# Patient Record
Sex: MALE | Race: BLACK OR AFRICAN AMERICAN | NOT HISPANIC OR LATINO | URBAN - METROPOLITAN AREA
[De-identification: names, ages, dates, MRNs, and addresses within clinical notes are randomized per-mention and may not be internally consistent; named-entity substitution may affect disease eponyms.]

---

## 2023-08-20 ENCOUNTER — INPATIENT (INPATIENT)
Facility: HOSPITAL | Age: 88
LOS: 22 days | Discharge: NURSING, REHAB OR LONG-TERM CARE | End: 2023-09-12
Attending: INTERNAL MEDICINE | Admitting: INTERNAL MEDICINE
Payer: MEDICARE

## 2023-08-20 VITALS
SYSTOLIC BLOOD PRESSURE: 98 MMHG | WEIGHT: 100.09 LBS | HEART RATE: 60 BPM | OXYGEN SATURATION: 99 % | RESPIRATION RATE: 26 BRPM | DIASTOLIC BLOOD PRESSURE: 57 MMHG | HEIGHT: 66 IN

## 2023-08-20 LAB
ACANTHOCYTES BLD QL SMEAR: SLIGHT — SIGNIFICANT CHANGE UP
ALBUMIN SERPL ELPH-MCNC: 2.1 G/DL — LOW (ref 3.3–5)
ALBUMIN SERPL ELPH-MCNC: 2.5 G/DL — LOW (ref 3.3–5)
ALP SERPL-CCNC: 69 U/L — SIGNIFICANT CHANGE UP (ref 40–120)
ALP SERPL-CCNC: 78 U/L — SIGNIFICANT CHANGE UP (ref 40–120)
ALT FLD-CCNC: 50 U/L — SIGNIFICANT CHANGE UP (ref 12–78)
ALT FLD-CCNC: 54 U/L — SIGNIFICANT CHANGE UP (ref 12–78)
AMMONIA BLD-MCNC: 46 UMOL/L — HIGH (ref 11–32)
ANION GAP SERPL CALC-SCNC: 11 MMOL/L — SIGNIFICANT CHANGE UP (ref 5–17)
ANION GAP SERPL CALC-SCNC: 26 MMOL/L — HIGH (ref 5–17)
ANISOCYTOSIS BLD QL: SLIGHT — SIGNIFICANT CHANGE UP
APAP SERPL-MCNC: <2 UG/ML — LOW (ref 10–30)
APPEARANCE UR: CLEAR — SIGNIFICANT CHANGE UP
AST SERPL-CCNC: 132 U/L — HIGH (ref 15–37)
AST SERPL-CCNC: 177 U/L — HIGH (ref 15–37)
BACTERIA # UR AUTO: ABNORMAL
BASE EXCESS BLDA CALC-SCNC: -15.6 MMOL/L — LOW (ref -2–3)
BASE EXCESS BLDA CALC-SCNC: -8.1 MMOL/L — LOW (ref -2–3)
BASE EXCESS BLDA CALC-SCNC: -9 MMOL/L — LOW (ref -2–3)
BASOPHILS # BLD AUTO: 0 K/UL — SIGNIFICANT CHANGE UP (ref 0–0.2)
BASOPHILS NFR BLD AUTO: 0 % — SIGNIFICANT CHANGE UP (ref 0–2)
BILIRUB SERPL-MCNC: 0.8 MG/DL — SIGNIFICANT CHANGE UP (ref 0.2–1.2)
BILIRUB SERPL-MCNC: 0.8 MG/DL — SIGNIFICANT CHANGE UP (ref 0.2–1.2)
BILIRUB UR-MCNC: NEGATIVE — SIGNIFICANT CHANGE UP
BLD GP AB SCN SERPL QL: SIGNIFICANT CHANGE UP
BLOOD GAS COMMENTS ARTERIAL: SIGNIFICANT CHANGE UP
BUN SERPL-MCNC: 37 MG/DL — HIGH (ref 7–23)
BUN SERPL-MCNC: 41 MG/DL — HIGH (ref 7–23)
BURR CELLS BLD QL SMEAR: PRESENT — SIGNIFICANT CHANGE UP
CALCIUM SERPL-MCNC: 7.7 MG/DL — LOW (ref 8.5–10.1)
CALCIUM SERPL-MCNC: 8.4 MG/DL — LOW (ref 8.5–10.1)
CHLORIDE SERPL-SCNC: 103 MMOL/L — SIGNIFICANT CHANGE UP (ref 96–108)
CHLORIDE SERPL-SCNC: 108 MMOL/L — SIGNIFICANT CHANGE UP (ref 96–108)
CK SERPL-CCNC: 4428 U/L — HIGH (ref 26–308)
CO2 BLDA-SCNC: 12 MMOL/L — LOW (ref 19–24)
CO2 BLDA-SCNC: 15 MMOL/L — LOW (ref 19–24)
CO2 BLDA-SCNC: 16 MMOL/L — LOW (ref 19–24)
CO2 SERPL-SCNC: 12 MMOL/L — LOW (ref 22–31)
CO2 SERPL-SCNC: 20 MMOL/L — LOW (ref 22–31)
COLOR SPEC: YELLOW — SIGNIFICANT CHANGE UP
COMMENT - URINE: SIGNIFICANT CHANGE UP
CREAT SERPL-MCNC: 2.64 MG/DL — HIGH (ref 0.5–1.3)
CREAT SERPL-MCNC: 3.4 MG/DL — HIGH (ref 0.5–1.3)
DACRYOCYTES BLD QL SMEAR: SLIGHT — SIGNIFICANT CHANGE UP
DIFF PNL FLD: ABNORMAL
EGFR: 17 ML/MIN/1.73M2 — LOW
EGFR: 23 ML/MIN/1.73M2 — LOW
ELLIPTOCYTES BLD QL SMEAR: SLIGHT — SIGNIFICANT CHANGE UP
EOSINOPHIL # BLD AUTO: 0 K/UL — SIGNIFICANT CHANGE UP (ref 0–0.5)
EOSINOPHIL NFR BLD AUTO: 0 % — SIGNIFICANT CHANGE UP (ref 0–6)
EPI CELLS # UR: SIGNIFICANT CHANGE UP
ETHANOL SERPL-MCNC: <10 MG/DL — SIGNIFICANT CHANGE UP (ref 0–10)
GAS PNL BLDA: SIGNIFICANT CHANGE UP
GAS PNL BLDA: SIGNIFICANT CHANGE UP
GLUCOSE SERPL-MCNC: 107 MG/DL — HIGH (ref 70–99)
GLUCOSE SERPL-MCNC: 88 MG/DL — SIGNIFICANT CHANGE UP (ref 70–99)
GLUCOSE UR QL: NEGATIVE MG/DL — SIGNIFICANT CHANGE UP
HCO3 BLDA-SCNC: 11 MMOL/L — LOW (ref 21–28)
HCO3 BLDA-SCNC: 14 MMOL/L — LOW (ref 21–28)
HCO3 BLDA-SCNC: 15 MMOL/L — LOW (ref 21–28)
HCT VFR BLD CALC: 21.8 % — LOW (ref 39–50)
HCT VFR BLD CALC: 24.2 % — LOW (ref 39–50)
HGB BLD-MCNC: 6.1 G/DL — CRITICAL LOW (ref 13–17)
HGB BLD-MCNC: 7.5 G/DL — LOW (ref 13–17)
HOROWITZ INDEX BLDA+IHG-RTO: 100 — SIGNIFICANT CHANGE UP
HOROWITZ INDEX BLDA+IHG-RTO: 100 — SIGNIFICANT CHANGE UP
HOROWITZ INDEX BLDA+IHG-RTO: 28 — SIGNIFICANT CHANGE UP
HYPOCHROMIA BLD QL: SLIGHT — SIGNIFICANT CHANGE UP
KETONES UR-MCNC: ABNORMAL
LACTATE SERPL-SCNC: 14.4 MMOL/L — CRITICAL HIGH (ref 0.7–2)
LACTATE SERPL-SCNC: 5.8 MMOL/L — CRITICAL HIGH (ref 0.7–2)
LACTATE SERPL-SCNC: 9.3 MMOL/L — CRITICAL HIGH (ref 0.7–2)
LEUKOCYTE ESTERASE UR-ACNC: NEGATIVE — SIGNIFICANT CHANGE UP
LYMPHOCYTES # BLD AUTO: 0.96 K/UL — LOW (ref 1–3.3)
LYMPHOCYTES # BLD AUTO: 7 % — LOW (ref 13–44)
MAGNESIUM SERPL-MCNC: 2.4 MG/DL — SIGNIFICANT CHANGE UP (ref 1.6–2.6)
MANUAL SMEAR VERIFICATION: SIGNIFICANT CHANGE UP
MCHC RBC-ENTMCNC: 18.6 PG — LOW (ref 27–34)
MCHC RBC-ENTMCNC: 20.3 PG — LOW (ref 27–34)
MCHC RBC-ENTMCNC: 28 G/DL — LOW (ref 32–36)
MCHC RBC-ENTMCNC: 31 G/DL — LOW (ref 32–36)
MCV RBC AUTO: 65.4 FL — LOW (ref 80–100)
MCV RBC AUTO: 66.5 FL — LOW (ref 80–100)
MICROCYTES BLD QL: SLIGHT — SIGNIFICANT CHANGE UP
MONOCYTES # BLD AUTO: 0.27 K/UL — SIGNIFICANT CHANGE UP (ref 0–0.9)
MONOCYTES NFR BLD AUTO: 2 % — SIGNIFICANT CHANGE UP (ref 2–14)
NEUTROPHILS # BLD AUTO: 12.46 K/UL — HIGH (ref 1.8–7.4)
NEUTROPHILS NFR BLD AUTO: 66 % — SIGNIFICANT CHANGE UP (ref 43–77)
NEUTS BAND # BLD: 25 % — HIGH (ref 0–8)
NITRITE UR-MCNC: NEGATIVE — SIGNIFICANT CHANGE UP
NRBC # BLD: 1 /100 WBCS — HIGH (ref 0–0)
NRBC # BLD: 1 /100 — HIGH (ref 0–0)
NRBC # BLD: SIGNIFICANT CHANGE UP /100 WBCS (ref 0–0)
OVALOCYTES BLD QL SMEAR: SLIGHT — SIGNIFICANT CHANGE UP
PCO2 BLDA: 24 MMHG — LOW (ref 32–46)
PCO2 BLDA: 25 MMHG — LOW (ref 32–46)
PCO2 BLDA: 28 MMHG — LOW (ref 32–46)
PH BLDA: 7.2 — LOW (ref 7.35–7.45)
PH BLDA: 7.38 — SIGNIFICANT CHANGE UP (ref 7.35–7.45)
PH BLDA: 7.39 — SIGNIFICANT CHANGE UP (ref 7.35–7.45)
PH UR: 5 — SIGNIFICANT CHANGE UP (ref 5–8)
PHOSPHATE SERPL-MCNC: 4.7 MG/DL — HIGH (ref 2.5–4.5)
PLAT MORPH BLD: NORMAL — SIGNIFICANT CHANGE UP
PLATELET # BLD AUTO: 348 K/UL — SIGNIFICANT CHANGE UP (ref 150–400)
PLATELET # BLD AUTO: 430 K/UL — HIGH (ref 150–400)
PO2 BLDA: 109 MMHG — HIGH (ref 83–108)
PO2 BLDA: 306 MMHG — HIGH (ref 83–108)
PO2 BLDA: 32 MMHG — CRITICAL LOW (ref 83–108)
POIKILOCYTOSIS BLD QL AUTO: SIGNIFICANT CHANGE UP
POLYCHROMASIA BLD QL SMEAR: SLIGHT — SIGNIFICANT CHANGE UP
POTASSIUM SERPL-MCNC: 4 MMOL/L — SIGNIFICANT CHANGE UP (ref 3.5–5.3)
POTASSIUM SERPL-MCNC: 4.2 MMOL/L — SIGNIFICANT CHANGE UP (ref 3.5–5.3)
POTASSIUM SERPL-SCNC: 4 MMOL/L — SIGNIFICANT CHANGE UP (ref 3.5–5.3)
POTASSIUM SERPL-SCNC: 4.2 MMOL/L — SIGNIFICANT CHANGE UP (ref 3.5–5.3)
PROT SERPL-MCNC: 6.3 GM/DL — SIGNIFICANT CHANGE UP (ref 6–8.3)
PROT SERPL-MCNC: 7.3 GM/DL — SIGNIFICANT CHANGE UP (ref 6–8.3)
PROT UR-MCNC: 30 MG/DL
RAPID RVP RESULT: DETECTED
RBC # BLD: 3.28 M/UL — LOW (ref 4.2–5.8)
RBC # BLD: 3.7 M/UL — LOW (ref 4.2–5.8)
RBC # FLD: 20.6 % — HIGH (ref 10.3–14.5)
RBC # FLD: 22.5 % — HIGH (ref 10.3–14.5)
RBC BLD AUTO: ABNORMAL
RBC CASTS # UR COMP ASSIST: ABNORMAL /HPF (ref 0–4)
RV+EV RNA SPEC QL NAA+PROBE: DETECTED
SALICYLATES SERPL-MCNC: 2 MG/DL — LOW (ref 2.8–20)
SAO2 % BLDA: 100 % — HIGH (ref 94–98)
SAO2 % BLDA: 31.8 % — LOW (ref 94–98)
SAO2 % BLDA: 99.4 % — HIGH (ref 94–98)
SARS-COV-2 RNA SPEC QL NAA+PROBE: SIGNIFICANT CHANGE UP
SCHISTOCYTES BLD QL AUTO: SLIGHT — SIGNIFICANT CHANGE UP
SODIUM SERPL-SCNC: 139 MMOL/L — SIGNIFICANT CHANGE UP (ref 135–145)
SODIUM SERPL-SCNC: 141 MMOL/L — SIGNIFICANT CHANGE UP (ref 135–145)
SP GR SPEC: 1.02 — SIGNIFICANT CHANGE UP (ref 1.01–1.02)
TROPONIN I, HIGH SENSITIVITY RESULT: 242.1 NG/L — HIGH
TROPONIN I, HIGH SENSITIVITY RESULT: 276.9 NG/L — HIGH
TSH SERPL-MCNC: 1.81 UIU/ML — SIGNIFICANT CHANGE UP (ref 0.36–3.74)
TSH SERPL-MCNC: 2.55 UIU/ML — SIGNIFICANT CHANGE UP (ref 0.36–3.74)
UROBILINOGEN FLD QL: NEGATIVE MG/DL — SIGNIFICANT CHANGE UP
WBC # BLD: 13.69 K/UL — HIGH (ref 3.8–10.5)
WBC # BLD: 9.27 K/UL — SIGNIFICANT CHANGE UP (ref 3.8–10.5)
WBC # FLD AUTO: 13.69 K/UL — HIGH (ref 3.8–10.5)
WBC # FLD AUTO: 9.27 K/UL — SIGNIFICANT CHANGE UP (ref 3.8–10.5)
WBC UR QL: SIGNIFICANT CHANGE UP

## 2023-08-20 PROCEDURE — 74176 CT ABD & PELVIS W/O CONTRAST: CPT | Mod: 26,MA

## 2023-08-20 PROCEDURE — 99223 1ST HOSP IP/OBS HIGH 75: CPT

## 2023-08-20 PROCEDURE — 93970 EXTREMITY STUDY: CPT | Mod: 26

## 2023-08-20 PROCEDURE — 71250 CT THORAX DX C-: CPT | Mod: 26,MA

## 2023-08-20 PROCEDURE — 93010 ELECTROCARDIOGRAM REPORT: CPT

## 2023-08-20 PROCEDURE — 99222 1ST HOSP IP/OBS MODERATE 55: CPT

## 2023-08-20 PROCEDURE — 99291 CRITICAL CARE FIRST HOUR: CPT

## 2023-08-20 PROCEDURE — 72125 CT NECK SPINE W/O DYE: CPT | Mod: 26,MA

## 2023-08-20 PROCEDURE — 70450 CT HEAD/BRAIN W/O DYE: CPT | Mod: 26

## 2023-08-20 PROCEDURE — 70450 CT HEAD/BRAIN W/O DYE: CPT | Mod: 26,MA,77

## 2023-08-20 RX ORDER — CEFTRIAXONE 500 MG/1
1000 INJECTION, POWDER, FOR SOLUTION INTRAMUSCULAR; INTRAVENOUS EVERY 24 HOURS
Refills: 0 | Status: DISCONTINUED | OUTPATIENT
Start: 2023-08-20 | End: 2023-08-21

## 2023-08-20 RX ORDER — VANCOMYCIN HCL 1 G
1000 VIAL (EA) INTRAVENOUS ONCE
Refills: 0 | Status: COMPLETED | OUTPATIENT
Start: 2023-08-20 | End: 2023-08-20

## 2023-08-20 RX ORDER — SODIUM CHLORIDE 9 MG/ML
1000 INJECTION, SOLUTION INTRAVENOUS
Refills: 0 | Status: DISCONTINUED | OUTPATIENT
Start: 2023-08-20 | End: 2023-08-20

## 2023-08-20 RX ORDER — SODIUM CHLORIDE 9 MG/ML
1000 INJECTION, SOLUTION INTRAVENOUS ONCE
Refills: 0 | Status: COMPLETED | OUTPATIENT
Start: 2023-08-20 | End: 2023-08-20

## 2023-08-20 RX ORDER — PIPERACILLIN AND TAZOBACTAM 4; .5 G/20ML; G/20ML
3.38 INJECTION, POWDER, LYOPHILIZED, FOR SOLUTION INTRAVENOUS ONCE
Refills: 0 | Status: DISCONTINUED | OUTPATIENT
Start: 2023-08-20 | End: 2023-08-20

## 2023-08-20 RX ORDER — HEPARIN SODIUM 5000 [USP'U]/ML
4000 INJECTION INTRAVENOUS; SUBCUTANEOUS EVERY 6 HOURS
Refills: 0 | Status: DISCONTINUED | OUTPATIENT
Start: 2023-08-20 | End: 2023-08-20

## 2023-08-20 RX ORDER — AZITHROMYCIN 500 MG/1
500 TABLET, FILM COATED ORAL ONCE
Refills: 0 | Status: COMPLETED | OUTPATIENT
Start: 2023-08-20 | End: 2023-08-20

## 2023-08-20 RX ORDER — PANTOPRAZOLE SODIUM 20 MG/1
40 TABLET, DELAYED RELEASE ORAL
Refills: 0 | Status: DISCONTINUED | OUTPATIENT
Start: 2023-08-20 | End: 2023-08-21

## 2023-08-20 RX ORDER — AMIODARONE HYDROCHLORIDE 400 MG/1
150 TABLET ORAL ONCE
Refills: 0 | Status: COMPLETED | OUTPATIENT
Start: 2023-08-20 | End: 2023-08-20

## 2023-08-20 RX ORDER — AZITHROMYCIN 500 MG/1
500 TABLET, FILM COATED ORAL EVERY 24 HOURS
Refills: 0 | Status: DISCONTINUED | OUTPATIENT
Start: 2023-08-21 | End: 2023-08-21

## 2023-08-20 RX ORDER — HEPARIN SODIUM 5000 [USP'U]/ML
INJECTION INTRAVENOUS; SUBCUTANEOUS
Qty: 25000 | Refills: 0 | Status: DISCONTINUED | OUTPATIENT
Start: 2023-08-20 | End: 2023-08-20

## 2023-08-20 RX ORDER — IPRATROPIUM BROMIDE 0.2 MG/ML
500 SOLUTION, NON-ORAL INHALATION EVERY 6 HOURS
Refills: 0 | Status: DISCONTINUED | OUTPATIENT
Start: 2023-08-20 | End: 2023-08-21

## 2023-08-20 RX ORDER — CHLORHEXIDINE GLUCONATE 213 G/1000ML
1 SOLUTION TOPICAL
Refills: 0 | Status: DISCONTINUED | OUTPATIENT
Start: 2023-08-20 | End: 2023-08-23

## 2023-08-20 RX ORDER — AMIODARONE HYDROCHLORIDE 400 MG/1
0.5 TABLET ORAL
Qty: 450 | Refills: 0 | Status: DISCONTINUED | OUTPATIENT
Start: 2023-08-20 | End: 2023-08-20

## 2023-08-20 RX ORDER — HEPARIN SODIUM 5000 [USP'U]/ML
5000 INJECTION INTRAVENOUS; SUBCUTANEOUS EVERY 8 HOURS
Refills: 0 | Status: DISCONTINUED | OUTPATIENT
Start: 2023-08-20 | End: 2023-09-02

## 2023-08-20 RX ORDER — HEPARIN SODIUM 5000 [USP'U]/ML
2000 INJECTION INTRAVENOUS; SUBCUTANEOUS EVERY 6 HOURS
Refills: 0 | Status: DISCONTINUED | OUTPATIENT
Start: 2023-08-20 | End: 2023-08-20

## 2023-08-20 RX ORDER — NOREPINEPHRINE BITARTRATE/D5W 8 MG/250ML
0.05 PLASTIC BAG, INJECTION (ML) INTRAVENOUS
Qty: 8 | Refills: 0 | Status: DISCONTINUED | OUTPATIENT
Start: 2023-08-20 | End: 2023-08-20

## 2023-08-20 RX ORDER — SODIUM CHLORIDE 9 MG/ML
1000 INJECTION INTRAMUSCULAR; INTRAVENOUS; SUBCUTANEOUS
Refills: 0 | Status: DISCONTINUED | OUTPATIENT
Start: 2023-08-20 | End: 2023-08-20

## 2023-08-20 RX ORDER — AZITHROMYCIN 500 MG/1
TABLET, FILM COATED ORAL
Refills: 0 | Status: DISCONTINUED | OUTPATIENT
Start: 2023-08-20 | End: 2023-08-21

## 2023-08-20 RX ORDER — PIPERACILLIN AND TAZOBACTAM 4; .5 G/20ML; G/20ML
3.38 INJECTION, POWDER, LYOPHILIZED, FOR SOLUTION INTRAVENOUS ONCE
Refills: 0 | Status: COMPLETED | OUTPATIENT
Start: 2023-08-20 | End: 2023-08-20

## 2023-08-20 RX ORDER — SODIUM CHLORIDE 9 MG/ML
1500 INJECTION INTRAMUSCULAR; INTRAVENOUS; SUBCUTANEOUS ONCE
Refills: 0 | Status: COMPLETED | OUTPATIENT
Start: 2023-08-20 | End: 2023-08-20

## 2023-08-20 RX ORDER — AMIODARONE HYDROCHLORIDE 400 MG/1
1 TABLET ORAL
Qty: 450 | Refills: 0 | Status: DISCONTINUED | OUTPATIENT
Start: 2023-08-20 | End: 2023-08-20

## 2023-08-20 RX ADMIN — CEFTRIAXONE 100 MILLIGRAM(S): 500 INJECTION, POWDER, FOR SOLUTION INTRAMUSCULAR; INTRAVENOUS at 15:30

## 2023-08-20 RX ADMIN — AMIODARONE HYDROCHLORIDE 618 MILLIGRAM(S): 400 TABLET ORAL at 13:18

## 2023-08-20 RX ADMIN — SODIUM CHLORIDE 1000 MILLILITER(S): 9 INJECTION, SOLUTION INTRAVENOUS at 09:15

## 2023-08-20 RX ADMIN — HEPARIN SODIUM 5000 UNIT(S): 5000 INJECTION INTRAVENOUS; SUBCUTANEOUS at 21:15

## 2023-08-20 RX ADMIN — AZITHROMYCIN 255 MILLIGRAM(S): 500 TABLET, FILM COATED ORAL at 16:18

## 2023-08-20 RX ADMIN — Medication 500 MICROGRAM(S): at 18:18

## 2023-08-20 RX ADMIN — SODIUM CHLORIDE 1500 MILLILITER(S): 9 INJECTION INTRAMUSCULAR; INTRAVENOUS; SUBCUTANEOUS at 05:09

## 2023-08-20 RX ADMIN — CHLORHEXIDINE GLUCONATE 1 APPLICATION(S): 213 SOLUTION TOPICAL at 15:30

## 2023-08-20 RX ADMIN — Medication 250 MILLIGRAM(S): at 06:10

## 2023-08-20 RX ADMIN — Medication 4.26 MICROGRAM(S)/KG/MIN: at 05:57

## 2023-08-20 RX ADMIN — Medication 500 MICROGRAM(S): at 23:10

## 2023-08-20 RX ADMIN — PIPERACILLIN AND TAZOBACTAM 3.38 GRAM(S): 4; .5 INJECTION, POWDER, LYOPHILIZED, FOR SOLUTION INTRAVENOUS at 05:45

## 2023-08-20 RX ADMIN — Medication 0.05 MICROGRAM(S)/KG/MIN: at 06:16

## 2023-08-20 RX ADMIN — PANTOPRAZOLE SODIUM 40 MILLIGRAM(S): 20 TABLET, DELAYED RELEASE ORAL at 17:16

## 2023-08-20 RX ADMIN — SODIUM CHLORIDE 100 MILLILITER(S): 9 INJECTION, SOLUTION INTRAVENOUS at 15:30

## 2023-08-20 RX ADMIN — PIPERACILLIN AND TAZOBACTAM 200 GRAM(S): 4; .5 INJECTION, POWDER, LYOPHILIZED, FOR SOLUTION INTRAVENOUS at 05:12

## 2023-08-20 RX ADMIN — Medication 4.26 MICROGRAM(S)/KG/MIN: at 13:18

## 2023-08-20 NOTE — CONSULT NOTE ADULT - ASSESSMENT
ASSESSMENT :   ==============  88 year old male with unknown medical history , presents to hospital via EMS after being found down on street. Patient awake , denies PMH.   On admission patient hypothermic, Afib RVR - Rate in 150's.   Ct Chest + B/L consolidation suggestive of pneumonia       PLAN:  ========  NEURO:  -neuro checks per routine   -CT head with age indeterminate SDH 4mm    PULM:  -pulse ox w/ poor wave form  -ABG - O2 saturation ok -  -titrate to Nasal cannula 2L        -maintain O2 >92    -Ct scan with honeycombing - likely emphysema  - will continue ipratropium Q 8  (duo nebs once HR controlled)  -chest PT        CV:  -Started on low dose  norepinephrine for hypotension  -likely will be able to d/c once HR controlled   - Afib RVR - rate in the 150's  - Amio bolus X 1   -cardiology consulted   -elevated troponin - should trend although likely demand from tachycardia   -Maintain MAP > 65    GI:  -Advance diet as tolerated   -Dysphagia   - pantoprazole  Injectable 40 milliGRAM(s)    RENAL:  -elevated BUN/Creatine --> unsure baseline   -likely dehydrated   -monitor replete electrolytes   -maintain urine output > 0.5cc/kg/hr     :  KELLIE yes [ X ] NO [  ] insertion date   08-20-23 @ 04:59    ID:  -suspected pneumonia  - MRSA PCR/ Legionella   - trend lactate 14--> 9.3-->   - continue ceftriaxone/ azithromycin for CAP   - received vanco/ zosyn in ED   - send sputum Cx, Blood culture     ENDO:  -check TSH   -cortisol level in AM   -check FS Q6, Hg A1C     HEME:  - anemia   - s/p PRBC X 1     MUSC:   -Physical therapy when stable       Goals of Care:   Advanced Directives :

## 2023-08-20 NOTE — CONSULT NOTE ADULT - CRITICAL CARE ATTENDING COMMENT
88M w/ no known prior hx. Found down, brought in by EMS. In ED, pt found to be hypothermic, hypoxic and tachycardic in Afib w/ RVR to 150s. Pt's BP was stable throughout his ED stay. Labs revealed bandemia, anemia, lactic acidosis and ANGI. He received IVF and abx. Imaging revealed multifocal pneumonia. Initially seen by ICU and did not require critical care. Pt's BP reportedly dropped while getting blood and was started on low dose norepi, so we were re-consulted. Pt on norepi 0.03 w/ BP 110s/70s. Accepted to ICU.     #Neuro - CTH showed old SDH w/ interval repeat CT showing unchanged imaging; neuro checks per protocol   #CV - pressors turned off when arrived to ICU, appeared very dry initially and anemic; avoid further fluid for now as IVC is plethoric; elevated cardiac enzymes likely due to demand; amiodarone for afib w/ RVR; get TTE   #Pulm - satting well on 2L NC, confirmed by ABG  #ID- CAP coverage w/ ceftriaxone and azithromycin; f/u blood cx, MRSA PCR, urine legionella; check RVP  #Renal/metabolic - ANGI (unknown baseline) likely prerenal ATN; monitor I/Os, electrolytes; lactate is clearing  #GI- bedside dysphagia screen, no need for PPI if can eat  #Heme - acute blood loss anemia, unknown baseline however, no clear bleeding from anywhere, plan for 1-2 pRBCs and monitor  #Endo - check hgba1c; check FS tonight  #PPx - HSQ q8  #Dispo- admit to ICU for close monitoring and possible need for pressors; prognosis guarded; full code    Family contacted and aware that pt is in the hospital

## 2023-08-20 NOTE — ED ADULT NURSE REASSESSMENT NOTE - NS ED NURSE REASSESS COMMENT FT1
Patient alert and oriented. Patient is on oxygen via nonrebreather pulse oxygenation reading low. Dr. Marte made aware. ABG to be ordered.

## 2023-08-20 NOTE — ED ADULT NURSE REASSESSMENT NOTE - NS ED NURSE REASSESS COMMENT FT1
Spoke to pt who said he still makes urine and is continent. Dr. Pete approved of waiting on beavers cath at this time, will give patient a chance to give sample on his own. Levophed d/oscar, pt has adequate BP with fluids. Still unable to obtain SPO2 reading d/t cold, ABG results pending. Continuous cardiac monitoring in place. Hypothermia blanket in place as well. Plan of care ongoing.

## 2023-08-20 NOTE — ED ADULT TRIAGE NOTE - CHIEF COMPLAINT QUOTE
poor historian pt found lying down in street. PT tachypneic speaking in , utilizing accessory muscles , speaking incomplete sentences. denies pain. unable to take temp in traige

## 2023-08-20 NOTE — PROVIDER CONTACT NOTE (CRITICAL VALUE NOTIFICATION) - DATE AND TIME:
20-Aug-2023 15:53 Valtrex Pregnancy And Lactation Text: this medication is Pregnancy Category B and is considered safe during pregnancy. This medication is not directly found in breast milk but it's metabolite acyclovir is present.

## 2023-08-20 NOTE — ED PROVIDER NOTE - OBJECTIVE STATEMENT
89 yo M bibems after being found down outside.  Unknown history.  Pt. is minimally responsive, altered, unable to provide history.  Rectal temp found to be 90 degrees.  No prior visits from chart review.    ROS: unobtainable from patient  PMH: unknown; Meds: unknown; SH: unknown

## 2023-08-20 NOTE — PATIENT PROFILE ADULT - FALL HARM RISK - HARM RISK INTERVENTIONS

## 2023-08-20 NOTE — CONSULT NOTE ADULT - SUBJECTIVE AND OBJECTIVE BOX
88 year old gentleman brought in by EMS , found lying on ground. Patient hypothermic, hypoxic on admission, AMS - confused, unknown medical History.   ICU initially consulted for hypothermia, hypoxia.   On ICU  examination patient awake, alert , knows birth date, states he has a son and daughter unable to give us more infromation, states that he takes no medicine and still smoke occasionally.   Patient Afib RVR  to  150's , BP low but stable, patient apppears dehydrated, skin tenting, mucous memebrane appears dry.    ICU CONSULT  ===============    88 year old gentleman brought in by EMS , found lying on ground. Patient hypothermic, hypoxic on admission, AMS - confused, unknown medical History.   ICU initially consulted for hypothermia, hypoxia, in the setting of sepsis elevated lactate- 14.     On ICU examination patient wake, alert , 's Afib rvr, BP - stable  120//76, Unable to obtain pulse oximetry reading , ( shows 95 but without appropriate wave form) - will obtain ABG.  CT scan Chest - Bibasilar patchy consolidations suggesting pneumonia. Superimposed emphysematous lung change and interstitial lung disease/honeycombing at the lung bases.  CT Head: Thin indeterminate age left frontal convexity subdural hematoma measuring up to 4 mm.     LABs show elevated [id number]-->9, Bands 25%, anemia H/H 6.1/21.8    On ICU  examination patient awake, alert, knows birth date, states he has a son and daughter unable to give us more information,    states that he takes no medicine and still smokes occasionally.     12:00 - ICU recalled  now requiring low dose vasopressors   -Will transfer to ICU for further monitoring       HPI:  87 yo M     bibems after being found down outside.    no  pmx. pe r pt  now    per  er  chart ,on  arrival,  pt  was  minimally responsive, altered, unable to provide history.    and  had  a ectal temp   90 degrees.  No prior visits from chart review   was hypotensive/  hypotehrmic/    elevated  lactate/  acidotic, severe  anemia on  arrival/ and  ,per  ER Dr , ICU  eval  was  called/ and  was rejected /  note currently, pending    pt  unable  to  clearly   state  if  eh had  any  trauma  or how  he fell    icu  re  eval  called again by current   er  dr/  awaiting   consult   pt  seen  in  er/  awake  and  alert,  somewhat  able  to  answer  questions , though   not  fully    denies  any  cp/sob/abd  pain/  recall   bleeding  also  denies   any  cardiac  hx/  priro  strokes/  dm,  etc   states  he  lives  with  his  family/ address /  phone  numbers  of  family    currently  unknown   (20 Aug 2023 10:35)      Allergies  No Known Allergies    Home Medications:  -unable to obtain     SOCIAL HX:     Smoking : [ X ] Current daily  [  ] former  [  ] never         ETOH/Illicit drugs: [X ] denies  [  ] current use :           Occupation:     PAST MEDICAL & SURGICAL HISTORY:  No pertinent past medical history    FAMILY HISTORY:  No known cardiovascular or pulmonary family history     ROS:  See HPI     PHYSICAL EXAM  ============    ICU Vital Signs Last 24 Hrs  T(C): 34.8 (20 Aug 2023 13:02), Max: 34.8 (20 Aug 2023 13:02)  T(F): 94.7 (20 Aug 2023 13:02), Max: 94.7 (20 Aug 2023 13:02)  HR: 149 (20 Aug 2023 13:02) (60 - 159)  BP: 123/60 (20 Aug 2023 13:02) (79/52 - 149/74)  RR: 37 (20 Aug 2023 13:02) (26 - 38)  SpO2: 99% (20 Aug 2023 04:21) (99% - 99%)    O2 Parameters below as of 20 Aug 2023 04:21  Patient On (Oxygen Delivery Method): room air    General: Not in distress  HEENT:  ARELIS              Lymphatic system: No LN  Lungs: Bilateral BS, CTA  NO wheezing , rales , crackles   Cardiovascular: TAchycardia, RRR No murmur   Gastrointestinal: Soft, Positive BS  Musculoskeletal: No clubbing.  Moves all extremities.    Skin: Warm.  Intact  Neurological: No motor or sensory deficit         LABS:                          6.1    13.69 )-----------( 430      ( 20 Aug 2023 05:00 )             21.8      08-20    141  |  103  |  37<H>  ----------------------------<  88  4.0   |  12<L>  |  3.40<H>    Ca    8.4<L>      20 Aug 2023 05:00    TPro  7.3  /  Alb  2.5<L>  /  TBili  0.8  /  DBili  x   /  AST  132<H>  /  ALT  50  /  AlkPhos  78  08-20           LIVER FUNCTIONS - ( 20 Aug 2023 05:00 )  Alb: 2.5 g/dL / Pro: 7.3 gm/dL / ALK PHOS: 78 U/L / ALT: 50 U/L / AST: 132 U/L / GGT: x                                                                                                                            Urinalysis Basic - ( 20 Aug 2023 08:17 )    Color: Yellow / Appearance: Clear / S.020 / pH: x  Gluc: x / Ketone: Trace  / Bili: Negative / Urobili: Negative mg/dL   Blood: x / Protein: 30 mg/dL / Nitrite: Negative   Leuk Esterase: Negative / RBC: 6-10 /HPF / WBC 0-2   Sq Epi: x / Non Sq Epi: x / Bacteria: Moderate                                        ABG - ( 20 Aug 2023 11:56 )  pH, Arterial: 7.39  pH, Blood: x     /  pCO2: 25    /  pO2: 109   / HCO3: 15    / Base Excess: -8.1  /  SaO2: 99.4        RADIOLOGY  ==========  < from: CT Head No Cont (23 @ 12:29) >  FINDINGS:  Similar small left frontal convexity mixed density subdural collection   measuring 5.3 mm in thickness. 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  There is periventricular and subcortical white matter hypodensity without   mass effect, nonspecific, likely representing mild chronic microvascular   ischemic changes. There is no compelling evidence for an acute   transcortical infarction. There is no evidence of mass, mass effect,   midline shift or other extra-axial fluid collection. The lateral   ventricles and cortical sulci are age-appropriate in size and   configuration. Mild inflammatory mucosal changes are seen throughout the   various portions of the paranasal sinuses. The orbits and mastoid air   cells are unremarkable. The calvarium is intact. Consider MRI as   clinically warranted.    IMPRESSION:  Similar left frontal subdural collection. Mild chronic   microvascular changes without evidence of an acute transcortical   infarction or hemorrhage.    < end of copied text >  < from: CT Abdomen and Pelvis No Cont (23 @ 06:49) >  FINDINGS:  CHEST:  LUNGS AND LARGE AIRWAYS: Patent central airways. Bibasilar patchy   consolidations suggesting pneumonia. Superimposed emphysematous lung   change and interstitial lung disease/honeycombing at the lung bases..  PLEURA: No pleural effusion or pneumothorax.  VESSELS: Limited evaluation of the thoracic aorta without intravenous   contrast, however there is calcific atherosclerosis without aneurysmal   dilatation.  HEART: Borderline enlarged heart.. No pericardial effusion. Coronary   artery calcifications. Anemia suggested.  MEDIASTINUM AND STACY: No lymphadenopathy.  CHEST WALL AND LOWER NECK: Within normal limits.      ABDOMEN AND PELVIS:  LIVER: Within normal limits.  BILE DUCTS: Normal caliber.  GALLBLADDER: Elongated gallbladder without definite calcified gallstone..  SPLEEN: Within normal limits.  PANCREAS: Within normal limits.  ADRENALS: Normal right adrenal gland. Limited left adrenal gland.  KIDNEYS/URETERS: Small bilateral kidneys. No renal stone or   hydronephrosis.    BLADDER: Within normal limits.  REPRODUCTIVE ORGANS: Prostate gland is not grossly enlarged.    BOWEL: Evaluation of bowel is limited without distention with oral   contrast and lack of mesenteric fat, however there is no bowel   obstruction. There is a 7 cm stool distended rectum. Small bowel loops   are not grossly dilated. Stomach is mildly distended with debris.  PERITONEUM: No free air or significant ascites.  VESSELS:  Limited evaluation of the abdominal aorta without intravenous   contrast, demonstrates tortuosity and calcific atherosclerosis without   aneurysmal dilatation. Negative  RETROPERITONEUM: No lymphadenopathy.  ABDOMINAL WALL: Within normal limits.  BONES: Severe scoliosis of the spine with associated multilevel   degenerative changes. Question pagetoid appearance of the right iliac   bone.    IMPRESSION:    Bilateral pneumonia.    ECHO:    MEDICATIONS  (STANDING):  chlorhexidine 2% Cloths 1 Application(s) Topical <User Schedule>  norepinephrine Infusion 0.05 MICROgram(s)/kG/Min (4.26 mL/Hr) IV Continuous <Continuous>  pantoprazole  Injectable 40 milliGRAM(s) IV Push two times a day  piperacillin/tazobactam IVPB. 3.375 Gram(s) IV Intermittent once  piperacillin/tazobactam IVPB.- 3.375 Gram(s) IV Intermittent once  sodium chloride 0.9%. 1000 milliLiter(s) (100 mL/Hr) IV Continuous <Continuous>    MEDICATIONS  (PRN):             ICU CONSULT  ===============    88 year old gentleman brought in by EMS , found lying on ground. Patient hypothermic, hypoxic on admission, AMS - confused, unknown medical History.   ICU initially consulted for hypothermia, hypoxia, in the setting of sepsis elevated lactate- 14.     On ICU examination patient wake, alert , 's Afib rvr, BP - stable  120//76, Unable to obtain pulse oximetry reading , ( shows 95 but without appropriate wave form) - will obtain ABG.  CT scan Chest - Bibasilar patchy consolidations suggesting pneumonia. Superimposed emphysematous lung change and interstitial lung disease/honeycombing at the lung bases.  CT Head: Thin indeterminate age left frontal convexity subdural hematoma measuring up to 4 mm.     LABs show elevated [id number]-->9, Bands 25%, anemia H/H 6.1/21.8  ABG 7.39/25/109/15 ( 2L NC )     On ICU  examination patient awake, alert, knows birth date, states he has a son and daughter unable to give us more information, states that he takes no medicine and still smokes occasionally.   Patient appears thin, denies medical history , states that he still smokes occasionally.     12:00 - ICU recalled  now requiring low dose vasopressors   -Will transfer to ICU for further monitoring       HPI:  87 yo M     bibems after being found down outside.    no  pmx. pe r pt  now    per  er  chart ,on  arrival,  pt  was  minimally responsive, altered, unable to provide history.    and  had  a ectal temp   90 degrees.  No prior visits from chart review   was hypotensive/  hypotehrmic/    elevated  lactate/  acidotic, severe  anemia on  arrival/ and  ,per  ER Dr , ICU  eval  was  called/ and  was rejected /  note currently, pending    pt  unable  to  clearly   state  if  eh had  any  trauma  or how  he fell    icu  re  eval  called again by current   er  dr/  awaiting   consult   pt  seen  in  er/  awake  and  alert,  somewhat  able  to  answer  questions , though   not  fully    denies  any  cp/sob/abd  pain/  recall   bleeding  also  denies   any  cardiac  hx/  priro  strokes/  dm,  etc   states  he  lives  with  his  family/ address /  phone  numbers  of  family    currently  unknown   (20 Aug 2023 10:35)      Allergies  No Known Allergies    Home Medications:  -unable to obtain     SOCIAL HX:     Smoking : [ X ] Current daily  [  ] former  [  ] never         ETOH/Illicit drugs: [X ] denies  [  ] current use :           Occupation:     PAST MEDICAL & SURGICAL HISTORY:  No pertinent past medical history    FAMILY HISTORY:  No known cardiovascular or pulmonary family history     ROS:  See HPI     PHYSICAL EXAM  ============    ICU Vital Signs Last 24 Hrs  T(C): 34.8 (20 Aug 2023 13:02), Max: 34.8 (20 Aug 2023 13:02)  T(F): 94.7 (20 Aug 2023 13:02), Max: 94.7 (20 Aug 2023 13:02)  HR: 149 (20 Aug 2023 13:02) (60 - 159)  BP: 123/60 (20 Aug 2023 13:02) (79/52 - 149/74)  RR: 37 (20 Aug 2023 13:02) (26 - 38)  SpO2: 99% (20 Aug 2023 04:21) (99% - 99%)    O2 Parameters below as of 20 Aug 2023 04:21  Patient On (Oxygen Delivery Method): room air    General: Not in distress  HEENT:  ARELIS              Lymphatic system: No LN  Lungs: Bilateral BS, CTA  NO wheezing , rales , crackles   Cardiovascular: TAchycardia, RRR No murmur   Gastrointestinal: Soft, Positive BS  Musculoskeletal: No clubbing.  Moves all extremities.    Skin: Warm.  Intact  Neurological: No motor or sensory deficit         LABS:                          6.1    13.69 )-----------( 430      ( 20 Aug 2023 05:00 )             21.8      08-20    141  |  103  |  37<H>  ----------------------------<  88  4.0   |  12<L>  |  3.40<H>    Ca    8.4<L>      20 Aug 2023 05:00    TPro  7.3  /  Alb  2.5<L>  /  TBili  0.8  /  DBili  x   /  AST  132<H>  /  ALT  50  /  AlkPhos  78  08-20           LIVER FUNCTIONS - ( 20 Aug 2023 05:00 )  Alb: 2.5 g/dL / Pro: 7.3 gm/dL / ALK PHOS: 78 U/L / ALT: 50 U/L / AST: 132 U/L / GGT: x                                                                                                                            Urinalysis Basic - ( 20 Aug 2023 08:17 )    Color: Yellow / Appearance: Clear / S.020 / pH: x  Gluc: x / Ketone: Trace  / Bili: Negative / Urobili: Negative mg/dL   Blood: x / Protein: 30 mg/dL / Nitrite: Negative   Leuk Esterase: Negative / RBC: 6-10 /HPF / WBC 0-2   Sq Epi: x / Non Sq Epi: x / Bacteria: Moderate                                        ABG - ( 20 Aug 2023 11:56 )  pH, Arterial: 7.39  pH, Blood: x     /  pCO2: 25    /  pO2: 109   / HCO3: 15    / Base Excess: -8.1  /  SaO2: 99.4        RADIOLOGY  ==========  < from: CT Head No Cont (23 @ 12:29) >  FINDINGS:  Similar small left frontal convexity mixed density subdural collection   measuring 5.3 mm in thickness. 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  There is periventricular and subcortical white matter hypodensity without   mass effect, nonspecific, likely representing mild chronic microvascular   ischemic changes. There is no compelling evidence for an acute   transcortical infarction. There is no evidence of mass, mass effect,   midline shift or other extra-axial fluid collection. The lateral   ventricles and cortical sulci are age-appropriate in size and   configuration. Mild inflammatory mucosal changes are seen throughout the   various portions of the paranasal sinuses. The orbits and mastoid air   cells are unremarkable. The calvarium is intact. Consider MRI as   clinically warranted.    IMPRESSION:  Similar left frontal subdural collection. Mild chronic   microvascular changes without evidence of an acute transcortical   infarction or hemorrhage.    < end of copied text >  < from: CT Abdomen and Pelvis No Cont (23 @ 06:49) >  FINDINGS:  CHEST:  LUNGS AND LARGE AIRWAYS: Patent central airways. Bibasilar patchy   consolidations suggesting pneumonia. Superimposed emphysematous lung   change and interstitial lung disease/honeycombing at the lung bases..  PLEURA: No pleural effusion or pneumothorax.  VESSELS: Limited evaluation of the thoracic aorta without intravenous   contrast, however there is calcific atherosclerosis without aneurysmal   dilatation.  HEART: Borderline enlarged heart.. No pericardial effusion. Coronary   artery calcifications. Anemia suggested.  MEDIASTINUM AND STACY: No lymphadenopathy.  CHEST WALL AND LOWER NECK: Within normal limits.      ABDOMEN AND PELVIS:  LIVER: Within normal limits.  BILE DUCTS: Normal caliber.  GALLBLADDER: Elongated gallbladder without definite calcified gallstone..  SPLEEN: Within normal limits.  PANCREAS: Within normal limits.  ADRENALS: Normal right adrenal gland. Limited left adrenal gland.  KIDNEYS/URETERS: Small bilateral kidneys. No renal stone or   hydronephrosis.    BLADDER: Within normal limits.  REPRODUCTIVE ORGANS: Prostate gland is not grossly enlarged.    BOWEL: Evaluation of bowel is limited without distention with oral   contrast and lack of mesenteric fat, however there is no bowel   obstruction. There is a 7 cm stool distended rectum. Small bowel loops   are not grossly dilated. Stomach is mildly distended with debris.  PERITONEUM: No free air or significant ascites.  VESSELS:  Limited evaluation of the abdominal aorta without intravenous   contrast, demonstrates tortuosity and calcific atherosclerosis without   aneurysmal dilatation. Negative  RETROPERITONEUM: No lymphadenopathy.  ABDOMINAL WALL: Within normal limits.  BONES: Severe scoliosis of the spine with associated multilevel   degenerative changes. Question pagetoid appearance of the right iliac   bone.    IMPRESSION:    Bilateral pneumonia.    ECHO:    MEDICATIONS  (STANDING):  chlorhexidine 2% Cloths 1 Application(s) Topical <User Schedule>  norepinephrine Infusion 0.05 MICROgram(s)/kG/Min (4.26 mL/Hr) IV Continuous <Continuous>  pantoprazole  Injectable 40 milliGRAM(s) IV Push two times a day  piperacillin/tazobactam IVPB. 3.375 Gram(s) IV Intermittent once  piperacillin/tazobactam IVPB.- 3.375 Gram(s) IV Intermittent once  sodium chloride 0.9%. 1000 milliLiter(s) (100 mL/Hr) IV Continuous <Continuous>    MEDICATIONS  (PRN):

## 2023-08-20 NOTE — H&P ADULT - ASSESSMENT
87 yo M     bibems after being found down outside.    no  pmx. pe r pt  now    per  er  chart ,on  arrival,  pt  was  minimally responsive, altered, unable to provide history.    and  had  a ectal temp   90 degrees.  No prior visits from chart review   was hypotensive/  hypotehrmic/    elevated  lactate/  acidotic, severe  anemia on  arrival/ and  ,per  ER Dr , ICU  eval  was  called/ and  was rejected /  note currently, pending      icu  re  eval  called again by current   er  dr/  awaiting   consult   pt  seen  in  er/  awake  and  alert,  somewhat  able  to  answer  questions , though   not  fully    denies  any  cp/sob/abd  pain/  recall   bleeding  also  denies   any  cardiac  hx/  priro  strokes/  dm,  etc   states  he  lives  with  his  family/ address /  phone  numbers  of  family    currently  unknown       found  down  outside.  arrived   via  ems   septic  shock  on arrival,  with  hypothermia,  hypotension,  ANGI,           acidosis  , lactate  of 14,         wbc  13,000,  hb 6/21,, + troponin . on non rebreather   mask/ hasfoley    CT head, sub  dural  hematoma       needs  N/S    eval/  rot  ct head/  neuro  checks      discussed  with er  dr  again.  who  never  informed  me  of  SDH/      arron pimentel  stated,  he is  aware   and  will  now  call N/S   at tertiary  facility    pt  is   critical  and  needs  icu level  of  care   still  awaiting ICU  eval   on iv  fluids/ iv  zposyn    anemia,   no  rectal  bleed            stool  guaiac   iron  studies/ prbc/  iv  protonix/  npo    ekg,  not  done/  ordered    rpt ABG  pending   dvt  ppx/  ekg  ordered   rpt labs ordered   card/ icu/ gi dr everett saenz/ID  d sue boone, / renal  edmund grant  called  pt  still  in er/ awaiting   higher level  of care                             rad< from: CT Chest No Cont (08.20.23 @ 06:49) >  INDINGS:  CHEST:  LUNGS AND LARGE AIRWAYS: Patent central airways. Bibasilar patchy   consolidations suggesting pneumonia. Superimposed emphysematous lung   change and interstitial lung disease/honeycombing at the lung bases..  PLEURA: No pleural effusion or pneumothorax.  VESSELS: Limited evaluation of the thoracic aorta without intravenous   contrast, however there is calcific atherosclerosis without aneurysmal   dilatation.  HEART: Borderline enlarged heart.. No pericardial effusion. Coronary   artery calcifications. Anemia suggested.  MEDIASTINUM AND STACY: No lymphadenopathy.  CHEST WALL AND LOWER NECK: Within normal limits.    ABDOMEN AND PELVIS:  LIVER: Within normal limits.  BILE DUCTS: Normal caliber.  GALLBLADDER: Elongated gallbladder without definite calcified gallstone..  SPLEEN: Within normal limits.  PANCREAS: Within normal limits.  ADRENALS: Normal right adrenal gland. Limited left adrenal gland.  KIDNEYS/URETERS: Small bilateral kidneys. No renal stone or   hydronephrosis.  BLADDER: Within normal limits.  REPRODUCTIVE ORGANS: Prostate gland is not grossly enlarged.  BOWEL: Evaluation of bowel is limited without distention with oral   contrast and lack of mesenteric fat, however there is no bowel   obstruction. There is a 7 cm stool distended rectum. Small bowel loops   are not grossly dilated. Stomach is mildly distended with debris.  PERITONEUM: No free air or significant ascites.  VESSELS:  Limited evaluation of the abdominal aorta without intravenous   contrast, demonstrates tortuosity and calcific atherosclerosis without   aneurysmal dilatation. Negative  RETROPERITONEUM: No lymphadenopathy.  ABDOMINAL WALL: Within normal limits.  BONES: Severe scoliosis of the spine with associated multilevel   degenerative changes. Question pagetoid appearance of the right iliac   bone.  IMPRESSION:  Bilateral pneumonia.  Additional findings as above.  --- End of Report ---    MARÍA RO MD; Attending Radiologist  This document has been  < end of copied text >                           rad< from: CT Head No Cont (08.20.23 @ 06:48) >    IMPRESSION:    Thin indeterminate age left frontal convexity subdural hematoma measuring   up to 4 mm. Asymmetric low-attenuation right frontal, parietal, and   temporal convexity subdural collection which may represent an old   subdural hematoma or hygroma.No significant mass effect. No acute   osseous fracture.    Findings were discussed with Dr. Pete of the emergency Department at 6:47   AM on 8/20/2023.    --- End of Report ---        < end of copied text >       89 yo M     bibems after being found down outside.      no  pmx. per pt  now    per  er  chart ,on  arrival,  pt  was  minimally responsive, altered, unable to provide history.      and  had  a ectal temp   90 degrees.  No prior visits   here   was hypotensive/  hypotehrmic/  elevated  lactate/  acidotic, severe  anemia on  arrival/ and  ,per  Er  Dr , ICU  eval  was  called/ and  was rejected /  note currently, pending      icu  re  eval  called again by current   er  dr/  awaiting   consult   pt  seen  in  er/  awake  and  alert,  somewhat  able  to  answer  questions , though   not  fully    denies  any  cp/sob/abd  pain/  recall   bleeding  also  denies   any  cardiac  hx/  prior  strokes/  dm,  etc   states  he  lives  with  his  family/ address /  phone  numbers  of  family    currently  unknown       found  down  outside.  arrived   via  ems   septic  shock  on arrival,.  with  organ dysfunction,  with  hypothermia,  hypotension,  ANGI,           acidosis  , lactate  of 14. pna on  ct/ ?  aspiration          wbc  13,000,  hb 6/21,, + troponin . on non rebreather   mask/ has beavers    CT head, sub  dural  hematoma         needs  N/S    eval/  rot  ct head/  neuro  checks         discussed  with er  dr  again.  who  never  informed  me  of  SDH         usually  pts  are transferred from er  to  tertiary facility for  N/S  eval/ intervention          [facility]  [facility],  [facility] is  aware  and  will  now  call N/S ,tertiary  facility    pt  is   critical  and  needs  icu level  of  care   still  awaiting ICU  eval   on iv  fluids,  sbp  has  improved    on   iv  Zosyn .  follow  bcx/ ucx     Anemia,   no  rectal  bleed         stool  guaiac   iron  studies/ prbc/  iv  protonix/  npo/  gi  dr floyd      ekg,  not  done/ and   ordered      rpt ABG  pending       rpt  Ct head, ordered      rpt labs ordered      echo ordered/  renal  u/s  ordered      Consults  called //  card,   gi dr everett saenz/ID  dr boone,                                                            renal  dr grant / pulm dr anderson      pt  still  in er/  pt  will benefit  from higher  level  of care   low  threshold   for  rrt, if  pt de compensates, pt in er  at present                               rad< from: CT Chest No Cont (08.20.23 @ 06:49) >  INDINGS:  CHEST:  LUNGS AND LARGE AIRWAYS: Patent central airways. Bibasilar patchy   consolidations suggesting pneumonia. Superimposed emphysematous lung   change and interstitial lung disease/honeycombing at the lung bases..  PLEURA: No pleural effusion or pneumothorax.  VESSELS: Limited evaluation of the thoracic aorta without intravenous   contrast, however there is calcific atherosclerosis without aneurysmal   dilatation.  HEART: Borderline enlarged heart.. No pericardial effusion. Coronary   artery calcifications. Anemia suggested.  MEDIASTINUM AND STACY: No lymphadenopathy.  CHEST WALL AND LOWER NECK: Within normal limits.    ABDOMEN AND PELVIS:  LIVER: Within normal limits.  BILE DUCTS: Normal caliber.  GALLBLADDER: Elongated gallbladder without definite calcified gallstone..  SPLEEN: Within normal limits.  PANCREAS: Within normal limits.  ADRENALS: Normal right adrenal gland. Limited left adrenal gland.  KIDNEYS/URETERS: Small bilateral kidneys. No renal stone or   hydronephrosis.  BLADDER: Within normal limits.  REPRODUCTIVE ORGANS: Prostate gland is not grossly enlarged.  BOWEL: Evaluation of bowel is limited without distention with oral   contrast and lack of mesenteric fat, however there is no bowel   obstruction. There is a 7 cm stool distended rectum. Small bowel loops   are not grossly dilated. Stomach is mildly distended with debris.  PERITONEUM: No free air or significant ascites.  VESSELS:  Limited evaluation of the abdominal aorta without intravenous   contrast, demonstrates tortuosity and calcific atherosclerosis without   aneurysmal dilatation. Negative  RETROPERITONEUM: No lymphadenopathy.  ABDOMINAL WALL: Within normal limits.  BONES: Severe scoliosis of the spine with associated multilevel   degenerative changes. Question pagetoid appearance of the right iliac   bone.  IMPRESSION:  Bilateral pneumonia.  Additional findings as above.  --- End of Report ---    MARÍA RO MD; Attending Radiologist  This document has been  < end of copied text >                           rad< from: CT Head No Cont (08.20.23 @ 06:48) >    IMPRESSION:    Thin indeterminate age left frontal convexity subdural hematoma measuring   up to 4 mm. Asymmetric low-attenuation right frontal, parietal, and   temporal convexity subdural collection which may represent an old   subdural hematoma or hygroma.No significant mass effect. No acute   osseous fracture.    Findings were discussed with Dr. Pete of the emergency Department at 6:47   AM on 8/20/2023.    --- End of Report ---        < end of copied text >       87 yo M     bibems after being found down outside.      no  pmx. per pt  now    per  er  chart ,on  arrival,  pt  was  minimally responsive, altered, unable to provide history.      and  had  a ectal temp   90 degrees.  No prior visits   here   was hypotensive/  hypothermic/   elevated  lactate/  acidotic, severe  anemia on  arrival/ and  ,per  Er  Dr , ICU  eval  was  called/ and  was rejected /  note currently, pending      icu  re  eval  called again by current   er  dr/  awaiting   consult   pt  seen  in  er/  awake  and  alert,  somewhat  able  to  answer  questions , though   not  fully    denies  any  cp/sob/abd  pain/  recall   bleeding  also  denies   any  cardiac  hx/  prior  strokes/  dm,  etc   states  he  lives  with  his  family/ address /  phone  numbers  of  family    currently  unknown       found  down  outside.  arrived   via  ems   septic  shock  on arrival,.  with  organ dysfunction,  with  hypothermia,  hypotension,  ANGI,           acidosis  , lactate  of 14. pna on  ct/ ?  aspiration          wbc  13,000,  hb 6/21,, + troponin . on non rebreather   mask/ has beavers    CT head, sub  dural  hematoma         needs  N/S    eval/  rot  ct head/  neuro  checks         discussed  with er  dr  again.  who  never  informed  me  of  SDH         usually  pts  are transferred from er  to  tertiary facility for  N/S  eval/ intervention          [facility],  [facility]  aware  and  will  now  call N/S ,tertiary  facility    elevated  troponin/ suspect  from  angi/  follow   ekg/  cpk ordered/ follow  troponin/  echo    pt  is   critical  and  needs  icu level  of  care   still  awaiting ICU  eval   on iv  fluids,  sbp  has  improved    on   iv  Zosyn .  follow  bcx/ ucx     Anemia,   no  rectal  bleed        stool  guaiac   iron  studies/ prbc/  iv  protonix/  npo/  gi  dr floyd      ekg,  not  done/ and   ordered      rpt ABG  pending       rpt  Ct head, ordered      rpt labs ordered      echo ordered/  renal  u/s  ordered      Consults  called //  card,   gi dr everett saenz/ID  dr boone,                                                            renal  dr grant / pulm dr anderson      pt  still  in er/  pt  will benefit  from higher  level  of care   low  threshold   for  rrt, if  pt de compensates, pt in er  at present         rad< from: CT Chest No Cont (08.20.23 @ 06:49) >  INDINGS:  CHEST:  LUNGS AND LARGE AIRWAYS: Patent central airways. Bibasilar patchy   consolidations suggesting pneumonia. Superimposed emphysematous lung   change and interstitial lung disease/honeycombing at the lung bases..  PLEURA: No pleural effusion or pneumothorax.  VESSELS: Limited evaluation of the thoracic aorta without intravenous   contrast, however there is calcific atherosclerosis without aneurysmal   dilatation.  HEART: Borderline enlarged heart.. No pericardial effusion. Coronary   artery calcifications. Anemia suggested.  MEDIASTINUM AND STACY: No lymphadenopathy.  CHEST WALL AND LOWER NECK: Within normal limits.    ABDOMEN AND PELVIS:  LIVER: Within normal limits.  BILE DUCTS: Normal caliber.  GALLBLADDER: Elongated gallbladder without definite calcified gallstone..  SPLEEN: Within normal limits.  PANCREAS: Within normal limits.  ADRENALS: Normal right adrenal gland. Limited left adrenal gland.  KIDNEYS/URETERS: Small bilateral kidneys. No renal stone or   hydronephrosis.  BLADDER: Within normal limits.  REPRODUCTIVE ORGANS: Prostate gland is not grossly enlarged.  BOWEL: Evaluation of bowel is limited without distention with oral   contrast and lack of mesenteric fat, however there is no bowel   obstruction. There is a 7 cm stool distended rectum. Small bowel loops   are not grossly dilated. Stomach is mildly distended with debris.  PERITONEUM: No free air or significant ascites.  VESSELS:  Limited evaluation of the abdominal aorta without intravenous   contrast, demonstrates tortuosity and calcific atherosclerosis without   aneurysmal dilatation. Negative  RETROPERITONEUM: No lymphadenopathy.  ABDOMINAL WALL: Within normal limits.  BONES: Severe scoliosis of the spine with associated multilevel   degenerative changes. Question pagetoid appearance of the right iliac   bone.  IMPRESSION:  Bilateral pneumonia.  Additional findings as above.  --- End of Report ---    MARÍA RO MD; Attending Radiologist  This document has been  < end of copied text >                           rad< from: CT Head No Cont (08.20.23 @ 06:48) >    IMPRESSION:    Thin indeterminate age left frontal convexity subdural hematoma measuring   up to 4 mm. Asymmetric low-attenuation right frontal, parietal, and   temporal convexity subdural collection which may represent an old   subdural hematoma or hygroma.No significant mass effect. No acute   osseous fracture.    Findings were discussed with Dr. Pete of the emergency Department at 6:47   AM on 8/20/2023.    --- End of Report ---        < end of copied text >       89 yo M     bibems after being found down outside.      no  pmx. per pt  now    per  er  chart ,on  arrival,  pt  was  minimally responsive, altered, unable to provide history.      and  had  a ectal temp   90 degrees.  No prior visits   here   was hypotensive/  hypothermic/   elevated  lactate/  acidotic, severe  anemia on  arrival/ and  ,per  Er  Dr , ICU  eval  was  called/ and  was rejected /  note currently, pending      icu  re  eval  called again by current   er  dr/  awaiting   consult   pt  seen  in  er/  awake  and  alert,  somewhat  able  to  answer  questions , though   not  fully    denies  any  cp/sob/abd  pain/  recall   bleeding  also  denies   any  cardiac  hx/  prior  strokes/  dm,  etc   states  he  lives  with  his  family/ address /  phone  numbers  of  family    currently  unknown       found  down  outside.  arrived   via  ems   septic  shock  on arrival,.  with  organ dysfunction,  with  hypothermia,  hypotension,  ANGI,           acidosis  , lactate  of 14. pna on  ct/ ?  aspiration          wbc  13,000,  hb 6/21,, + troponin . on non rebreather   mask/ has beavers    CT head, sub  dural  hematoma         needs  N/S    eval/  rot  ct head/  neuro  checks         discussed  with er  dr  again.  who  never  informed  me  of  SDH         usually  pts  are transferred from er  to  tertiary facility for  N/S  eval/ intervention          [facility]  [facility],  he is  aware  and  will  now  call N/S ,tertiary  facility    elevated  troponin/ suspect  from  angi/  follow   ekg/  cpk ordered/ follow  troponin/  echo    pt  is   critical  and  needs  icu level  of  care   still  awaiting ICU  eval   on iv  fluids,  sbp  has  improved    on   iv  Zosyn .  follow  bcx/ ucx     Anemia,   no  rectal  bleed        stool  guaiac   iron  studies/ prbc/  iv  protonix/  npo/  gi  dr floyd      ekg,  not  done/ and   ordered      rpt ABG  pending       rpt  Ct head, ordered      rpt labs ordered      echo ordered/  renal  u/s  ordered      Consults  called //  card,   gi dr everett saenz/ID  dr boone,                                     renal  dr grant / pulm dr anderson /  neuro dr lombardo     pt  still  in er/  pt  will benefit  from higher  level  of care   low  threshold   for  rrt, if  pt de compensates, pt in er  at present         rad< from: CT Chest No Cont (08.20.23 @ 06:49) >  INDINGS:  CHEST:  LUNGS AND LARGE AIRWAYS: Patent central airways. Bibasilar patchy   consolidations suggesting pneumonia. Superimposed emphysematous lung   change and interstitial lung disease/honeycombing at the lung bases..  PLEURA: No pleural effusion or pneumothorax.  VESSELS: Limited evaluation of the thoracic aorta without intravenous   contrast, however there is calcific atherosclerosis without aneurysmal   dilatation.  HEART: Borderline enlarged heart.. No pericardial effusion. Coronary   artery calcifications. Anemia suggested.  MEDIASTINUM AND STACY: No lymphadenopathy.  CHEST WALL AND LOWER NECK: Within normal limits.    ABDOMEN AND PELVIS:  LIVER: Within normal limits.  BILE DUCTS: Normal caliber.  GALLBLADDER: Elongated gallbladder without definite calcified gallstone..  SPLEEN: Within normal limits.  PANCREAS: Within normal limits.  ADRENALS: Normal right adrenal gland. Limited left adrenal gland.  KIDNEYS/URETERS: Small bilateral kidneys. No renal stone or   hydronephrosis.  BLADDER: Within normal limits.  REPRODUCTIVE ORGANS: Prostate gland is not grossly enlarged.  BOWEL: Evaluation of bowel is limited without distention with oral   contrast and lack of mesenteric fat, however there is no bowel   obstruction. There is a 7 cm stool distended rectum. Small bowel loops   are not grossly dilated. Stomach is mildly distended with debris.  PERITONEUM: No free air or significant ascites.  VESSELS:  Limited evaluation of the abdominal aorta without intravenous   contrast, demonstrates tortuosity and calcific atherosclerosis without   aneurysmal dilatation. Negative  RETROPERITONEUM: No lymphadenopathy.  ABDOMINAL WALL: Within normal limits.  BONES: Severe scoliosis of the spine with associated multilevel   degenerative changes. Question pagetoid appearance of the right iliac   bone.  IMPRESSION:  Bilateral pneumonia.  Additional findings as above.  --- End of Report ---    MARÍA RO MD; Attending Radiologist  This document has been  < end of copied text >                           rad< from: CT Head No Cont (08.20.23 @ 06:48) >    IMPRESSION:    Thin indeterminate age left frontal convexity subdural hematoma measuring   up to 4 mm. Asymmetric low-attenuation right frontal, parietal, and   temporal convexity subdural collection which may represent an old   subdural hematoma or hygroma.No significant mass effect. No acute   osseous fracture.    Findings were discussed with Dr. Pete of the emergency Department at 6:47   AM on 8/20/2023.    --- End of Report ---        < end of copied text >       89 yo M     bibems after being found down outside.      no  pmx. per pt  now    per  er  chart ,on  arrival,  pt  was  minimally responsive, altered, unable to provide history.      and  had  a ectal temp   90 degrees.  No prior visits   here   was hypotensive/  hypothermic/   elevated  lactate/  acidotic, severe  anemia on  arrival/ and  ,per  Er  Dr , ICU  eval  was  called/ and  was rejected /  note currently, pending      icu  re  eval  called again by current   er  dr/  awaiting   consult   pt  seen  in  er/  awake  and  alert,  somewhat  able  to  answer  questions , though   not  fully    denies  any  cp/sob/abd  pain/  recall   bleeding  also  denies   any  cardiac  hx/  prior  strokes/  dm,  etc   states  he  lives  with  his  family/ address /  phone  numbers  of  family    currently  unknown       found  down  outside.  arrived   via  ems   septic  shock  on arrival,.  with  organ dysfunction,  with  hypothermia,  hypotension,  ANGI,           acidosis  , lactate  of 14. pna on  ct/ ?  aspiration          wbc  13,000,  hb 6/21,, + troponin . on non rebreather   mask/ has beavers    CT head, sub  dural  hematoma         needs  N/S    eval/  rot  ct head/  neuro  checks         discussed  with er  dr  again.  who  never  informed  me  of  SDH         usually  pts  are transferred from er  to  tertiary facility for  N/S  eval/ intervention          [facility],  [facility] is  aware  and  will  now  call N/S ,tertiary  facility    elevated  troponin/ suspect  from  angi/  follow   ekg/  cpk ordered/ follow  troponin/  echo    pt  is   critical  and  needs  icu level  of  care   still  awaiting ICU  eval   on iv  fluids,  sbp  has  improved    on   iv  Zosyn .  follow  bcx/ ucx     Anemia,   no  rectal  bleed        stool  guaiac   iron  studies/ prbc/  iv  protonix/  npo/  gi  dr floyd      ekg,  not  done/ and   ordered      rpt ABG  pending       rpt  Ct head, ordered      rpt labs ordered      echo ordered/  renal  u/s  ordered      Consults  called //  card,   gi dr everett saenz/ID  dr boone,                                     renal  dr grant / pulm dr anderson /  neuro dr lombardo     pt  still  in er/  pt  will benefit  from higher  level  of care   low  threshold   for  rrt, if  pt de compensates, pt in er  at present     addendum/  12  pm/   spoke  with  N/S   resident/  attending edmund tellez  link       rad< from: CT Chest No Cont (08.20.23 @ 06:49) >  INDINGS:  CHEST:  LUNGS AND LARGE AIRWAYS: Patent central airways. Bibasilar patchy   consolidations suggesting pneumonia. Superimposed emphysematous lung   change and interstitial lung disease/honeycombing at the lung bases..  PLEURA: No pleural effusion or pneumothorax.  VESSELS: Limited evaluation of the thoracic aorta without intravenous   contrast, however there is calcific atherosclerosis without aneurysmal   dilatation.  HEART: Borderline enlarged heart.. No pericardial effusion. Coronary   artery calcifications. Anemia suggested.  MEDIASTINUM AND STACY: No lymphadenopathy.  CHEST WALL AND LOWER NECK: Within normal limits.    ABDOMEN AND PELVIS:  LIVER: Within normal limits.  BILE DUCTS: Normal caliber.  GALLBLADDER: Elongated gallbladder without definite calcified gallstone..  SPLEEN: Within normal limits.  PANCREAS: Within normal limits.  ADRENALS: Normal right adrenal gland. Limited left adrenal gland.  KIDNEYS/URETERS: Small bilateral kidneys. No renal stone or   hydronephrosis.  BLADDER: Within normal limits.  REPRODUCTIVE ORGANS: Prostate gland is not grossly enlarged.  BOWEL: Evaluation of bowel is limited without distention with oral   contrast and lack of mesenteric fat, however there is no bowel   obstruction. There is a 7 cm stool distended rectum. Small bowel loops   are not grossly dilated. Stomach is mildly distended with debris.  PERITONEUM: No free air or significant ascites.  VESSELS:  Limited evaluation of the abdominal aorta without intravenous   contrast, demonstrates tortuosity and calcific atherosclerosis without   aneurysmal dilatation. Negative  RETROPERITONEUM: No lymphadenopathy.  ABDOMINAL WALL: Within normal limits.  BONES: Severe scoliosis of the spine with associated multilevel   degenerative changes. Question pagetoid appearance of the right iliac   bone.  IMPRESSION:  Bilateral pneumonia.  Additional findings as above.  --- End of Report ---    MARÍA RO MD; Attending Radiologist  This document has been  < end of copied text >                           rad< from: CT Head No Cont (08.20.23 @ 06:48) >    IMPRESSION:    Thin indeterminate age left frontal convexity subdural hematoma measuring   up to 4 mm. Asymmetric low-attenuation right frontal, parietal, and   temporal convexity subdural collection which may represent an old   subdural hematoma or hygroma.No significant mass effect. No acute   osseous fracture.    Findings were discussed with Dr. Pete of the emergency Department at 6:47   AM on 8/20/2023.    --- End of Report ---        < end of copied text >       87 yo M     bibems after being found down outside.     per  er  chart ,on  arrival,  pt  was  minimally responsive, altered, unable to provide history.      and  had  a ectal temp   90 degrees.  No prior visits   here   was hypotensive/  hypothermic/   elevated  lactate/  acidotic, severe  anemia on  arrival/ and  per  [facility] Dr , Icu   eval  was  called, and   pt  was rejected /  note currently, pending      icu  re  eval  called again by current   er  dr/  awaiting   consult   pt  seen  in  er/  awake  and  alert,  now, somewhat  able  to  answer  questions    denies  any  cp/sob/abd  pain/  recall   bleeding  also  denies   any  cardiac  hx/  prior  strokes/  dm,  etc   states  he  lives  with  his  family/ address /  phone  numbers  of  family    currently  unknown       found  down  outside.  arrived   via  ems   septic  shock  on arrival,.  with  organ dysfunction,  with  hypothermia,  hypotension,  ANGI,           acidosis  , lactate  of 14. pna on  ct/ ?  aspiration          wbc  13,000,  hb 6/21,, + troponin . on non rebreather   mask/ has beavers    CT head, sub  dural  hematoma         needs  N/S    eval/  rot  ct head/  neuro  checks         discussed  with er  dr  again.  who  never  informed  me  of  SDH         usually  pts  are transferred from er  to  tertiary facility for  N/S  eval/ intervention          dr copeland [facility]  stated,  he is  aware  and  will  now  call N/S  at tertiary  facility    elevated  troponin/ suspect  from  angi/  follow   ekg/  cpk ordered/ follow  troponin/  echo   s/p  syncope/ collapse    pt  is   critical  and  needs  icu level  of  care   still  awaiting ICU  eval   on iv  fluids,  sbp  has  improved    on   iv  Zosyn .  follow  bcx/ ucx     Anemia,   no  rectal  bleed        stool  guaiac   iron  studies/ prbc/  iv  protonix/  npo/  gi  dr floyd      ekg,  not  done/ and   ordered      rpt ABG  pending       rpt  Ct head, ordered      rpt labs ordered      echo ordered/  renal  u/s  ordered      Consults  called //  card,   gi dr everett saenz/ID  dr boone,                                     renal  dr grant / pulm dr anderson /  neuro dr lombardo      pt  still  in er/  pt  will benefit  from higher  level  of care   low  threshold   for  rrt, if  pt de compensates, pt in er  at present     addendum/  12  pm/           spoke  with  N/S   resident, and, attending dr rosalinda gonzalez will evaluate        rad< from: CT Chest No Cont (08.20.23 @ 06:49) >  INDINGS:  CHEST:  LUNGS AND LARGE AIRWAYS: Patent central airways. Bibasilar patchy   consolidations suggesting pneumonia. Superimposed emphysematous lung   change and interstitial lung disease/honeycombing at the lung bases..  PLEURA: No pleural effusion or pneumothorax.  VESSELS: Limited evaluation of the thoracic aorta without intravenous   contrast, however there is calcific atherosclerosis without aneurysmal   dilatation.  HEART: Borderline enlarged heart.. No pericardial effusion. Coronary   artery calcifications. Anemia suggested.  MEDIASTINUM AND STACY: No lymphadenopathy.  CHEST WALL AND LOWER NECK: Within normal limits.    ABDOMEN AND PELVIS:  LIVER: Within normal limits.  BILE DUCTS: Normal caliber.  GALLBLADDER: Elongated gallbladder without definite calcified gallstone..  SPLEEN: Within normal limits.  PANCREAS: Within normal limits.  ADRENALS: Normal right adrenal gland. Limited left adrenal gland.  KIDNEYS/URETERS: Small bilateral kidneys. No renal stone or   hydronephrosis.  BLADDER: Within normal limits.  REPRODUCTIVE ORGANS: Prostate gland is not grossly enlarged.  BOWEL: Evaluation of bowel is limited without distention with oral   contrast and lack of mesenteric fat, however there is no bowel   obstruction. There is a 7 cm stool distended rectum. Small bowel loops   are not grossly dilated. Stomach is mildly distended with debris.  PERITONEUM: No free air or significant ascites.  VESSELS:  Limited evaluation of the abdominal aorta without intravenous   contrast, demonstrates tortuosity and calcific atherosclerosis without   aneurysmal dilatation. Negative  RETROPERITONEUM: No lymphadenopathy.  ABDOMINAL WALL: Within normal limits.  BONES: Severe scoliosis of the spine with associated multilevel   degenerative changes. Question pagetoid appearance of the right iliac   bone.  IMPRESSION:  Bilateral pneumonia.  Additional findings as above.  --- End of Report ---  MARÍA RO MD; Attending Radiologist  This document has been  < end of copied text >       rad< from: CT Head No Cont (08.20.23 @ 06:48) >  IMPRESSION:  Thin indeterminate age left frontal convexity subdural hematoma measuring   up to 4 mm. Asymmetric low-attenuation right frontal, parietal, and   temporal convexity subdural collection which may represent an old   subdural hematoma or hygroma.No significant mass effect. No acute   osseous fracture.  Findings were discussed with Dr. Pete of the emergency Department at 6:47   AM on 8/20/2023.  < end of copied text >                           rad< from: CT Head No Cont (08.20.23 @ 06:48) >    IMPRESSION:    Thin indeterminate age left frontal convexity subdural hematoma measuring   up to 4 mm. Asymmetric low-attenuation right frontal, parietal, and   temporal convexity subdural collection which may represent an old   subdural hematoma or hygroma.No significant mass effect. No acute   osseous fracture.    Findings were discussed with Dr. Pete of the emergency Department at 6:47   AM on 8/20/2023.    --- End of Report ---        < end of copied text >       89 yo M     bibems after being found down outside.     per  er  chart ,on  arrival,  pt  was  minimally responsive, altered, unable to provide history.      and  had  a ectal temp   90 degrees.  No prior visits   here   was hypotensive/  hypothermic/   elevated  lactate/  acidotic, severe  anemia on  arrival/ and  per  [facility] Dr , Icu   eval  was  called, and   pt  was rejected /  note currently, pending      icu  re  eval  called again by current   er  dr/  awaiting   consult   pt  seen  in  er/  awake  and  alert,  now, somewhat  able  to  answer  questions    denies  any  cp/sob/abd  pain/  recall   bleeding  also  denies   any  cardiac  hx/  prior  strokes/  dm,  etc   states  he  lives  with  his  family/ address /  phone  numbers  of  family    currently  unknown       found  down  outside.  arrived   via  ems   septic  shock  on arrival,.  with  organ dysfunction,  with  hypothermia,  hypotension,  ANGI,           acidosis  , lactate  of 14. pna on  ct/ ?  aspiration          wbc  13,000,  hb 6/21,, + troponin . on non rebreather   mask/ has beavers    CT head, sub  dural  hematoma         needs  N/S    eval/  rot  ct head/  neuro  checks         discussed  with er  dr  again.  who  never  informed  me  of  SDH         usually  pts  are transferred from er  to  tertiary facility for  N/S  eval/ intervention          dr copeland [facility]  stated,  he is  aware  and  will  now  call N/S  at tertiary  facility    elevated  troponin/ suspect  from  angi/  follow   ekg/  cpk ordered/ follow  troponin/  echo   s/p  syncope/ collapse    pt  is   critical  and  needs  icu level  of  care   still  awaiting ICU  eval   on iv  fluids,  sbp  has  improved    on   iv  Zosyn .  follow  bcx/ ucx     Anemia,   no  rectal  bleed        stool  guaiac   iron  studies/ prbc/  iv  protonix/  npo/  gi  dr floyd      ekg,  not  done/ and   ordered      rpt ABG  pending       rpt  Ct head, ordered      rpt labs ordered      echo ordered/  renal  u/s  ordered      Consults  called //  card,   gi dr everett saenz/ID  dr boone,                                     renal  dr grant / pulm dr anderson /  neuro dr lombardo      pt  still  in er/  pt  will benefit  from higher  level  of care   low  threshold   for  rrt, if  pt de compensates, pt in er  at present       addendum/  12  pm           spoke  with  N/S   resident, and, attending dr rosalinda gonzalez will evaluate     addendum/ 12/14  pm         noted  form chart,, that  Er Dr  has started  iv norepinephrine   at  12/11 pm       rad< from: CT Chest No Cont (08.20.23 @ 06:49) >  INDINGS:  CHEST:  LUNGS AND LARGE AIRWAYS: Patent central airways. Bibasilar patchy   consolidations suggesting pneumonia. Superimposed emphysematous lung   change and interstitial lung disease/honeycombing at the lung bases..  PLEURA: No pleural effusion or pneumothorax.  VESSELS: Limited evaluation of the thoracic aorta without intravenous   contrast, however there is calcific atherosclerosis without aneurysmal   dilatation.  HEART: Borderline enlarged heart.. No pericardial effusion. Coronary   artery calcifications. Anemia suggested.  MEDIASTINUM AND STACY: No lymphadenopathy.  CHEST WALL AND LOWER NECK: Within normal limits.    ABDOMEN AND PELVIS:  LIVER: Within normal limits.  BILE DUCTS: Normal caliber.  GALLBLADDER: Elongated gallbladder without definite calcified gallstone..  SPLEEN: Within normal limits.  PANCREAS: Within normal limits.  ADRENALS: Normal right adrenal gland. Limited left adrenal gland.  KIDNEYS/URETERS: Small bilateral kidneys. No renal stone or   hydronephrosis.  BLADDER: Within normal limits.  REPRODUCTIVE ORGANS: Prostate gland is not grossly enlarged.  BOWEL: Evaluation of bowel is limited without distention with oral   contrast and lack of mesenteric fat, however there is no bowel   obstruction. There is a 7 cm stool distended rectum. Small bowel loops   are not grossly dilated. Stomach is mildly distended with debris.  PERITONEUM: No free air or significant ascites.  VESSELS:  Limited evaluation of the abdominal aorta without intravenous   contrast, demonstrates tortuosity and calcific atherosclerosis without   aneurysmal dilatation. Negative  RETROPERITONEUM: No lymphadenopathy.  ABDOMINAL WALL: Within normal limits.  BONES: Severe scoliosis of the spine with associated multilevel   degenerative changes. Question pagetoid appearance of the right iliac   bone.  IMPRESSION:  Bilateral pneumonia.  Additional findings as above.  --- End of Report ---  MARÍA RO MD; Attending Radiologist  This document has been  < end of copied text >       rad< from: CT Head No Cont (08.20.23 @ 06:48) >  IMPRESSION:  Thin indeterminate age left frontal convexity subdural hematoma measuring   up to 4 mm. Asymmetric low-attenuation right frontal, parietal, and   temporal convexity subdural collection which may represent an old   subdural hematoma or hygroma.No significant mass effect. No acute   osseous fracture.  Findings were discussed with Dr. Pete of the emergency Department at 6:47   AM on 8/20/2023.  < end of copied text >                           rad< from: CT Head No Cont (08.20.23 @ 06:48) >    IMPRESSION:    Thin indeterminate age left frontal convexity subdural hematoma measuring   up to 4 mm. Asymmetric low-attenuation right frontal, parietal, and   temporal convexity subdural collection which may represent an old   subdural hematoma or hygroma.No significant mass effect. No acute   osseous fracture.    Findings were discussed with Dr. Pete of the emergency Department at 6:47   AM on 8/20/2023.    --- End of Report ---        < end of copied text >       89 yo M     bibems after being found down outside.     per  er  chart ,on  arrival,  pt  was  minimally responsive, altered, unable to provide history.      and  had  a ectal temp   90 degrees.  No prior visits   here   was hypotensive/  hypothermic/   elevated  lactate/  acidotic, severe  anemia on  arrival/ and  per  Er Dr , Icu   eval  was  called, and   pt  was rejected /  note currently, pending      icu  re  eval  called again by current   er  dr/  awaiting   consult   pt  seen  in  er/  awake  and  alert,  now, somewhat  able  to  answer  questions    denies  any  cp/sob/abd  pain/  recall   bleeding  also  denies   any  cardiac  hx/  prior  strokes/  dm,  etc   states  he  lives  with  his  family/ address /  phone  numbers  of  family    currently  unknown       found  down  outside.  arrived   via  ems   septic  shock  on arrival,.  with  organ dysfunction,  with  hypothermia,  hypotension,  ANGI,           acidosis  , lactate  of 14. pna on  ct/ ?  aspiration          wbc  13,000,  hb 6/21,, + troponin . on non rebreather   mask/ has beavers    CT head, sub  dural  hematoma         needs  N/S    eval/  rot  ct head/  neuro  checks         discussed  with er  dr  again.  who  never  informed  me  of  SDH         usually  pts  are transferred from er  to  tertiary facility for  N/S  eval/ intervention          dr copeland [facility]  stated,  he is  aware  and  will  now  call N/S  at tertiary  facility    elevated  troponin/ suspect  from  angi/  follow   ekg/  cpk ordered/ follow  troponin/  echo   s/p  syncope/ collapse    pt  is   critical  and  needs  icu level  of  care     awaiting ICU  eval   on iv  fluids,  sbp  has  improved    on   iv  Zosyn .  follow  bcx/ ucx     Anemia,   no  rectal  bleed        stool  guaiac   iron  studies/ prbc/  iv  protonix/  npo/  gi  dr floyd      ekg,  not  done/ and   ordered      rpt ABG  pending       rpt  Ct head, ordered      rpt labs ordered      echo ordered/  renal  u/s  ordered      Consults  called //  card,   gi dr everett saenz/ID  dr boone,                                     renal  dr grant / pulm dr anderson /  neuro dr lombardo      pt  still  in er/  pt  will benefit  from higher  level  of care   low  threshold   for  rrt, if  pt de compensates, pt in er  at present       addendum/  12  pm           spoke  with  N/S   resident, and, attending dr rosalinda gonzalez will evaluate     addendum/ 12/14  pm         noted  form chart,, that  Er Dr  has started  iv norepinephrine   at  12/11 pm  1 pm/  informed   by er  Dr, that pt   has been accepted by icu       rad< from: CT Chest No Cont (08.20.23 @ 06:49) >  INDINGS:  CHEST:  LUNGS AND LARGE AIRWAYS: Patent central airways. Bibasilar patchy   consolidations suggesting pneumonia. Superimposed emphysematous lung   change and interstitial lung disease/honeycombing at the lung bases..  PLEURA: No pleural effusion or pneumothorax.  VESSELS: Limited evaluation of the thoracic aorta without intravenous   contrast, however there is calcific atherosclerosis without aneurysmal   dilatation.  HEART: Borderline enlarged heart.. No pericardial effusion. Coronary   artery calcifications. Anemia suggested.  MEDIASTINUM AND STACY: No lymphadenopathy.  CHEST WALL AND LOWER NECK: Within normal limits.    ABDOMEN AND PELVIS:  LIVER: Within normal limits.  BILE DUCTS: Normal caliber.  GALLBLADDER: Elongated gallbladder without definite calcified gallstone..  SPLEEN: Within normal limits.  PANCREAS: Within normal limits.  ADRENALS: Normal right adrenal gland. Limited left adrenal gland.  KIDNEYS/URETERS: Small bilateral kidneys. No renal stone or   hydronephrosis.  BLADDER: Within normal limits.  REPRODUCTIVE ORGANS: Prostate gland is not grossly enlarged.  BOWEL: Evaluation of bowel is limited without distention with oral   contrast and lack of mesenteric fat, however there is no bowel   obstruction. There is a 7 cm stool distended rectum. Small bowel loops   are not grossly dilated. Stomach is mildly distended with debris.  PERITONEUM: No free air or significant ascites.  VESSELS:  Limited evaluation of the abdominal aorta without intravenous   contrast, demonstrates tortuosity and calcific atherosclerosis without   aneurysmal dilatation. Negative  RETROPERITONEUM: No lymphadenopathy.  ABDOMINAL WALL: Within normal limits.  BONES: Severe scoliosis of the spine with associated multilevel   degenerative changes. Question pagetoid appearance of the right iliac   bone.  IMPRESSION:  Bilateral pneumonia.  Additional findings as above.  --- End of Report ---  MARÍA RO MD; Attending Radiologist  This document has been  < end of copied text >       rad< from: CT Head No Cont (08.20.23 @ 06:48) >  IMPRESSION:  Thin indeterminate age left frontal convexity subdural hematoma measuring   up to 4 mm. Asymmetric low-attenuation right frontal, parietal, and   temporal convexity subdural collection which may represent an old   subdural hematoma or hygroma.No significant mass effect. No acute   osseous fracture.  Findings were discussed with Dr. Pete of the emergency Department at 6:47   AM on 8/20/2023.  < end of copied text >                           rad< from: CT Head No Cont (08.20.23 @ 06:48) >    IMPRESSION:    Thin indeterminate age left frontal convexity subdural hematoma measuring   up to 4 mm. Asymmetric low-attenuation right frontal, parietal, and   temporal convexity subdural collection which may represent an old   subdural hematoma or hygroma.No significant mass effect. No acute   osseous fracture.    Findings were discussed with Dr. Pete of the emergency Department at 6:47   AM on 8/20/2023.    --- End of Report ---        < end of copied text >       89 yo M     bibems after being found down outside.     per  er  chart ,on  arrival,  pt  was  minimally responsive, altered, unable to provide history.      and  had  a ectal temp   90 degrees.  No prior visits   here   was hypotensive/  hypothermic/   elevated  lactate/  acidotic, severe  anemia on  arrival/ and  per  Er Dr , Icu   eval  was  called, and   pt  was rejected /  note currently, pending      icu  re  eval  called again by current   er  dr/  awaiting   consult   pt  seen  in  er/  awake  and  alert,  now, somewhat  able  to  answer  questions    denies  any  cp/sob/abd  pain/  recall   bleeding  also  denies   any  cardiac  hx/  prior  strokes/  dm,  etc   states  he  lives  with  his  family/ address /  phone  numbers  of  family    currently  unknown       found  down  outside.  arrived   via  ems   septic  shock  on arrival,.  with  organ dysfunction,  with  hypothermia,  hypotension,  ANGI,           acidosis  , lactate  of 14. pna on  ct/ ?  aspiration          wbc  13,000,  hb 6/21,, + troponin . on non rebreather   mask/ has beavers    CT head, sub  dural  hematoma         needs  N/S    eval/  rot  ct head/  neuro  checks         discussed  with er  dr  . as usually  pts  are transferred from er  to  tertiary facility for  N/S  eval/ intervention          arron pimentel  stated,  he is  aware  and  will  now  call N/S  at tertiary  facility    elevated  troponin/ suspect  from  angi/  follow   ekg/  cpk ordered/ follow  troponin/  echo   s/p  syncope/ collapse    pt  is   critical  and  needs  icu level  of  care     awaiting ICU  eval   on iv  fluids,  sbp  has  improved    on   iv  Zosyn .  follow  bcx/ ucx     Anemia,   no  rectal  bleed        stool  guaiac   iron  studies/ prbc/  iv  protonix/  npo/  gi  dr floyd      ekg,  not  done/ and   ordered      rpt ABG  pending       rpt  Ct head, ordered      rpt labs ordered      echo ordered/  renal  u/s  ordered      Consults  called //  card,   gi dr everett saenz/ID  dr boone,                                     renal  dr grant / pulm dr anderson /  neuro dr lombardo      pt  still  in er/  pt  will benefit  from higher  level  of care   low  threshold   for  rrt, if  pt de compensates, pt in er  at present       addendum/  12  pm           spoke  with  N/S   resident, and, attending dr rosalinda gonzalez will evaluate     addendum/ 12/14  pm         noted  form chart,, that  Er Dr  has started  iv norepinephrine   at  12/11 pm  1 pm/  informed   by er  , that pt   has been accepted by icu       rad< from: CT Chest No Cont (08.20.23 @ 06:49) >  INDINGS:  CHEST:  LUNGS AND LARGE AIRWAYS: Patent central airways. Bibasilar patchy   consolidations suggesting pneumonia. Superimposed emphysematous lung   change and interstitial lung disease/honeycombing at the lung bases..  PLEURA: No pleural effusion or pneumothorax.  VESSELS: Limited evaluation of the thoracic aorta without intravenous   contrast, however there is calcific atherosclerosis without aneurysmal   dilatation.  HEART: Borderline enlarged heart.. No pericardial effusion. Coronary   artery calcifications. Anemia suggested.  MEDIASTINUM AND STACY: No lymphadenopathy.  CHEST WALL AND LOWER NECK: Within normal limits.    ABDOMEN AND PELVIS:  LIVER: Within normal limits.  BILE DUCTS: Normal caliber.  GALLBLADDER: Elongated gallbladder without definite calcified gallstone..  SPLEEN: Within normal limits.  PANCREAS: Within normal limits.  ADRENALS: Normal right adrenal gland. Limited left adrenal gland.  KIDNEYS/URETERS: Small bilateral kidneys. No renal stone or   hydronephrosis.  BLADDER: Within normal limits.  REPRODUCTIVE ORGANS: Prostate gland is not grossly enlarged.  BOWEL: Evaluation of bowel is limited without distention with oral   contrast and lack of mesenteric fat, however there is no bowel   obstruction. There is a 7 cm stool distended rectum. Small bowel loops   are not grossly dilated. Stomach is mildly distended with debris.  PERITONEUM: No free air or significant ascites.  VESSELS:  Limited evaluation of the abdominal aorta without intravenous   contrast, demonstrates tortuosity and calcific atherosclerosis without   aneurysmal dilatation. Negative  RETROPERITONEUM: No lymphadenopathy.  ABDOMINAL WALL: Within normal limits.  BONES: Severe scoliosis of the spine with associated multilevel   degenerative changes. Question pagetoid appearance of the right iliac   bone.  IMPRESSION:  Bilateral pneumonia.  Additional findings as above.  --- End of Report ---  MARÍA RO MD; Attending Radiologist  This document has been  < end of copied text >       rad< from: CT Head No Cont (08.20.23 @ 06:48) >  IMPRESSION:  Thin indeterminate age left frontal convexity subdural hematoma measuring   up to 4 mm. Asymmetric low-attenuation right frontal, parietal, and   temporal convexity subdural collection which may represent an old   subdural hematoma or hygroma.No significant mass effect. No acute   osseous fracture.  Findings were discussed with Dr. Pete of the emergency Department at 6:47   AM on 8/20/2023.  < end of copied text >                           rad< from: CT Head No Cont (08.20.23 @ 06:48) >    IMPRESSION:    Thin indeterminate age left frontal convexity subdural hematoma measuring   up to 4 mm. Asymmetric low-attenuation right frontal, parietal, and   temporal convexity subdural collection which may represent an old   subdural hematoma or hygroma.No significant mass effect. No acute   osseous fracture.    Findings were discussed with Dr. Pete of the emergency Department at 6:47   AM on 8/20/2023.    --- End of Report ---        < end of copied text >

## 2023-08-20 NOTE — CONSULT NOTE ADULT - SUBJECTIVE AND OBJECTIVE BOX
[facility] NEPHROLOGY SERVICES, [facility]  NEPHROLOGY AND HYPERTENSION  [address]  [address]  [place] [address]  [phone]    MD LYNDSEY RAINEY MD YELENA ROSENBERG, MD BINNY KOSHY, MD CHRISTOPHER CAPUTO, MD EDWARD BOVER, MD      Information from chart:  "Patient is a 88y old  Male who presents with a chief complaint of found  down (20 Aug 2023 13:14)    HPI:  89 yo M     bibems after being found down outside.    no  pmx. pe r pt  now    per  er  chart ,on  arrival,  pt  was  minimally responsive, altered, unable to provide history.    and  had  a ectal temp   90 degrees.  No prior visits from chart review   was hypotensive/  hypotehrmic/    elevated  lactate/  acidotic, severe  anemia on  arrival/ and  ,per  ER Dr , ICU  eval  was  called/ and  was rejected /  note currently, pending    pt  unable  to  clearly   state  if  eh had  any  trauma  or how  he fell    icu  re  eval  called again by current   er  dr/  awaiting   consult   pt  seen  in  er/  awake  and  alert,  somewhat  able  to  answer  questions , though   not  fully    denies  any  cp/sob/abd  pain/  recall   bleeding  also  denies   any  cardiac  hx/  priro  strokes/  dm,  etc   states  he  lives  with  his  family/ address /  phone  numbers  of  family    currently  unknown   (20 Aug 2023 10:35)   "    Patient awake, answering questions  Denies medical hx  No abd pain   + current smoker    PAST MEDICAL & SURGICAL HISTORY:  No pertinent past medical history        FAMILY HISTORY:    Allergies    No Known Allergies    Intolerances      Home Medications:    MEDICATIONS  (STANDING):  azithromycin  IVPB      azithromycin  IVPB 500 milliGRAM(s) IV Intermittent once  cefTRIAXone   IVPB 1000 milliGRAM(s) IV Intermittent every 24 hours  chlorhexidine 2% Cloths 1 Application(s) Topical <User Schedule>  ipratropium    for Nebulization 500 MICROGram(s) Nebulizer every 6 hours  lactated ringers. 1000 milliLiter(s) (100 mL/Hr) IV Continuous <Continuous>  norepinephrine Infusion 0.05 MICROgram(s)/kG/Min (4.26 mL/Hr) IV Continuous <Continuous>  pantoprazole  Injectable 40 milliGRAM(s) IV Push two times a day    MEDICATIONS  (PRN):    Vital Signs Last 24 Hrs  T(C): 34.8 (20 Aug 2023 13:02), Max: 34.8 (20 Aug 2023 13:02)  T(F): 94.7 (20 Aug 2023 13:02), Max: 94.7 (20 Aug 2023 13:02)  HR: 149 (20 Aug 2023 13:02) (60 - 159)  BP: 123/60 (20 Aug 2023 13:02) (79/52 - 149/74)  BP(mean): --  RR: 37 (20 Aug 2023 13:02) (26 - 38)  SpO2: 99% (20 Aug 2023 04:21) (99% - 99%)    Parameters below as of 20 Aug 2023 04:21  Patient On (Oxygen Delivery Method): room air        Daily Height in cm: 167.64 (20 Aug 2023 04:21)    Daily     CAPILLARY BLOOD GLUCOSE      POCT Blood Glucose.: 76 mg/dL (20 Aug 2023 04:28)    PHYSICAL EXAM:      T(C): 34.8 (23 @ 13:02), Max: 34.8 (23 @ 13:02)  HR: 149 (23 @ 13:02) (60 - 159)  BP: 123/60 (23 @ 13:02) (79/52 - 149/74)  RR: 37 (23 @ 13:02) (26 - 38)  SpO2: 99% (23 @ 04:21) (99% - 99%)  Wt(kg): --  Lungs clear  Heart S1S2  Abd soft mild distention   Extremities:   1  edema    Lactate, Blood (23 @ 05:00)    Lactate, Blood: 14.4: TYPE:(C=Critical, N=Notification, A=Abnormal) C  TESTS: LACTATE_  DATE/TIME CALLED: 2023 05:42:10 EDT_  CALLED TO: DR. TODD _  READ BACK (2 Patient Identifiers)(Y/N): Y_  READ BACK VALUES (Y/N): Y_  CALLED BY: CHARLINE DANIELS_ mmol/L                  141  |  103  |  37<H>  ----------------------------<  88  4.0   |  12<L>  |  3.40<H>    Ca    8.4<L>      20 Aug 2023 05:00    TPro  7.3  /  Alb  2.5<L>  /  TBili  0.8  /  DBili  x   /  AST  132<H>  /  ALT  50  /  AlkPhos  78                            6.1    13.69 )-----------( 430      ( 20 Aug 2023 05:00 )             21.8     Creatinine Trend: 3.40<--  Urinalysis Basic - ( 20 Aug 2023 08:17 )    Color: Yellow / Appearance: Clear / S.020 / pH: x  Gluc: x / Ketone: Trace  / Bili: Negative / Urobili: Negative mg/dL   Blood: x / Protein: 30 mg/dL / Nitrite: Negative   Leuk Esterase: Negative / RBC: 6-10 /HPF / WBC 0-2   Sq Epi: x / Non Sq Epi: x / Bacteria: Moderate      ABG - ( 20 Aug 2023 11:56 )  pH, Arterial: 7.39  pH, Blood: x     /  pCO2: 25    /  pO2: 109   / HCO3: 15    / Base Excess: -8.1  /  SaO2: 99.4                  Assessment   ANGI, degree of CKD unclear;   Metabolic acidosis, lactic, septic shock, risk for ATN  Afib; risk for rhabdomyolysis  Anemia, acute on chronic     Plan  IVF bolus and maintenance   MM screen   Guaiac stool   CPK         Vj Guadalupe MD [facility] NEPHROLOGY SERVICES, [facility]  NEPHROLOGY AND HYPERTENSION  [address]  [address]  [place] [address]  [phone]    MD LYNDSEY RAINEY MD YELENA ROSENBERG, MD BINNY KOSHY, MD CHRISTOPHER CAPUTO, MD EDWARD BOVER, MD      Information from chart:  "Patient is a 88y old  Male who presents with a chief complaint of found  down (20 Aug 2023 13:14)    HPI:  89 yo M     bibems after being found down outside.    no  pmx. pe r pt  now    per  er  chart ,on  arrival,  pt  was  minimally responsive, altered, unable to provide history.    and  had  a ectal temp   90 degrees.  No prior visits from chart review   was hypotensive/  hypotehrmic/    elevated  lactate/  acidotic, severe  anemia on  arrival/ and  ,per  ER Dr , ICU  eval  was  called/ and  was rejected /  note currently, pending    pt  unable  to  clearly   state  if  eh had  any  trauma  or how  he fell    icu  re  eval  called again by current   er  dr/  awaiting   consult   pt  seen  in  er/  awake  and  alert,  somewhat  able  to  answer  questions , though   not  fully    denies  any  cp/sob/abd  pain/  recall   bleeding  also  denies   any  cardiac  hx/  priro  strokes/  dm,  etc   states  he  lives  with  his  family/ address /  phone  numbers  of  family    currently  unknown   (20 Aug 2023 10:35)   "    Patient awake, answering questions  Denies medical hx  No abd pain   + current smoker  UA with reported RBC casts     PAST MEDICAL & SURGICAL HISTORY:  No pertinent past medical history        FAMILY HISTORY:    Allergies    No Known Allergies    Intolerances      Home Medications:    MEDICATIONS  (STANDING):  azithromycin  IVPB      azithromycin  IVPB 500 milliGRAM(s) IV Intermittent once  cefTRIAXone   IVPB 1000 milliGRAM(s) IV Intermittent every 24 hours  chlorhexidine 2% Cloths 1 Application(s) Topical <User Schedule>  ipratropium    for Nebulization 500 MICROGram(s) Nebulizer every 6 hours  lactated ringers. 1000 milliLiter(s) (100 mL/Hr) IV Continuous <Continuous>  norepinephrine Infusion 0.05 MICROgram(s)/kG/Min (4.26 mL/Hr) IV Continuous <Continuous>  pantoprazole  Injectable 40 milliGRAM(s) IV Push two times a day    MEDICATIONS  (PRN):    Vital Signs Last 24 Hrs  T(C): 34.8 (20 Aug 2023 13:02), Max: 34.8 (20 Aug 2023 13:02)  T(F): 94.7 (20 Aug 2023 13:02), Max: 94.7 (20 Aug 2023 13:02)  HR: 149 (20 Aug 2023 13:) (60 - 159)  BP: 123/60 (20 Aug 2023 13:02) (79/52 - 149/74)  BP(mean): --  RR: 37 (20 Aug 2023 13:) (26 - 38)  SpO2: 99% (20 Aug 2023 04:21) (99% - 99%)    Parameters below as of 20 Aug 2023 04:21  Patient On (Oxygen Delivery Method): room air        Daily Height in cm: 167.64 (20 Aug 2023 04:21)    Daily     CAPILLARY BLOOD GLUCOSE      POCT Blood Glucose.: 76 mg/dL (20 Aug 2023 04:28)    PHYSICAL EXAM:      T(C): 34.8 (23 @ 13:02), Max: 34.8 (23 @ 13:02)  HR: 149 (23 @ 13:02) (60 - 159)  BP: 123/60 (23 @ 13:02) (79/52 - 149/74)  RR: 37 (23 @ 13:02) (26 - 38)  SpO2: 99% (23 @ 04:21) (99% - 99%)  Wt(kg): --  Lungs clear  Heart S1S2  Abd soft mild distention   Extremities:   1  edema    Lactate, Blood (23 @ 05:00)    Lactate, Blood: 14.4: TYPE:(C=Critical, N=Notification, A=Abnormal) C  TESTS: LACTATE_  DATE/TIME CALLED: 2023 05:42:10 EDT_  CALLED TO: DR. TODD _  READ BACK (2 Patient Identifiers)(Y/N): Y_  READ BACK VALUES (Y/N): Y_  CALLED BY: CHARLINE DANIELS_ mmol/L                  141  |  103  |  37<H>  ----------------------------<  88  4.0   |  12<L>  |  3.40<H>    Ca    8.4<L>      20 Aug 2023 05:00    TPro  7.3  /  Alb  2.5<L>  /  TBili  0.8  /  DBili  x   /  AST  132<H>  /  ALT  50  /  AlkPhos  78                            6.1    13.69 )-----------( 430      ( 20 Aug 2023 05:00 )             21.8     Creatinine Trend: 3.40<--  Urinalysis Basic - ( 20 Aug 2023 08:17 )    Color: Yellow / Appearance: Clear / S.020 / pH: x  Gluc: x / Ketone: Trace  / Bili: Negative / Urobili: Negative mg/dL   Blood: x / Protein: 30 mg/dL / Nitrite: Negative   Leuk Esterase: Negative / RBC: 6-10 /HPF / WBC 0-2   Sq Epi: x / Non Sq Epi: x / Bacteria: Moderate      ABG - ( 20 Aug 2023 11:56 )  pH, Arterial: 7.39  pH, Blood: x     /  pCO2: 25    /  pO2: 109   / HCO3: 15    / Base Excess: -8.1  /  SaO2: 99.4                  Assessment   ANGI, degree of CKD unclear;   PNA emphysema   Metabolic acidosis, lactic, septic shock, risk for ATN  R/O occult pulm renal syndrome, vasculitic, connective tissue related  Afib; risk for rhabdomyolysis  Anemia, acute on chronic     Plan  IVF bolus and maintenance   MM screen  Serologic screen    Guaiac stool   CPK         Vj Guadalupe MD

## 2023-08-20 NOTE — CONSULT NOTE ADULT - ASSESSMENT
The chart has been reviewed but the patient has not yet been examined.  Full note to follow. 86-year-old female with history of recurrent lightheadedness and dizziness with multiple admissions to the emergency room for above.  88-year-old male with no known medical history found down on street for unknown duration.  On admission found to be hypothermic and hypotensive with rapid atrial fibrillation.  CT of chest appears to suggest bilateral pneumonia and patient is currently being treated for this with IV fluids and antibiotics.  Also, profound acidosis and anemia, receiving blood products while in the emergency room.  Rates appear to be slowly improving on IV amiodarone, suggest continue for now.  Pressor support as needed, will obtain transthoracic echocardiogram to assess for left ventricular function.  Evidence of acute renal insufficiency possible prerenal azotemia, see no evidence of heart failure, advocate aggressive IV hydration. Elevated troponin seen here for left significant demand ischemia in the setting of renal insufficiency.  Continue supportive measures  We will follow with you

## 2023-08-20 NOTE — ED PROVIDER NOTE - CLINICAL SUMMARY MEDICAL DECISION MAKING FREE TEXT BOX
89 yo M with hypothermia, AMS, possible sepsis  -basic labs, asa/tylenol levels, ammonia, etoh, blood cxs, lactate, tsh, trop, CT head/neck/chest/abd/pelv, ekg, iv, vanc/zosyn, hydration, ruthie hugger  -ICU consulted for eval  -f/u results, reeval, admit 87 yo M with hypothermia, AMS, possible sepsis  -basic labs, asa/tylenol levels, ammonia, etoh, blood cxs, lactate, tsh, trop, CT head/neck/chest/abd/pelv, ekg, iv, vanc/zosyn, hydration, ruthie hugger  -ICU consulted for eval  -f/u results, reeval, admit    Pt rejected by ICU  lactate improving, BP normalized and patient more responsive  Hgb 6 --> PRBC  tele admit

## 2023-08-20 NOTE — ED PROVIDER NOTE - CARE PLAN
1 Principal Discharge DX:	AMS (altered mental status)  Secondary Diagnosis:	Sepsis   Principal Discharge DX:	AMS (altered mental status)  Secondary Diagnosis:	Sepsis  Secondary Diagnosis:	Anemia requiring transfusions  Secondary Diagnosis:	ANGI (acute kidney injury)  Secondary Diagnosis:	Elevated troponin  Secondary Diagnosis:	Induced hypothermia

## 2023-08-20 NOTE — ED PROVIDER NOTE - PROGRESS NOTE DETAILS
Patient received on sign-out by Dr. Pete    hypothermia, AMS, anemia   lactate 14-->9, patient not accepted to ICU  will give additional fluids and PRBC and admit to medicine sz focus resection, post op some right sided weakness dec sz activity   being discharged home on keppra and Onfi EKG Afib w/ RVR, patient septic w/ b/l PNA on CT    neurosurgery consulted for CT head of old subdural vs hygroma on recs of management Patient received on sign-out by Dr. Pete    hypothermia, AMS, anemia   lactate 14-->9, patient not accepted to ICU    will give additional fluids and PRBC and admit to medicine Patient received on sign-out by Dr. Pete    hypothermia, AMS, anemia   lactate 14-->9, patient not accepted to ICU (per Dr. Coates)    will give additional fluids and PRBC and admit to medicine EKG Afib w/ RVR, patient septic w/ b/l PNA on CT  neurosurgery consulted for CT head of old subdural vs hygroma on recs of management  will re-consult with ICU team for acceptance, patient with multiple acute medical problems requiring ICU management Patient remains A&O x1  hypotensive s/p 3 L IV fluids, PRBC running  will initiate levophed  pending ICU team re-evaluation for admission Patient A&Ox2  Accepted to ICU under Dr. Coates

## 2023-08-20 NOTE — ED ADULT NURSE REASSESSMENT NOTE - NS ED NURSE REASSESS COMMENT FT1
receives pt from departing shift in bed  m27. pt is confused. rectal temp 92.4. pt noted with a heating blanket in place. iv in left ac. ns and  vanco infusing. afib on the monitor. pt is admitted. waiting for a bed. receives pt from departing shift in bed  m27. pt is confused. rectal temp 92.4. pt noted with a heating blanket in place. iv in left ac. ns and  vanco infusing. afib on the monitor.  made aware. receives pt from departing shift in bed  m27. pt is confused. rectal temp 92.4. pt noted with a heating blanket in place. iv in left ac. ns and  vanco infusing. afib on the monitor.  made aware. pt waiting for icu evaluation.

## 2023-08-20 NOTE — CHART NOTE - NSCHARTNOTEFT_GEN_A_CORE
:  Aminata    INDICATION:  respiratory failure  soft BP    PROCEDURE:  [ x] LIMITED ECHO  [ x] LIMITED CHEST  [ ] LIMITED RETROPERITONEAL  [ ] LIMITED ABDOMINAL  [ ] LIMITED DVT  [ ] NEEDLE GUIDANCE VASCULAR  [ ] NEEDLE GUIDANCE THORACENTESIS  [ ] NEEDLE GUIDANCE PARACENTESIS  [ ] NEEDLE GUIDANCE PERICARDIOCENTESIS  [ ] OTHER    FINDINGS:  Limited chest- scattered B lines w/ irregular pleura; bibasilar consolidation patterns w/ mobile air bronchograms  Limited TTE- difficult axis, likely mild-moderately decrease LV systolic function, RV somewhat enlarged, maybe same size as LV, no pericardiac effusion, IVC indeterminate      INTERPRETATION:  continue abx for pneumonia  would avoid additional IVF for now, if BP drops suggest pressors

## 2023-08-20 NOTE — CONSULT NOTE ADULT - ASSESSMENT
Acute encephalopathy in the setting of sepsis, hypothermia, ?aspiration pneumonia, resolving.      Bilateral chronic subdural collections.  Suspect multiple prior falls.      Cognitive impairment; likely has dementia at baseline.      Microcytic anemia.      Malnutrition.      Asthenia.        RECOMMENDATIONS    Repeat non-con head CT 8/21/23.      B12, folate, methylmalonic acid, homocysteine, RPR, Fe, TIBC, ferritin, RPR, copper, zinc.    PT evaluation.                                                          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 I do not see patients outside of the hospital.        Patients requiring neurological follow-up after discharge may contact one of the following offices.     [facility]  [address] [place] [address].  [place] [address]  [phone]    [facility]  [address].  [place], [place]  [phone]      Teagan Butcher M.D.   - Department of Neurology  [employer] [facility]

## 2023-08-20 NOTE — ED PROVIDER NOTE - SECONDARY DIAGNOSIS.
Sepsis Elevated troponin Anemia requiring transfusions Induced hypothermia ANGI (acute kidney injury)

## 2023-08-20 NOTE — CONSULT NOTE ADULT - SUBJECTIVE AND OBJECTIVE BOX
History from Admission H&P today.  Pt examined in ICU.    <Start of quote(s) from H&P>  "Reason for Admission: found  down  History of Present Illness:   89 yo M     bibems after being found down outside.    no  pmx. pe r pt  now    per  er  chart ,on  arrival,  pt  was  minimally responsive, altered, unable to provide history.    and  had  a ectal temp   90 degrees.  No prior visits from chart review   was hypotensive/  hypotehrmic/    elevated  lactate/  acidotic, severe  anemia on  arrival/ and  ,per  ER Dr , ICU  eval  was  called/ and  was rejected /  note currently, pending    pt  unable  to  clearly   state  if  eh had  any  trauma  or how  he fell    icu  re  eval  called again by current   er  dr/  awaiting   consult   pt  seen  in  er/  awake  and  alert,  somewhat  able  to  answer  questions , though   not  fully    denies  any  cp/sob/abd  pain/  recall   bleeding  also  denies   any  cardiac  hx/  priro  strokes/  dm,  etc   states  he  lives  with  his  family/ address /  phone  numbers  of  family    currently  unknown       Review of Systems:  Review of Systems: REVIEW OF SYSTEMS:  CONSTITUTIONAL: No fever,  no  weight loss  ENT:  No  tinnitus,   no   vertigo  NECK: No pain or stiffness  RESPIRATORY: No cough, wheezing, chills or hemoptysis;    No Shortness of Breath  CARDIOVASCULAR: No chest pain, palpitations, dizziness  GASTROINTESTINAL: No abdominal or epigastric pain. No nausea, vomiting, or hematemesis; No diarrhea  No melena or hematochezia.  GENITOURINARY: No dysuria, frequency, hematuria, or incontinence  NEUROLOGICAL: No headaches  SKIN: No itching,  no   rash  LYMPH Nodes: No enlarged glands  ENDOCRINE: No heat or cold intolerance  MUSCULOSKELETAL: No joint pain or swelling  PSYCHIATRIC: No depression, anxiety  HEME/LYMPH: No easy bruising, or bleeding gums  ALLERGY AND IMMUNOLOGIC: No hives or eczema"  <End of quote(s) from H&P>    Per radiology report of non-con head CT 06:48 today:  "There is a thin indeterminate age left frontal convexity subdural   hematoma measuring 4 mm in maximum thickness. There is asymmetric low   attenuation right frontal, parietal, and temporal convexity subdural   collection which may represent an old subdural hematoma or hygroma. There   is no significant mass effect or shift of the midline. The basal cisterns   are not effaced. There is cerebral and cerebellar volume loss with   prominence of the ventricles, sulci, and cerebellar folia. There are mild   chronic ischemic changes in the frontoparietal white matter. There are   atherosclerotic calcifications of the intracranial carotid arteries.    There is no significant scalp soft tissue swelling or scalp hematoma. The   skull base and bony calvarium are intact. There is mild bilateral ethmoid   and maxillary sinus mucosal thickening and aerosolized secretions in the   sphenoid sinus and maxillary sinuses with small fluid levels. The   tympanic and mastoid cavities are well aerated.      IMPRESSION:    Thin indeterminate age left frontal convexity subdural hematoma measuring   up to 4 mm. Asymmetric low-attenuation right frontal, parietal, and   temporal convexity subdural collection which may represent an old   subdural hematoma or hygroma.No significant mass effect. No acute   osseous fracture."      Per radiology report of follow-up non-con head CT 12:29 today:  "COMPARISON: Head CT dated 8/20/2023  . . .     FINDINGS:  Similar small left frontal convexity mixed density subdural collection   measuring 5.3 mm in thickness. 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  There is periventricular and subcortical white matter hypodensity without   mass effect, nonspecific, likely representing mild chronic microvascular   ischemic changes. There is no compelling evidence for an acute   transcortical infarction. There is no evidence of mass, mass effect,   midline shift or other extra-axial fluid collection. The lateral   ventricles and cortical sulci are age-appropriate in size and   configuration. Mild inflammatory mucosal changes are seen throughout the   various portions of the paranasal sinuses. The orbits and mastoid air   cells are unremarkable. The calvarium is intact. Consider MRI as   clinically warranted.    IMPRESSION:  Similar left frontal subdural collection. Mild chronic   microvascular changes without evidence of an acute transcortical   infarction or hemorrhage."      EXAMINATION    Awake, alert, supine in bed, under heating blanket.   History from Admission H&P today.  Pt examined in ICU.    <Start of quote(s) from H&P>  "Reason for Admission: found  down  History of Present Illness:   89 yo M     bibems after being found down outside.    no  pmx. pe r pt  now    per  er  chart ,on  arrival,  pt  was  minimally responsive, altered, unable to provide history.    and  had  a ectal temp   90 degrees.  No prior visits from chart review   was hypotensive/  hypotehrmic/    elevated  lactate/  acidotic, severe  anemia on  arrival/ and  ,per  ER Dr , ICU  eval  was  called/ and  was rejected /  note currently, pending    pt  unable  to  clearly   state  if  eh had  any  trauma  or how  he fell    icu  re  eval  called again by current   er  dr/  awaiting   consult   pt  seen  in  er/  awake  and  alert,  somewhat  able  to  answer  questions , though   not  fully    denies  any  cp/sob/abd  pain/  recall   bleeding  also  denies   any  cardiac  hx/  priro  strokes/  dm,  etc   states  he  lives  with  his  family/ address /  phone  numbers  of  family    currently  unknown       Review of Systems:  Review of Systems: REVIEW OF SYSTEMS:  CONSTITUTIONAL: No fever,  no  weight loss  ENT:  No  tinnitus,   no   vertigo  NECK: No pain or stiffness  RESPIRATORY: No cough, wheezing, chills or hemoptysis;    No Shortness of Breath  CARDIOVASCULAR: No chest pain, palpitations, dizziness  GASTROINTESTINAL: No abdominal or epigastric pain. No nausea, vomiting, or hematemesis; No diarrhea  No melena or hematochezia.  GENITOURINARY: No dysuria, frequency, hematuria, or incontinence  NEUROLOGICAL: No headaches  SKIN: No itching,  no   rash  LYMPH Nodes: No enlarged glands  ENDOCRINE: No heat or cold intolerance  MUSCULOSKELETAL: No joint pain or swelling  PSYCHIATRIC: No depression, anxiety  HEME/LYMPH: No easy bruising, or bleeding gums  ALLERGY AND IMMUNOLOGIC: No hives or eczema"  <End of quote(s) from H&P>    Per my interviewing the Pt:    He has no recollection of how he ended up on the ground.  He is  from his wife; has a son and a daughter.  He rooms in someone's house.   His surname is pronounced "crame" (rhymes with came).  He used to be a .       Per radiology report of non-con head CT 06:48 today:  "There is a thin indeterminate age left frontal convexity subdural   hematoma measuring 4 mm in maximum thickness. There is asymmetric low   attenuation right frontal, parietal, and temporal convexity subdural   collection which may represent an old subdural hematoma or hygroma. There   is no significant mass effect or shift of the midline. The basal cisterns   are not effaced. There is cerebral and cerebellar volume loss with   prominence of the ventricles, sulci, and cerebellar folia. There are mild   chronic ischemic changes in the frontoparietal white matter. There are   atherosclerotic calcifications of the intracranial carotid arteries.    There is no significant scalp soft tissue swelling or scalp hematoma. The   skull base and bony calvarium are intact. There is mild bilateral ethmoid   and maxillary sinus mucosal thickening and aerosolized secretions in the   sphenoid sinus and maxillary sinuses with small fluid levels. The   tympanic and mastoid cavities are well aerated.      IMPRESSION:    Thin indeterminate age left frontal convexity subdural hematoma measuring   up to 4 mm. Asymmetric low-attenuation right frontal, parietal, and   temporal convexity subdural collection which may represent an old   subdural hematoma or hygroma.No significant mass effect. No acute   osseous fracture."      Per radiology report of follow-up non-con head CT 12:29 today:  "COMPARISON: Head CT dated 8/20/2023  . . .     FINDINGS:  Similar small left frontal convexity mixed density subdural collection   measuring 5.3 mm in thickness. 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  There is periventricular and subcortical white matter hypodensity without   mass effect, nonspecific, likely representing mild chronic microvascular   ischemic changes. There is no compelling evidence for an acute   transcortical infarction. There is no evidence of mass, mass effect,   midline shift or other extra-axial fluid collection. The lateral   ventricles and cortical sulci are age-appropriate in size and   configuration. Mild inflammatory mucosal changes are seen throughout the   various portions of the paranasal sinuses. The orbits and mastoid air   cells are unremarkable. The calvarium is intact. Consider MRI as   clinically warranted.    IMPRESSION:  Similar left frontal subdural collection. Mild chronic   microvascular changes without evidence of an acute transcortical   infarction or hemorrhage."    Per radiology report of non-con c-spine CT:  "FINDINGS:    Evaluation is mildly degraded by motion. There is nonspecific   straightening and reversal of the cervical spine lordosis. There is no   gross acute cervical spine fracture or evidence of traumatic   malalignment. There is no significant prevertebral soft tissue   swelling/hematoma. There are multilevel degenerative changes with   multilevel ventral osteophytes, intervertebral disc space narrowing,   small disc bulges and disc osteophyte complexes, and facet and uncinate   hypertrophy. The regional soft tissues of the neckare otherwise   unremarkable apart from vascular atherosclerotic calcifications. The lung   apices demonstrate mild emphysema and parenchymal scarring.      IMPRESSION:    Motion degraded exam shows no gross acute cervical spine fracture or   evidence of traumatic malalignment. Cervical spondylosis."       EXAMINATION    Awake, alert, supine in bed, under heating blanket.  Grossly normal comprehension, expression, articulation.  Mild psychomotor slowing.  Year = "I don't know."  Month = "I forgot."  President = "I forgot; " cued with Siddhartha he answers "Biden."  9x7 = "63."  Changed from $1.00 for 43 cent purchase = "53 cents."  Names fingers correctly (cued to get ring finger).  PERRL; meiotic.  Confrontation visual fields grossly intact.  Symmetric normal facial movements.      No gross UE drift.  Very weak four limbs without grossly evident asymmetries; exam limited by lines, heating blanket, recumbency.  P/LT sensation grossly intact; no extinction on DSS.      Reflex                           Right    Left   Comment    Biceps                              2-      2-  low amplitude  Triceps                             0       0  Merrill                        absent absent  Patellar                             0       0  Gastroc                             0      0  Plantar                           mute   mute     History from Admission H&P today.  Pt examined in ICU.    <Start of quote(s) from H&P>  "Reason for Admission: found  down  History of Present Illness:   87 yo M     bibems after being found down outside.    no  pmx. pe r pt  now    per  er  chart ,on  arrival,  pt  was  minimally responsive, altered, unable to provide history.    and  had  a ectal temp   90 degrees.  No prior visits from chart review   was hypotensive/  hypotehrmic/    elevated  lactate/  acidotic, severe  anemia on  arrival/ and  ,per  ER Dr , ICU  eval  was  called/ and  was rejected /  note currently, pending    pt  unable  to  clearly   state  if  eh had  any  trauma  or how  he fell    icu  re  eval  called again by current   er  dr/  awaiting   consult   pt  seen  in  er/  awake  and  alert,  somewhat  able  to  answer  questions , though   not  fully    denies  any  cp/sob/abd  pain/  recall   bleeding  also  denies   any  cardiac  hx/  priro  strokes/  dm,  etc   states  he  lives  with  his  family/ address /  phone  numbers  of  family    currently  unknown       Review of Systems:  Review of Systems: REVIEW OF SYSTEMS:  CONSTITUTIONAL: No fever,  no  weight loss  ENT:  No  tinnitus,   no   vertigo  NECK: No pain or stiffness  RESPIRATORY: No cough, wheezing, chills or hemoptysis;    No Shortness of Breath  CARDIOVASCULAR: No chest pain, palpitations, dizziness  GASTROINTESTINAL: No abdominal or epigastric pain. No nausea, vomiting, or hematemesis; No diarrhea  No melena or hematochezia.  GENITOURINARY: No dysuria, frequency, hematuria, or incontinence  NEUROLOGICAL: No headaches  SKIN: No itching,  no   rash  LYMPH Nodes: No enlarged glands  ENDOCRINE: No heat or cold intolerance  MUSCULOSKELETAL: No joint pain or swelling  PSYCHIATRIC: No depression, anxiety  HEME/LYMPH: No easy bruising, or bleeding gums  ALLERGY AND IMMUNOLOGIC: No hives or eczema  . . .      per  er  chart ,on  arrival,  pt  was  minimally responsive, altered, unable to provide history.      and  had  a ectal temp   90 degrees.  No prior visits   here   was hypotensive/  hypothermic/   elevated  lactate/  acidotic, severe  anemia on  arrival/ and  per  Er Dr , Icu   eval  was  called, and   pt  was rejected /  note currently, pending      icu  re  eval  called again by current   er  dr/  awaiting   consult   pt  seen  in  er/  awake  and  alert,  now, somewhat  able  to  answer  questions    . . .     denies   any  cardiac  hx/  prior  strokes/  dm,  etc   states  he  lives  with  his  family/ address /  phone  numbers  of  family    currently  unknown   . . .      septic  shock  on arrival,.  with  organ dysfunction,  with  hypothermia,  hypotension,  ANGI,           acidosis  , lactate  of 14. pna on  ct/ ?  aspiration"  <End of quote(s) from H&P>    Per my interviewing the Pt:    He has no recollection of how he ended up on the ground.  He is  from his wife; has a son and a daughter.  He rooms in someone's house.   His surname is pronounced "crame" (rhymes with came).  He used to be a .       Per radiology report of non-con head CT 06:48 today:  "There is a thin indeterminate age left frontal convexity subdural   hematoma measuring 4 mm in maximum thickness. There is asymmetric low   attenuation right frontal, parietal, and temporal convexity subdural   collection which may represent an old subdural hematoma or hygroma. There   is no significant mass effect or shift of the midline. The basal cisterns   are not effaced. There is cerebral and cerebellar volume loss with   prominence of the ventricles, sulci, and cerebellar folia. There are mild   chronic ischemic changes in the frontoparietal white matter. There are   atherosclerotic calcifications of the intracranial carotid arteries.    There is no significant scalp soft tissue swelling or scalp hematoma. The   skull base and bony calvarium are intact. There is mild bilateral ethmoid   and maxillary sinus mucosal thickening and aerosolized secretions in the   sphenoid sinus and maxillary sinuses with small fluid levels. The   tympanic and mastoid cavities are well aerated.      IMPRESSION:    Thin indeterminate age left frontal convexity subdural hematoma measuring   up to 4 mm. Asymmetric low-attenuation right frontal, parietal, and   temporal convexity subdural collection which may represent an old   subdural hematoma or hygroma.No significant mass effect. No acute   osseous fracture."      Per radiology report of follow-up non-con head CT 12:29 today:  "COMPARISON: Head CT dated 8/20/2023  . . .     FINDINGS:  Similar small left frontal convexity mixed density subdural collection   measuring 5.3 mm in thickness. 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  There is periventricular and subcortical white matter hypodensity without   mass effect, nonspecific, likely representing mild chronic microvascular   ischemic changes. There is no compelling evidence for an acute   transcortical infarction. There is no evidence of mass, mass effect,   midline shift or other extra-axial fluid collection. The lateral   ventricles and cortical sulci are age-appropriate in size and   configuration. Mild inflammatory mucosal changes are seen throughout the   various portions of the paranasal sinuses. The orbits and mastoid air   cells are unremarkable. The calvarium is intact. Consider MRI as   clinically warranted.    IMPRESSION:  Similar left frontal subdural collection. Mild chronic   microvascular changes without evidence of an acute transcortical   infarction or hemorrhage."    Per radiology report of non-con c-spine CT:  "FINDINGS:    Evaluation is mildly degraded by motion. There is nonspecific   straightening and reversal of the cervical spine lordosis. There is no   gross acute cervical spine fracture or evidence of traumatic   malalignment. There is no significant prevertebral soft tissue   swelling/hematoma. There are multilevel degenerative changes with   multilevel ventral osteophytes, intervertebral disc space narrowing,   small disc bulges and disc osteophyte complexes, and facet and uncinate   hypertrophy. The regional soft tissues of the neckare otherwise   unremarkable apart from vascular atherosclerotic calcifications. The lung   apices demonstrate mild emphysema and parenchymal scarring.      IMPRESSION:    Motion degraded exam shows no gross acute cervical spine fracture or   evidence of traumatic malalignment. Cervical spondylosis."     Hgb/Hct/MCV  7.5/24.2/65.4  tot prot/alb  6.3/2.1  ammonia 46      EXAMINATION    Awake, alert, supine in bed, under heating blanket.  Grossly normal comprehension, expression, articulation.  Mild psychomotor slowing.  Year = "I don't know."  Month = "I forgot."  President = "I forgot; " cued with Siddhartha he answers "Biden."  9x7 = "63."  Changed from $1.00 for 43 cent purchase = "53 cents."  Names fingers correctly (cued to get ring finger).  PERRL; meiotic.  Confrontation visual fields grossly intact.  Symmetric normal facial movements.      No gross UE drift.  Very weak four limbs without grossly evident asymmetries; exam limited by lines, heating blanket, recumbency.  P/LT sensation grossly intact; no extinction on DSS.      Reflex                           Right    Left   Comment    Biceps                              2-      2-  low amplitude  Triceps                             0       0  Merrill                        absent absent  Patellar                             0       0  Gastroc                             0      0  Plantar                           mute   mute    Gait and station cannot be evaluated at this time.    No tremors or AIMs noted.

## 2023-08-20 NOTE — CONSULT NOTE ADULT - SUBJECTIVE AND OBJECTIVE BOX
CARDIOLOGY CONSULTATION NOTE                                                                             NAIN WEISS is a 88y Male BIBEMS. As per ED chart- pt was minimally responsive on admission and unable to provide history.   He had a rectal temp of 90 degrees, was hypotensive and severely acidotic and anemic with elevated  lactate. No clear history of any trauma, or how  he fell. No evidence of active bleed, prior labs unknown.   REVIEW OF SYSTEMS: NA ----------------------------  Home Medications:   MEDICATIONS  (STANDING): aMIOdarone IVPB 150 milliGRAM(s) IV Intermittent once chlorhexidine 2% Cloths 1 Application(s) Topical <User Schedule> norepinephrine Infusion 0.05 MICROgram(s)/kG/Min (4.26 mL/Hr) IV Continuous <Continuous> pantoprazole  Injectable 40 milliGRAM(s) IV Push two times a day piperacillin/tazobactam IVPB. 3.375 Gram(s) IV Intermittent once piperacillin/tazobactam IVPB.- 3.375 Gram(s) IV Intermittent once sodium chloride 0.9%. 1000 milliLiter(s) (100 mL/Hr) IV Continuous <Continuous>   ALLERGIES: No Known Allergies   FAMILY HISTORY:   PHYSICAL EXAMINATION: ----------------------------- T(C): 34.6 (08-20-23 @ 11:32), Max: 34.6 (08-20-23 @ 11:32) HR: 159 (08-20-23 @ 12:40) (60 - 159) BP: 112/82 (08-20-23 @ 12:40) (79/52 - 149/74) RR: 36 (08-20-23 @ 12:40) (26 - 38) SpO2: 99% (08-20-23 @ 04:21) (99% - 99%) Wt(kg): --  Height (cm): 167.6 (08-20 @ 04:21) Weight (kg): 45.4 (08-20 @ 04:21) BMI (kg/m2): 16.2 (08-20 @ 04:21) BSA (m2): 1.49 (08-20 @ 04:21)  Constitutional: well developed, normal appearance, well groomed, well nourished, no deformities and no acute distress.  Eyes: the conjunctiva exhibited no abnormalities and the eyelids demonstrated no xanthelasmas.  HEENT: normal oral mucosa, no oral pallor and no oral cyanosis.  Neck: normal jugular venous A waves present, normal jugular venous V waves present and no jugular venous perdomo A waves.  Pulmonary: no respiratory distress, normal respiratory rhythm and effort, no accessory muscle use and lungs were clear to auscultation bilaterally.  Cardiovascular: heart rate and rhythm were normal, normal S1 and S2 and no murmur, gallop, rub, heave or thrill are present.  Abdomen: soft, non-tender, no hepato-splenomegaly and no abdominal mass palpated.  Musculoskeletal: the gait could not be assessed..  Extremities: no clubbing of the fingernails, no localized cyanosis, no petechial hemorrhages and no ischemic changes.  Skin: normal skin color and pigmentation, no rash, no venous stasis, no skin lesions, no skin ulcer and no xanthoma was observed.  Psychiatric: oriented to person, place, and time, the affect was normal, the mood was normal and not feeling anxious.   ECG: -------    LABS:  -------- 08-20  141  |  103  |  37<H> ----------------------------<  88 4.0   |  12<L>  |  3.40<H>  Ca    8.4<L>      20 Aug 2023 05:00  TPro  7.3  /  Alb  2.5<L>  /  TBili  0.8  /  DBili  x   /  AST  132<H>  /  ALT  50  /  AlkPhos  78  08-20                       6.1   13.69 )-----------( 430      ( 20 Aug 2023 05:00 )            21.8          RADIOLOGY REPORTS: -----------------------------  < from: CT Abdomen and Pelvis No Cont (08.20.23 @ 06:49) >  ACC: [id number] EXAM:  CT ABDOMEN AND PELVIS   ORDERED BY: OSCAR TODD   ACC: [id number] EXAM:  CT CHEST   ORDERED BY: OSCAR EWY   PROCEDURE DATE:  08/20/2023      INTERPRETATION:  CLINICAL INFORMATION:  found down hypothermic to 90,  AMS, unknown history  COMPARISON: None.  CONTRAST/COMPLICATIONS: IV Contrast: None Oral Contrast: None Complications: None   PROCEDURE: Axial CT images were acquired through the chest, abdomen and pelvis  without intravenous contrast. Coronal and sagittal reformatted images  were generated.  LIMITATIONS: Limited due to the lack of oral and intravenous contrast.  Also limited due to suboptimal resolution and lack of body fat.  FINDINGS: CHEST: LUNGS AND LARGE AIRWAYS: Patent central airways. Bibasilar patchy  consolidations suggesting pneumonia. Superimposed emphysematous lung  change and interstitial lung disease/honeycombing at the lung bases.. PLEURA: No pleural effusion or pneumothorax. VESSELS: Limited evaluation of the thoracic aorta without intravenous  contrast, however there is calcific atherosclerosis without aneurysmal  dilatation. HEART: Borderline enlarged heart.. No pericardial effusion. Coronary  artery calcifications. Anemia suggested. MEDIASTINUM AND STACY: No lymphadenopathy. CHEST WALL AND LOWER NECK: Within normal limits.   ABDOMEN AND PELVIS: LIVER: Within normal limits. BILE DUCTS: Normal caliber. GALLBLADDER: Elongated gallbladder without definite calcified gallstone.. SPLEEN: Within normal limits. PANCREAS: Within normal limits. ADRENALS: Normal right adrenal gland. Limited left adrenal gland. KIDNEYS/URETERS: Small bilateral kidneys. No renal stone or  hydronephrosis.  BLADDER: Within normal limits. REPRODUCTIVE ORGANS: Prostate gland is not grossly enlarged.  BOWEL: Evaluation of bowel is limited without distention with oral  contrast and lack of mesenteric fat, however there is no bowel  obstruction. There is a 7 cm stool distended rectum. Small bowel loops  are not grossly dilated. Stomach is mildly distended with debris. PERITONEUM: No free air or significant ascites. VESSELS:  Limited evaluation of the abdominal aorta without intravenous  contrast, demonstrates tortuosity and calcific atherosclerosis without  aneurysmal dilatation. Negative RETROPERITONEUM: No lymphadenopathy. ABDOMINAL WALL: Within normal limits. BONES: Severe scoliosis of the spine with associated multilevel  degenerative changes. Question pagetoid appearance of the right iliac  bone.  IMPRESSION:  Bilateral pneumonia.  Additional findings as above.  --- End of Report ---    ECHOCARDIOGRAM: --------------------------- pending     CARDIOLOGY CONSULTATION NOTE                                                                             NAIN WEISS is a 88y Male BIBEMS. As per ED chart- pt was minimally responsive on admission and unable to provide history.   He had a rectal temp of 90 degrees, was hypotensive and severely acidotic and anemic with elevated  lactate. In Afib rates up to 140's, no prior history available. No clear history of any trauma, or how  he fell. No evidence of active bleed, prior labs unknown.   REVIEW OF SYSTEMS: NA ----------------------------  Home Medications: NA   MEDICATIONS  (STANDING): aMIOdarone IVPB 150 milliGRAM(s) IV Intermittent once chlorhexidine 2% Cloths 1 Application(s) Topical <User Schedule> norepinephrine Infusion 0.05 MICROgram(s)/kG/Min (4.26 mL/Hr) IV Continuous <Continuous> pantoprazole  Injectable 40 milliGRAM(s) IV Push two times a day piperacillin/tazobactam IVPB. 3.375 Gram(s) IV Intermittent once piperacillin/tazobactam IVPB.- 3.375 Gram(s) IV Intermittent once sodium chloride 0.9%. 1000 milliLiter(s) (100 mL/Hr) IV Continuous <Continuous>   ALLERGIES: No Known Allergies   FAMILY HISTORY:   PHYSICAL EXAMINATION: ----------------------------- T(C): 34.6 (08-20-23 @ 11:32), Max: 34.6 (08-20-23 @ 11:32) HR: 159 (08-20-23 @ 12:40) (60 - 159) BP: 112/82 (08-20-23 @ 12:40) (79/52 - 149/74) RR: 36 (08-20-23 @ 12:40) (26 - 38) SpO2: 99% (08-20-23 @ 04:21) (99% - 99%) Wt(kg): --  Height (cm): 167.6 (08-20 @ 04:21) Weight (kg): 45.4 (08-20 @ 04:21) BMI (kg/m2): 16.2 (08-20 @ 04:21) BSA (m2): 1.49 (08-20 @ 04:21)  Constitutional: well developed, normal appearance, well groomed, well nourished, no deformities and no acute distress.  Eyes: the conjunctiva exhibited no abnormalities and the eyelids demonstrated no xanthelasmas.  HEENT: normal oral mucosa, no oral pallor and no oral cyanosis.  Neck: normal jugular venous A waves present, normal jugular venous V waves present and no jugular venous perdomo A waves.  Pulmonary: no respiratory distress, normal respiratory rhythm and effort, no accessory muscle use and lungs were clear to auscultation bilaterally.  Cardiovascular: heart rate and rhythm were normal, normal S1 and S2 and no murmur, gallop, rub, heave or thrill are present.  Abdomen: soft, non-tender, no hepato-splenomegaly and no abdominal mass palpated.  Musculoskeletal: the gait could not be assessed..  Extremities: no clubbing of the fingernails, no localized cyanosis, no petechial hemorrhages and no ischemic changes.  Skin: normal skin color and pigmentation, no rash, no venous stasis, no skin lesions, no skin ulcer and no xanthoma was observed.  Psychiatric: oriented to person, place, and time, the affect was normal, the mood was normal and not feeling anxious.   ECG: ------- 's.   LABS:  -------- 08-20  141  |  103  |  37<H> ----------------------------<  88 4.0   |  12<L>  |  3.40<H>  Ca    8.4<L>      20 Aug 2023 05:00  TPro  7.3  /  Alb  2.5<L>  /  TBili  0.8  /  DBili  x   /  AST  132<H>  /  ALT  50  /  AlkPhos  78  08-20                       6.1   13.69 )-----------( 430      ( 20 Aug 2023 05:00 )            21.8          RADIOLOGY REPORTS: -----------------------------  < from: CT Abdomen and Pelvis No Cont (08.20.23 @ 06:49) >  ACC: [id number] EXAM:  CT ABDOMEN AND PELVIS   ORDERED BY: OSCAR TODD   ACC: [id number] EXAM:  CT CHEST   ORDERED BY: OSCAR TSEKAROLINA   PROCEDURE DATE:  08/20/2023      INTERPRETATION:  CLINICAL INFORMATION:  found down hypothermic to 90,  AMS, unknown history  COMPARISON: None.  CONTRAST/COMPLICATIONS: IV Contrast: None Oral Contrast: None Complications: None   PROCEDURE: Axial CT images were acquired through the chest, abdomen and pelvis  without intravenous contrast. Coronal and sagittal reformatted images  were generated.  LIMITATIONS: Limited due to the lack of oral and intravenous contrast.  Also limited due to suboptimal resolution and lack of body fat.  FINDINGS: CHEST: LUNGS AND LARGE AIRWAYS: Patent central airways. Bibasilar patchy  consolidations suggesting pneumonia. Superimposed emphysematous lung  change and interstitial lung disease/honeycombing at the lung bases.. PLEURA: No pleural effusion or pneumothorax. VESSELS: Limited evaluation of the thoracic aorta without intravenous  contrast, however there is calcific atherosclerosis without aneurysmal  dilatation. HEART: Borderline enlarged heart.. No pericardial effusion. Coronary  artery calcifications. Anemia suggested. MEDIASTINUM AND STACY: No lymphadenopathy. CHEST WALL AND LOWER NECK: Within normal limits.   ABDOMEN AND PELVIS: LIVER: Within normal limits. BILE DUCTS: Normal caliber. GALLBLADDER: Elongated gallbladder without definite calcified gallstone.. SPLEEN: Within normal limits. PANCREAS: Within normal limits. ADRENALS: Normal right adrenal gland. Limited left adrenal gland. KIDNEYS/URETERS: Small bilateral kidneys. No renal stone or  hydronephrosis.  BLADDER: Within normal limits. REPRODUCTIVE ORGANS: Prostate gland is not grossly enlarged.  BOWEL: Evaluation of bowel is limited without distention with oral  contrast and lack of mesenteric fat, however there is no bowel  obstruction. There is a 7 cm stool distended rectum. Small bowel loops  are not grossly dilated. Stomach is mildly distended with debris. PERITONEUM: No free air or significant ascites. VESSELS:  Limited evaluation of the abdominal aorta without intravenous  contrast, demonstrates tortuosity and calcific atherosclerosis without  aneurysmal dilatation. Negative RETROPERITONEUM: No lymphadenopathy. ABDOMINAL WALL: Within normal limits. BONES: Severe scoliosis of the spine with associated multilevel  degenerative changes. Question pagetoid appearance of the right iliac  bone.  IMPRESSION:  Bilateral pneumonia.  Additional findings as above.  --- End of Report ---    ECHOCARDIOGRAM: --------------------------- pending

## 2023-08-20 NOTE — H&P ADULT - NSHPPHYSICALEXAM_GEN_ALL_CORE
T(C): 33.6 (08-20-23 @ 06:54), Max: 33.6 (08-20-23 @ 06:54)  HR: 141 (08-20-23 @ 06:54) (60 - 150)  BP: 128/81 (08-20-23 @ 06:54) (86/68 - 149/74)  RR: 32 (08-20-23 @ 05:41) (26 - 32)  SpO2: 99% (08-20-23 @ 04:21) (99% - 99%)  GENERAL: NAD, lying in bed comfortably  HEAD:  Atraumatic, normocephalic  EYES: EOMI, PERRLA, conjunctiva and sclera clear  NECK: Supple, trachea midline, no JVD  HEART: Regular rate and rhythm, no murmurs, rubs, or gallops  LUNGS: Unlabored respirations.  Clear to auscultation bilaterally, no crackles, wheezing, or rhonchi  ABDOMEN: Soft, nontender, nondistended, +BS  EXTREMITIES: 2+ peripheral pulses bilaterally. No clubbing, cyanosis, or edema  NERVOUS SYSTEM:  A&Ox3, moving all extremities, no focal deficits   SKIN: No rashes or lesions   lying  on  to  his  side/  has  NRM  on/  bear  hugger   on

## 2023-08-20 NOTE — ED PROVIDER NOTE - PHYSICAL EXAMINATION
Vitals: soft pressure of 98/57, rectal temp of 90 is hypothermic  Gen: confused, minimally responsive to questions, NAD, laying in stretcher  Head: ncat, perrla, eomi b/l  Neck: supple, no lymphadenopathy, no midline deviation  Heart: rrr, no m/r/g  Lungs: CTA b/l, no rales/ronchi/wheezes  Abd: soft, nontender, non-distended, no rebound or guarding  Ext: no clubbing/cyanosis/edema, no gross bony deformities   Neuro: sensation and muscle strength intact b/l, uncooperative for full exam

## 2023-08-20 NOTE — ED ADULT NURSE NOTE - OBJECTIVE STATEMENT
poor historian pt found lying down in street. PT tachypneic speaking in , utilizing accessory muscles , speaking incomplete sentences. denies pain. Rectal 90.3 in triage, pt in afib with RVR

## 2023-08-20 NOTE — ED ADULT NURSE NOTE - NSFALLHARMRISKINTERV_ED_ALL_ED

## 2023-08-20 NOTE — H&P ADULT - NSHPLABSRESULTS_GEN_ALL_CORE
LABS:                        6.1    13.69 )-----------( 430      ( 20 Aug 2023 05:00 )             21.8         141  |  103  |  37<H>  ----------------------------<  88  4.0   |  12<L>  |  3.40<H>    Ca    8.4<L>      20 Aug 2023 05:00    TPro  7.3  /  Alb  2.5<L>  /  TBili  0.8  /  DBili  x   /  AST  132<H>  /  ALT  50  /  AlkPhos  78            Urinalysis Basic - ( 20 Aug 2023 08:17 )    Color: Yellow / Appearance: Clear / S.020 / pH: x  Gluc: x / Ketone: Trace  / Bili: Negative / Urobili: Negative mg/dL   Blood: x / Protein: 30 mg/dL / Nitrite: Negative   Leuk Esterase: Negative / RBC: 6-10 /HPF / WBC 0-2   Sq Epi: x / Non Sq Epi: x / Bacteria: Moderate      Lactate, Blood: 9.3 mmol/L ( @ 08:00)  Lactate, Blood: 14.4 mmol/L ( @ 05:00)    ABG - ( 20 Aug 2023 05:57 )  pH, Arterial: 7.20  pH, Blood: x     /  pCO2: 28    /  pO2: 32    / HCO3: 11    / Base Excess: -15.6 /  SaO2: 31.8                  Thyroid Stimulating Hormone, Serum: 2.550 uIU/mL ( @ 05:00)

## 2023-08-20 NOTE — H&P ADULT - HISTORY OF PRESENT ILLNESS
89 yo M     bibems after being found down outside.    no  pmx. pe r pt  now    per  er  chart ,on  arrival,  pt  was  minimally responsive, altered, unable to provide history.    and  had  a ectal temp   90 degrees.  No prior visits from chart review   was hypotensive/  hypotehrmic/    elevated  lactate/  acidotic, severe  anemia on  arrival/ and  ,per  ER Dr , ICU  eval  was  called/ and  was rejected /  note currently, pending    pt  unable  to  clearly   state  if  eh had  any  trauma  or how  he fell    icu  re  eval  called again by current   er  dr/  awaiting   consult   pt  seen  in  er/  awake  and  alert,  somewhat  able  to  answer  questions , though   not  fully    denies  any  cp/sob/abd  pain/  recall   bleeding  also  denies   any  cardiac  hx/  priro  strokes/  dm,  etc   states  he  lives  with  his  family/ address /  phone  numbers  of  family    currently  unknown

## 2023-08-20 NOTE — PATIENT PROFILE ADULT - PRO INTERPRETER NEED 2
Medication refill requested: carvedilol (COREG) 3 125 mg tablet   Last office visit: 02/10/2022  Next office visit: 02/23/2023  Last refilled: 4/25/2022  Labs: No  Ordering Provider[facility]   [place] [address] [address] [facility], [address] [place] [id number]  Phone: [phone] Fax: [phone] [place]

## 2023-08-21 LAB
A1C WITH ESTIMATED AVERAGE GLUCOSE RESULT: 5.6 % — SIGNIFICANT CHANGE UP (ref 4–5.6)
ALBUMIN SERPL ELPH-MCNC: 1.8 G/DL — LOW (ref 3.3–5)
ALP SERPL-CCNC: 59 U/L — SIGNIFICANT CHANGE UP (ref 40–120)
ALT FLD-CCNC: 48 U/L — SIGNIFICANT CHANGE UP (ref 12–78)
ANION GAP SERPL CALC-SCNC: 11 MMOL/L — SIGNIFICANT CHANGE UP (ref 5–17)
APTT BLD: 23.8 SEC — LOW (ref 24.5–35.6)
AST SERPL-CCNC: 115 U/L — HIGH (ref 15–37)
BILIRUB SERPL-MCNC: 0.9 MG/DL — SIGNIFICANT CHANGE UP (ref 0.2–1.2)
BUN SERPL-MCNC: 39 MG/DL — HIGH (ref 7–23)
CALCIUM SERPL-MCNC: 7.2 MG/DL — LOW (ref 8.5–10.1)
CHLORIDE SERPL-SCNC: 112 MMOL/L — HIGH (ref 96–108)
CK SERPL-CCNC: 2446 U/L — HIGH (ref 26–308)
CO2 SERPL-SCNC: 21 MMOL/L — LOW (ref 22–31)
CREAT SERPL-MCNC: 2.42 MG/DL — HIGH (ref 0.5–1.3)
CULTURE RESULTS: NO GROWTH — SIGNIFICANT CHANGE UP
EGFR: 25 ML/MIN/1.73M2 — LOW
ERYTHROCYTE [SEDIMENTATION RATE] IN BLOOD: 28 MM/HR — HIGH (ref 0–20)
ESTIMATED AVERAGE GLUCOSE: 114 MG/DL — SIGNIFICANT CHANGE UP (ref 68–114)
FERRITIN SERPL-MCNC: 40 NG/ML — SIGNIFICANT CHANGE UP (ref 30–400)
FERRITIN SERPL-MCNC: 46 NG/ML — SIGNIFICANT CHANGE UP (ref 30–400)
GLUCOSE BLDC GLUCOMTR-MCNC: 65 MG/DL — LOW (ref 70–99)
GLUCOSE BLDC GLUCOMTR-MCNC: 80 MG/DL — SIGNIFICANT CHANGE UP (ref 70–99)
GLUCOSE BLDC GLUCOMTR-MCNC: 86 MG/DL — SIGNIFICANT CHANGE UP (ref 70–99)
GLUCOSE SERPL-MCNC: 72 MG/DL — SIGNIFICANT CHANGE UP (ref 70–99)
GRAM STN FLD: SIGNIFICANT CHANGE UP
HCT VFR BLD CALC: 23.8 % — LOW (ref 39–50)
HGB BLD-MCNC: 7.8 G/DL — LOW (ref 13–17)
IGA FLD-MCNC: 536 MG/DL — HIGH (ref 84–499)
IGG FLD-MCNC: 1017 MG/DL — SIGNIFICANT CHANGE UP (ref 610–1660)
IGM SERPL-MCNC: 42 MG/DL — SIGNIFICANT CHANGE UP (ref 35–242)
INR BLD: 1.39 RATIO — HIGH (ref 0.85–1.18)
IRON SATN MFR SERPL: 11 UG/DL — LOW (ref 45–165)
IRON SATN MFR SERPL: 17 UG/DL — LOW (ref 45–165)
IRON SATN MFR SERPL: 4 % — LOW (ref 16–55)
IRON SATN MFR SERPL: 7 % — LOW (ref 16–55)
KAPPA LC SER QL IFE: 8.99 MG/DL — HIGH (ref 0.33–1.94)
KAPPA/LAMBDA FREE LIGHT CHAIN RATIO, SERUM: 1.39 RATIO — SIGNIFICANT CHANGE UP (ref 0.26–1.65)
LACTATE SERPL-SCNC: 1.8 MMOL/L — SIGNIFICANT CHANGE UP (ref 0.7–2)
LAMBDA LC SER QL IFE: 6.46 MG/DL — HIGH (ref 0.57–2.63)
MAGNESIUM SERPL-MCNC: 2.3 MG/DL — SIGNIFICANT CHANGE UP (ref 1.6–2.6)
MCHC RBC-ENTMCNC: 22.1 PG — LOW (ref 27–34)
MCHC RBC-ENTMCNC: 32.8 G/DL — SIGNIFICANT CHANGE UP (ref 32–36)
MCV RBC AUTO: 67.4 FL — LOW (ref 80–100)
METHOD TYPE: SIGNIFICANT CHANGE UP
NRBC # BLD: 0 /100 WBCS — SIGNIFICANT CHANGE UP (ref 0–0)
PHOSPHATE SERPL-MCNC: 4.1 MG/DL — SIGNIFICANT CHANGE UP (ref 2.5–4.5)
PLATELET # BLD AUTO: 241 K/UL — SIGNIFICANT CHANGE UP (ref 150–400)
POTASSIUM SERPL-MCNC: 3.7 MMOL/L — SIGNIFICANT CHANGE UP (ref 3.5–5.3)
POTASSIUM SERPL-SCNC: 3.7 MMOL/L — SIGNIFICANT CHANGE UP (ref 3.5–5.3)
PROCALCITONIN SERPL-MCNC: 117.6 NG/ML — HIGH (ref 0.02–0.1)
PROT SERPL-MCNC: 5.1 G/DL — LOW (ref 6–8.3)
PROT SERPL-MCNC: 5.1 G/DL — LOW (ref 6–8.3)
PROT SERPL-MCNC: 5.5 GM/DL — LOW (ref 6–8.3)
PROTHROM AB SERPL-ACNC: 16.4 SEC — HIGH (ref 9.5–13)
RBC # BLD: 3.53 M/UL — LOW (ref 4.2–5.8)
RBC # FLD: 23.4 % — HIGH (ref 10.3–14.5)
S PNEUM DNA BLD POS QL NAA+NON-PROBE: SIGNIFICANT CHANGE UP
SODIUM SERPL-SCNC: 144 MMOL/L — SIGNIFICANT CHANGE UP (ref 135–145)
SPECIMEN SOURCE: SIGNIFICANT CHANGE UP
SPECIMEN SOURCE: SIGNIFICANT CHANGE UP
T4 AB SER-ACNC: 3.7 UG/DL — LOW (ref 4.6–12)
TIBC SERPL-MCNC: 263 UG/DL — SIGNIFICANT CHANGE UP (ref 220–430)
TIBC SERPL-MCNC: 289 UG/DL — SIGNIFICANT CHANGE UP (ref 220–430)
UIBC SERPL-MCNC: 246 UG/DL — SIGNIFICANT CHANGE UP (ref 110–370)
UIBC SERPL-MCNC: 277 UG/DL — SIGNIFICANT CHANGE UP (ref 110–370)
URATE SERPL-MCNC: 9.5 MG/DL — HIGH (ref 3.4–8.8)
VANCOMYCIN FLD-MCNC: 4.2 UG/ML — SIGNIFICANT CHANGE UP
WBC # BLD: 10.05 K/UL — SIGNIFICANT CHANGE UP (ref 3.8–10.5)
WBC # FLD AUTO: 10.05 K/UL — SIGNIFICANT CHANGE UP (ref 3.8–10.5)

## 2023-08-21 PROCEDURE — 99231 SBSQ HOSP IP/OBS SF/LOW 25: CPT

## 2023-08-21 PROCEDURE — 99223 1ST HOSP IP/OBS HIGH 75: CPT

## 2023-08-21 RX ORDER — FERROUS SULFATE 325(65) MG
325 TABLET ORAL THREE TIMES A DAY
Refills: 0 | Status: DISCONTINUED | OUTPATIENT
Start: 2023-08-21 | End: 2023-09-07

## 2023-08-21 RX ORDER — CEFTRIAXONE 500 MG/1
2000 INJECTION, POWDER, FOR SOLUTION INTRAMUSCULAR; INTRAVENOUS EVERY 24 HOURS
Refills: 0 | Status: DISCONTINUED | OUTPATIENT
Start: 2023-08-21 | End: 2023-08-23

## 2023-08-21 RX ADMIN — HEPARIN SODIUM 5000 UNIT(S): 5000 INJECTION INTRAVENOUS; SUBCUTANEOUS at 05:28

## 2023-08-21 RX ADMIN — Medication 500 MICROGRAM(S): at 05:01

## 2023-08-21 RX ADMIN — AZITHROMYCIN 255 MILLIGRAM(S): 500 TABLET, FILM COATED ORAL at 14:33

## 2023-08-21 RX ADMIN — Medication 325 MILLIGRAM(S): at 13:33

## 2023-08-21 RX ADMIN — HEPARIN SODIUM 5000 UNIT(S): 5000 INJECTION INTRAVENOUS; SUBCUTANEOUS at 13:33

## 2023-08-21 RX ADMIN — CHLORHEXIDINE GLUCONATE 1 APPLICATION(S): 213 SOLUTION TOPICAL at 11:19

## 2023-08-21 RX ADMIN — HEPARIN SODIUM 5000 UNIT(S): 5000 INJECTION INTRAVENOUS; SUBCUTANEOUS at 21:25

## 2023-08-21 RX ADMIN — CEFTRIAXONE 100 MILLIGRAM(S): 500 INJECTION, POWDER, FOR SOLUTION INTRAMUSCULAR; INTRAVENOUS at 21:25

## 2023-08-21 RX ADMIN — Medication 325 MILLIGRAM(S): at 21:25

## 2023-08-21 RX ADMIN — PANTOPRAZOLE SODIUM 40 MILLIGRAM(S): 20 TABLET, DELAYED RELEASE ORAL at 05:27

## 2023-08-21 NOTE — PROGRESS NOTE ADULT - SUBJECTIVE AND OBJECTIVE BOX
[facility] NEPHROLOGY SERVICES, [facility]  NEPHROLOGY AND HYPERTENSION  [address]  [address]  [place] [address]  [phone]    MD LYNDSEY RAINEY, MD DOTTY BENNETT, MD HERBERT ROY, MD ROSELINE VILLAFANA MD          Patient events noted  No distress  Feels better    MEDICATIONS  (STANDING):  azithromycin  IVPB 500 milliGRAM(s) IV Intermittent every 24 hours  azithromycin  IVPB      cefTRIAXone   IVPB 1000 milliGRAM(s) IV Intermittent every 24 hours  chlorhexidine 2% Cloths 1 Application(s) Topical <User Schedule>  ferrous    sulfate 325 milliGRAM(s) Oral three times a day  heparin   Injectable 5000 Unit(s) SubCutaneous every 8 hours    MEDICATIONS  (PRN):      08-20-23 @ 07:01  -  08-21-23 @ 07:00  --------------------------------------------------------  IN: 645 mL / OUT: 800 mL / NET: -155 mL    08-21-23 @ 07:01  -  08-21-23 @ 17:32  --------------------------------------------------------  IN: 490 mL / OUT: 375 mL / NET: 115 mL      PHYSICAL EXAM:      T(C): 36.5 (08-21-23 @ 17:11), Max: 36.9 (08-21-23 @ 13:27)  HR: 66 (08-21-23 @ 17:11) (49 - 78)  BP: 133/77 (08-21-23 @ 17:11) (96/68 - 150/69)  RR: 18 (08-21-23 @ 17:11) (14 - 42)  SpO2: 95% (08-21-23 @ 17:11) (91% - 100%)  Wt(kg): --  Lungs clear  Heart S1S2  Abd soft NT ND  Extremities:   1 edema                                    7.8    10.05 )-----------( 241      ( 21 Aug 2023 02:20 )             23.8     08-21    144  |  112<H>  |  39<H>  ----------------------------<  72  3.7   |  21<L>  |  2.42<H>    Ca    7.2<L>      21 Aug 2023 02:20  Phos  4.1     08-21  Mg     2.3     08-21    TPro  5.5<L>  /  Alb  1.8<L>  /  TBili  0.9  /  DBili  x   /  AST  115<H>  /  ALT  48  /  AlkPhos  59  08-21    ABG - ( 20 Aug 2023 11:56 )  pH, Arterial: 7.39  pH, Blood: x     /  pCO2: 25    /  pO2: 109   / HCO3: 15    / Base Excess: -8.1  /  SaO2: 99.4              LIVER FUNCTIONS - ( 21 Aug 2023 02:20 )  Alb: 1.8 g/dL / Pro: 5.5 gm/dL / ALK PHOS: 59 U/L / ALT: 48 U/L / AST: 115 U/L / GGT: x           Creatinine Trend: 2.42<--, 2.64<--, 3.40<--        Assessment   ANGI, degree of CKD unclear;   PNA emphysema   Metabolic acidosis, lactic, septic shock, risk for ATN  R/O occult pulm renal syndrome, vasculitic, connective tissue related  Afib; risk for rhabdomyolysis  Anemia, acute on chronic   DVT LLE     Plan  IVF bolus and maintenance   MM screen  Serologic screen    Guaiac stool   Discussed with daughter at bedside     Vj Guadalupe MD

## 2023-08-21 NOTE — PROGRESS NOTE ADULT - SUBJECTIVE AND OBJECTIVE BOX
INTERVAL HPI/OVERNIGHT EVENTS:   HPI:  87 yo M     bibems after being found down outside.    no  pmx. pe r pt  now    per  er  chart ,on  arrival,  pt  was  minimally responsive, altered, unable to provide history.    and  had  a ectal temp   90 degrees.  No prior visits from chart review   was hypotensive/  hypotehrmic/    elevated  lactate/  acidotic, severe  anemia on  arrival/ and  ,per  ER Dr , ICU  eval  was  called/ and  was rejected /  note currently, pending    pt  unable  to  clearly   state  if  eh had  any  trauma  or how  he fell    icu  re  eval  called again by current   er  dr/  awaiting   consult   pt  seen  in  er/  awake  and  alert,  somewhat  able  to  answer  questions , though   not  fully    denies  any  cp/sob/abd  pain/  recall   bleeding  also  denies   any  cardiac  hx/  priro  strokes/  dm,  etc   states  he  lives  with  his  family/ address /  phone  numbers  of  family    currently  unknown   (20 Aug 2023 10:35)      CENTRAL LINE: [ ] YES [ ] NO  LOCATION:   DATE INSERTED:  REMOVE: [ ] YES [ ] NO  EXPLAIN:    HOPKINS: [ ] YES [ ] NO    DATE INSERTED:  REMOVE:  [ ] YES [ ] NO  EXPLAIN:    A-LINE:  [ ] YES [ ] NO  LOCATION:   DATE INSERTED:  REMOVE:  [ ] YES [ ] NO  EXPLAIN:    PAST MEDICAL & SURGICAL HISTORY:  No pertinent past medical history          REVIEW OF SYSTEMS:    Negative ROS aside from HPI/Interval events above.    ICU Vital Signs Last 24 Hrs  T(C): 36.6 (21 Aug 2023 07:10), Max: 36.8 (20 Aug 2023 19:00)  T(F): 97.9 (21 Aug 2023 07:10), Max: 98.3 (20 Aug 2023 19:00)  HR: 65 (21 Aug 2023 11:00) (49 - 159)  BP: 141/72 (21 Aug 2023 11:00) (83/59 - 150/69)  BP(mean): 89 (21 Aug 2023 11:00) (64 - 118)  ABP: --  ABP(mean): --  RR: 31 (21 Aug 2023 11:00) (14 - 42)  SpO2: 100% (21 Aug 2023 11:00) (84% - 100%)    O2 Parameters below as of 21 Aug 2023 07:00  Patient On (Oxygen Delivery Method): room air            ABG - ( 20 Aug 2023 11:56 )  pH, Arterial: 7.39  pH, Blood: x     /  pCO2: 25    /  pO2: 109   / HCO3: 15    / Base Excess: -8.1  /  SaO2: 99.4                I&O's Detail    20 Aug 2023 07:01  -  21 Aug 2023 07:00  --------------------------------------------------------  IN:    IV PiggyBack: 300 mL    PRBCs (Packed Red Blood Cells): 345 mL  Total IN: 645 mL    OUT:    Indwelling Catheter - Urethral (mL): 800 mL    Norepinephrine: 0 mL  Total OUT: 800 mL    Total NET: -155 mL      21 Aug 2023 07:01  -  21 Aug 2023 11:58  --------------------------------------------------------  IN:    Oral Fluid: 120 mL  Total IN: 120 mL    OUT:    Indwelling Catheter - Urethral (mL): 375 mL  Total OUT: 375 mL    Total NET: -255 mL      CAPILLARY BLOOD GLUCOSE      POCT Blood Glucose.: 80 mg/dL (21 Aug 2023 11:25)  POCT Blood Glucose.: 65 mg/dL (21 Aug 2023 10:26)      PHYSICAL EXAM:    General: Not in distress  HEENT:  ARELIS              Lymphatic system: No LN  Lungs: Bilateral BS, CTA  NO wheezing , rales , crackles   Cardiovascular: TAchycardia, RRR No murmur   Gastrointestinal: Soft, Positive BS  Musculoskeletal: No clubbing.  Moves all extremities.    Skin: Warm.  Intact  Neurological: No motor or sensory deficit       LABS:                        7.8    10.05 )-----------( 241      ( 21 Aug 2023 02:20 )             23.8      08-21    144  |  112<H>  |  39<H>  ----------------------------<  72  3.7   |  21<L>  |  2.42<H>    Ca    7.2<L>      21 Aug 2023 02:20  Phos  4.1     08-21  Mg     2.3     08-21    TPro  5.5<L>  /  Alb  1.8<L>  /  TBili  0.9  /  DBili  x   /  AST  115<H>  /  ALT  48  /  AlkPhos  59  08-21    PT/INR - ( 21 Aug 2023 05:25 )   PT: 16.4 sec;   INR: 1.39 ratio         PTT - ( 21 Aug 2023 05:25 )  PTT:23.8 sec  Urinalysis Basic - ( 21 Aug 2023 02:20 )    Color: x / Appearance: x / SG: x / pH: x  Gluc: 72 mg/dL / Ketone: x  / Bili: x / Urobili: x   Blood: x / Protein: x / Nitrite: x   Leuk Esterase: x / RBC: x / WBC x   Sq Epi: x / Non Sq Epi: x / Bacteria: x      Culture Results:   No growth (08-20 @ 08:17)  Culture Results:   Growth in aerobic bottle: Gram Positive Cocci in Pairs and Chains  Direct identification is available within approximately 3-5  hours either by Blood Panel Multiplexed PCR or Direct  MALDI-TOF. Details: [email or web address] (08-20 @ 05:00)

## 2023-08-21 NOTE — CHART NOTE - NSCHARTNOTEFT_GEN_A_CORE
MICU Transfer Note    Transfer from: MICU  Transfer to:  (X) Medicine    (  ) Telemetry    (  ) RCU    (  ) Palliative    (  ) Stroke Unit    (  ) _______________  Accepting Physican:    HOSPITAL/MICU COURSE:    87 yo M w/ hx of multiple syncopal episodes on no home meds initially presents via EMS after being found down on street minimally responsive (8/20/23). In the ED, he was found to be hypotensive, hypothermic (rectal T 90F), and tachycardic (afib w/ RVR). His labs were remarkable for anemia (Hgb 6.1), leukocytosis w/ 25% band neutrophils, lactic acidosis (lactate 14.4), ANGI, and elevated troponin (276.9, c/w demand ischemia). He was treated w/ vanc/zosyn, fluids, 1U pRBCs, IV amiodarone bolus (resolved arrhythmia), and a non-rebreather mask. CT imaging showed an age-indeterminate L frontal sub-dural hematoma (4mm) and lung findings c/w b/l pnuemonia w/ concerns for emphysema and/or ILD. RVP was entero/rhinovirus positive. He was switched to ceftriaxone/azithromycin, was started on ipratropium nebulizers, and was admitted to the ICU for new pressor requirment (norepinephrine).    During his ICU course, his blood cultures grew Strep pneumonia (on appropriate abx), lower extremity duplex study showed L tibioperoneal trunk and proximal posterior tibial vein DVTs (on heparin ppx), norepinephrine was weaned off w/ adequate MAPs, repeat CT head showed stable sub-dural hematoma, and iron supplementation was started. He is now stable for medicine floors and does not require ICU level of care.      ASSESSMENT & PLAN:   87 yo M w/ hx of multiple syncopal episodes presented via EMS for an unwitnessed fall w/ hypotension, hypothermia, and afib w/ RVR concerning for sepsis. He was found to have Strep pneumonia bacteremia w/ evidence of b/l pneumonia and lactic acidosis, iron deficiency anemia, stable sub-dural hematoma, ANGI, and demand ischemia.    NEURO: A&Ox3, stable L frontal SDH  PULM: pneumonia w/ findings c/w emphysema/ILD, weaned off NC on room air, on ipratropium q8h (consider transition to duonebs), chest PT, maintain SpO2 > 92%  CV: weaned off norepinephrine, s/p amio bolus for afib w/ RVR (converted), troponin elevation c/w demand ischemia per cards, TTE performed w/ reads pending, maintain MAP > 65  GI: passed dysphagia screening > PO as tolerated, on pantoprazole > d/c when adequate PO intake  RENAL: resolving ANGI w/ <0.5cc/kg/hr output, monitor UOP w/ beavers, replete lytes as needed  ID: on ceftriaxone/azithromycin for CAP, Strep pneumonia on blood cultures, entero/rhinovirus on RVP, s/p vanc/zosyn in ED, rpt blood/sputum cx  ENDO: cortisol level collected  HEME: iron deficiency anemia s/p 2U pRBCs and iron supplementation, iron studies pending      ENDO:  -check TSH   -cortisol level in AM   -check FS Q6, Hg A1C     HEME:  - anemia   - s/p PRBC X 1     MUSC:   -Physical therapy when stable       For Follow-Up:  UOP monitoring  f/u on TTE study  f/u blood cultures   f/u on iron labs  advance PO intake   duonebs as needed      Vital Signs Last 24 Hrs  T(C): 36.6 (21 Aug 2023 07:10), Max: 36.8 (20 Aug 2023 19:00)  T(F): 97.9 (21 Aug 2023 07:10), Max: 98.3 (20 Aug 2023 19:00)  HR: 60 (21 Aug 2023 10:00) (49 - 159)  BP: 141/66 (21 Aug 2023 10:00) (79/52 - 150/69)  BP(mean): 84 (21 Aug 2023 10:00) (64 - 118)  RR: 19 (21 Aug 2023 10:00) (14 - 42)  SpO2: 98% (21 Aug 2023 10:00) (84% - 100%)    Parameters below as of 21 Aug 2023 07:00  Patient On (Oxygen Delivery Method): room air      I&O's Summary    20 Aug 2023 07:01  -  21 Aug 2023 07:00  --------------------------------------------------------  IN: 645 mL / OUT: 800 mL / NET: -155 mL    21 Aug 2023 07:01  -  21 Aug 2023 11:00  --------------------------------------------------------  IN: 120 mL / OUT: 375 mL / NET: -255 mL          MEDICATIONS  (STANDING):  azithromycin  IVPB 500 milliGRAM(s) IV Intermittent every 24 hours  azithromycin  IVPB      cefTRIAXone   IVPB 1000 milliGRAM(s) IV Intermittent every 24 hours  chlorhexidine 2% Cloths 1 Application(s) Topical <User Schedule>  ferrous    sulfate 325 milliGRAM(s) Oral three times a day  heparin   Injectable 5000 Unit(s) SubCutaneous every 8 hours    MEDICATIONS  (PRN):        LABS                                            7.8                   Neurophils% (auto):   x      (08-21 @ 02:20):    10.05)-----------(241          Lymphocytes% (auto):  x                                             23.8                   Eosinphils% (auto):   x        Manual%: Neutrophils x    ; Lymphocytes x    ; Eosinophils x    ; Bands%: x    ; Blasts x                                    144    |  112    |  39                  Calcium: 7.2   / iCa: x      (08-21 @ 02:20)    ----------------------------<  72        Magnesium: 2.3                              3.7     |  21     |  2.42             Phosphorous: 4.1      TPro  5.5    /  Alb  1.8    /  TBili  0.9    /  DBili  x      /  AST  115    /  ALT  48     /  AlkPhos  59     21 Aug 2023 02:20    ( 08-21 @ 05:25 )   PT: 16.4 sec;   INR: 1.39 ratio  aPTT: 23.8 sec MICU Transfer Note    Transfer from: MICU  Transfer to:  (X) Medicine    (  ) Telemetry    (  ) RCU    (  ) Palliative    (  ) Stroke Unit    (  ) _______________  Accepting Physician: Dr. Escobar    [facility]:    87 yo M w/ hx of multiple syncopal episodes on no home meds initially presents via EMS after being found down on street minimally responsive (8/20/23). In the ED, he was found to be hypotensive, hypothermic (rectal T 90F), and tachycardic (afib w/ RVR). His labs were remarkable for anemia (Hgb 6.1), leukocytosis w/ 25% band neutrophils, lactic acidosis (lactate 14.4), ANGI, and elevated troponin (276.9, c/w demand ischemia). He was treated w/ vanc/zosyn, fluids, 1U pRBCs, IV amiodarone bolus (resolved arrhythmia), and a non-rebreather mask. CT imaging showed an age-indeterminate L frontal sub-dural hematoma (4mm) and lung findings c/w b/l pnuemonia w/ concerns for emphysema and/or ILD. RVP was entero/rhinovirus positive. He was switched to ceftriaxone/azithromycin, was started on ipratropium nebulizers, and was admitted to the ICU for new pressor requirment (norepinephrine).    During his ICU course, his blood cultures grew Strep pneumonia (on appropriate abx), lower extremity duplex study showed L tibioperoneal trunk and proximal posterior tibial vein DVTs (on heparin ppx), norepinephrine was weaned off w/ adequate MAPs, repeat CT head showed stable sub-dural hematoma, and iron supplementation was started. He is now stable for medicine floors and does not require ICU level of care.      ASSESSMENT & PLAN:   87 yo M w/ hx of multiple syncopal episodes presented via EMS for an unwitnessed fall w/ hypotension, hypothermia, and afib w/ RVR concerning for sepsis. He was found to have Strep pneumonia bacteremia w/ evidence of b/l pneumonia and lactic acidosis, iron deficiency anemia, stable sub-dural hematoma, ANGI, and demand ischemia.    NEURO: A&Ox3, stable L frontal SDH  PULM: pneumonia w/ findings c/w emphysema/ILD, weaned off NC on room air, on ipratropium q8h (consider transition to duonebs), chest PT, maintain SpO2 > 92%, pending (Legionella pneumophila urine antigen)  CV: weaned off norepinephrine, s/p amio bolus for afib w/ RVR (converted), troponin elevation c/w demand ischemia per cards, TTE performed w/ reads pending, maintain MAP > 65  GI: passed dysphagia screening > PO as tolerated, on pantoprazole > d/c when adequate PO intake  RENAL: resolving ANGI w/ <0.5cc/kg/hr output, monitor UOP w/ beavers, replete lytes as needed, pending labs (glomerular basement membrane Ab IgG, urine studies)  ID: on ceftriaxone/azithromycin for CAP, Strep pneumonia on blood cultures, entero/rhinovirus on RVP, s/p vanc/zosyn in ED, rpt blood/sputum cx  ENDO: cortisol level collected  HEME: iron deficiency anemia s/p 2U pRBCs and iron supplementation, pending labs (iron studies, Bence Rivera urine protein)      For Follow-Up:   - Pneumonia: ipratropium vs duonebs, pending legionella, chest PT, incentive spirometry   - ANGI: continue UOP monitoring (<0.5cc/kg/hr), beavers in place, f/u on urine studies   - f/u on TTE (performed) and rpt EKG   - rpt blood cultures (q48hrs until 2 negative blood cx)   - advance diet as tolerated, on pantoprazole      Case and plan discussed with Dr. Kay and Dr. Sheth      Vital Signs Last 24 Hrs  T(C): 36.6 (21 Aug 2023 07:10), Max: 36.8 (20 Aug 2023 19:00)  T(F): 97.9 (21 Aug 2023 07:10), Max: 98.3 (20 Aug 2023 19:00)  HR: 60 (21 Aug 2023 10:00) (49 - 159)  BP: 141/66 (21 Aug 2023 10:00) (79/52 - 150/69)  BP(mean): 84 (21 Aug 2023 10:00) (64 - 118)  RR: 19 (21 Aug 2023 10:00) (14 - 42)  SpO2: 98% (21 Aug 2023 10:00) (84% - 100%)    Parameters below as of 21 Aug 2023 07:00  Patient On (Oxygen Delivery Method): room air      I&O's Summary    20 Aug 2023 07:01  -  21 Aug 2023 07:00  --------------------------------------------------------  IN: 645 mL / OUT: 800 mL / NET: -155 mL    21 Aug 2023 07:01  -  21 Aug 2023 11:00  --------------------------------------------------------  IN: 120 mL / OUT: 375 mL / NET: -255 mL          MEDICATIONS  (STANDING):  azithromycin  IVPB 500 milliGRAM(s) IV Intermittent every 24 hours  azithromycin  IVPB      cefTRIAXone   IVPB 1000 milliGRAM(s) IV Intermittent every 24 hours  chlorhexidine 2% Cloths 1 Application(s) Topical <User Schedule>  ferrous    sulfate 325 milliGRAM(s) Oral three times a day  heparin   Injectable 5000 Unit(s) SubCutaneous every 8 hours    MEDICATIONS  (PRN):        LABS                                            7.8                   Neurophils% (auto):   x      (08-21 @ 02:20):    10.05)-----------(241          Lymphocytes% (auto):  x                                             23.8                   Eosinphils% (auto):   x        Manual%: Neutrophils x    ; Lymphocytes x    ; Eosinophils x    ; Bands%: x    ; Blasts x                                    144    |  112    |  39                  Calcium: 7.2   / iCa: x      (08-21 @ 02:20)    ----------------------------<  72        Magnesium: 2.3                              3.7     |  21     |  2.42             Phosphorous: 4.1      TPro  5.5    /  Alb  1.8    /  TBili  0.9    /  DBili  x      /  AST  115    /  ALT  48     /  AlkPhos  59     21 Aug 2023 02:20    ( 08-21 @ 05:25 )   PT: 16.4 sec;   INR: 1.39 ratio  aPTT: 23.8 sec

## 2023-08-21 NOTE — CONSULT NOTE ADULT - SUBJECTIVE AND OBJECTIVE BOX
[facility]  INFECTIOUS DISEASES   [address] [place] [address]  Tel: [phone]     Fax: [phone]  ======================================================  Elroy Barnett,MD Jose Armando, DO Pari Mcgarry, NP   ======================================================    NAIN WEISS  MRN-[id number]  Male  88y ([birth year])        Patient is a 88y old  Male who presents with a chief complaint of found  down (21 Aug 2023 17:32)      HPI: 89 yo M w/ hx of multiple syncopal episodes on no home meds initially presents via EMS after being found down on street minimally responsive (8/20/23). In the ED, he was found to be hypotensive, hypothermic (rectal T 90F), and tachycardic (afib w/ RVR). His labs were remarkable for anemia (Hgb 6.1), leukocytosis w/ 25% band neutrophils, lactic acidosis (lactate 14.4), ANGI, and elevated troponin (276.9, c/w demand ischemia). He was treated w/ vanc/zosyn, fluids, 1U pRBCs, IV amiodarone bolus (resolved arrhythmia), and a non-rebreather mask. CT imaging showed an age-indeterminate L frontal sub-dural hematoma (4mm) and lung findings c/w b/l pnuemonia w/ concerns for emphysema and/or ILD. RVP was entero/rhinovirus positive. He was switched to ceftriaxone/azithromycin, was started on ipratropium nebulizers, and was admitted to the ICU for new pressor requirment (norepinephrine).    During his ICU course, his blood cultures grew Strep pneumonia (on appropriate abx), lower extremity duplex study showed L tibioperoneal trunk and proximal posterior tibial vein DVTs (on heparin ppx), norepinephrine was weaned off w/ adequate MAPs, repeat CT head showed stable sub-dural hematoma, and iron supplementation was started. He is now stable for medicine floors and does not require ICU level of care.        ID consulted for workup and antibiotic management     PAST MEDICAL & SURGICAL HISTORY:  No pertinent past medical history          Allergies  No Known Allergies        ANTIMICROBIALS:  cefTRIAXone   IVPB 2000 every 24 hours      MEDICATIONS  (STANDING):  azithromycin  IVPB   255 mL/Hr IV Intermittent (08-21-23 @ 14:33)    azithromycin  IVPB   255 mL/Hr IV Intermittent (08-20-23 @ 16:18)    cefTRIAXone   IVPB   100 mL/Hr IV Intermittent (08-20-23 @ 15:30)    piperacillin/tazobactam IVPB...   200 mL/Hr IV Intermittent (08-20-23 @ 05:12)    vancomycin  IVPB.   250 mL/Hr IV Intermittent (08-20-23 @ 06:10)        OTHER MEDS: MEDICATIONS  (STANDING):  heparin   Injectable 5000 every 8 hours      SOCIAL HISTORY:     Smoking Cigarettes [ ]Active [ ] Former [ ]Denies   ETOH [ ]denies [ ]Former [ ]Current Use denies   Drug Use [ ]Never [ ] Former [ ] Active     FAMILY HISTORY:      REVIEW OF SYSTEMS  [  ] ROS unobtainable because:    [  ] All other systems negative except as noted below:	    Constitutional:  [ ] fever [ ] chills  [ ] weight loss  [ ] weakness  Skin:  [ ] rash [ ] phlebitis	  Eyes: [ ] icterus [ ] pain  [ ] discharge	  ENMT: [ ] sore throat  [ ] thrush [ ] ulcers [ ] exudates  Respiratory: [ ] dyspnea [ ] hemoptysis [ ] cough [ ] sputum	  Cardiovascular:  [ ] chest pain [ ] palpitations [ ] edema	  Gastrointestinal:  [ ] nausea [ ] vomiting [ ] diarrhea [ ] constipation [ ] pain	  Genitourinary:  [ ] dysuria [ ] frequency [ ] hematuria [ ] discharge [ ] flank pain  [ ] incontinence  Musculoskeletal:  [ ] myalgias [ ] arthralgias [ ] arthritis  [ ] back pain  Neurological:  [ ] headache [ ] seizures  [ ] confusion/altered mental status  Psychiatric:  [ ] anxiety [ ] depression	  Hematology/Lymphatics:  [ ] lymphadenopathy  Endocrine:  [ ] adrenal [ ] thyroid  Allergic/Immunologic:	 [ ] transplant [ ] seasonal    Vital Signs Last 24 Hrs  T(F): 97.7 (08-21-23 @ 17:11), Max: 98.5 (08-21-23 @ 13:27)    Vital Signs Last 24 Hrs  HR: 66 (08-21-23 @ 17:11) (49 - 78)  BP: 133/77 (08-21-23 @ 17:11) (96/68 - 150/69)  RR: 18 (08-21-23 @ 17:11)  SpO2: 95% (08-21-23 @ 17:11) (91% - 100%)  Wt(kg): --    PHYSICAL EXAM:  Constitutional: chronically ill appearing male, cachectic   HEAD/EYES: anicteric, no conjunctival injection, temporal wasting   ENT:  supple, no thrush, clavicular wasting dry dark colored tongue   Cardiovascular:   normal S1, S2, no murmur, no edema  Respiratory:  rales and crackles bilaterally, RR > 20 but O2 sats 95% on RA   GI:  soft, non-tender, normal bowel sounds  :  no beavers, no CVA tenderness  Musculoskeletal:  no synovitis, normal ROM  Neurologic: awake and alert, generally decreased strength no focal findings  Skin:  no rash, no erythema, no phlebitis  Heme/Onc: no lymphadenopathy   Psychiatric:  awake, alert, appropriate mood      WBC Count: 10.05 K/uL (08-21 @ 02:20)  WBC Count: 9.27 K/uL (08-20 @ 14:45)  WBC Count: 13.69 K/uL (08-20 @ 05:00)      Auto Neutrophil %: 66.0 % (08-20-23 @ 05:00)  Auto Neutrophil #: 12.46 K/uL *H* (08-20-23 @ 05:00)                            7.8    10.05 )-----------( 241      ( 21 Aug 2023 02:20 )             23.8       08-21    144  |  112<H>  |  39<H>  ----------------------------<  72  3.7   |  21<L>  |  2.42<H>    Ca    7.2<L>      21 Aug 2023 02:20  Phos  4.1     08-21  Mg     2.3     08-21    TPro  5.5<L>  /  Alb  1.8<L>  /  TBili  0.9  /  DBili  x   /  AST  115<H>  /  ALT  48  /  AlkPhos  59  08-21      Creatinine Trend: 2.42<--, 2.64<--, 3.40<--      Lactate, Blood: 1.8 mmol/L (08-21-23 @ 02:20)  Lactate, Blood: 5.8 mmol/L (08-20-23 @ 14:45)  Lactate, Blood: 9.3 mmol/L (08-20-23 @ 08:00)  Lactate, Blood: 14.4 mmol/L (08-20-23 @ 05:00)      MICROBIOLOGY:        Clean Catch Clean Catch (Midstream)  08-20-23   No growth  --  --      .Blood Blood  08-20-23   No growth at 24 hours  --  --      .Blood Blood  08-20-23   Growth in aerobic bottle: Gram Positive Cocci in Pairs and Chains  Direct identification is available within approximately 3-5  hours either by Blood Panel Multiplexed PCR or Direct  MALDI-TOF. Details: [email or web address]  --  Blood Culture PCR        SARS-CoV-2: NotDetec (20 Aug 2023 17:50)        Vancomycin Level, Random: 4.2 ug/mL (08-21-23 @ 05:25)  v    Rapid RVP Result: Detected (08-20 @ 17:50)            Ferritin: 40 (08-21)  Ferritin: 46 (08-20)        Procalcitonin, Serum: 117.60 (08-20-23 @ 14:45)    SARS-CoV-2: NotDetec (08-20-23 @ 17:50)  Rapid RVP Result: Detected (08-20-23 @ 17:50)        RADIOLOGY:    ACC: [id number] EXAM:  CT ABDOMEN AND PELVIS   ORDERED BY: OSCAR TODD     ACC: [id number] EXAM:  CT CHEST   ORDERED BY: OSCAR TODD     PROCEDURE DATE:  08/20/2023          INTERPRETATION:  CLINICAL INFORMATION:  found down hypothermic to 90,   AMS, unknown history    COMPARISON: None.    CONTRAST/COMPLICATIONS:  IV Contrast: None  Oral Contrast: None  Complications: None      PROCEDURE:  Axial CT images were acquired through the chest, abdomen and pelvis   without intravenous contrast. Coronal and sagittal reformatted images   were generated.    LIMITATIONS: Limited due to the lack of oral and intravenous contrast.   Also limited due to suboptimal resolution and lack of body fat.    FINDINGS:  CHEST:  LUNGS AND LARGE AIRWAYS: Patent central airways. Bibasilar patchy   consolidations suggesting pneumonia. Superimposed emphysematous lung   change and interstitial lung disease/honeycombing at the lung bases..  PLEURA: No pleural effusion or pneumothorax.  VESSELS: Limited evaluation of the thoracic aorta without intravenous   contrast, however there is calcific atherosclerosis without aneurysmal   dilatation.  HEART: Borderline enlarged heart.. No pericardial effusion. Coronary   artery calcifications. Anemia suggested.  MEDIASTINUM AND STACY: No lymphadenopathy.  CHEST WALL AND LOWER NECK: Within normal limits.      ABDOMEN AND PELVIS:  LIVER: Within normal limits.  BILE DUCTS: Normal caliber.  GALLBLADDER: Elongated gallbladder without definite calcified gallstone..  SPLEEN: Within normal limits.  PANCREAS: Within normal limits.  ADRENALS: Normal right adrenal gland. Limited left adrenal gland.  KIDNEYS/URETERS: Small bilateral kidneys. No renal stone or   hydronephrosis.    BLADDER: Within normal limits.  REPRODUCTIVE ORGANS: Prostate gland is not grossly enlarged.    BOWEL: Evaluation of bowel is limited without distention with oral   contrast and lack of mesenteric fat, however there is no bowel   obstruction. There is a 7 cm stool distended rectum. Small bowel loops   are not grossly dilated. Stomach is mildly distended with debris.  PERITONEUM: No free air or significant ascites.  VESSELS:  Limited evaluation of the abdominal aorta without intravenous   contrast, demonstrates tortuosity and calcific atherosclerosis without   aneurysmal dilatation. Negative  RETROPERITONEUM: No lymphadenopathy.  ABDOMINAL WALL: Within normal limits.  BONES: Severe scoliosis of the spine with associated multilevel   degenerative changes. Question pagetoid appearance of the right iliac   bone.    IMPRESSION:    Bilateral pneumonia.    Additional findings as above.    --- End of Report ---    ROBE RO MD; Attending Radiologist  This document has been electronically signed. Aug 20 2023  7:27AM                                        I have personally reviewed the above imaging

## 2023-08-21 NOTE — PROGRESS NOTE ADULT - ASSESSMENT
88 year old male with unknown medical history , presents to hospital via EMS after being found down on street. Admitted to ICU for multifocal pna in severe sepsis. c/b afib w/ rvr and lactic acidosis. now resolving.    Neuro intact  resp stable on room air  BP stable. no vasopressor support. HR normal  normal diet  dvt ppx  abx for pna  Start iron supplementation. microcytic anemia. low iron levels.  stable for downgrade.

## 2023-08-21 NOTE — CONSULT NOTE ADULT - ASSESSMENT
87 yo M w/ hx of multiple syncopal episodes presented via EMS for an unwitnessed fall w/ hypotension, hypothermia, and afib w/ RVR found to have Strep pneumonia bacteremia w/ evidence of b/l pneumonia and lactic acidosis, iron deficiency anemia, stable sub-dural hematoma, ANGI, and demand ischemia.    Plan:  stop azithro   increase t ceftriaxone 2g q24hrs   repeat blood cultures tomorrow AM   monitor respiratory status and O2 saturation   incentive spirometer   patient very cachectic, nutrition consult and help with his protein calorie malnutrition   monitor Hb closely and transfer if < 7.0  trend creatinine and ensure hydration with good urine output      Discussed with medical team    Jose Armando Sotelo, DO  Infectious Disease Attending  Reachable via Microsoft Teams or ID office: [phone]  After 5pm/weekends please call [phone] for all inquiries and new consults

## 2023-08-22 LAB
-  CEFTRIAXONE (MENINGITIDIS): SIGNIFICANT CHANGE UP
-  CEFTRIAXONE (NON-MENINGITIDIS): SIGNIFICANT CHANGE UP
-  ERYTHROMYCIN: SIGNIFICANT CHANGE UP
-  LEVOFLOXACIN: SIGNIFICANT CHANGE UP
-  PENICILLIN (MENINGITIDIS): SIGNIFICANT CHANGE UP
-  PENICILLIN (NON-MENINGITIDIS): SIGNIFICANT CHANGE UP
-  PENICILLIN (ORAL PENICILLIN V): SIGNIFICANT CHANGE UP
-  TRIMETHOPRIM/SULFAMETHOXAZOLE: SIGNIFICANT CHANGE UP
-  VANCOMYCIN: SIGNIFICANT CHANGE UP
ALBUMIN SERPL ELPH-MCNC: 1.9 G/DL — LOW (ref 3.3–5)
ALP SERPL-CCNC: 70 U/L — SIGNIFICANT CHANGE UP (ref 40–120)
ALT FLD-CCNC: 49 U/L — SIGNIFICANT CHANGE UP (ref 12–78)
ANION GAP SERPL CALC-SCNC: 6 MMOL/L — SIGNIFICANT CHANGE UP (ref 5–17)
ANISOCYTOSIS BLD QL: SLIGHT — SIGNIFICANT CHANGE UP
AST SERPL-CCNC: 60 U/L — HIGH (ref 15–37)
AUTO DIFF PNL BLD: NEGATIVE — SIGNIFICANT CHANGE UP
BASOPHILS # BLD AUTO: 0 K/UL — SIGNIFICANT CHANGE UP (ref 0–0.2)
BASOPHILS NFR BLD AUTO: 0 % — SIGNIFICANT CHANGE UP (ref 0–2)
BILIRUB SERPL-MCNC: 0.5 MG/DL — SIGNIFICANT CHANGE UP (ref 0.2–1.2)
BUN SERPL-MCNC: 34 MG/DL — HIGH (ref 7–23)
C-ANCA SER-ACNC: NEGATIVE — SIGNIFICANT CHANGE UP
CALCIUM SERPL-MCNC: 8 MG/DL — LOW (ref 8.5–10.1)
CHLORIDE SERPL-SCNC: 114 MMOL/L — HIGH (ref 96–108)
CO2 SERPL-SCNC: 23 MMOL/L — SIGNIFICANT CHANGE UP (ref 22–31)
CREAT SERPL-MCNC: 1.94 MG/DL — HIGH (ref 0.5–1.3)
CULTURE RESULTS: SIGNIFICANT CHANGE UP
EGFR: 33 ML/MIN/1.73M2 — LOW
EOSINOPHIL # BLD AUTO: 0 K/UL — SIGNIFICANT CHANGE UP (ref 0–0.5)
EOSINOPHIL NFR BLD AUTO: 0 % — SIGNIFICANT CHANGE UP (ref 0–6)
GBM IGG SER-ACNC: <0.2 — SIGNIFICANT CHANGE UP (ref 0–0.9)
GLUCOSE BLDC GLUCOMTR-MCNC: 100 MG/DL — HIGH (ref 70–99)
GLUCOSE BLDC GLUCOMTR-MCNC: 103 MG/DL — HIGH (ref 70–99)
GLUCOSE BLDC GLUCOMTR-MCNC: 79 MG/DL — SIGNIFICANT CHANGE UP (ref 70–99)
GLUCOSE BLDC GLUCOMTR-MCNC: 85 MG/DL — SIGNIFICANT CHANGE UP (ref 70–99)
GLUCOSE BLDC GLUCOMTR-MCNC: 96 MG/DL — SIGNIFICANT CHANGE UP (ref 70–99)
GLUCOSE SERPL-MCNC: 68 MG/DL — LOW (ref 70–99)
GRAM STN FLD: SIGNIFICANT CHANGE UP
HCT VFR BLD CALC: 26.5 % — LOW (ref 39–50)
HGB BLD-MCNC: 8.6 G/DL — LOW (ref 13–17)
HYPOCHROMIA BLD QL: SLIGHT — SIGNIFICANT CHANGE UP
LEGIONELLA AG UR QL: NEGATIVE — SIGNIFICANT CHANGE UP
LYMPHOCYTES # BLD AUTO: 0.42 K/UL — LOW (ref 1–3.3)
LYMPHOCYTES # BLD AUTO: 3 % — LOW (ref 13–44)
MACROCYTES BLD QL: SLIGHT — SIGNIFICANT CHANGE UP
MAGNESIUM SERPL-MCNC: 2.6 MG/DL — SIGNIFICANT CHANGE UP (ref 1.6–2.6)
MANUAL SMEAR VERIFICATION: SIGNIFICANT CHANGE UP
MCHC RBC-ENTMCNC: 21.8 PG — LOW (ref 27–34)
MCHC RBC-ENTMCNC: 32.5 G/DL — SIGNIFICANT CHANGE UP (ref 32–36)
MCV RBC AUTO: 67.3 FL — LOW (ref 80–100)
METHOD TYPE: SIGNIFICANT CHANGE UP
MICROCYTES BLD QL: SLIGHT — SIGNIFICANT CHANGE UP
MONOCYTES # BLD AUTO: 0 K/UL — SIGNIFICANT CHANGE UP (ref 0–0.9)
MONOCYTES NFR BLD AUTO: 0 % — LOW (ref 2–14)
NEUTROPHILS # BLD AUTO: 13.55 K/UL — HIGH (ref 1.8–7.4)
NEUTROPHILS NFR BLD AUTO: 87 % — HIGH (ref 43–77)
NEUTS BAND # BLD: 10 % — HIGH (ref 0–8)
NRBC # BLD: 0 /100 — SIGNIFICANT CHANGE UP (ref 0–0)
NRBC # BLD: SIGNIFICANT CHANGE UP /100 WBCS (ref 0–0)
ORGANISM # SPEC MICROSCOPIC CNT: SIGNIFICANT CHANGE UP
OVALOCYTES BLD QL SMEAR: SLIGHT — SIGNIFICANT CHANGE UP
P-ANCA SER-ACNC: NEGATIVE — SIGNIFICANT CHANGE UP
PHOSPHATE SERPL-MCNC: 3.2 MG/DL — SIGNIFICANT CHANGE UP (ref 2.5–4.5)
PLAT MORPH BLD: NORMAL — SIGNIFICANT CHANGE UP
PLATELET # BLD AUTO: 239 K/UL — SIGNIFICANT CHANGE UP (ref 150–400)
POLYCHROMASIA BLD QL SMEAR: SLIGHT — SIGNIFICANT CHANGE UP
POTASSIUM SERPL-MCNC: 3.4 MMOL/L — LOW (ref 3.5–5.3)
POTASSIUM SERPL-SCNC: 3.4 MMOL/L — LOW (ref 3.5–5.3)
PROT ?TM UR-MCNC: 36 MG/DL — HIGH (ref 0–12)
PROT SERPL-MCNC: 6.1 GM/DL — SIGNIFICANT CHANGE UP (ref 6–8.3)
RBC # BLD: 3.94 M/UL — LOW (ref 4.2–5.8)
RBC # FLD: 24 % — HIGH (ref 10.3–14.5)
RBC BLD AUTO: ABNORMAL
SODIUM SERPL-SCNC: 143 MMOL/L — SIGNIFICANT CHANGE UP (ref 135–145)
SPECIMEN SOURCE: SIGNIFICANT CHANGE UP
WBC # BLD: 13.97 K/UL — HIGH (ref 3.8–10.5)
WBC # FLD AUTO: 13.97 K/UL — HIGH (ref 3.8–10.5)

## 2023-08-22 PROCEDURE — 99232 SBSQ HOSP IP/OBS MODERATE 35: CPT | Mod: FS

## 2023-08-22 PROCEDURE — 99232 SBSQ HOSP IP/OBS MODERATE 35: CPT

## 2023-08-22 PROCEDURE — 93010 ELECTROCARDIOGRAM REPORT: CPT

## 2023-08-22 PROCEDURE — 99233 SBSQ HOSP IP/OBS HIGH 50: CPT

## 2023-08-22 RX ORDER — ALBUTEROL 90 UG/1
2 AEROSOL, METERED ORAL EVERY 6 HOURS
Refills: 0 | Status: DISCONTINUED | OUTPATIENT
Start: 2023-08-22 | End: 2023-09-12

## 2023-08-22 RX ORDER — AMIODARONE HYDROCHLORIDE 400 MG/1
150 TABLET ORAL ONCE
Refills: 0 | Status: COMPLETED | OUTPATIENT
Start: 2023-08-22 | End: 2023-08-22

## 2023-08-22 RX ORDER — AMIODARONE HYDROCHLORIDE 400 MG/1
1 TABLET ORAL
Qty: 450 | Refills: 0 | Status: DISCONTINUED | OUTPATIENT
Start: 2023-08-22 | End: 2023-08-28

## 2023-08-22 RX ORDER — TIOTROPIUM BROMIDE 18 UG/1
2 CAPSULE ORAL; RESPIRATORY (INHALATION) DAILY
Refills: 0 | Status: DISCONTINUED | OUTPATIENT
Start: 2023-08-22 | End: 2023-09-12

## 2023-08-22 RX ORDER — BUDESONIDE AND FORMOTEROL FUMARATE DIHYDRATE 160; 4.5 UG/1; UG/1
2 AEROSOL RESPIRATORY (INHALATION)
Refills: 0 | Status: DISCONTINUED | OUTPATIENT
Start: 2023-08-22 | End: 2023-08-24

## 2023-08-22 RX ORDER — PANTOPRAZOLE SODIUM 20 MG/1
40 TABLET, DELAYED RELEASE ORAL
Refills: 0 | Status: DISCONTINUED | OUTPATIENT
Start: 2023-08-22 | End: 2023-09-12

## 2023-08-22 RX ORDER — AMIODARONE HYDROCHLORIDE 400 MG/1
0.5 TABLET ORAL
Qty: 450 | Refills: 0 | Status: DISCONTINUED | OUTPATIENT
Start: 2023-08-23 | End: 2023-08-23

## 2023-08-22 RX ORDER — POTASSIUM CHLORIDE 20 MEQ
40 PACKET (EA) ORAL ONCE
Refills: 0 | Status: COMPLETED | OUTPATIENT
Start: 2023-08-22 | End: 2023-08-22

## 2023-08-22 RX ORDER — ACETAMINOPHEN 500 MG
650 TABLET ORAL EVERY 6 HOURS
Refills: 0 | Status: DISCONTINUED | OUTPATIENT
Start: 2023-08-22 | End: 2023-09-12

## 2023-08-22 RX ORDER — SODIUM CHLORIDE 9 MG/ML
250 INJECTION INTRAMUSCULAR; INTRAVENOUS; SUBCUTANEOUS ONCE
Refills: 0 | Status: COMPLETED | OUTPATIENT
Start: 2023-08-22 | End: 2023-08-22

## 2023-08-22 RX ADMIN — PANTOPRAZOLE SODIUM 40 MILLIGRAM(S): 20 TABLET, DELAYED RELEASE ORAL at 12:20

## 2023-08-22 RX ADMIN — HEPARIN SODIUM 5000 UNIT(S): 5000 INJECTION INTRAVENOUS; SUBCUTANEOUS at 05:26

## 2023-08-22 RX ADMIN — HEPARIN SODIUM 5000 UNIT(S): 5000 INJECTION INTRAVENOUS; SUBCUTANEOUS at 14:09

## 2023-08-22 RX ADMIN — CHLORHEXIDINE GLUCONATE 1 APPLICATION(S): 213 SOLUTION TOPICAL at 05:26

## 2023-08-22 RX ADMIN — Medication 325 MILLIGRAM(S): at 05:27

## 2023-08-22 RX ADMIN — HEPARIN SODIUM 5000 UNIT(S): 5000 INJECTION INTRAVENOUS; SUBCUTANEOUS at 22:29

## 2023-08-22 RX ADMIN — Medication 325 MILLIGRAM(S): at 14:09

## 2023-08-22 RX ADMIN — AMIODARONE HYDROCHLORIDE 33.3 MG/MIN: 400 TABLET ORAL at 19:41

## 2023-08-22 RX ADMIN — AMIODARONE HYDROCHLORIDE 600 MILLIGRAM(S): 400 TABLET ORAL at 18:39

## 2023-08-22 RX ADMIN — AMIODARONE HYDROCHLORIDE 33.3 MG/MIN: 400 TABLET ORAL at 18:53

## 2023-08-22 RX ADMIN — Medication 650 MILLIGRAM(S): at 13:15

## 2023-08-22 RX ADMIN — Medication 650 MILLIGRAM(S): at 12:20

## 2023-08-22 RX ADMIN — Medication 40 MILLIEQUIVALENT(S): at 15:35

## 2023-08-22 RX ADMIN — Medication 325 MILLIGRAM(S): at 22:29

## 2023-08-22 RX ADMIN — SODIUM CHLORIDE 250 MILLILITER(S): 9 INJECTION INTRAMUSCULAR; INTRAVENOUS; SUBCUTANEOUS at 17:10

## 2023-08-22 RX ADMIN — CEFTRIAXONE 100 MILLIGRAM(S): 500 INJECTION, POWDER, FOR SOLUTION INTRAMUSCULAR; INTRAVENOUS at 22:32

## 2023-08-22 RX ADMIN — Medication 30 MILLILITER(S): at 12:20

## 2023-08-22 NOTE — DIETITIAN INITIAL EVALUATION ADULT - PERTINENT LABORATORY DATA
08-22    143  |  114<H>  |  34<H>  ----------------------------<  68<L>  3.4<L>   |  23  |  1.94<H>    Ca    8.0<L>      22 Aug 2023 06:45  Phos  3.2     08-22  Mg     2.6     08-22    TPro  6.1  /  Alb  1.9<L>  /  TBili  0.5  /  DBili  x   /  AST  60<H>  /  ALT  49  /  AlkPhos  70  08-22  POCT Blood Glucose.: 103 mg/dL (08-22-23)  A1C with Estimated Average Glucose Result: 5.6 %, 114 (08-20-23)

## 2023-08-22 NOTE — PROGRESS NOTE ADULT - SUBJECTIVE AND OBJECTIVE BOX
NAIN WEISS  MRN-[id number]    Follow Up:  bacteremia, RVP positive for Rhinovirus     Interval History: the pt was seen and examined earlier, no acute distress, awake and alert, able to state his name and birthday, place, not year or president, has no new complaints. Pt is afebrile, RA, WBC elevated.     PAST MEDICAL & SURGICAL HISTORY:  No pertinent past medical history          ROS:    [ ] Unobtainable because:  [x ] All other systems negative - pt is mildly confused, unclear how reliable as a source of information     Constitutional: no fever, no chills  Head: no trauma  Eyes: no vision changes, no eye pain  ENT:  no sore throat, no rhinorrhea  Cardiovascular:  no chest pain, no palpitation  Respiratory:  no SOB, no cough  GI:  no abd pain, no vomiting, no diarrhea  urinary: no dysuria, no hematuria, no flank pain  musculoskeletal:  no joint pain, no joint swelling  skin:  no rash  neurology:  no headache, no seizure, no change in mental status  psych: no anxiety, no depression         Allergies  No Known Allergies        ANTIMICROBIALS:  cefTRIAXone   IVPB 2000 every 24 hours      OTHER MEDS:  acetaminophen     Tablet .. 650 milliGRAM(s) Oral every 6 hours PRN  aluminum hydroxide/magnesium hydroxide/simethicone Suspension 30 milliLiter(s) Oral every 4 hours PRN  chlorhexidine 2% Cloths 1 Application(s) Topical <User Schedule>  ferrous    sulfate 325 milliGRAM(s) Oral three times a day  heparin   Injectable 5000 Unit(s) SubCutaneous every 8 hours  pantoprazole    Tablet 40 milliGRAM(s) Oral before breakfast      Vital Signs Last 24 Hrs  T(C): 36.7 (22 Aug 2023 16:29), Max: 36.7 (22 Aug 2023 16:29)  T(F): 98.1 (22 Aug 2023 16:29), Max: 98.1 (22 Aug 2023 16:29)  HR: 90 (22 Aug 2023 16:29) (62 - 90)  BP: 96/62 (22 Aug 2023 16:29) (96/62 - 137/77)  BP(mean): --  RR: 19 (22 Aug 2023 16:29) (18 - 19)  SpO2: 96% (22 Aug 2023 16:29) (96% - 98%)    Parameters below as of 22 Aug 2023 16:29  Patient On (Oxygen Delivery Method): room air        Physical Exam:  Constitutional: chronically ill appearing male, cachectic   HEAD/EYES: anicteric, no conjunctival injection, temporal wasting   ENT:  supple, no thrush, clavicular wasting dry dark colored tongue   Cardiovascular:   normal S1, S2, no murmur, no edema  Respiratory:  mild rales and crackles bilaterally  :  no beavers, no CVA tenderness  Musculoskeletal:  no synovitis, normal ROM  Neurologic: awake and alert, generally decreased strength no focal findings  Skin:  no rash, no erythema, no phlebitis  Heme/Onc: no lymphadenopathy   Psychiatric:  awake, alert, appropriate, mildly confused     WBC Count: 13.97 K/uL (08-22 @ 06:45)  WBC Count: 10.05 K/uL (08-21 @ 02:20)  WBC Count: 9.27 K/uL (08-20 @ 14:45)  WBC Count: 13.69 K/uL (08-20 @ 05:00)                            8.6    13.97 )-----------( 239      ( 22 Aug 2023 06:45 )             26.5       08-22    143  |  114<H>  |  34<H>  ----------------------------<  68<L>  3.4<L>   |  23  |  1.94<H>    Ca    8.0<L>      22 Aug 2023 06:45  Phos  3.2     08-22  Mg     2.6     08-22    TPro  6.1  /  Alb  1.9<L>  /  TBili  0.5  /  DBili  x   /  AST  60<H>  /  ALT  49  /  AlkPhos  70  08-22      Urinalysis Basic - ( 22 Aug 2023 06:45 )    Color: x / Appearance: x / SG: x / pH: x  Gluc: 68 mg/dL / Ketone: x  / Bili: x / Urobili: x   Blood: x / Protein: x / Nitrite: x   Leuk Esterase: x / RBC: x / WBC x   Sq Epi: x / Non Sq Epi: x / Bacteria: x        Creatinine Trend: 1.94<--, 2.42<--, 2.64<--, 3.40<--  Lactate, Blood: 1.8 mmol/L (08-21-23 @ 02:20)      MICROBIOLOGY:  v  Clean Catch Clean Catch (Midstream)  08-20-23   No growth  --  --      .Blood Blood  08-20-23   No growth at 48 Hours  --  --      .Blood Blood  08-20-23   Growth in aerobic bottle: Streptococcus pneumoniae  Direct identification is available within approximately 3-5  hours either by Blood Panel Multiplexed PCR or Direct  MALDI-TOF. Details: [email or web address]  --  Blood Culture PCR  Streptococcus pneumoniae          Rapid RVP Result: Detected (08-20 @ 17:50)          Ferritin: 40 (08-21)  Ferritin: 46 (08-20)        Procalcitonin, Serum: 117.60 (08-20-23 @ 14:45)    SARS-CoV-2: NotDetec (08-20-23 @ 17:50)  Rapid RVP Result: Detected (08-20-23 @ 17:50)    SARS-CoV-2: NotDetec (20 Aug 2023 17:50)    RADIOLOGY:

## 2023-08-22 NOTE — DIETITIAN INITIAL EVALUATION ADULT - ETIOLOGY
Inadequate energy/protein intake related to active smoker; emphysematous lung changes & interstitial lung disease, SDH, possible social circumstances

## 2023-08-22 NOTE — PROGRESS NOTE ADULT - SUBJECTIVE AND OBJECTIVE BOX
Patient is a 88y old  Male who presents with a chief complaint of AMS,SEPSIS,ANEMIA REQUIRING TRANSFUSION,ANGI,ELEVATED TROPONI    PAST MEDICAL & SURGICAL HISTORY:      INTERVAL HISTORY: in no acute distress   	  MEDICATIONS:  MEDICATIONS  (STANDING):  cefTRIAXone   IVPB 2000 milliGRAM(s) IV Intermittent every 24 hours  chlorhexidine 2% Cloths 1 Application(s) Topical <User Schedule>  ferrous    sulfate 325 milliGRAM(s) Oral three times a day  heparin   Injectable 5000 Unit(s) SubCutaneous every 8 hours  pantoprazole    Tablet 40 milliGRAM(s) Oral before breakfast    MEDICATIONS  (PRN):  acetaminophen     Tablet .. 650 milliGRAM(s) Oral every 6 hours PRN Moderate Pain (4 - 6)  aluminum hydroxide/magnesium hydroxide/simethicone Suspension 30 milliLiter(s) Oral every 4 hours PRN Dyspepsia    Vitals:  T(F): 97.4 (08-22-23 @ 11:06), Max: 97.8 (08-22-23 @ 04:34)  HR: 66 (08-22-23 @ 11:06) (62 - 70)  BP: 132/73 (08-22-23 @ 11:06) (132/73 - 137/77)  RR: 18 (08-22-23 @ 11:06) (18 - 19)  SpO2: 98% (08-22-23 @ 11:06) (95% - 98%)    08-21 @ 07:01  -  08-22 @ 07:00  --------------------------------------------------------  IN:    IV PiggyBack: 250 mL    Oral Fluid: 260 mL  Total IN: 510 mL    OUT:    Indwelling Catheter - Urethral (mL): 375 mL    Voided (mL): 400 mL  Total OUT: 775 mL    Total NET: -265 mL    Weight (kg): 48.4 (08-20 @ 14:29)  BMI (kg/m2): 17.2 (08-20 @ 14:29)    PHYSICAL EXAM:  Neuro: Awake, responsive  CV: S1 S2 RRR  Lungs: CTABL  GI: Soft, BS +, ND, NT  Extremities: No edema    RADIOLOGY: < from: US Duplex Venous Lower Ext Complete, Bilateral (08.20.23 @ 17:44) >  Acute deep venous thrombosis: below the knee.    DVT within the left tibioperoneal trunk and proximal posterior tibial   veins.    < end of copied text >  < from: CT Abdomen and Pelvis No Cont (08.20.23 @ 06:49) >  CHEST:  LUNGS AND LARGE AIRWAYS: Patent central airways. Bibasilar patchy   consolidations suggesting pneumonia. Superimposed emphysematous lung   change and interstitial lung disease/honeycombing at the lung bases..  PLEURA: No pleural effusion or pneumothorax.  VESSELS: Limited evaluation of the thoracic aorta without intravenous   contrast, however there is calcific atherosclerosis without aneurysmal   dilatation.  HEART: Borderline enlarged heart.. No pericardial effusion. Coronary   artery calcifications. Anemia suggested.  MEDIASTINUM AND STACY: No lymphadenopathy.  CHEST WALL AND LOWER NECK: Within normal limits.      ABDOMEN AND PELVIS:  LIVER: Within normal limits.  BILE DUCTS: Normal caliber.  GALLBLADDER: Elongated gallbladder without definite calcified gallstone..  SPLEEN: Within normal limits.  PANCREAS: Within normal limits.  ADRENALS: Normal right adrenal gland. Limited left adrenal gland.  KIDNEYS/URETERS: Small bilateral kidneys. No renal stone or   hydronephrosis.    BLADDER: Within normal limits.  REPRODUCTIVE ORGANS: Prostate gland is not grossly enlarged.    BOWEL: Evaluation of bowel is limited without distention with oral   contrast and lack of mesenteric fat, however there is no bowel   obstruction. There is a 7 cm stool distended rectum. Small bowel loops   are not grossly dilated. Stomach is mildly distended with debris.  PERITONEUM: No free air or significant ascites.  VESSELS:  Limited evaluation of the abdominal aorta without intravenous   contrast, demonstrates tortuosity and calcific atherosclerosis without   aneurysmal dilatation. Negative  RETROPERITONEUM: No lymphadenopathy.  ABDOMINAL WALL: Within normal limits.  BONES: Severe scoliosis of the spine with associated multilevel   degenerative changes. Question pagetoid appearance of the right iliac   bone.    IMPRESSION:    Bilateral pneumonia.    < end of copied text >    DIAGNOSTIC TESTING:    [x ] Echocardiogram:   < from: TTE Echo Complete w/o Contrast w/ Doppler (08.21.23 @ 08:17) >  Left Ventricle: Normal left ventricular size and wall thicknesses, with   normal systolic and diastolic function.  Global LV systolic function was mildly decreased. Global longitudinal   strain imaging was performed on [employer] Vivid S70 at a heart rate of 68BPM and   a blood pressure of 108/57 mmHg, average GLS calculated was -19.1%   (normal).  Right Ventricle: Normal right ventricular size and function.  Left Atrium: Moderately enlarged left atrium.  Right Atrium: The right atrium is normal in size. Moderately enlarged   right atrium.  Pericardium: There is no evidence of pericardial effusion.  Mitral Valve: Structurally normal mitral valve, with normal leaflet   excursion. Moderate to severe mitral valve regurgitation is seen.  Tricuspid Valve: Structurally normal tricuspid valve, with normal leaflet   excursion. Moderate-severe tricuspid regurgitation is visualized.   Estimated pulmonary artery systolic pressure is 65.8 mmHg assuming a   right atrial pressure of 15 mmHg, which is consistent with severe   pulmonary hypertension.  Aortic Valve: Normal trileaflet aortic valve with normal opening. No   evidence of aortic valve regurgitation is seen.  Pulmonic Valve: Structurally normal pulmonic valve, with normal leaflet   excursion. Mild pulmonic valve regurgitation.  Aorta: The aortic root and ascending aorta are structurally normal, with   no evidence of dilitation.  Pulmonary Artery: The main pulmonary artery is normal in size.  Venous: The inferior vena cava was normal sized, with respiratory size   variation less than 50%.      Summary:   1. Mildly decreased global left ventricular systolic function.   2. Global longitudinal strain imaging was performed on [employer] Vivid S70 at a   heart rate of 68BPM and a blood pressure of 108/57 mmHg, average GLS   calculated was -19.1% (normal).   3. Moderately enlarged left atrium.   4. Moderately enlarged right atrium.   5. Moderate to severe mitral valve regurgitation.   6. Moderate-severe tricuspid regurgitation.   7. Mild pulmonic valve regurgitation.   8. Estimated pulmonary artery systolic pressure is 65.8 mmHg assuming a   right atrial pressure of 15 mmHg, which is consistent with severe   pulmonary hypertension.    < end of copied text >  [ ] Cardiac Catheterization:   [ ] Stress Test:      LABS:	 	    CARDIAC MARKERS:  Troponin I, High Sensitivity Result: 242.1 ng/L (08-20 @ 14:45)  Troponin I, High Sensitivity Result: 276.9 ng/L (08-20 @ 05:00)    22 Aug 2023 06:45    143    |  114    |  34     ----------------------------<  68     3.4     |  23     |  1.94   21 Aug 2023 02:20    144    |  112    |  39     ----------------------------<  72     3.7     |  21     |  2.42   20 Aug 2023 14:45    139    |  108    |  41     ----------------------------<  107    4.2     |  20     |  2.64     Ca    8.0        22 Aug 2023 06:45  Phos  3.2       22 Aug 2023 06:45  Mg     2.6       22 Aug 2023 06:45    TPro  6.1    /  Alb  1.9    /  TBili  0.5    /  DBili  x      /  AST  60     /  ALT  49     /  AlkPhos  70     22 Aug 2023 06:45                        8.6    13.97 )-----------( 239      ( 22 Aug 2023 06:45 )             26.5 ,                       7.8    10.05 )-----------( 241      ( 21 Aug 2023 02:20 )             23.8 ,                       7.5    9.27  )-----------( 348      ( 20 Aug 2023 14:45 )             24.2 ,                       6.1    13.69 )-----------( 430      ( 20 Aug 2023 05:00 )             21.8     TSH: Thyroid Stimulating Hormone, Serum: 1.810 uIU/mL (08-20 @ 14:45)    INR: 1.39 ratio (08-21 @ 05:25)           Patient is a 88y old  Male who presents with AMS, SEPSIS, ANEMIA     PAST MEDICAL & SURGICAL HISTORY:    lightheadedness and dizziness    INTERVAL HISTORY: in no acute distress, + Dyspnea   	  MEDICATIONS:  MEDICATIONS  (STANDING):  cefTRIAXone   IVPB 2000 milliGRAM(s) IV Intermittent every 24 hours  chlorhexidine 2% Cloths 1 Application(s) Topical <User Schedule>  ferrous    sulfate 325 milliGRAM(s) Oral three times a day  heparin   Injectable 5000 Unit(s) SubCutaneous every 8 hours  pantoprazole    Tablet 40 milliGRAM(s) Oral before breakfast    MEDICATIONS  (PRN):  acetaminophen     Tablet .. 650 milliGRAM(s) Oral every 6 hours PRN Moderate Pain (4 - 6)  aluminum hydroxide/magnesium hydroxide/simethicone Suspension 30 milliLiter(s) Oral every 4 hours PRN Dyspepsia    Vitals:  T(F): 97.4 (08-22-23 @ 11:06), Max: 97.8 (08-22-23 @ 04:34)  HR: 66 (08-22-23 @ 11:06) (62 - 70)  BP: 132/73 (08-22-23 @ 11:06) (132/73 - 137/77)  RR: 18 (08-22-23 @ 11:06) (18 - 19)  SpO2: 98% (08-22-23 @ 11:06) (95% - 98%)    08-21 @ 07:01  -  08-22 @ 07:00  --------------------------------------------------------  IN:    IV PiggyBack: 250 mL    Oral Fluid: 260 mL  Total IN: 510 mL    OUT:    Indwelling Catheter - Urethral (mL): 375 mL    Voided (mL): 400 mL  Total OUT: 775 mL    Total NET: -265 mL    Weight (kg): 48.4 (08-20 @ 14:29)  BMI (kg/m2): 17.2 (08-20 @ 14:29)    PHYSICAL EXAM:  Neuro: Awake, responsive  CV: S1 S2 RRR + SM  Lungs: diminished to bases   GI: Soft, BS +, ND, NT  Extremities: No edema    RADIOLOGY: < from: US Duplex Venous Lower Ext Complete, Bilateral (08.20.23 @ 17:44) >  Acute deep venous thrombosis: below the knee.    DVT within the left tibioperoneal trunk and proximal posterior tibial   veins.    < end of copied text >  < from: CT Abdomen and Pelvis No Cont (08.20.23 @ 06:49) >  CHEST:  LUNGS AND LARGE AIRWAYS: Patent central airways. Bibasilar patchy   consolidations suggesting pneumonia. Superimposed emphysematous lung   change and interstitial lung disease/honeycombing at the lung bases..  PLEURA: No pleural effusion or pneumothorax.  VESSELS: Limited evaluation of the thoracic aorta without intravenous   contrast, however there is calcific atherosclerosis without aneurysmal   dilatation.  HEART: Borderline enlarged heart.. No pericardial effusion. Coronary   artery calcifications. Anemia suggested.  MEDIASTINUM AND STACY: No lymphadenopathy.  CHEST WALL AND LOWER NECK: Within normal limits.    ABDOMEN AND PELVIS:  LIVER: Within normal limits.  BILE DUCTS: Normal caliber.  GALLBLADDER: Elongated gallbladder without definite calcified gallstone..  SPLEEN: Within normal limits.  PANCREAS: Within normal limits.  ADRENALS: Normal right adrenal gland. Limited left adrenal gland.  KIDNEYS/URETERS: Small bilateral kidneys. No renal stone or   hydronephrosis.    BLADDER: Within normal limits.  REPRODUCTIVE ORGANS: Prostate gland is not grossly enlarged.    BOWEL: Evaluation of bowel is limited without distention with oral   contrast and lack of mesenteric fat, however there is no bowel   obstruction. There is a 7 cm stool distended rectum. Small bowel loops   are not grossly dilated. Stomach is mildly distended with debris.  PERITONEUM: No free air or significant ascites.  VESSELS:  Limited evaluation of the abdominal aorta without intravenous   contrast, demonstrates tortuosity and calcific atherosclerosis without   aneurysmal dilatation. Negative  RETROPERITONEUM: No lymphadenopathy.  ABDOMINAL WALL: Within normal limits.  BONES: Severe scoliosis of the spine with associated multilevel   degenerative changes. Question pagetoid appearance of the right iliac   bone.    IMPRESSION:    Bilateral pneumonia.    < end of copied text >    < from: CT Head No Cont (08.20.23 @ 12:29) >  IMPRESSION:  Similar left frontal subdural collection. Mild chronic   microvascular changes without evidence of an acute transcortical   infarction or hemorrhage.    < end of copied text >  DIAGNOSTIC TESTING:    [x ] Echocardiogram:   < from: TTE Echo Complete w/o Contrast w/ Doppler (08.21.23 @ 08:17) >  Left Ventricle: Normal left ventricular size and wall thicknesses, with   normal systolic and diastolic function.  Global LV systolic function was mildly decreased. Global longitudinal   strain imaging was performed on [employer] Vivid S70 at a heart rate of 68BPM and   a blood pressure of 108/57 mmHg, average GLS calculated was -19.1%   (normal).  Right Ventricle: Normal right ventricular size and function.  Left Atrium: Moderately enlarged left atrium.  Right Atrium: The right atrium is normal in size. Moderately enlarged   right atrium.  Pericardium: There is no evidence of pericardial effusion.  Mitral Valve: Structurally normal mitral valve, with normal leaflet   excursion. Moderate to severe mitral valve regurgitation is seen.  Tricuspid Valve: Structurally normal tricuspid valve, with normal leaflet   excursion. Moderate-severe tricuspid regurgitation is visualized.   Estimated pulmonary artery systolic pressure is 65.8 mmHg assuming a   right atrial pressure of 15 mmHg, which is consistent with severe   pulmonary hypertension.  Aortic Valve: Normal trileaflet aortic valve with normal opening. No   evidence of aortic valve regurgitation is seen.  Pulmonic Valve: Structurally normal pulmonic valve, with normal leaflet   excursion. Mild pulmonic valve regurgitation.  Aorta: The aortic root and ascending aorta are structurally normal, with   no evidence of dilitation.  Pulmonary Artery: The main pulmonary artery is normal in size.  Venous: The inferior vena cava was normal sized, with respiratory size   variation less than 50%.      Summary:   1. Mildly decreased global left ventricular systolic function.   2. Global longitudinal strain imaging was performed on [employer] Vivid S70 at a   heart rate of 68BPM and a blood pressure of 108/57 mmHg, average GLS   calculated was -19.1% (normal).   3. Moderately enlarged left atrium.   4. Moderately enlarged right atrium.   5. Moderate to severe mitral valve regurgitation.   6. Moderate-severe tricuspid regurgitation.   7. Mild pulmonic valve regurgitation.   8. Estimated pulmonary artery systolic pressure is 65.8 mmHg assuming a   right atrial pressure of 15 mmHg, which is consistent with severe   pulmonary hypertension.    < end of copied text >    LABS:	 	    CARDIAC MARKERS:  Troponin I, High Sensitivity Result: 242.1 ng/L (08-20 @ 14:45)  Troponin I, High Sensitivity Result: 276.9 ng/L (08-20 @ 05:00)    22 Aug 2023 06:45    143    |  114    |  34     ----------------------------<  68     3.4     |  23     |  1.94   21 Aug 2023 02:20    144    |  112    |  39     ----------------------------<  72     3.7     |  21     |  2.42   20 Aug 2023 14:45    139    |  108    |  41     ----------------------------<  107    4.2     |  20     |  2.64     Ca    8.0        22 Aug 2023 06:45  Phos  3.2       22 Aug 2023 06:45  Mg     2.6       22 Aug 2023 06:45    TPro  6.1    /  Alb  1.9    /  TBili  0.5    /  DBili  x      /  AST  60     /  ALT  49     /  AlkPhos  70     22 Aug 2023 06:45                        8.6    13.97 )-----------( 239      ( 22 Aug 2023 06:45 )             26.5 ,                       7.8    10.05 )-----------( 241      ( 21 Aug 2023 02:20 )             23.8 ,                       7.5    9.27  )-----------( 348      ( 20 Aug 2023 14:45 )             24.2 ,                       6.1    13.69 )-----------( 430      ( 20 Aug 2023 05:00 )             21.8     TSH: Thyroid Stimulating Hormone, Serum: 1.810 uIU/mL (08-20 @ 14:45)    INR: 1.39 ratio (08-21 @ 05:25)

## 2023-08-22 NOTE — DIETITIAN INITIAL EVALUATION ADULT - NAME AND PHONE
Quality 110: Preventive Care And Screening: Influenza Immunization: Influenza Immunization Administered during Influenza season Geovanna Haines, MS, RD CDN  Quality 130: Documentation Of Current Medications In The Medical Record: Current Medications Documented Detail Level: Detailed

## 2023-08-22 NOTE — PROGRESS NOTE ADULT - SUBJECTIVE AND OBJECTIVE BOX
[facility] NEPHR[facility], [facility]  NEPHROLOGY AND HYPERTENSION  [address]  [address]  [place] [address]  [phone]    MD LYNDSEY RAINEY, MD ANGELICA OLSEN MD CHRISTOPHER CAPUTO, MD ROSELINE VILLAFANA MD          Patient events noted  No distress  Tachycardia;     MEDICATIONS  (STANDING):  aMIOdarone Infusion 1 mG/Min (33.3 mL/Hr) IV Continuous <Continuous>  cefTRIAXone   IVPB 2000 milliGRAM(s) IV Intermittent every 24 hours  chlorhexidine 2% Cloths 1 Application(s) Topical <User Schedule>  ferrous    sulfate 325 milliGRAM(s) Oral three times a day  heparin   Injectable 5000 Unit(s) SubCutaneous every 8 hours  pantoprazole    Tablet 40 milliGRAM(s) Oral before breakfast    MEDICATIONS  (PRN):  acetaminophen     Tablet .. 650 milliGRAM(s) Oral every 6 hours PRN Moderate Pain (4 - 6)  aluminum hydroxide/magnesium hydroxide/simethicone Suspension 30 milliLiter(s) Oral every 4 hours PRN Dyspepsia      08-21-23 @ 07:01  -  08-22-23 @ 07:00  --------------------------------------------------------  IN: 510 mL / OUT: 775 mL / NET: -265 mL      PHYSICAL EXAM:      T(C): 36.3 (08-22-23 @ 18:05), Max: 36.7 (08-22-23 @ 16:29)  HR: 120 (08-22-23 @ 19:06) (62 - 150)  BP: 111/75 (08-22-23 @ 19:06) (96/62 - 137/77)  RR: 17 (08-22-23 @ 18:05) (17 - 19)  SpO2: 96% (08-22-23 @ 18:05) (96% - 98%)  Wt(kg): --  Lungs clear  Heart S1S2 irregular  Abd soft NT ND  Extremities:   tr edema                                    8.6    13.97 )-----------( 239      ( 22 Aug 2023 06:45 )             26.5     08-22    143  |  114<H>  |  34<H>  ----------------------------<  68<L>  3.4<L>   |  23  |  1.94<H>    Ca    8.0<L>      22 Aug 2023 06:45  Phos  3.2     08-22  Mg     2.6     08-22    TPro  6.1  /  Alb  1.9<L>  /  TBili  0.5  /  DBili  x   /  AST  60<H>  /  ALT  49  /  AlkPhos  70  08-22      LIVER FUNCTIONS - ( 22 Aug 2023 06:45 )  Alb: 1.9 g/dL / Pro: 6.1 gm/dL / ALK PHOS: 70 U/L / ALT: 49 U/L / AST: 60 U/L / GGT: x           Creatinine Trend: 1.94<--, 2.42<--, 2.64<--, 3.40<--        Assessment   ANGI, degree of CKD unclear;   PNA emphysema   Metabolic acidosis, lactic, septic shock, risk for ATN  Afib  R/O occult pulm renal syndrome, vasculitic, connective tissue related but less likely   Afib; risk for rhabdomyolysis  Anemia, acute on chronic   DVT LLE     Plan  IVF maintenance   MM screen  Serologic screen    Telemetry monitoring     Vj Guadalupe MD

## 2023-08-22 NOTE — PROGRESS NOTE ADULT - ASSESSMENT
Acute encephalopathy in the setting of sepsis, hypothermia, ?aspiration pneumonia, resolving.      Bilateral chronic subdural collections.  Suspect multiple prior falls.      Cognitive impairment; likely has dementia at baseline.      Microcytic anemia.      Malnutrition.      Asthenia.    Mental status improved since initial evaluation.  Sepsis resolved.          RECOMMENDATIONS    Repeat non-con head CT.      B12, folate, methylmalonic acid, homocysteine, RPR, copper, zinc.    PT evaluation.                                                          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 I do not see patients outside of the hospital.        Patients requiring neurological follow-up after discharge may contact one of the following offices.     [facility] [address] [place] [address]  [phone]    [facility]  [address]  [place], [place]  [phone]      Teagan Butcher M.D.   - Department of Neurology  [employer] [facility]

## 2023-08-22 NOTE — CONSULT NOTE ADULT - SUBJECTIVE AND OBJECTIVE BOX
Patient is a 88y old  Male who presents with a chief complaint of found  down (22 Aug 2023 14:19)      HPI:  87 yo M     bibems after being found down outside.    no  pmx. pe r pt  now    per  er  chart ,on  arrival,  pt  was  minimally responsive, altered, unable to provide history.    and  had  a ectal temp   90 degrees.  No prior visits from chart review   was hypotensive/  hypotehrmic/    elevated  lactate/  acidotic, severe  anemia on  arrival/ and  ,per  ER Dr , ICU  eval  was  called/ and  was rejected /  note currently, pending    pt  unable  to  clearly   state  if  eh had  any  trauma  or how  he fell    icu  re  eval  called again by current   er  dr/  awaiting   consult   pt  seen  in  er/  awake  and  alert,  somewhat  able  to  answer  questions , though   not  fully    denies  any  cp/sob/abd  pain/  recall   bleeding  also  denies   any  cardiac  hx/  priro  strokes/  dm,  etc   states  he  lives  with  his  family/ address /  phone  numbers  of  family    currently  unknown   (20 Aug 2023 10:35)      PAST MEDICAL & SURGICAL HISTORY:  No pertinent past medical history          FAMILY HISTORY:    SOCIAL HISTORY:     Allergies  No Known Allergies          MEDICATIONS  (STANDING):  cefTRIAXone   IVPB 2000 milliGRAM(s) IV Intermittent every 24 hours  chlorhexidine 2% Cloths 1 Application(s) Topical <User Schedule>  ferrous    sulfate 325 milliGRAM(s) Oral three times a day  heparin   Injectable 5000 Unit(s) SubCutaneous every 8 hours  pantoprazole    Tablet 40 milliGRAM(s) Oral before breakfast    MEDICATIONS  (PRN):  acetaminophen     Tablet .. 650 milliGRAM(s) Oral every 6 hours PRN Moderate Pain (4 - 6)  aluminum hydroxide/magnesium hydroxide/simethicone Suspension 30 milliLiter(s) Oral every 4 hours PRN Dyspepsia    REVIEW OF SYSTEMS:    Constitutional:            No fever, weight loss or fatigue  HEENT:                         No difficulty hearing, tinnitus, vertigo; No sinus or throat pain  Respiratory:   Cardiovascular:           No chest pain, palpitations  Gastrointestinal:        No abdominal or epigastric pain. No N/V/diarrhea or hematemesis  Genitourinary:            No dysuria, frequency, hematuria or incontinence  SKIN:                             no rash  Musculoskeletal:        No joint pain or swelling  Extremities:                No swelling  Neurological:              No headaches  Psychiatric:                 No depression, anxiety    PQRS:  Vaccines - Influenza and Pneumovax:  BMI:  Tobacco:  Depression:   Colorectal Screening:  Breast Cancer Screening:  Blood Presssure Screening / Control of:  HbAIc:  Ischemic Vascular Disease:  Current Medications Reviewed:    Vital Signs Last 24 Hrs  T(C): 36.3 (22 Aug 2023 11:06), Max: 36.6 (22 Aug 2023 04:34)  T(F): 97.4 (22 Aug 2023 11:06), Max: 97.8 (22 Aug 2023 04:34)  HR: 66 (22 Aug 2023 11:06) (62 - 70)  BP: 132/73 (22 Aug 2023 11:06) (132/73 - 137/77)  BP(mean): --  RR: 18 (22 Aug 2023 11:06) (18 - 19)  SpO2: 98% (22 Aug 2023 11:06) (95% - 98%)    Parameters below as of 22 Aug 2023 04:34  Patient On (Oxygen Delivery Method): room air        PHYSICAL EXAM:  GEN:         Awake, responsive and comfortable.  HEENT:    Normal.    RESP:   CVS:             Regular rate and rhythm.   ABD:         Soft, non-tender, non-distended;   :             No costovertebral angle tenderness  SKIN:           Warm and dry.  EXTR:            No clubbing, cyanosis or edema  CNS:              Intact sensory and motor function.  PSYCH:        cooperative, no anxiety or depression            LABS:                        8.6    13.97 )-----------( 239      ( 22 Aug 2023 06:45 )             26.5     08-22    143  |  114<H>  |  34<H>  ----------------------------<  68<L>  3.4<L>   |  23  |  1.94<H>    Ca    8.0<L>      22 Aug 2023 06:45  Phos  3.2     08-22  Mg     2.6     08-22    TPro  6.1  /  Alb  1.9<L>  /  TBili  0.5  /  DBili  x   /  AST  60<H>  /  ALT  49  /  AlkPhos  70  08-22    PT/INR - ( 21 Aug 2023 05:25 )   PT: 16.4 sec;   INR: 1.39 ratio         PTT - ( 21 Aug 2023 05:25 )  PTT:23.8 sec  08-20 @ 11:56  pH: --  pCO2: 25  pO2: 109  SaO2: 99.4  08-20 @ 10:46  pH: --  pCO2: 24  pO2: 306  SaO2: 100.0  08-20 @ 05:57  pH: --  pCO2: 28  pO2: 32  SaO2: 31.8    Urinalysis Basic - ( 22 Aug 2023 06:45 )    Color: x / Appearance: x / SG: x / pH: x  Gluc: 68 mg/dL / Ketone: x  / Bili: x / Urobili: x   Blood: x / Protein: x / Nitrite: x   Leuk Esterase: x / RBC: x / WBC x   Sq Epi: x / Non Sq Epi: x / Bacteria: x            Culture - Urine (collected 08-20-23 @ 08:17)  Source: Clean Catch Clean Catch (Midstream)  Final Report (08-21-23 @ 09:01):    No growth    Culture - Blood (collected 08-20-23 @ 05:45)  Source: .Blood Blood  Preliminary Report (08-22-23 @ 13:01):    No growth at 48 Hours    Culture - Blood (collected 08-20-23 @ 05:00)  Source: .Blood Blood  Gram Stain (prelim) (08-21-23 @ 02:35):    Growth in aerobic bottle: Gram Positive Cocci in Pairs and Chains  Preliminary Report (08-21-23 @ 21:02):    Growth in aerobic bottle: Streptococcus pneumoniae Susceptibility to    follow.    Direct identification is available within approximately 3-5    hours either by Blood Panel Multiplexed PCR or Direct    MALDI-TOF. Details: [email or web address]  Organism: Blood Culture PCR (08-21-23 @ 04:17)  Organism: Blood Culture PCR (08-21-23 @ 04:17)      Method Type: PCR      -  Streptococcus pneumoniae: Detec        EKG:     RADIOLOGY & ADDITIONAL STUDIES:    ASSESSMENT AND PLAN:  Patient is a 88y old  Male who presents with a chief complaint of found  down (22 Aug 2023 14:19)    HPI: 87 yo M with multiple syncopal episodes per chart notes,  on no home meds   Brought by EMS after being found down on street minimally responsive (8/20/23).   In the ED, was hypotensive, hypothermic (rectal T 90F), and tachycardic (afib w/ RVR).   Labs were remarkable for anemia (Hgb 6.1), leukocytosis w/ 25% band, lactic acidosis (lactate 14.4), ANGI, and elevated troponin   Got treated w/ vanc/zosyn, fluids, 1U pRBCs, IV amiodarone bolus (resolved arrhythmia), and a non-rebreather mask.   CT imaging showed an age-indeterminate L frontal sub-dural hematoma (4mm) and lung findings with pneumonia and emphysema/ILD.   RVP was entero/rhinovirus positive.   He was switched to ceftriaxone/azithromycin, was started on ipratropium nebulizers, and got  admitted to the ICU for new pressor requirement (norepinephrine).    During his ICU course, his blood cultures grew Strep pneumonia (on appropriate abx), lower extremity duplex study showed L tibioperoneal trunk and proximal posterior tibial vein DVTs (on heparin ppx), norepinephrine was weaned off w/ adequate MAPs,   Repeat CT head showed stable sub-dural hematoma, and iron supplementation was started.   08/21/23:  Got transferred to regular medical floor.  08/22/23:  At time of my evaluation, awake, responsive. Denies SOB, cough, sputum production or chest pain.  Admits being a smoker but says quit about a year ago.  Again in rapid A Fib and being transferred to monitor bed. Seen by Cardiology.    PAST MEDICAL & SURGICAL HISTORY:  No pertinent past medical history    FAMILY HISTORY: not available.    SOCIAL HISTORY:  ex smoker.    Allergies  No Known Allergies    MEDICATIONS  (STANDING):  cefTRIAXone   IVPB 2000 milliGRAM(s) IV Intermittent every 24 hours  chlorhexidine 2% Cloths 1 Application(s) Topical <User Schedule>  ferrous    sulfate 325 milliGRAM(s) Oral three times a day  heparin   Injectable 5000 Unit(s) SubCutaneous every 8 hours  pantoprazole    Tablet 40 milliGRAM(s) Oral before breakfast    MEDICATIONS  (PRN):  acetaminophen     Tablet .. 650 milliGRAM(s) Oral every 6 hours PRN Moderate Pain (4 - 6)  aluminum hydroxide/magnesium hydroxide/simethicone Suspension 30 milliLiter(s) Oral every 4 hours PRN Dyspepsia    REVIEW OF SYSTEMS:  Not able to provide details.    Vital Signs Last 24 Hrs  T(C): 36.3 (22 Aug 2023 11:06), Max: 36.6 (22 Aug 2023 04:34)  T(F): 97.4 (22 Aug 2023 11:06), Max: 97.8 (22 Aug 2023 04:34)  HR: 66 (22 Aug 2023 11:06) (62 - 70)  BP: 132/73 (22 Aug 2023 11:06) (132/73 - 137/77)  BP(mean): --  RR: 18 (22 Aug 2023 11:06) (18 - 19)  SpO2: 98% (22 Aug 2023 11:06) (95% - 98%)    Parameters below as of 22 Aug 2023 04:34  Patient On (Oxygen Delivery Method): room air    PHYSICAL EXAM:  GEN:         Awake, responsive and comfortable.  HEENT:    Normal.    RESP:      no wheezing.  CVS:        irregular rate, tachycardic   ABD:        Soft, non-tender, non-distended;   SKIN:        Warm and dry.  EXTR:        No clubbing, cyanosis or edema  CNS:         responsive  PSYCH:     cooperative, no anxiety or depression    LABS:                        8.6    13.97 )-----------( 239      ( 22 Aug 2023 06:45 )             26.5     08-22    143  |  114<H>  |  34<H>  ----------------------------<  68<L>  3.4<L>   |  23  |  1.94<H>    Ca    8.0<L>      22 Aug 2023 06:45  Phos  3.2     08-22  Mg     2.6     08-22    TPro  6.1  /  Alb  1.9<L>  /  TBili  0.5  /  DBili  x   /  AST  60<H>  /  ALT  49  /  AlkPhos  70  08-22    PT/INR - ( 21 Aug 2023 05:25 )   PT: 16.4 sec;   INR: 1.39 ratio      PTT - ( 21 Aug 2023 05:25 )  PTT:23.8 sec  08-20 @ 11:56  pH: --  pCO2: 25  pO2: 109  SaO2: 99.4  08-20 @ 10:46  pH: --  pCO2: 24  pO2: 306  SaO2: 100.0  08-20 @ 05:57  pH: --  pCO2: 28  pO2: 32  SaO2: 31.8    Urinalysis Basic - ( 22 Aug 2023 06:45 )    Color: x / Appearance: x / SG: x / pH: x  Gluc: 68 mg/dL / Ketone: x  / Bili: x / Urobili: x   Blood: x / Protein: x / Nitrite: x   Leuk Esterase: x / RBC: x / WBC x   Sq Epi: x / Non Sq Epi: x / Bacteria: x    Culture - Urine (collected 08-20-23 @ 08:17)  Source: Clean Catch Clean Catch (Midstream)  Final Report (08-21-23 @ 09:01):    No growth    Culture - Blood (collected 08-20-23 @ 05:45)  Source: .Blood Blood  Preliminary Report (08-22-23 @ 13:01):    No growth at 48 Hours    Culture - Blood (collected 08-20-23 @ 05:00)  Source: .Blood Blood  Gram Stain (prelim) (08-21-23 @ 02:35):    Growth in aerobic bottle: Gram Positive Cocci in Pairs and Chains  Preliminary Report (08-21-23 @ 21:02):    Growth in aerobic bottle: Streptococcus pneumoniae Susceptibility to    follow.    Direct identification is available within approximately 3-5    hours either by Blood Panel Multiplexed PCR or Direct    MALDI-TOF. Details: [email or web address]  Organism: Blood Culture PCR (08-21-23 @ 04:17)  Organism: Blood Culture PCR (08-21-23 @ 04:17)      Method Type: PCR      -  Streptococcus pneumoniae: Detec    EKG:  A Fib with RVR    RADIOLOGY & ADDITIONAL STUDIES:  < from: US Duplex Venous Lower Ext Complete, Bilateral (08.20.23 @ 17:44) >  ACC: [id number] EXAM:  US DPLX LWR EXT VEINS COMPL BI   ORDERED BY: MARY KHAN     PROCEDURE DATE:  08/20/2023      INTERPRETATION:  CLINICAL INFORMATION: Rule out DVT    COMPARISON: None available.    TECHNIQUE: Duplex sonography of the BILATERAL LOWER extremity veins with   color and spectral Doppler, with and without compression.    FINDINGS:    RIGHT:  Normal compressibility of the RIGHT common femoral, femoral and popliteal   veins.  Doppler examination shows normal spontaneous andphasic flow.  No RIGHT calf vein thrombosis is detected.    LEFT:  Normal compressibility of the LEFT common femoral, femoral and popliteal   veins.. Flow is noted within the left external iliac vein.  Doppler examination shows normal spontaneous andphasic flow.  There is nonocclusive thrombus within the peroneal trunk as well as   within the proximal posterior tibial veins.    IMPRESSION:  Acute deep venous thrombosis: below the knee.    DVT within the left tibioperoneal trunk and proximal posterior tibial   veins.    Above findings were discussed with CATRINA Jacinto on 8/20/2023 6:01 PM.    CRISTIANE SALAZAR MD; Attending Radiologist  This document has been electronically signed. Aug 20 2023  6:02PM  < from: CT Head No Cont (08.20.23 @ 12:29) >  ACC: [id number] EXAM:  CT BRAIN   ORDERED BY: MARY KHAN     PROCEDURE DATE:  08/20/2023      INTERPRETATION:  CLINICAL INDICATIONS:  Alteration of  Consciousness      f/p  ct/  SDH    COMPARISON: Head CT dated 8/20/2023  < from: CT Abdomen and Pelvis No Cont (08.20.23 @ 06:49) >  ACC: [id number] EXAM:  CT ABDOMEN AND PELVIS   ORDERED BY: OSCAR TODD     ACC: [id number] EXAM:  CT CHEST   ORDERED BY: OSCAR TODD     PROCEDURE DATE:  08/20/2023      INTERPRETATION:  CLINICAL INFORMATION:  found down hypothermic to 90,   AMS, unknown history    COMPARISON: None.    CONTRAST/COMPLICATIONS:  IV Contrast: None  Oral Contrast: None  Complications: None    PROCEDURE:  Axial CT images were acquired through the chest, abdomen and pelvis   without intravenous contrast. Coronal and sagittal reformatted images   were generated.    LIMITATIONS: Limited due to the lack of oral and intravenous contrast.   Also limited due to suboptimal resolution and lack of body fat.    FINDINGS:  CHEST:  LUNGS AND LARGE AIRWAYS: Patent central airways. Bibasilar patchy   consolidations suggesting pneumonia. Superimposed emphysematous lung   change and interstitial lung disease/honeycombing at the lung bases..  PLEURA: No pleural effusion or pneumothorax.  VESSELS: Limited evaluation of the thoracic aorta without intravenous   contrast, however there is calcific atherosclerosis without aneurysmal   dilatation.  HEART: Borderline enlarged heart.. No pericardial effusion. Coronary   artery calcifications. Anemia suggested.  MEDIASTINUM AND STACY: No lymphadenopathy.  CHEST WALL AND LOWER NECK: Within normal limits.      ABDOMEN AND PELVIS:  LIVER: Within normal limits.  BILE DUCTS: Normal caliber.  GALLBLADDER: Elongated gallbladder without definite calcified gallstone..  SPLEEN: Within normal limits.  PANCREAS: Within normal limits.  ADRENALS: Normal right adrenal gland. Limited left adrenal gland.  KIDNEYS/URETERS: Small bilateral kidneys. No renal stone or   hydronephrosis.    BLADDER: Within normal limits.  REPRODUCTIVE ORGANS: Prostate gland is not grossly enlarged.    BOWEL: Evaluation of bowel is limited without distention with oral   contrast and lack of mesenteric fat, however there is no bowel   obstruction. There is a 7 cm stool distended rectum. Small bowel loops   are not grossly dilated. Stomach is mildly distended with debris.  PERITONEUM: No free air or significant ascites.  VESSELS:  Limited evaluation of the abdominal aorta without intravenous   contrast, demonstrates tortuosity and calcific atherosclerosis without   aneurysmal dilatation. Negative  RETROPERITONEUM: No lymphadenopathy.  ABDOMINAL WALL: Within normal limits.  BONES: Severe scoliosis of the spine with associated multilevel   degenerative changes. Question pagetoid appearance of the right iliac   bone.    IMPRESSION:    Bilateral pneumonia.    Additional findings as above.    ROBE RO MD; Attending Radiologist    TECHNIQUE: Noncontrast CT of the head. Multiplanar reformations are   submitted.    FINDINGS:  Similar small left frontal convexity mixed density subdural collection   measuring 5.3 mm in thickness. 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  There is periventricular and subcortical white matter hypodensity without   mass effect, nonspecific, likely representing mild chronic microvascular   ischemic changes. There is no compelling evidence for an acute   transcortical infarction. There is no evidence of mass, mass effect,   midline shift or other extra-axial fluid collection. The lateral   ventricles and cortical sulci are age-appropriate in size and   configuration. Mild inflammatory mucosal changes are seen throughout the   various portions of the paranasal sinuses. The orbits and mastoid air   cells are unremarkable. The calvarium is intact. Consider MRI as   clinically warranted.    IMPRESSION:  Similar left frontal subdural collection. Mild chronic   microvascular changes without evidence of an acute transcortical   infarction or hemorrhage.    KENNETH COPE MD; Attending Radiologist  This document has been electronically signed. Aug 20 2023  1:34PM    ASSESSMENT AND PLAN:  ·	Multifocal pneumonia.  ·	S/P Septic shock.  ·	Strep Pneumo Bacteremia.  ·	Leukocytosis.  ·	A Fib with RVR.  ·	Anemia.  ·	Renal Insuffiencey.  ·	Hypokalemia.  ·	Left peroneal+ post tibial DVT.    SPO2 in high 90s on room air.  Continue antibiotic per ID.  Being transferred to monitor bed for rapid A Fib.  Not a candidate for full anticoagulation due to SDH, and anemia requiring PRBC transfusion.  Consider Vascular evaluation for DVT.  56+ minutes spent.

## 2023-08-22 NOTE — PROGRESS NOTE ADULT - SUBJECTIVE AND OBJECTIVE BOX
Patient is a 88y old  Male who presents with a chief complaint of found  down (22 Aug 2023 20:06)      INTERVAL HPI/OVERNIGHT EVENTS:  Pt was seen and examined, no acute events.      MEDICATIONS  (STANDING):  aMIOdarone Infusion 1 mG/Min (33.3 mL/Hr) IV Continuous <Continuous>  cefTRIAXone   IVPB 2000 milliGRAM(s) IV Intermittent every 24 hours  chlorhexidine 2% Cloths 1 Application(s) Topical <User Schedule>  ferrous    sulfate 325 milliGRAM(s) Oral three times a day  heparin   Injectable 5000 Unit(s) SubCutaneous every 8 hours  pantoprazole    Tablet 40 milliGRAM(s) Oral before breakfast    MEDICATIONS  (PRN):  acetaminophen     Tablet .. 650 milliGRAM(s) Oral every 6 hours PRN Moderate Pain (4 - 6)  aluminum hydroxide/magnesium hydroxide/simethicone Suspension 30 milliLiter(s) Oral every 4 hours PRN Dyspepsia      Allergies    No Known Allergies    Intolerances          Vital Signs Last 24 Hrs  T(C): 36.3 (22 Aug 2023 18:05), Max: 36.7 (22 Aug 2023 16:29)  T(F): 97.3 (22 Aug 2023 18:05), Max: 98.1 (22 Aug 2023 16:29)  HR: 120 (22 Aug 2023 19:06) (62 - 150)  BP: 111/75 (22 Aug 2023 19:06) (96/62 - 137/77)  BP(mean): 87 (22 Aug 2023 19:06) (86 - 88)  RR: 17 (22 Aug 2023 18:05) (17 - 19)  SpO2: 96% (22 Aug 2023 18:05) (96% - 98%)    Parameters below as of 22 Aug 2023 18:05  Patient On (Oxygen Delivery Method): room air        PHYSICAL EXAM:  GENERAL: NAD  HEAD:  Atraumatic   EYES: PERRLA  ENMT: Mouth moist   NECK: Supple  NERVOUS SYSTEM:  Awake, alert, non focal  CHEST/LUNG: Clear  HEART: Irregular, rate ok  ABDOMEN: Soft, non tender  EXTREMITIES: no edema BL LE  SKIN: No rash        LABS:                        8.6    13.97 )-----------( 239      ( 22 Aug 2023 06:45 )             26.5     08-22    143  |  114<H>  |  34<H>  ----------------------------<  68<L>  3.4<L>   |  23  |  1.94<H>    Ca    8.0<L>      22 Aug 2023 06:45  Phos  3.2     08-22  Mg     2.6     08-22    TPro  6.1  /  Alb  1.9<L>  /  TBili  0.5  /  DBili  x   /  AST  60<H>  /  ALT  49  /  AlkPhos  70  08-22    PT/INR - ( 21 Aug 2023 05:25 )   PT: 16.4 sec;   INR: 1.39 ratio         PTT - ( 21 Aug 2023 05:25 )  PTT:23.8 sec  Urinalysis Basic - ( 22 Aug 2023 06:45 )    Color: x / Appearance: x / SG: x / pH: x  Gluc: 68 mg/dL / Ketone: x  / Bili: x / Urobili: x   Blood: x / Protein: x / Nitrite: x   Leuk Esterase: x / RBC: x / WBC x   Sq Epi: x / Non Sq Epi: x / Bacteria: x      CAPILLARY BLOOD GLUCOSE      POCT Blood Glucose.: 96 mg/dL (22 Aug 2023 17:45)  POCT Blood Glucose.: 103 mg/dL (22 Aug 2023 11:22)  POCT Blood Glucose.: 79 mg/dL (22 Aug 2023 07:45)  POCT Blood Glucose.: 85 mg/dL (22 Aug 2023 06:40)  POCT Blood Glucose.: 100 mg/dL (22 Aug 2023 00:01)      Culture - Urine (collected 20 Aug 2023 08:17)  Source: Clean Catch Clean Catch (Midstream)  Final Report (21 Aug 2023 09:01):    No growth    Culture - Blood (collected 20 Aug 2023 05:45)  Source: .Blood Blood  Preliminary Report (22 Aug 2023 13:01):    No growth at 48 Hours    Culture - Blood (collected 20 Aug 2023 05:00)  Source: .Blood Blood  Gram Stain (22 Aug 2023 17:38):    Growth in aerobic bottle: Gram Positive Cocci in Pairs and Chains  Final Report (22 Aug 2023 17:38):    Growth in aerobic bottle: Streptococcus pneumoniae    Direct identification is available within approximately 3-5    hours either by Blood Panel Multiplexed PCR or Direct    MALDI-TOF. Details: [email or web address]  Organism: Blood Culture PCR  Streptococcus pneumoniae (22 Aug 2023 17:38)  Organism: Streptococcus pneumoniae (22 Aug 2023 17:38)  Organism: Blood Culture PCR (22 Aug 2023 17:38)      RADIOLOGY & ADDITIONAL TESTS:    Imaging Personally Reviewed:  [ ] YES  [ ] NO    Consultant(s) Notes Reviewed:  [ ] YES  [ ] NO    Care Discussed with Consultants/Other Providers [ ] YES  [ ] NO

## 2023-08-22 NOTE — DIETITIAN NUTRITION RISK NOTIFICATION - TREATMENT: THE FOLLOWING DIET HAS BEEN RECOMMENDED
Diet, Easy to Chew:   Supplement Feeding Modality:  Oral  Ensure Plus High Protein Cans or Servings Per Day:  1       Frequency:  Three Times a day (08-22-23 @ 11:55) [Pending Verification By Attending]  Diet, Regular (08-21-23 @ 09:54) [Active]

## 2023-08-22 NOTE — PROGRESS NOTE ADULT - NS ATTEND AMEND GEN_ALL_CORE FT
88-year-old with renal dysfunction, anemia, hypotension, rapid A-fib, bilateral pneumonia and sepsis severe MR TR mild global systolic dysfunction, continue sepsis management. 88-year-old with renal dysfunction, anemia, hypotension, rapid A-fib, bilateral pneumonia and sepsis moderate to severe MR TR mild global systolic dysfunction, continue sepsis management. Conservative care management.  DVT prevention with heparin.

## 2023-08-22 NOTE — PROGRESS NOTE ADULT - ASSESSMENT
87 yo M w/ hx of multiple syncopal episodes presented via EMS for an unwitnessed fall w/ hypotension, hypothermia, and afib w/ RVR found to have Strep pneumoniae bacteremia w/ evidence of b/l pneumonia and lactic acidosis, iron deficiency anemia, stable sub-dural hematoma, ANGI, and demand ischemia.    8/22: no fever, RA, WBC elevated 13.97, Cr better 1.94, TTE no vegetation, repeat BCs were collected today in the morning, Ceftriaxone IV continued.     Plan:  continue ceftriaxone 2g q24hrs   awaiting for repeat blood cultures collected today in the morning   monitor respiratory status and O2 saturation   incentive spirometer   nutrition consult is appreciated   monitor Hb closely and transfuse if < 7.0  trend creatinine and ensure hydration with good urine output    will discuss with Dr. Barnett

## 2023-08-22 NOTE — PROGRESS NOTE ADULT - NS ATTEND AMEND GEN_ALL_CORE FT
Attending Addendum--  Case reviewed with CATRINA Mcgarry. Her note reviewed and modified as appropriate.   Pneumococcal septicemia due to pneumonia  Minimal leukocytosis, bands present- need to be monitored  Awaiting follow up culture data  Elroy Barnett MD  Attending Physician  [facility]  Division of Infectious Diseases  [phone]

## 2023-08-22 NOTE — DIETITIAN INITIAL EVALUATION ADULT - OTHER INFO
Able to obtain limited information from pt due to confusion & pain. Per medical record pt lives c family; reports he has a son & daughter; PMHx unknown at this time. Pt was found in street s/p unwitnessed fall; minimally responsive; hypotensive & c hypothermia; pt remains confused. Pt c strep PNA & severe sepsis complicated by AFib, lactic acidosis (now resolving); per chest CT c emphysematous lung changes & interstitial lung disease; head CT reveals SDH. Per CNA pt c poor PO intake; pt receptive to nutritional supplement & Easy to Chew consistency for ease of eating. No reports of any N/V/C/D.

## 2023-08-22 NOTE — PROGRESS NOTE ADULT - ASSESSMENT
87 yo M w/ hx of multiple syncopal episodes presented via EMS for an unwitnessed fall w/ hypotension, hypothermia, and afib w/ RVR concerning for sepsis. He was found to have Strep pneumonia bacteremia w/ evidence of b/l pneumonia and lactic acidosis, iron deficiency anemia, stable sub-dural hematoma, ANGI, and demand ischemia.      Metabolic encephalopathy:  - 2/2 sepsis  - Now A&Ox3, stable L frontal SDH    Acute hypoxic respiratory failure:  - pneumonia w/ findings c/w emphysema/ILD, weaned off NC on room air, on ipratropium q8h (consider transition to duonebs), chest PT, maintain SpO2 > 92%  - Add albuterol PRN, add Spiriva and Advair.   - Pulm following     Sepsis with septic shock POA:  - With strep pneumo bacteremia 2/2 PNA  - Weaned off norepinephrine  - Continue Ceftriaxone 2 gm QD  - follow up repeat blood clx    Afib:  - s/p amio bolus for afib w/ RVR (converted), today again a fib with RVR, started amio drip per cardio   - troponin elevation c/w demand ischemia   - Echo with Mildly decreased global left ventricular systolic function. BLAE, Mod - severe MR/TR, severe pHTN  - Not on Ac for anemia and SDH  - Cardio following     ANGI:  - 2/2 septic ATN  - Cr improving   - Pending serologies for pulm renal syndrome  - Renal following     Anemia:  - S/P 2 units PRBC  - Iron deficient, continue iron supplementation    Acute DVT :  - Not on AC for anemia and SDH  - On DVT ppx dose  - Vascular follow up    GI ppx

## 2023-08-22 NOTE — PROGRESS NOTE ADULT - ASSESSMENT
86-year-old female with history of recurrent lightheadedness and dizziness with multiple admissions to the emergency room for above found down on street for unknown duration.   On admission found to be hypothermic and hypotensive with rapid atrial fibrillation requiring Pressor support    CT of chest appears to suggest bilateral pneumonia   Also, profound acidosis and anemia, receiving blood products while in the emergency room.  blood culture:  Streptococcus pneumoniae: Detec (08.20.23 @ 05:00)  Rates improved on IV amiodarone, converted to sinus  Evidence of acute renal insufficiency   Elevated troponin seen here likely significant demand ischemia in the setting of renal insufficiency.      -hemodynamically stable  -TTE with Mildly decreased global left ventricular systolic function. BLAE, Mod - severe MR/TR, severe pHTN  -ABx as per ID for Strep pneumoniae bacteremia w/ evidence of b/l pneumonia and lactic acidosis  -no bbl yet as HR in low 60s now  -nutrition eval appreciated   -monitor h/h, transfuse as needed, cont on feso4, monitor creat, CK  -activities as tolerated  86-year-old female with history of recurrent lightheadedness and dizziness with multiple admissions to the emergency room for above found down on street for unknown duration.   On admission found to be hypothermic and hypotensive with rapid atrial fibrillation requiring Pressor support    CT of chest appears to suggest bilateral pneumonia   Also, profound acidosis and anemia, receiving blood products while in the emergency room.  blood culture:  Streptococcus pneumoniae: Detec (08.20.23 @ 05:00)  Rates improved on IV amiodarone, converted to sinus  Evidence of acute renal insufficiency   Elevated troponin seen here likely significant demand ischemia in the setting of renal insufficiency.      -hemodynamically stable  -TTE with Mildly decreased global left ventricular systolic function. BLAE, Mod - severe MR/TR, severe pHTN  -ABx as per ID for Strep pneumoniae bacteremia w/ evidence of b/l pneumonia and lactic acidosis  -no bbl yet as HR in low 60s now  -nutrition eval appreciated   -monitor h/h, transfuse as needed, cont on feso4, monitor creat, CK  -activities as tolerated      86-year-old female with history of recurrent lightheadedness and dizziness with multiple admissions to the emergency room for above found down on street for unknown duration.   On admission found to be hypothermic and hypotensive with rapid atrial fibrillation requiring Pressor support    CT of chest appears to suggest bilateral pneumonia   Also, profound acidosis and anemia, receiving blood products while in the emergency room.  blood culture:  Streptococcus pneumoniae: Detec (08.20.23 @ 05:00)  Rates improved on IV amiodarone, converted to sinus  Evidence of acute renal insufficiency   Elevated troponin seen here likely significant demand ischemia in the setting of renal insufficiency.      -hemodynamically stable  -TTE with Mildly decreased global left ventricular systolic function. BLAE, Mod - severe MR/TR, severe pHTN  -ABx as per ID for Strep pneumoniae bacteremia w/ evidence of b/l pneumonia and lactic acidosis  -no bbl yet as HR in low 60s now  -nutrition eval appreciated   -monitor h/h, transfuse as needed, cont on feso4, monitor creat, CK  -activities as tolerated     Addendum: 12 lead EKG done this afternoon shows afib with RVR, SBP in 80s, pt asymptomatic, transferred to telemetry, s/p IVF bolus, BP improved, case discussed with Dr. Shrestha, will start on amiodarone IV loading dose  cont on telemetry. repeat EKG in am    86-year-old male with history of recurrent lightheadedness and dizziness with multiple admissions to the emergency room for above found down on street for unknown duration.   On admission found to be hypothermic and hypotensive with rapid atrial fibrillation requiring Pressor support    CT of chest appears to suggest bilateral pneumonia   Also, profound acidosis and anemia, receiving blood products while in the emergency room.  blood culture:  Streptococcus pneumoniae: Detec (08.20.23 @ 05:00)  Rates improved on IV amiodarone, converted to sinus  Evidence of acute renal insufficiency   Elevated troponin seen here likely significant demand ischemia in the setting of renal insufficiency.      -hemodynamically stable  -TTE with Mildly decreased global left ventricular systolic function. BLAE, Mod - severe MR/TR, severe pHTN  -ABx as per ID for Strep pneumoniae bacteremia w/ evidence of b/l pneumonia and lactic acidosis  -no bbl yet as HR in low 60s now  -nutrition eval appreciated   -monitor h/h, transfuse as needed, cont on feso4, monitor creat, CK  -activities as tolerated     Addendum: 12 lead EKG done this afternoon shows afib with RVR, SBP in 80s, pt asymptomatic, transferred to telemetry, s/p IVF bolus, BP improved, case discussed with Dr. Shrestha, will start on amiodarone IV loading dose  cont on telemetry. repeat EKG in am

## 2023-08-23 LAB
% ALBUMIN: 42.8 % — SIGNIFICANT CHANGE UP
% ALPHA 1: 8 % — SIGNIFICANT CHANGE UP
% ALPHA 2: 12 % — SIGNIFICANT CHANGE UP
% BETA: 21.2 % — SIGNIFICANT CHANGE UP
% GAMMA: 16 % — SIGNIFICANT CHANGE UP
% M SPIKE: SIGNIFICANT CHANGE UP
ALBUMIN SERPL ELPH-MCNC: 1.7 G/DL — LOW (ref 3.3–5)
ALBUMIN SERPL ELPH-MCNC: 2.2 G/DL — LOW (ref 3.6–5.5)
ALBUMIN/GLOB SERPL ELPH: 0.8 RATIO — SIGNIFICANT CHANGE UP
ALP SERPL-CCNC: 63 U/L — SIGNIFICANT CHANGE UP (ref 40–120)
ALPHA1 GLOB SERPL ELPH-MCNC: 0.4 G/DL — SIGNIFICANT CHANGE UP (ref 0.1–0.4)
ALPHA2 GLOB SERPL ELPH-MCNC: 0.6 G/DL — SIGNIFICANT CHANGE UP (ref 0.5–1)
ALT FLD-CCNC: 44 U/L — SIGNIFICANT CHANGE UP (ref 12–78)
ANA TITR SER: NEGATIVE — SIGNIFICANT CHANGE UP
ANION GAP SERPL CALC-SCNC: 7 MMOL/L — SIGNIFICANT CHANGE UP (ref 5–17)
AST SERPL-CCNC: 39 U/L — HIGH (ref 15–37)
B-GLOBULIN SERPL ELPH-MCNC: 1.1 G/DL — HIGH (ref 0.5–1)
BILIRUB SERPL-MCNC: 0.3 MG/DL — SIGNIFICANT CHANGE UP (ref 0.2–1.2)
BUN SERPL-MCNC: 27 MG/DL — HIGH (ref 7–23)
CALCIUM SERPL-MCNC: 8.1 MG/DL — LOW (ref 8.5–10.1)
CHLORIDE SERPL-SCNC: 113 MMOL/L — HIGH (ref 96–108)
CHOLEST SERPL-MCNC: 78 MG/DL — SIGNIFICANT CHANGE UP
CK SERPL-CCNC: 389 U/L — HIGH (ref 26–308)
CO2 SERPL-SCNC: 23 MMOL/L — SIGNIFICANT CHANGE UP (ref 22–31)
CREAT SERPL-MCNC: 1.57 MG/DL — HIGH (ref 0.5–1.3)
EGFR: 43 ML/MIN/1.73M2 — LOW
GAMMA GLOBULIN: 0.8 G/DL — SIGNIFICANT CHANGE UP (ref 0.6–1.6)
GLUCOSE SERPL-MCNC: 79 MG/DL — SIGNIFICANT CHANGE UP (ref 70–99)
HCT VFR BLD CALC: 31.3 % — LOW (ref 39–50)
HDLC SERPL-MCNC: 31 MG/DL — LOW
HGB BLD-MCNC: 9.7 G/DL — LOW (ref 13–17)
INTERPRETATION SERPL IFE-IMP: SIGNIFICANT CHANGE UP
LIPID PNL WITH DIRECT LDL SERPL: 30 MG/DL — SIGNIFICANT CHANGE UP
M PROTEIN 24H UR ELPH-MRATE: PRESENT
M-SPIKE: SIGNIFICANT CHANGE UP (ref 0–0)
MCHC RBC-ENTMCNC: 21.2 PG — LOW (ref 27–34)
MCHC RBC-ENTMCNC: 31 G/DL — LOW (ref 32–36)
MCV RBC AUTO: 68.5 FL — LOW (ref 80–100)
NON HDL CHOLESTEROL: 47 MG/DL — SIGNIFICANT CHANGE UP
NRBC # BLD: 0 /100 WBCS — SIGNIFICANT CHANGE UP (ref 0–0)
PLATELET # BLD AUTO: 231 K/UL — SIGNIFICANT CHANGE UP (ref 150–400)
POTASSIUM SERPL-MCNC: 3.8 MMOL/L — SIGNIFICANT CHANGE UP (ref 3.5–5.3)
POTASSIUM SERPL-SCNC: 3.8 MMOL/L — SIGNIFICANT CHANGE UP (ref 3.5–5.3)
PROT PATTERN SERPL ELPH-IMP: SIGNIFICANT CHANGE UP
PROT SERPL-MCNC: 6.1 GM/DL — SIGNIFICANT CHANGE UP (ref 6–8.3)
RBC # BLD: 4.57 M/UL — SIGNIFICANT CHANGE UP (ref 4.2–5.8)
RBC # FLD: 25.1 % — HIGH (ref 10.3–14.5)
SODIUM SERPL-SCNC: 143 MMOL/L — SIGNIFICANT CHANGE UP (ref 135–145)
TRIGL SERPL-MCNC: 84 MG/DL — SIGNIFICANT CHANGE UP
WBC # BLD: 11.58 K/UL — HIGH (ref 3.8–10.5)
WBC # FLD AUTO: 11.58 K/UL — HIGH (ref 3.8–10.5)

## 2023-08-23 PROCEDURE — 99233 SBSQ HOSP IP/OBS HIGH 50: CPT

## 2023-08-23 PROCEDURE — 99223 1ST HOSP IP/OBS HIGH 75: CPT

## 2023-08-23 PROCEDURE — 99232 SBSQ HOSP IP/OBS MODERATE 35: CPT | Mod: FS

## 2023-08-23 PROCEDURE — 93010 ELECTROCARDIOGRAM REPORT: CPT

## 2023-08-23 RX ORDER — DILTIAZEM HCL 120 MG
10 CAPSULE, EXT RELEASE 24 HR ORAL ONCE
Refills: 0 | Status: DISCONTINUED | OUTPATIENT
Start: 2023-08-23 | End: 2023-09-08

## 2023-08-23 RX ORDER — DILTIAZEM HCL 120 MG
10 CAPSULE, EXT RELEASE 24 HR ORAL
Refills: 0 | Status: DISCONTINUED | OUTPATIENT
Start: 2023-08-23 | End: 2023-08-23

## 2023-08-23 RX ORDER — AMIODARONE HYDROCHLORIDE 400 MG/1
200 TABLET ORAL DAILY
Refills: 0 | Status: DISCONTINUED | OUTPATIENT
Start: 2023-08-24 | End: 2023-09-12

## 2023-08-23 RX ORDER — BENZOCAINE AND MENTHOL 5; 1 G/100ML; G/100ML
1 LIQUID ORAL
Refills: 0 | Status: DISCONTINUED | OUTPATIENT
Start: 2023-08-23 | End: 2023-09-08

## 2023-08-23 RX ORDER — DILTIAZEM HCL 120 MG
10 CAPSULE, EXT RELEASE 24 HR ORAL ONCE
Refills: 0 | Status: COMPLETED | OUTPATIENT
Start: 2023-08-23 | End: 2023-08-23

## 2023-08-23 RX ORDER — VANCOMYCIN HCL 1 G
750 VIAL (EA) INTRAVENOUS ONCE
Refills: 0 | Status: COMPLETED | OUTPATIENT
Start: 2023-08-23 | End: 2023-08-23

## 2023-08-23 RX ORDER — VANCOMYCIN HCL 1 G
VIAL (EA) INTRAVENOUS
Refills: 0 | Status: DISCONTINUED | OUTPATIENT
Start: 2023-08-23 | End: 2023-08-25

## 2023-08-23 RX ORDER — VANCOMYCIN HCL 1 G
750 VIAL (EA) INTRAVENOUS EVERY 24 HOURS
Refills: 0 | Status: DISCONTINUED | OUTPATIENT
Start: 2023-08-24 | End: 2023-08-25

## 2023-08-23 RX ADMIN — HEPARIN SODIUM 5000 UNIT(S): 5000 INJECTION INTRAVENOUS; SUBCUTANEOUS at 13:39

## 2023-08-23 RX ADMIN — Medication 250 MILLIGRAM(S): at 11:52

## 2023-08-23 RX ADMIN — TIOTROPIUM BROMIDE 2 PUFF(S): 18 CAPSULE ORAL; RESPIRATORY (INHALATION) at 11:53

## 2023-08-23 RX ADMIN — AMIODARONE HYDROCHLORIDE 16.7 MG/MIN: 400 TABLET ORAL at 02:00

## 2023-08-23 RX ADMIN — Medication 10 MILLIGRAM(S): at 00:36

## 2023-08-23 RX ADMIN — Medication 325 MILLIGRAM(S): at 21:10

## 2023-08-23 RX ADMIN — Medication 325 MILLIGRAM(S): at 06:26

## 2023-08-23 RX ADMIN — BUDESONIDE AND FORMOTEROL FUMARATE DIHYDRATE 2 PUFF(S): 160; 4.5 AEROSOL RESPIRATORY (INHALATION) at 06:23

## 2023-08-23 RX ADMIN — AMIODARONE HYDROCHLORIDE 16.7 MG/MIN: 400 TABLET ORAL at 07:38

## 2023-08-23 RX ADMIN — Medication 325 MILLIGRAM(S): at 13:39

## 2023-08-23 RX ADMIN — PANTOPRAZOLE SODIUM 40 MILLIGRAM(S): 20 TABLET, DELAYED RELEASE ORAL at 06:24

## 2023-08-23 RX ADMIN — CHLORHEXIDINE GLUCONATE 1 APPLICATION(S): 213 SOLUTION TOPICAL at 06:33

## 2023-08-23 RX ADMIN — BENZOCAINE AND MENTHOL 1 LOZENGE: 5; 1 LIQUID ORAL at 13:39

## 2023-08-23 RX ADMIN — HEPARIN SODIUM 5000 UNIT(S): 5000 INJECTION INTRAVENOUS; SUBCUTANEOUS at 06:23

## 2023-08-23 RX ADMIN — HEPARIN SODIUM 5000 UNIT(S): 5000 INJECTION INTRAVENOUS; SUBCUTANEOUS at 21:10

## 2023-08-23 NOTE — PHYSICAL THERAPY INITIAL EVALUATION ADULT - PERTINENT HX OF CURRENT PROBLEM, REHAB EVAL
As per ED provider notes, A 89 yo M bibems after being found down outside.  Unknown history.  Pt. is minimally responsive, altered, unable to provide history.  Rectal temp found to be 90 degrees.  No prior visits from chart review.

## 2023-08-23 NOTE — PROGRESS NOTE ADULT - SUBJECTIVE AND OBJECTIVE BOX
Patient is a 88y old  Male who presents with AMS, SEPSIS, ANEMIA, pt was found down    PAST MEDICAL & SURGICAL HISTORY:  lightheadedness and dizziness    INTERVAL HISTORY: in no acute distress, c/o feeling cold, denies any chest pain, + sOB  	  MEDICATIONS:  MEDICATIONS  (STANDING):  aMIOdarone Infusion 0.5 mG/Min (16.7 mL/Hr) IV Continuous <Continuous>  budesonide  80 MICROgram(s)/formoterol 4.5 MICROgram(s) Inhaler 2 Puff(s) Inhalation two times a day  ferrous    sulfate 325 milliGRAM(s) Oral three times a day  heparin   Injectable 5000 Unit(s) SubCutaneous every 8 hours  pantoprazole    Tablet 40 milliGRAM(s) Oral before breakfast  tiotropium 2.5 MICROgram(s) Inhaler 2 Puff(s) Inhalation daily    MEDICATIONS  (PRN):  acetaminophen     Tablet .. 650 milliGRAM(s) Oral every 6 hours PRN Moderate Pain (4 - 6)  albuterol    90 MICROgram(s) HFA Inhaler 2 Puff(s) Inhalation every 6 hours PRN Shortness of Breath and/or Wheezing  aluminum hydroxide/magnesium hydroxide/simethicone Suspension 30 milliLiter(s) Oral every 4 hours PRN Dyspepsia  benzocaine/menthol Lozenge 1 Lozenge Oral four times a day PRN Sore Throat  diltiazem Injectable 10 milliGRAM(s) IV Push once PRN sustained HR >130    Vitals:  T(F): 97.8 (08-23-23 @ 10:24), Max: 98.4 (08-22-23 @ 23:52)  HR: 67 (08-23-23 @ 10:24) (67 - 150)  BP: 101/66 (08-23-23 @ 10:24) (96/62 - 121/70)  RR: 19 (08-23-23 @ 10:24) (17 - 19)  SpO2: 94% (08-23-23 @ 10:24) (94% - 98%)    Weight (kg): 50.3 (08-22 @ 18:05)  BMI (kg/m2): 16.4 (08-22 @ 18:05)    PHYSICAL EXAM:  Neuro: Awake, responsive  CV: S1 S2 RRR + SM  Lungs: diminished to bases   GI: Soft, BS +, ND, NT  Extremities: + Ankle edema    TELEMETRY: afib with RVR - > sinus     RADIOLOGY:     < from: US Duplex Venous Lower Ext Complete, Bilateral (08.20.23 @ 17:44) >  Acute deep venous thrombosis: below the knee.    DVT within the left tibioperoneal trunk and proximal posterior tibial   veins.    < end of copied text >  < from: CT Head No Cont (08.20.23 @ 12:29) >  Similar left frontal subdural collection. Mild chronic   microvascular changes without evidence of an acute transcortical   infarction or hemorrhage.    < end of copied text >  < from: CT Abdomen and Pelvis No Cont (08.20.23 @ 06:49) >    CHEST:  LUNGS AND LARGE AIRWAYS: Patent central airways. Bibasilar patchy   consolidations suggesting pneumonia. Superimposed emphysematous lung   change and interstitial lung disease/honeycombing at the lung bases..  PLEURA: No pleural effusion or pneumothorax.  VESSELS: Limited evaluation of the thoracic aorta without intravenous   contrast, however there is calcific atherosclerosis without aneurysmal   dilatation.  HEART: Borderline enlarged heart.. No pericardial effusion. Coronary   artery calcifications. Anemia suggested.  MEDIASTINUM AND STACY: No lymphadenopathy.  CHEST WALL AND LOWER NECK: Within normal limits.    ABDOMEN AND PELVIS:  LIVER: Within normal limits.  BILE DUCTS: Normal caliber.  GALLBLADDER: Elongated gallbladder without definite calcified gallstone..  SPLEEN: Within normal limits.  PANCREAS: Within normal limits.  ADRENALS: Normal right adrenal gland. Limited left adrenal gland.  KIDNEYS/URETERS: Small bilateral kidneys. No renal stone or   hydronephrosis.    BLADDER: Within normal limits.  REPRODUCTIVE ORGANS: Prostate gland is not grossly enlarged.    BOWEL: Evaluation of bowel is limited without distention with oral   contrast and lack of mesenteric fat, however there is no bowel   obstruction. There is a 7 cm stool distended rectum. Small bowel loops   are not grossly dilated. Stomach is mildly distended with debris.  PERITONEUM: No free air or significant ascites.  VESSELS:  Limited evaluation of the abdominal aorta without intravenous   contrast, demonstrates tortuosity and calcific atherosclerosis without   aneurysmal dilatation. Negative  RETROPERITONEUM: No lymphadenopathy.  ABDOMINAL WALL: Within normal limits.  BONES: Severe scoliosis of the spine with associated multilevel   degenerative changes. Question pagetoid appearance of the right iliac   bone.    IMPRESSION:    Bilateral pneumonia.    < end of copied text >    DIAGNOSTIC TESTING:    [x ] Echocardiogram:   < from: TTE Echo Complete w/o Contrast w/ Doppler (08.21.23 @ 08:17) >  Left Ventricle: Normal left ventricular size and wall thicknesses, with   normal systolic and diastolic function.  Global LV systolic function was mildly decreased. Global longitudinal   strain imaging was performed on GE Vivid S70 at a heart rate of 68BPM and   a blood pressure of 108/57 mmHg, average GLS calculated was -19.1%   (normal).  Right Ventricle: Normal right ventricular size and function.  Left Atrium: Moderately enlarged left atrium.  Right Atrium: The right atrium is normal in size. Moderately enlarged   right atrium.  Pericardium: There is no evidence of pericardial effusion.  Mitral Valve: Structurally normal mitral valve, with normal leaflet   excursion. Moderate to severe mitral valve regurgitation is seen.  Tricuspid Valve: Structurally normal tricuspid valve, with normal leaflet   excursion. Moderate-severe tricuspid regurgitation is visualized.   Estimated pulmonary artery systolic pressure is 65.8 mmHg assuming a   right atrial pressure of 15 mmHg, which is consistent with severe   pulmonary hypertension.  Aortic Valve: Normal trileaflet aortic valve with normal opening. No   evidence of aortic valve regurgitation is seen.  Pulmonic Valve: Structurally normal pulmonic valve, with normal leaflet   excursion. Mild pulmonic valve regurgitation.  Aorta: The aortic root and ascending aorta are structurally normal, with   no evidence of dilitation.  Pulmonary Artery: The main pulmonary artery is normal in size.  Venous: The inferior vena cava was normal sized, with respiratory size   variation less than 50%.      Summary:   1. Mildly decreased global left ventricular systolic function.   2. Global longitudinal strain imaging was performed on GE Vivid S70 at a   heart rate of 68BPM and a blood pressure of 108/57 mmHg, average GLS   calculated was -19.1% (normal).   3. Moderately enlarged left atrium.   4. Moderately enlarged right atrium.   5. Moderate to severe mitral valve regurgitation.   6. Moderate-severe tricuspid regurgitation.   7. Mild pulmonic valve regurgitation.   8. Estimated pulmonary artery systolic pressure is 65.8 mmHg assuming a   right atrial pressure of 15 mmHg, which is consistent with severe   pulmonary hypertension.    < end of copied text >    LABS:	 	    CARDIAC MARKERS:  Troponin I, High Sensitivity Result: 242.1 ng/L (08-20 @ 14:45)    23 Aug 2023 07:05    143    |  113    |  27     ----------------------------<  79     3.8     |  23     |  1.57   22 Aug 2023 06:45    143    |  114    |  34     ----------------------------<  68     3.4     |  23     |  1.94   21 Aug 2023 02:20    144    |  112    |  39     ----------------------------<  72     3.7     |  21     |  2.42     Ca    8.1        23 Aug 2023 07:05  Phos  3.2       22 Aug 2023 06:45  Mg     2.6       22 Aug 2023 06:45    TPro  6.1    /  Alb  1.7    /  TBili  0.3    /  DBili  x      /  AST  39     /  ALT  44     /  AlkPhos  63     23 Aug 2023 07:05                        9.7    11.58 )-----------( 231      ( 23 Aug 2023 07:05 )             31.3 ,                       8.6    13.97 )-----------( 239      ( 22 Aug 2023 06:45 )             26.5 ,                       7.8    10.05 )-----------( 241      ( 21 Aug 2023 02:20 )             23.8 ,                       7.5    9.27  )-----------( 348      ( 20 Aug 2023 14:45 )             24.2     TSH: Thyroid Stimulating Hormone, Serum: 1.810 uIU/mL (08-20 @ 14:45)    INR: 1.39 ratio (08-21 @ 05:25)           Patient is a 88y old  Male who presents with AMS, SEPSIS, ANEMIA, pt was found down    PAST MEDICAL & SURGICAL HISTORY:  lightheadedness and dizziness    INTERVAL HISTORY: in no acute distress, c/o feeling cold, denies any chest pain, + sOB  	  MEDICATIONS:  MEDICATIONS  (STANDING):  aMIOdarone Infusion 0.5 mG/Min (16.7 mL/Hr) IV Continuous <Continuous>  budesonide  80 MICROgram(s)/formoterol 4.5 MICROgram(s) Inhaler 2 Puff(s) Inhalation two times a day  ferrous    sulfate 325 milliGRAM(s) Oral three times a day  heparin   Injectable 5000 Unit(s) SubCutaneous every 8 hours  pantoprazole    Tablet 40 milliGRAM(s) Oral before breakfast  tiotropium 2.5 MICROgram(s) Inhaler 2 Puff(s) Inhalation daily    MEDICATIONS  (PRN):  acetaminophen     Tablet .. 650 milliGRAM(s) Oral every 6 hours PRN Moderate Pain (4 - 6)  albuterol    90 MICROgram(s) HFA Inhaler 2 Puff(s) Inhalation every 6 hours PRN Shortness of Breath and/or Wheezing  aluminum hydroxide/magnesium hydroxide/simethicone Suspension 30 milliLiter(s) Oral every 4 hours PRN Dyspepsia  benzocaine/menthol Lozenge 1 Lozenge Oral four times a day PRN Sore Throat  diltiazem Injectable 10 milliGRAM(s) IV Push once PRN sustained HR >130    Vitals:  T(F): 97.8 (08-23-23 @ 10:24), Max: 98.4 (08-22-23 @ 23:52)  HR: 67 (08-23-23 @ 10:24) (67 - 150)  BP: 101/66 (08-23-23 @ 10:24) (96/62 - 121/70)  RR: 19 (08-23-23 @ 10:24) (17 - 19)  SpO2: 94% (08-23-23 @ 10:24) (94% - 98%)    Weight (kg): 50.3 (08-22 @ 18:05)  BMI (kg/m2): 16.4 (08-22 @ 18:05)    PHYSICAL EXAM:  Neuro: Awake, responsive  CV: S1 S2 RRR + SM  Lungs: diminished to bases   GI: Soft, BS +, ND, NT  Extremities: + Ankle edema    TELEMETRY: afib with RVR - > sinus     RADIOLOGY:     < from: US Duplex Venous Lower Ext Complete, Bilateral (08.20.23 @ 17:44) >  Acute deep venous thrombosis: below the knee.    DVT within the left tibioperoneal trunk and proximal posterior tibial   veins.    < end of copied text >  < from: CT Head No Cont (08.20.23 @ 12:29) >  Similar left frontal subdural collection. Mild chronic   microvascular changes without evidence of an acute transcortical   infarction or hemorrhage.    < end of copied text >    < from: CT Abdomen and Pelvis No Cont (08.20.23 @ 06:49) >    CHEST:  LUNGS AND LARGE AIRWAYS: Patent central airways. Bibasilar patchy   consolidations suggesting pneumonia. Superimposed emphysematous lung   change and interstitial lung disease/honeycombing at the lung bases..  PLEURA: No pleural effusion or pneumothorax.  VESSELS: Limited evaluation of the thoracic aorta without intravenous   contrast, however there is calcific atherosclerosis without aneurysmal   dilatation.  HEART: Borderline enlarged heart.. No pericardial effusion. Coronary   artery calcifications. Anemia suggested.  MEDIASTINUM AND STACY: No lymphadenopathy.  CHEST WALL AND LOWER NECK: Within normal limits.    ABDOMEN AND PELVIS:  LIVER: Within normal limits.  BILE DUCTS: Normal caliber.  GALLBLADDER: Elongated gallbladder without definite calcified gallstone..  SPLEEN: Within normal limits.  PANCREAS: Within normal limits.  ADRENALS: Normal right adrenal gland. Limited left adrenal gland.  KIDNEYS/URETERS: Small bilateral kidneys. No renal stone or   hydronephrosis.    BLADDER: Within normal limits.  REPRODUCTIVE ORGANS: Prostate gland is not grossly enlarged.    BOWEL: Evaluation of bowel is limited without distention with oral   contrast and lack of mesenteric fat, however there is no bowel   obstruction. There is a 7 cm stool distended rectum. Small bowel loops   are not grossly dilated. Stomach is mildly distended with debris.  PERITONEUM: No free air or significant ascites.  VESSELS:  Limited evaluation of the abdominal aorta without intravenous   contrast, demonstrates tortuosity and calcific atherosclerosis without   aneurysmal dilatation. Negative  RETROPERITONEUM: No lymphadenopathy.  ABDOMINAL WALL: Within normal limits.  BONES: Severe scoliosis of the spine with associated multilevel   degenerative changes. Question pagetoid appearance of the right iliac   bone.    IMPRESSION:    Bilateral pneumonia.    < end of copied text >    DIAGNOSTIC TESTING:    [x ] Echocardiogram:   < from: TTE Echo Complete w/o Contrast w/ Doppler (08.21.23 @ 08:17) >  Left Ventricle: Normal left ventricular size and wall thicknesses, with   normal systolic and diastolic function.  Global LV systolic function was mildly decreased. Global longitudinal   strain imaging was performed on GE Vivid S70 at a heart rate of 68BPM and   a blood pressure of 108/57 mmHg, average GLS calculated was -19.1%   (normal).  Right Ventricle: Normal right ventricular size and function.  Left Atrium: Moderately enlarged left atrium.  Right Atrium: The right atrium is normal in size. Moderately enlarged   right atrium.  Pericardium: There is no evidence of pericardial effusion.  Mitral Valve: Structurally normal mitral valve, with normal leaflet   excursion. Moderate to severe mitral valve regurgitation is seen.  Tricuspid Valve: Structurally normal tricuspid valve, with normal leaflet   excursion. Moderate-severe tricuspid regurgitation is visualized.   Estimated pulmonary artery systolic pressure is 65.8 mmHg assuming a   right atrial pressure of 15 mmHg, which is consistent with severe   pulmonary hypertension.  Aortic Valve: Normal trileaflet aortic valve with normal opening. No   evidence of aortic valve regurgitation is seen.  Pulmonic Valve: Structurally normal pulmonic valve, with normal leaflet   excursion. Mild pulmonic valve regurgitation.  Aorta: The aortic root and ascending aorta are structurally normal, with   no evidence of dilitation.  Pulmonary Artery: The main pulmonary artery is normal in size.  Venous: The inferior vena cava was normal sized, with respiratory size   variation less than 50%.      Summary:   1. Mildly decreased global left ventricular systolic function.   2. Global longitudinal strain imaging was performed on GE Vivid S70 at a   heart rate of 68BPM and a blood pressure of 108/57 mmHg, average GLS   calculated was -19.1% (normal).   3. Moderately enlarged left atrium.   4. Moderately enlarged right atrium.   5. Moderate to severe mitral valve regurgitation.   6. Moderate-severe tricuspid regurgitation.   7. Mild pulmonic valve regurgitation.   8. Estimated pulmonary artery systolic pressure is 65.8 mmHg assuming a   right atrial pressure of 15 mmHg, which is consistent with severe   pulmonary hypertension.    < end of copied text >    LABS:	 	    CARDIAC MARKERS:  Troponin I, High Sensitivity Result: 242.1 ng/L (08-20 @ 14:45)    23 Aug 2023 07:05    143    |  113    |  27     ----------------------------<  79     3.8     |  23     |  1.57   22 Aug 2023 06:45    143    |  114    |  34     ----------------------------<  68     3.4     |  23     |  1.94   21 Aug 2023 02:20    144    |  112    |  39     ----------------------------<  72     3.7     |  21     |  2.42     Ca    8.1        23 Aug 2023 07:05  Phos  3.2       22 Aug 2023 06:45  Mg     2.6       22 Aug 2023 06:45    TPro  6.1    /  Alb  1.7    /  TBili  0.3    /  DBili  x      /  AST  39     /  ALT  44     /  AlkPhos  63     23 Aug 2023 07:05                        9.7    11.58 )-----------( 231      ( 23 Aug 2023 07:05 )             31.3 ,                       8.6    13.97 )-----------( 239      ( 22 Aug 2023 06:45 )             26.5 ,                       7.8    10.05 )-----------( 241      ( 21 Aug 2023 02:20 )             23.8 ,                       7.5    9.27  )-----------( 348      ( 20 Aug 2023 14:45 )             24.2     TSH: Thyroid Stimulating Hormone, Serum: 1.810 uIU/mL (08-20 @ 14:45)    INR: 1.39 ratio (08-21 @ 05:25)

## 2023-08-23 NOTE — PROGRESS NOTE ADULT - SUBJECTIVE AND OBJECTIVE BOX
PROGRESS NOTE:     Patient is a 88y old  Male who presents with a chief complaint of found  down (23 Aug 2023 13:01)          SUBJECTIVE & OBJECTIVE:   Pt seen and examined at bedside in AM    no overnight events.   no new complaints    REVIEW OF SYSTEMS: remaining ROS negative     PHYSICAL EXAM:  VITALS:  Vital Signs Last 24 Hrs  T(C): 36.6 (23 Aug 2023 10:24), Max: 36.9 (22 Aug 2023 23:52)  T(F): 97.8 (23 Aug 2023 10:24), Max: 98.4 (22 Aug 2023 23:52)  HR: 67 (23 Aug 2023 10:24) (67 - 150)  BP: 101/66 (23 Aug 2023 10:24) (96/62 - 121/70)  BP(mean): 87 (22 Aug 2023 19:06) (86 - 88)  RR: 19 (23 Aug 2023 10:24) (17 - 19)  SpO2: 94% (23 Aug 2023 10:24) (94% - 98%)    Parameters below as of 23 Aug 2023 10:24  Patient On (Oxygen Delivery Method): room air          GENERAL: NAD,  no increased WOB  HEAD:  Atraumatic, Normocephalic  EYES: EOMI, PERRLA, conjunctiva and sclera clear  ENMT: Moist mucous membranes  NECK: Supple, No JVD  NERVOUS SYSTEM:  Alert, no focal neuro deficits   CHEST/LUNG: Clear to auscultation bilaterally; No rales, rhonchi, wheezing, or rubs  HEART: Regular rate and rhythm; No murmurs, rubs, or gallops  ABDOMEN: Soft, Nontender, Nondistended; Bowel sounds present  EXTREMITIES:  2+ Peripheral Pulses b/l, No clubbing, cyanosis, calf tenderness or edema b/l      MEDICATIONS  (STANDING):  aMIOdarone Infusion 1 mG/Min (33.3 mL/Hr) IV Continuous <Continuous>  aMIOdarone Infusion 0.5 mG/Min (16.7 mL/Hr) IV Continuous <Continuous>  budesonide  80 MICROgram(s)/formoterol 4.5 MICROgram(s) Inhaler 2 Puff(s) Inhalation two times a day  ferrous    sulfate 325 milliGRAM(s) Oral three times a day  heparin   Injectable 5000 Unit(s) SubCutaneous every 8 hours  pantoprazole    Tablet 40 milliGRAM(s) Oral before breakfast  tiotropium 2.5 MICROgram(s) Inhaler 2 Puff(s) Inhalation daily  vancomycin  IVPB        MEDICATIONS  (PRN):  acetaminophen     Tablet .. 650 milliGRAM(s) Oral every 6 hours PRN Moderate Pain (4 - 6)  albuterol    90 MICROgram(s) HFA Inhaler 2 Puff(s) Inhalation every 6 hours PRN Shortness of Breath and/or Wheezing  aluminum hydroxide/magnesium hydroxide/simethicone Suspension 30 milliLiter(s) Oral every 4 hours PRN Dyspepsia  benzocaine/menthol Lozenge 1 Lozenge Oral four times a day PRN Sore Throat  diltiazem Injectable 10 milliGRAM(s) IV Push once PRN sustained HR >130      Allergies    No Known Allergies    Intolerances              LABS:                           9.7    11.58 )-----------( 231      ( 23 Aug 2023 07:05 )             31.3     08-23    143  |  113<H>  |  27<H>  ----------------------------<  79  3.8   |  23  |  1.57<H>    Ca    8.1<L>      23 Aug 2023 07:05  Phos  3.2     08-22  Mg     2.6     08-22    TPro  6.1  /  Alb  1.7<L>  /  TBili  0.3  /  DBili  x   /  AST  39<H>  /  ALT  44  /  AlkPhos  63  08-23      Urinalysis Basic - ( 23 Aug 2023 07:05 )    Color: x / Appearance: x / SG: x / pH: x  Gluc: 79 mg/dL / Ketone: x  / Bili: x / Urobili: x   Blood: x / Protein: x / Nitrite: x   Leuk Esterase: x / RBC: x / WBC x   Sq Epi: x / Non Sq Epi: x / Bacteria: x      CAPILLARY BLOOD GLUCOSE      POCT Blood Glucose.: 96 mg/dL (22 Aug 2023 17:45)      Culture - Blood (collected 22 Aug 2023 06:45)  Source: .Blood Blood-Peripheral  Preliminary Report (23 Aug 2023 12:02):    No growth at 24 hours    Culture - Blood (collected 22 Aug 2023 06:30)  Source: .Blood Blood-Peripheral  Preliminary Report (23 Aug 2023 12:02):    No growth at 24 hours                RECENT CULTURES:          RADIOLOGY & ADDITIONAL TESTS:      Radiology reports read and imaging reviewed  :  [ x] YES  [ ] NO  (I am not a radiologist and therefore rely on Radiologist reports to facilitate with diagnosis and treatment plans)    Consultant(s) Notes Reviewed:  [x ] YES  [ ] NO    Care Discussed with Consultants/Other Providers [x ] YES  [ ] NO  Care plan and all findings were discussed in detail with patient.  All questions and concerns addressed

## 2023-08-23 NOTE — PHYSICAL THERAPY INITIAL EVALUATION ADULT - REFERRING PHYSICIAN, REHAB EVAL
right arm. Her heart rate was 100 and regular. Respiratory rate 18. O2 saturation 98%. Weight 243 pounds. GENERAL:  She is a pleasant 21-year-old female. Denied pain. She was oriented to person, place and time. Answered questions appropriately. SKIN:  No unusual skin changes. HEENT:  The pupils are equally round and intact. Mucous membranes were dry. NECK:  No JVD. Good carotid pulses. No carotid bruits. No lymphadenopathy or thyromegaly. CARDIOVASCULAR EXAM:  S1 and S2 were normal.  No S3 or S4. Soft systolic blowing type murmur. No diastolic murmur. PMI was normal.  No lift, thrust, or pericardial friction rub. LUNGS:  Quite clear to auscultation and percussion. ABDOMEN:  Soft and nontender. Good bowel sounds. EXTREMITIES:  Good femoral pulses. Good pedal pulses. No pedal edema. Skin was warm and dry. No calf tenderness. Nail beds pink. Good cap refill. PULSES:  Bilateral symmetrical radial, brachial and carotid pulses. No carotid bruits. Good femoral and pedal pulses. NEUROLOGIC EXAM:  Within normal limits. PSYCHIATRIC EXAM:  Within normal limits. IMPRESSION:  1.  Marked fatigue and shortness of breath, which are her anginal equivalents along with chest pain, now able to walk approximately 30 feet before stopping with shortness of breath consistent with class III angina. 2.  Abnormal Cardiolite stress test on 10/16/2017 showing inferoseptal wall ischemia with intermediate risk of CAD. 3.  Non-insulin-dependent diabetes with a hemoglobin A1c of 6.7.  4.  Hypertension, well controlled. 5.  Chronic renal insufficiency with a baseline creatinine at approximately 1.38, followed by Dr. Luther Mtz. 6.  Status post arthroplasty of both knees. 7.  Normal LV function by bedside echocardiogram.    PLAN:  1. Proceed with a cardiac catheterization to define her anatomy in view of class III angina on full medical therapy. Deloris Calderon

## 2023-08-23 NOTE — PROGRESS NOTE ADULT - NS ATTEND AMEND GEN_ALL_CORE FT
Attending Addendum--  Case reviewed with CATRINA Mcgarry. Her note reviewed and modified as appropriate.   Pneumococcus relatively resistant  QTc prolonged, opinions divided as to whether fluoroquinolone ok to give on amiodarone  For now, vancomnycin, target levels 10-15  Elroy Barnett MD  Attending Physician  [facility]  Division of Infectious Diseases  [phone]

## 2023-08-23 NOTE — PROGRESS NOTE ADULT - SUBJECTIVE AND OBJECTIVE BOX
[facility] NEPHROLOGY SERVICES, [facility]  NEPHROLOGY AND HYPERTENSION  [address]  [address]  [place] [address]  [phone]    MD LYNDSEY RAINEY MD YELENA ROSENBERG, MD BINNY KOSHY, MD CHRISTOPHER CAPUTO, MD EDWARD BOVER, MD          Patient events noted  No distress      MEDICATIONS  (STANDING):  aMIOdarone Infusion 1 mG/Min (33.3 mL/Hr) IV Continuous <Continuous>  budesonide  80 MICROgram(s)/formoterol 4.5 MICROgram(s) Inhaler 2 Puff(s) Inhalation two times a day  ferrous    sulfate 325 milliGRAM(s) Oral three times a day  heparin   Injectable 5000 Unit(s) SubCutaneous every 8 hours  pantoprazole    Tablet 40 milliGRAM(s) Oral before breakfast  tiotropium 2.5 MICROgram(s) Inhaler 2 Puff(s) Inhalation daily  vancomycin  IVPB        MEDICATIONS  (PRN):  acetaminophen     Tablet .. 650 milliGRAM(s) Oral every 6 hours PRN Moderate Pain (4 - 6)  albuterol    90 MICROgram(s) HFA Inhaler 2 Puff(s) Inhalation every 6 hours PRN Shortness of Breath and/or Wheezing  aluminum hydroxide/magnesium hydroxide/simethicone Suspension 30 milliLiter(s) Oral every 4 hours PRN Dyspepsia  benzocaine/menthol Lozenge 1 Lozenge Oral four times a day PRN Sore Throat  diltiazem Injectable 10 milliGRAM(s) IV Push once PRN sustained HR >130      23 @ 07:01  -  23 @ 21:58  --------------------------------------------------------  IN: 240 mL / OUT: 0 mL / NET: 240 mL      PHYSICAL EXAM:      T(C): 36.4 (23 @ 16:29), Max: 36.9 (23 @ 23:52)  HR: 67 (23 @ 18:27) (67 - 140)  BP: 129/76 (23 @ 18:27) (100/79 - 129/76)  RR: 19 (23 @ 16:29) (18 - 19)  SpO2: 98% (23 @ 16:29) (94% - 98%)  Wt(kg): --  Lungs clear  Heart S1S2  Abd soft NT ND  Extremities:   tr edema                                    9.7    11.58 )-----------( 231      ( 23 Aug 2023 07:05 )             31.3         143  |  113<H>  |  27<H>  ----------------------------<  79  3.8   |  23  |  1.57<H>    Ca    8.1<L>      23 Aug 2023 07:05  Phos  3.2       Mg     2.6         TPro  6.1  /  Alb  1.7<L>  /  TBili  0.3  /  DBili  x   /  AST  39<H>  /  ALT  44  /  AlkPhos  63        LIVER FUNCTIONS - ( 23 Aug 2023 07:05 )  Alb: 1.7 g/dL / Pro: 6.1 gm/dL / ALK PHOS: 63 U/L / ALT: 44 U/L / AST: 39 U/L / GGT: x           Creatinine Trend: 1.57<--, 1.94<--, 2.42<--, 2.64<--, 3.40<--    Bence Rivera/Protein, Urine (23 @ 14:45)    Bence Rivera/Protein, Urine: Present: Two Weak Bence Rivera protein Lambda types    Glomerular Basement Membrane Ab IgG (23 @ 02:20)    Glomerular Basement Membrane Ab IgG: <0.2: Performed At: [facility] [address] [id number]  Desmond Funes MD Ph:[phone]    Neutrophil Cytoplasmic Antibody (23 @ 02:20)    Cytoplasmic (c-ANCA) Antibody: Negative   Perinuclear (p-ANCA) Antibody: Negative   Atypical ANCA: Negative            Immunoelectrophoresis, Serum (23 @ 02:20)    Protein Total: 5.1 g/dL   Total Protein, Serum: 5.1 g/dL   Albumin: 2.2 g/dL   Albumin/Globulin Ratio: 0.8 Ratio   Alpha 1: 0.4 g/dL   Alpha 2: 0.6 g/dL   Beta Globulin: 1.1 g/dL   Gamma Globulin: 0.8 g/dL   M-Bradley: Unable to Quantitate   Serum Protein Electrophoresis Interp: Weak Beta-Migrating Paraprotein Identified   % Albumin: 42.8 %   % Alpha 1: 8.0 %   % Alpha 2: 12.0 %   % Beta: 21.2 %   % Gamma: 16.0 %   % M Bradley: Unable to Quantitate   Immunofixation, Serum: Weak IgA Lambda Band Identified      Reference Range: None Detected   Quantitative Ig mg/dL   Quantitative IgA: 536 mg/dL   Quantitative IgM: 42 mg/dL   MEGHNA Kappa: 8.99 mg/dL   MEGHNA Lambda: 6.46 mg/dL   Laurens/Lambda Free Light Chain Ratio, Serum: 1.39 Ratio    Assessment   ANGI, degree of CKD unclear;   PNA emphysema   Metabolic acidosis, lactic, septic shock, risk for ATN  Afib  R/O occult pulm renal syndrome, vasculitic, connective tissue related but less likely   Afib; risk for rhabdomyolysis  Anemia, acute on chronic   DVT LLE     Plan  IVF maintenance   Telemetry monitoring         Vj Guadalupe MD

## 2023-08-23 NOTE — PROGRESS NOTE ADULT - ASSESSMENT
89 yo M w/ hx of multiple syncopal episodes presented via EMS for an unwitnessed fall w/ hypotension, hypothermia, and afib w/ RVR concerning for sepsis. He was found to have Strep pneumonia bacteremia w/ evidence of b/l pneumonia and lactic acidosis, iron deficiency anemia, stable sub-dural hematoma, ANGI, and demand ischemia.      Metabolic encephalopathy:  - 2/2 sepsis  - Now A&Ox3, stable L frontal SDH    Acute hypoxic respiratory failure:  - pneumonia w/ findings c/w emphysema/ILD, weaned off NC on room air, on ipratropium q8h (consider transition to duonebs), chest PT, maintain SpO2 > 92%  - Add albuterol PRN, add Spiriva and Advair.   - Pulm following     Sepsis with septic shock POA:  - With strep pneumo bacteremia 2/2 PNA  - Weaned off norepinephrine  - Continue Ceftriaxone 2 gm QD  - follow up repeat blood clx    Afib:  - s/p amio bolus for afib w/ RVR (converted), today again a fib with RVR, started amio drip per cardio   - troponin elevation c/w demand ischemia   - Echo with Mildly decreased global left ventricular systolic function. BLAE, Mod - severe MR/TR, severe pHTN  - Not on Ac for anemia and SDH  - Cardio following     ANGI:  - 2/2 septic ATN  - Cr improving   - Pending serologies for pulm renal syndrome  - Renal following     Anemia:  - S/P 2 units PRBC  - Iron deficient, continue iron supplementation    Acute DVT :  - Not on AC for anemia and SDH  - On DVT ppx dose  - Vascular follow up    GI ppx   89 yo M w/ hx of multiple syncopal episodes presented via EMS for an unwitnessed fall w/ hypotension, hypothermia, and afib w/ RVR concerning for sepsis. He was found to have Strep pneumonia bacteremia w/ evidence of b/l pneumonia and lactic acidosis, iron deficiency anemia, stable sub-dural hematoma, ANGI, and demand ischemia.      Metabolic encephalopathy:  - 2/2 sepsis  - Now A&Ox3, stable L frontal SDH    Acute hypoxic respiratory failure:  - pneumonia w/ findings c/w emphysema/ILD, weaned off NC on room air, on ipratropium q8h (consider transition to duonebs), chest PT, maintain SpO2 > 92%  - C/w symciort and Spiriva  - albuterol PRN  - Pulm following     Sepsis with septic shock POA:  - With strep pneumo bacteremia 2/2 PNA  - Weaned off norepinephrine  - Initial BC positive for strep, intermediate sensitivity to Rocephin, sensitive to Vanco  - Repeat Bcx negative  - Discontinue Rocephin, start on Vanco  - follow up repeat blood clx    Afib:  - s/p amio bolus and gtt for afib w/ RVR (converted), currently on sinus rthym   - troponin elevation c/w demand ischemia   - Echo with Mildly decreased global left ventricular systolic function. BLAE, Mod - severe MR/TR, severe pHTN  - Not on Ac for anemia and SDH  - Complete Amio gtt, will transition to PO  - Cardio following     ANGI:  - 2/2 septic ATN  - Cr improving   - Pending serologies for pulm renal syndrome  - Renal following     Anemia:  - S/P 2 units PRBC  - Iron deficient, continue iron supplementation    Acute DVT :  - Not on AC for anemia and SDH  - On DVT ppx dose  - Vascular follow up    GI ppx  - Protonix

## 2023-08-23 NOTE — PHYSICAL THERAPY INITIAL EVALUATION ADULT - ASSISTIVE DEVICE:SUPINE/SIT, REHAB EVAL
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  Patient is full code and was admitted to ICU.        Principal Problem:    Pneumonia due to infectious organism, unspecified laterality, unspecified part of lung      PMHx:  She  has a past medical history of Anxiety, Brain cancer (CMD), Depression, Essential (primary) hypertension, Ovarian cancer (CMD), Pulmonary emboli (CMD) (2019), and Thyroid condition.     PSHx:  Her  has a past surgical history that includes  section, classic; thyroidectomy; Lung surgery; tonsillectomy; and Portacath placement (Left).     SHx:  She  reports that she has never smoked. She has never used smokeless tobacco. She reports current alcohol use. She reports current drug use. Drug: Marijuana.      FHx:  Her family history includes Cancer in her maternal grandmother; Coronary Artery Disease in her father; Stroke in her mother.    Allergies: ALLERGIES:  Aspartame   (food or med), Metoprolol, Sulfa antibiotics, and Sulfadiazine     MEDS:  [unfilled]    Review of Systems:  A 14 point comprehensive review of systems was negative, except as documented in HPI.       Objective   Objective:   VITALS:   Visit Vitals  BP (!) 87/50   Pulse 84   Temp 97.7 °F (36.5 °C) (Axillary)   Resp 16   Ht 5' 5\" (1.651 m)   Wt 71.3 kg (157 lb 3 oz)   LMP  (LMP Unknown)   SpO2 100%   BMI 26.16 kg/m²     EXAM:    General:  Alert, cooperative, no distress, appears  stated age.     HEENT:   Normocephalic, without obvious abnormality,Conjunctivae/corneas clear. PERRL, EOMs intact. Septum midline.      Neck: Supple, symmetrical, trachea midline, no adenopathy, thyroid: no carotid bruit and no JVD.     Back:   Symmetric, no curvature. ROM normal. No CVA tenderness.     Lungs:   Clear to auscultation bilaterally.     Chest wall:  No tenderness or deformity.     Heart:  Regular rate and rhythm, S1, S2 normal, no murmur, click, rub or gallop.     Abdomen:   Soft, non-tender. Bowel sounds normal. No masses,  No organomegaly.         Extremities: Edema present   Pulses: 2+ and symmetric all extremities.     Skin: Skin color, texture, turgor normal. No rashes or lesions       Neurologic: CNII-XII intact. Normal strength, sensation and reflexes throughout.       Data Review:    Imaging: x-rays reviewed      Labs Reviewed   COMPREHENSIVE METABOLIC PANEL - Abnormal; Notable for the following components:       Result Value    Sodium 133 (*)     Glucose 124 (*)     BUN 27 (*)     Creatinine 0.27 (*)     BUN/Cr 100 (*)     Calcium 7.5 (*)     Albumin 1.9 (*)     Protein, Total 4.7 (*)     A/G Ratio 0.7 (*)     All other components within normal limits   TROPONIN I, HIGH SENSITIVITY - Abnormal; Notable for the following components:    Troponin I, High Sensitivity 69 (*)     All other components within normal limits   NT PROBNP - Abnormal; Notable for the following components:    NT-proBNP 1,539 (*)     All other components within normal limits   CBC WITH AUTOMATED DIFFERENTIAL (PERFORMABLE ONLY) - Abnormal; Notable for the following components:    WBC 15.0 (*)     RBC 3.21 (*)     HGB 11.6 (*)     HCT 34.8 (*)     .4 (*)     MCH 36.1 (*)     RDW-SD 57.6 (*)     Absolute Neutrophils 12.5 (*)     Absolute Immature Granulocytes 0.3 (*)     All other components within normal limits    Narrative:     This is an appended report.  These results have been appended to a previously verified  report.   COVID/FLU/RSV PANEL - Abnormal; Notable for the following components:    Rapid SARS-COV-2 by PCR Detected (*)     All other components within normal limits   COMPREHENSIVE METABOLIC PANEL - Abnormal; Notable for the following components:    BUN 35 (*)     Creatinine 0.22 (*)     BUN/Cr 159 (*)     Calcium 7.3 (*)     Albumin 1.7 (*)     Protein, Total 4.0 (*)     A/G Ratio 0.7 (*)     All other components within normal limits   CBC WITH AUTOMATED DIFFERENTIAL (PERFORMABLE ONLY) - Abnormal; Notable for the following components:    RBC 2.65 (*)     HGB 9.6 (*)     HCT 28.5 (*)     .5 (*)     MCH 36.2 (*)     RDW-SD 56.6 (*)     Absolute Neutrophils 8.7 (*)     Absolute Lymphocytes 0.9 (*)     All other components within normal limits    Narrative:     This is an appended report.  These results have been appended to a previously verified report.   TROPONIN I, HIGH SENSITIVITY - Abnormal; Notable for the following components:    Troponin I, High Sensitivity 84 (*)     All other components within normal limits   COMPREHENSIVE METABOLIC PANEL - Abnormal; Notable for the following components:    BUN 35 (*)     Creatinine 0.29 (*)     BUN/Cr 121 (*)     Calcium 8.0 (*)     Bilirubin, Total 1.3 (*)     Albumin 1.9 (*)     Protein, Total 4.7 (*)     A/G Ratio 0.7 (*)     All other components within normal limits   LACTIC ACID, VENOUS - Abnormal; Notable for the following components:    Lactate, Venous 2.1 (*)     All other components within normal limits   CBC NO DIFFERENTIAL (PERFORMABLE ONLY) - Abnormal; Notable for the following components:    WBC 13.1 (*)     RBC 2.93 (*)     HGB 10.6 (*)     HCT 31.1 (*)     .1 (*)     MCH 36.2 (*)      (*)     RDW-SD 57.0 (*)     All other components within normal limits   PROCALCITONIN - Abnormal; Notable for the following components:    Procalcitonin 2.22 (*)     All other components within normal limits   TROPONIN I, HIGH SENSITIVITY - Abnormal; Notable  for the following components:    Troponin I, High Sensitivity 89 (*)     All other components within normal limits   IRON AND TOTAL IRON BINDING CAPACITY - Abnormal; Notable for the following components:    Iron 10 (*)     Iron Binding Capacity 153 (*)     Iron, Percent Saturation 7 (*)     All other components within normal limits   FERRITIN - Abnormal; Notable for the following components:    Ferritin 1,292 (*)     All other components within normal limits   BLOOD GAS, ARTERIAL WITH COOXIMETRY - RESPIRATORY - Abnormal; Notable for the following components:    O2 CONTENT, ARTERIAL - RESPIRATORY 13 (*)     PO2, ARTERIAL - RESPIRATORY 150 (*)     O2 SATURATION, ARTERIAL - RESPIRATORY 100 (*)     HEMOGLOBIN - RESPIRATORY 9.1 (*)     All other components within normal limits   SODIUM - RESPIRATORY - Abnormal; Notable for the following components:    SODIUM - RESPIRATORY 129 (*)     All other components within normal limits   GLUCOSE, BEDSIDE - POINT OF CARE - Abnormal; Notable for the following components:    GLUCOSE, BEDSIDE - POINT OF CARE 61 (*)     All other components within normal limits   GLUCOSE, BEDSIDE - POINT OF CARE - Abnormal; Notable for the following components:    GLUCOSE, BEDSIDE - POINT OF CARE 110 (*)     All other components within normal limits   LACTIC ACID VENOUS WITH REFLEX - Normal   PROTHROMBIN TIME (INR/PT) - Normal   PARTIAL THROMBOPLASTIN TIME (PTT) - Normal    Narrative:     PTT  Therapeutic Range:  45-65 seconds.       MAGNESIUM - Normal   LACTIC ACID VENOUS WITH REFLEX - Normal   PHOSPHORUS - Normal   MAGNESIUM - Normal   PHOSPHORUS - Normal   PARTIAL THROMBOPLASTIN TIME (PTT) - Normal    Narrative:     PTT  Therapeutic Range:  45-65 seconds.       PROTHROMBIN TIME (INR/PT) - Normal   LACTIC ACID, VENOUS - Normal   STAPHYLOCOCCUS AUREUS METHICILLIN RESISTANT (MRSA) PCR - Normal   GLUCOSE, BEDSIDE - POINT OF CARE - Normal   POTASSIUM - RESPIRATORY - Normal   CALCIUM, IONIZED - RESPIRATORY  - Normal   LACTIC ACID, ARTERIAL - RESPIRATORY - Normal   GLUCOSE, BEDSIDE - POINT OF CARE - Normal   CBC WITH DIFFERENTIAL    Narrative:     The following orders were created for panel order CBC with Automated Differential.  Procedure                               Abnormality         Status                     ---------                               -----------         ------                     CBC with Automated Dif...[[id number]]  Abnormal            Final result                 Please view results for these tests on the individual orders.   RAINBOW DRAW    Narrative:     The following orders were created for panel order [place] Draw Light Blue Top Collected? No Labels; Red Top Collected? No Labels; Light Green Top Collected? 1 Label; Lavender Top Collected? 1 Label; Gold Top Collected? 1 Label; Pink Top Collected? No Labels; Grey Top Collected? No Labels.  Procedure                               Abnormality         Status                     ---------                               -----------         ------                     Light Green Top[[id number]]                                Final result               Lavender Top[[id number]]                                   Final result               Gold Top[[id number]]                                       Final result                 Please view results for these tests on the individual orders.   LIGHT GREEN TOP   LAVENDER TOP   GOLD TOP   CBC WITH DIFFERENTIAL    Narrative:     The following orders were created for panel order CBC with Automated Differential.  Procedure                               Abnormality         Status                     ---------                               -----------         ------                     CBC with Automated Dif...[[id number]]  Abnormal            Final result                 Please view results for these tests on the individual orders.   CBC NO DIFFERENTIAL    Narrative:     The following orders were created for  panel order CBC No Differential.  Procedure                               Abnormality         Status                     ---------                               -----------         ------                     CBC No Differential[[id number]]        Abnormal            Final result                 Please view results for these tests on the individual orders.   URINALYSIS WITH MICROSCOPY & CULTURE IF INDICATED   LACTIC ACID, VENOUS   CBC WITH DIFFERENTIAL    Narrative:     The following orders were created for panel order CBC with Automated Differential.  Procedure                               Abnormality         Status                     ---------                               -----------         ------                     CBC with Automated Dif...[[id number]]                                                   Please view results for these tests on the individual orders.   CBC WITH AUTOMATED DIFFERENTIAL (PERFORMABLE ONLY)   COMPREHENSIVE METABOLIC PANEL   IMMUNOGLOBULINS QUANTITATIVE   VITAMIN B12 AND FOLATE   TRANSFERRIN   TYPE/SCREEN   BLOOD CULTURE   BLOOD CULTURE   LEGIONELLA URINE ANTIGEN   POCT METERED BLOOD GLUCOSE       CBC:   Lab Results   Component Value Date    WBC 13.1 (H) 07/27/2023    WBC 4.2 06/09/2020    RBC 2.93 (L) 07/27/2023    RBC 3.65 (L) 06/09/2020     BMP:   Lab Results   Component Value Date    GLUCOSE 95 07/27/2023    GLUCOSE 86 06/09/2020    SODIUM 136 07/27/2023    SODIUM 138 06/09/2020    POTASSIUM 3.7 07/27/2023    POTASSIUM 4.6 06/09/2020    POTASSIUM Slight hemolysis, result may be falsely increased. 06/09/2020    CHLORIDE 105 07/27/2023    CHLORIDE 106 06/09/2020    BUN 35 (H) 07/27/2023    BUN 23 (H) 06/09/2020    CREATININE 0.29 (L) 07/27/2023    CREATININE 0.71 06/09/2020    CALCIUM 8.0 (L) 07/27/2023    CALCIUM 8.8 06/09/2020     CMP:  Lab Results   Component Value Date    SODIUM 136 07/27/2023    SODIUM 138 06/09/2020    POTASSIUM 3.7 07/27/2023    POTASSIUM 4.6 06/09/2020     POTASSIUM Slight hemolysis, result may be falsely increased. 06/09/2020    CHLORIDE 105 07/27/2023    CHLORIDE 106 06/09/2020    ANIONGAP 10 07/27/2023    ANIONGAP 12 06/09/2020    GLUCOSE 95 07/27/2023    GLUCOSE 86 06/09/2020    BUN 35 (H) 07/27/2023    BUN 23 (H) 06/09/2020    CREATININE 0.29 (L) 07/27/2023    CREATININE 0.71 06/09/2020    ALBUMIN 1.9 (L) 07/27/2023    ALBUMIN 3.5 (L) 05/12/2020    CALCIUM 8.0 (L) 07/27/2023    CALCIUM 8.8 06/09/2020    AST 31 07/27/2023    AST 11 05/12/2020    GFRNA 89 06/09/2020    GFRNA  06/09/2020     eGFR 60 - 89 mL/min/1.73m2 = Mild decrease in kidney function.    GFRA >90 06/09/2020    GFRA  06/09/2020     eGFR results = or >90 mL/min/1.73m2 = Normal kidney function.     Coagulation:   Lab Results   Component Value Date    INR 1.0 07/27/2023    INR 1.0 05/14/2020    INR  05/14/2020     INR Therapeutic Range: 2.0 to 3.0 (2.5 to 3.5 recommended for recurrent thrombotic episodes and mechanical prosthetic heart valves.)    PTT 22 07/27/2023    PTT 22 05/14/2020    PTT PTT  Therapeutic Range:  45-70 seconds. 05/14/2020    PTT NOT APPLICABLE 05/14/2020     Cardiac markers:   Lab Results   Component Value Date    CPK 35 07/07/2023     ABGs: No results found for: \"PHARTERIAL\"  Mg: No results found for: \"MAGNESIUM\"  BNP: No results found for: \"BNP\"  Thyroid:   Lab Results   Component Value Date    TSH 0.654 05/26/2023    TSH 5.727 (H) 04/25/2023    TSH 1.505 05/26/2020    T4FREE 1.5 04/12/2022       Outside reports reviewed: ER records.         Assessment   Assessment:   Septic shock due to suspected left lower lobe pneumonia  Ovarian carcinoma with brain metastasis  Hypothyroidism  Hypertension  Pulmonary embolism  History of UTI  seizure  MSSA bacteremia  DVT with pulmonary embolism    [unfilled]    Plan   PLAN:   Patient is admitted to ICU.    Patient is referred to pulmonologist and oncologist for consultation         By:  Akbar Edwards MD, 7/27/2023, 3:24  PM    Primary Care Physician:  Akbar Edwards MD      bed rails

## 2023-08-23 NOTE — PROGRESS NOTE ADULT - NS ATTEND AMEND GEN_ALL_CORE FT
Muliple issues causing AF with RVR's. Sepsis resolving, less anemic. Load Amio oral and eventual cardiac evaluation including ischemia evaluation when stable.

## 2023-08-23 NOTE — CONSULT NOTE ADULT - NS ATTEND AMEND GEN_ALL_CORE FT
Patient admitted with septic shock in setting of pneumonia.   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  Vascular consulted for evaluation for IVC filter placement.   Patient is on DVT prophylaxis currently.   Patient is being followed by neurology and is suspected for history of multiple prior falls and mild dementia.   On my exam, patient alert and oriented x 2. Was unsure of year although identified Katty as current president.   He is thin and in no acute distress.  Abdomen is soft. Bilateral calves are soft without swelling.     - Patient would be a candidate for IVCF placement as he has chronic subdural hematomas (not a candidate for full AC), high fall risk, and increased risk of clot propagation in setting of hospitalization.  - However, at this time, patient does not want any intervention until he feels better despite risk of clot propagation. Therefore, recommend continued DVT prophylaxis, venodynes to right lower extremity, and repeat duplex in one week from original duplex to assess for clot propagation.   - Patient also should be evaluated by PT and encouraged ambulation if no contraindication.

## 2023-08-23 NOTE — PROGRESS NOTE ADULT - ASSESSMENT
87 yo M w/ hx of multiple syncopal episodes presented via EMS for an unwitnessed fall w/ hypotension, hypothermia, and afib w/ RVR found to have Strep pneumoniae bacteremia w/ evidence of b/l pneumonia and lactic acidosis, iron deficiency anemia, stable sub-dural hematoma, ANGI, and demand ischemia.    8/22: no fever, RA, WBC elevated 13.97, Cr better 1.94, TTE no vegetation, repeat BCs were collected today in the morning, Ceftriaxone IV continued.   Attending Addendum--  Case reviewed with NP Pari Mcgarry. Her note reviewed and modified as appropriate.   Pneumococcal septicemia due to pneumonia  Minimal leukocytosis, bands present- need to be monitored  Awaiting follow up culture data    8/23: no fevers, tachycardic, RA, WBC better 11.58, Cr 1.57, repeat BCs preliminary with no growth. Initial BCs intermediate to ceftriaxone, ceftriaxone IV stopped, Vancomycin IV started.     Plan:  stop ceftriaxone 2g q24hrs   start IV Vancomycin  monitor Vancomycin levels, required  follow repeat blood cultures - preliminary with no growth  monitor respiratory status and O2 saturation   incentive spirometer   monitor Hb closely and transfuse if < 7.0  trend creatinine and ensure hydration with good urine output      discussed with Dr. Barnett

## 2023-08-23 NOTE — PROGRESS NOTE ADULT - SUBJECTIVE AND OBJECTIVE BOX
NAIN WEISS  MRN-[id number]    Follow Up:  strep pneumo bacteremia, pna    Interval History: the pt was seen and examined earlier, no acute distress, on telemetry unit now. Pt is afebrile, tachycardic, wbc better, complains of sore throat.     PAST MEDICAL & SURGICAL HISTORY:  No pertinent past medical history          ROS:    [ ] Unobtainable because:  [x ] All other systems negative    Constitutional: no fever, no chills  Head: no trauma  Eyes: no vision changes, no eye pain  ENT:  + sore throat, no rhinorrhea  Cardiovascular:  no chest pain, no palpitation  Respiratory:  no SOB, no cough  GI:  no abd pain, no vomiting, no diarrhea  urinary: no dysuria, no hematuria, no flank pain  musculoskeletal:  no joint pain, no joint swelling  skin:  no rash  neurology:  no headache, no seizure, no change in mental status  psych: no anxiety, no depression         Allergies  No Known Allergies        ANTIMICROBIALS:  vancomycin  IVPB        OTHER MEDS:  acetaminophen     Tablet .. 650 milliGRAM(s) Oral every 6 hours PRN  albuterol    90 MICROgram(s) HFA Inhaler 2 Puff(s) Inhalation every 6 hours PRN  aluminum hydroxide/magnesium hydroxide/simethicone Suspension 30 milliLiter(s) Oral every 4 hours PRN  aMIOdarone Infusion 1 mG/Min IV Continuous <Continuous>  aMIOdarone Infusion 0.5 mG/Min IV Continuous <Continuous>  benzocaine/menthol Lozenge 1 Lozenge Oral four times a day PRN  budesonide  80 MICROgram(s)/formoterol 4.5 MICROgram(s) Inhaler 2 Puff(s) Inhalation two times a day  diltiazem Injectable 10 milliGRAM(s) IV Push once PRN  ferrous    sulfate 325 milliGRAM(s) Oral three times a day  heparin   Injectable 5000 Unit(s) SubCutaneous every 8 hours  pantoprazole    Tablet 40 milliGRAM(s) Oral before breakfast  tiotropium 2.5 MICROgram(s) Inhaler 2 Puff(s) Inhalation daily      Vital Signs Last 24 Hrs  T(C): 36.6 (23 Aug 2023 10:24), Max: 36.9 (22 Aug 2023 23:52)  T(F): 97.8 (23 Aug 2023 10:24), Max: 98.4 (22 Aug 2023 23:52)  HR: 67 (23 Aug 2023 10:24) (67 - 150)  BP: 101/66 (23 Aug 2023 10:24) (96/62 - 121/70)  BP(mean): 87 (22 Aug 2023 19:06) (86 - 88)  RR: 19 (23 Aug 2023 10:24) (17 - 19)  SpO2: 94% (23 Aug 2023 10:24) (94% - 98%)    Parameters below as of 23 Aug 2023 10:24  Patient On (Oxygen Delivery Method): room air        Physical Exam:  Constitutional: chronically ill appearing male, cachectic   HEAD/EYES: anicteric, no conjunctival injection, temporal wasting   ENT:  supple, no thrush, clavicular wasting dry dark colored tongue, no erythema of the throat noted    Cardiovascular:   normal S1, S2, no murmur, no edema  Respiratory:  very fine crackles bilaterally  :  no beavers, no CVA tenderness  Musculoskeletal:  no synovitis, normal ROM  Neurologic: awake and alert, generally decreased strength no focal findings  Skin:  no rash, no erythema, no phlebitis  Heme/Onc: no lymphadenopathy   Psychiatric:  awake, alert, appropriate, mildly confused     WBC Count: 11.58 K/uL (08-23 @ 07:05)  WBC Count: 13.97 K/uL (08-22 @ 06:45)  WBC Count: 10.05 K/uL (08-21 @ 02:20)  WBC Count: 9.27 K/uL (08-20 @ 14:45)  WBC Count: 13.69 K/uL (08-20 @ 05:00)                            9.7    11.58 )-----------( 231      ( 23 Aug 2023 07:05 )             31.3       08-23    143  |  113<H>  |  27<H>  ----------------------------<  79  3.8   |  23  |  1.57<H>    Ca    8.1<L>      23 Aug 2023 07:05  Phos  3.2     08-22  Mg     2.6     08-22    TPro  6.1  /  Alb  1.7<L>  /  TBili  0.3  /  DBili  x   /  AST  39<H>  /  ALT  44  /  AlkPhos  63  08-23      Urinalysis Basic - ( 23 Aug 2023 07:05 )    Color: x / Appearance: x / SG: x / pH: x  Gluc: 79 mg/dL / Ketone: x  / Bili: x / Urobili: x   Blood: x / Protein: x / Nitrite: x   Leuk Esterase: x / RBC: x / WBC x   Sq Epi: x / Non Sq Epi: x / Bacteria: x        Creatinine Trend: 1.57<--, 1.94<--, 2.42<--, 2.64<--, 3.40<--      MICROBIOLOGY:  v  .Blood Blood-Peripheral  08-22-23   No growth at 24 hours  --  --      .Blood Blood-Peripheral  08-22-23   No growth at 24 hours  --  --      Clean Catch Clean Catch (Midstream)  08-20-23   No growth  --  --      .Blood Blood  08-20-23   No growth at 72 Hours  --  --      .Blood Blood  08-20-23   Growth in aerobic bottle: Streptococcus pneumoniae  Direct identification is available within approximately 3-5  hours either by Blood Panel Multiplexed PCR or Direct  MALDI-TOF. Details: [email or web address]  --  Blood Culture PCR  Streptococcus pneumoniae          Rapid RVP Result: Detected (08-20 @ 17:50)          Ferritin: 40 (08-21)  Ferritin: 46 (08-20)        Procalcitonin, Serum: 117.60 (08-20-23 @ 14:45)    SARS-CoV-2: NotDetec (08-20-23 @ 17:50)  Rapid RVP Result: Detected (08-20-23 @ 17:50)    SARS-CoV-2: NotDetec (20 Aug 2023 17:50)    RADIOLOGY:

## 2023-08-23 NOTE — PHYSICAL THERAPY INITIAL EVALUATION ADULT - ADDITIONAL COMMENTS
As per pt lives in an rental apartment alone in the first level, denies any entry stairs to negotiate, reports to be previously independent in all mobility without any assistive devices.

## 2023-08-23 NOTE — PROGRESS NOTE ADULT - ASSESSMENT
86-year-old male with history of recurrent lightheadedness and dizziness with multiple admissions to the emergency room for above found down on street for unknown duration.   On admission found to be hypothermic and hypotensive with rapid atrial fibrillation requiring Pressor support    CT of chest appears to suggest bilateral pneumonia   Also, profound acidosis and anemia, receiving blood products while in the emergency room.  blood culture:  Streptococcus pneumoniae: Detec (08.20.23 @ 05:00)  Rates improved on IV amiodarone, converted to sinus  Evidence of acute renal insufficiency   Elevated troponin seen here likely significant demand ischemia in the setting of renal insufficiency.      -hemodynamically stable  -TTE with Mildly decreased global left ventricular systolic function. BLAE, Mod - severe MR/TR, severe pHTN  -ABx as per ID for Strep pneumoniae bacteremia w/ evidence of b/l pneumonia and lactic acidosis  -no bbl yet as HR in low 60s now  -nutrition eval appreciated   -monitor h/h, transfuse as needed, cont on feso4, monitor creat, CK  -activities as tolerated     Addendum: 12 lead EKG done this afternoon shows afib with RVR, SBP in 80s, pt asymptomatic, transferred to telemetry, s/p IVF bolus, BP improved, case discussed with Dr. Shrestha, will start on amiodarone IV loading dose  cont on telemetry. repeat EKG in am    86-year-old male with history of recurrent lightheadedness and dizziness with multiple admissions to the emergency room for above found down on street for unknown duration.   On admission found to be hypothermic and hypotensive with rapid atrial fibrillation requiring Pressor support    CT of chest appears to suggest bilateral pneumonia   Also, profound acidosis and anemia, receiving blood products while in the emergency room.  blood culture:  Streptococcus pneumoniae: Detec (08.20.23 @ 05:00)  Rates improved on IV amiodarone, converted to sinus  Evidence of acute renal insufficiency   Elevated troponin seen here likely significant demand ischemia in the setting of renal insufficiency.    8/21: transferred to medical floor  8/22: found to be in afib with RVR, 140-160s SBP 80-90s, transferred to tele bed, on amiodarone drip -> converted to sinus this am 70s      -remains in sinus from this am, cont on amiodarone until the IV loading is completed, then will add maintenance oral dose of amiodarone   -No Ac in the setting of acute anemia, subdural hematoma   -TTE with Mildly decreased global left ventricular systolic function. BLAE, Mod - severe MR/TR, severe pHTN  -ABx as per ID for Strep pneumoniae bacteremia w/ evidence of b/l pneumonia and lactic acidosis  -Pt with underweight, Severe protein-calorie malnutrition, nutrition eval appreciated   -monitor h/h, transfuse as needed, cont on feso4, monitor creat, CK  -vascular follow up for DVTs  -activities as tolerated

## 2023-08-23 NOTE — CHART NOTE - NSCHARTNOTEFT_GEN_A_CORE
Notified by RN of infiltration to left upper arm where amiodarone was infusing.   Site appears swollen, not red. Pt c/o soreness to area.   PIV removed.  DW pharmacy, hospital does not have hyaluronidase in stock.  Will elevate and apply warm compresses, continue to monitor.  RN aware.

## 2023-08-23 NOTE — PROGRESS NOTE ADULT - SUBJECTIVE AND OBJECTIVE BOX
INTERVAL HPI:  89 yo M with multiple syncopal episodes per chart notes,  on no home meds   Brought by EMS after being found down on street minimally responsive (8/20/23).   In the ED, was hypotensive, hypothermic (rectal T 90F), and tachycardic (afib w/ RVR).   Labs were remarkable for anemia (Hgb 6.1), leukocytosis w/ 25% band, lactic acidosis (lactate 14.4), ANGI, and elevated troponin   Got treated w/ vanc/zosyn, fluids, 1U pRBCs, IV amiodarone bolus (resolved arrhythmia), and a non-rebreather mask.   CT imaging showed an age-indeterminate L frontal sub-dural hematoma (4mm) and lung findings with pneumonia and emphysema/ILD.   RVP was entero/rhinovirus positive.   He was switched to ceftriaxone/azithromycin, was started on ipratropium nebulizers, and got  admitted to the ICU for new pressor requirement (norepinephrine).    During his ICU course, his blood cultures grew Strep pneumonia (on appropriate abx), lower extremity duplex study showed L tibioperoneal trunk and proximal posterior tibial vein DVTs (on heparin ppx), norepinephrine was weaned off w/ adequate MAPs,   Repeat CT head showed stable sub-dural hematoma, and iron supplementation was started.   08/21/23:  Got transferred to regular medical floor.  08/22/23:  At time of my evaluation, awake, responsive. Denies SOB, cough, sputum production or chest pain.  Admits being a smoker but says quit about a year ago.  Again in rapid A Fib and being transferred to monitor bed. Seen by Cardiology.    OVERNIGHT EVENTS:  Awake, responsive. Complains of stomach discomfort and at times with difficult swallowing. SPO2 stable on room air.  Converted to sinus rhythm, still on Amiodarone drip.    Vital Signs Last 24 Hrs  T(C): 36.6 (23 Aug 2023 10:24), Max: 36.9 (22 Aug 2023 23:52)  T(F): 97.8 (23 Aug 2023 10:24), Max: 98.4 (22 Aug 2023 23:52)  HR: 67 (23 Aug 2023 10:24) (67 - 150)  BP: 101/66 (23 Aug 2023 10:24) (96/62 - 121/70)  BP(mean): 87 (22 Aug 2023 19:06) (86 - 88)  RR: 19 (23 Aug 2023 10:24) (17 - 19)  SpO2: 94% (23 Aug 2023 10:24) (94% - 98%)    Parameters below as of 23 Aug 2023 10:24  Patient On (Oxygen Delivery Method): room air    PHYSICAL EXAM:  GEN:         Awake, responsive and comfortable.  HEENT:    Normal.    RESP:       no wheezing.  CVS:          Regular rate and rhythm.     MEDICATIONS  (STANDING):  aMIOdarone Infusion 1 mG/Min (33.3 mL/Hr) IV Continuous <Continuous>  aMIOdarone Infusion 0.5 mG/Min (16.7 mL/Hr) IV Continuous <Continuous>  budesonide  80 MICROgram(s)/formoterol 4.5 MICROgram(s) Inhaler 2 Puff(s) Inhalation two times a day  ferrous    sulfate 325 milliGRAM(s) Oral three times a day  heparin   Injectable 5000 Unit(s) SubCutaneous every 8 hours  pantoprazole    Tablet 40 milliGRAM(s) Oral before breakfast  tiotropium 2.5 MICROgram(s) Inhaler 2 Puff(s) Inhalation daily  vancomycin  IVPB        MEDICATIONS  (PRN):  acetaminophen     Tablet .. 650 milliGRAM(s) Oral every 6 hours PRN Moderate Pain (4 - 6)  albuterol    90 MICROgram(s) HFA Inhaler 2 Puff(s) Inhalation every 6 hours PRN Shortness of Breath and/or Wheezing  aluminum hydroxide/magnesium hydroxide/simethicone Suspension 30 milliLiter(s) Oral every 4 hours PRN Dyspepsia  benzocaine/menthol Lozenge 1 Lozenge Oral four times a day PRN Sore Throat  diltiazem Injectable 10 milliGRAM(s) IV Push once PRN sustained HR >130    LABS:                        9.7    11.58 )-----------( 231      ( 23 Aug 2023 07:05 )             31.3     08-23    143  |  113<H>  |  27<H>  ----------------------------<  79  3.8   |  23  |  1.57<H>    Ca    8.1<L>      23 Aug 2023 07:05  Phos  3.2     08-22  Mg     2.6     08-22    TPro  6.1  /  Alb  1.7<L>  /  TBili  0.3  /  DBili  x   /  AST  39<H>  /  ALT  44  /  AlkPhos  63  08-23 08-20 @ 11:56  pH: --  pCO2: 25  pO2: 109  SaO2: 99.4  08-20 @ 10:46  pH: --  pCO2: 24  pO2: 306  SaO2: 100.0  08-20 @ 05:57  pH: --  pCO2: 28  pO2: 32  SaO2: 31.8    Urinalysis Basic - ( 23 Aug 2023 07:05 )    Color: x / Appearance: x / SG: x / pH: x  Gluc: 79 mg/dL / Ketone: x  / Bili: x / Urobili: x   Blood: x / Protein: x / Nitrite: x   Leuk Esterase: x / RBC: x / WBC x   Sq Epi: x / Non Sq Epi: x / Bacteria: x      ASSESSMENT AND PLAN:  Multifocal pneumonia.  S/P Septic shock.  Strep Pneumo Bacteremia.  Leukocytosis.  A Fib with RVR.  Anemia.  Renal Insuffiencey.  Hypokalemia.  Left peroneal+ post tibial DVT.    SPO2 in high 90s on room air.  Continue antibiotic per ID.  Being transferred to monitor bed for rapid A Fib.  Not a candidate for full anticoagulation due to SDH, and anemia requiring PRBC transfusion.  Consider Vascular evaluation for DVT.  56+ minutes spent.    08/23/23:   Awake, responsive. Complains of stomach discomfort and at times with difficult swallowing. SPO2 stable on room air.  Converted to sinus rhythm, still on Amiodarone drip.  Continue antibiotics per ID.

## 2023-08-23 NOTE — CONSULT NOTE ADULT - SUBJECTIVE AND OBJECTIVE BOX
HPI:  89 yo M     bibems after being found down outside.    no  pmx. pe r pt  now    per  er  chart ,on  arrival,  pt  was  minimally responsive, altered, unable to provide history.    and  had  a ectal temp   90 degrees.  No prior visits from chart review   was hypotensive/  hypotehrmic/    elevated  lactate/  acidotic, severe  anemia on  arrival/ and  ,per  ER Dr , ICU  eval  was  called/ and  was rejected /  note currently, pending    pt  unable  to  clearly   state  if  eh had  any  trauma  or how  he fell    icu  re  eval  called again by current   er  dr/  awaiting   consult   pt  seen  in  er/  awake  and  alert,  somewhat  able  to  answer  questions , though   not  fully    denies  any  cp/sob/abd  pain/  recall   bleeding  also  denies   any  cardiac  hx/  priro  strokes/  dm,  etc   states  he  lives  with  his  family/ address /  phone  numbers  of  family    currently  unknown   (20 Aug 2023 10:35)    Patient seen and examined at bedside with Dr. eMdeiros. Offers no complaints, states he has never had any medical problems before and has not seen a doctor in the past. 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 Patient is alert, aware of his name, what month it is, and of the president, but unable to remember the year. When discussing the possibility of an IVC filter, patient states he does not want any procedures until he feels better.   States his breathing has been ok. Denies any fever, chills, N/V/D, CP      PAST MEDICAL & SURGICAL HISTORY:  No pertinent past medical history          Review of Systems:  contained within HPI    MEDICATIONS  (STANDING):  aMIOdarone Infusion 1 mG/Min (33.3 mL/Hr) IV Continuous <Continuous>  budesonide  80 MICROgram(s)/formoterol 4.5 MICROgram(s) Inhaler 2 Puff(s) Inhalation two times a day  ferrous    sulfate 325 milliGRAM(s) Oral three times a day  heparin   Injectable 5000 Unit(s) SubCutaneous every 8 hours  pantoprazole    Tablet 40 milliGRAM(s) Oral before breakfast  tiotropium 2.5 MICROgram(s) Inhaler 2 Puff(s) Inhalation daily  vancomycin  IVPB        MEDICATIONS  (PRN):  acetaminophen     Tablet .. 650 milliGRAM(s) Oral every 6 hours PRN Moderate Pain (4 - 6)  albuterol    90 MICROgram(s) HFA Inhaler 2 Puff(s) Inhalation every 6 hours PRN Shortness of Breath and/or Wheezing  aluminum hydroxide/magnesium hydroxide/simethicone Suspension 30 milliLiter(s) Oral every 4 hours PRN Dyspepsia  benzocaine/menthol Lozenge 1 Lozenge Oral four times a day PRN Sore Throat  diltiazem Injectable 10 milliGRAM(s) IV Push once PRN sustained HR >130    Allergies    No Known Allergies    Intolerances    SOCIAL HISTORY          Smoking: Yes [ ]  No [X]   ______pk yrs          ETOH  Yes [ ]  No [X]  Social [ ]          DRUGS:  Yes [ ]  No [X]  if so what______________    FAMILY HISTORY:  denies any family history of blood clots     Vital Signs Last 24 Hrs  T(C): 36.4 (23 Aug 2023 16:29), Max: 36.9 (22 Aug 2023 23:52)  T(F): 97.5 (23 Aug 2023 16:29), Max: 98.4 (22 Aug 2023 23:52)  HR: 67 (23 Aug 2023 16:29) (67 - 150)  BP: 113/31 (23 Aug 2023 16:29) (100/79 - 121/70)  BP(mean): 87 (22 Aug 2023 19:06) (86 - 88)  RR: 19 (23 Aug 2023 16:29) (17 - 19)  SpO2: 98% (23 Aug 2023 16:29) (94% - 98%)    Parameters below as of 23 Aug 2023 16:29  Patient On (Oxygen Delivery Method): room air        Physical Exam:  General: poorly groomed, NAD  Eyes: ARELIS  HENT: WNL, no JVD  Chest: On NC, labored respirations  Cardiovascular: Regular rate & rhythm  Abdomen: soft, NT/ND  Extremities: thin extremities, non edematous. Weak femoral pulses noted  Skin: No rash  Musculoskeletal: normal strength  Neuro/Psych: AAO x2      LABS:                        9.7    11.58 )-----------( 231      ( 23 Aug 2023 07:05 )             31.3     08-23    143  |  113<H>  |  27<H>  ----------------------------<  79  3.8   |  23  |  1.57<H>    Ca    8.1<L>      23 Aug 2023 07:05  Phos  3.2     08-22  Mg     2.6     08-22    TPro  6.1  /  Alb  1.7<L>  /  TBili  0.3  /  DBili  x   /  AST  39<H>  /  ALT  44  /  AlkPhos  63  08-23      Urinalysis Basic - ( 23 Aug 2023 07:05 )    Color: x / Appearance: x / SG: x / pH: x  Gluc: 79 mg/dL / Ketone: x  / Bili: x / Urobili: x   Blood: x / Protein: x / Nitrite: x   Leuk Esterase: x / RBC: x / WBC x   Sq Epi: x / Non Sq Epi: x / Bacteria: x    RADIOLOGY & ADDITIONAL STUDIES:    A/P  89 yo M with no PMH, BIBEMS after being found down outside. 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 Patient would benefit from IVC filter but currently refusing any procedures until he feels better. Patient aware of all risks of delaying IVC filter   - recommend SCDs on right leg  - continue DVT ppx with SQH  - repeat duplex in one week

## 2023-08-24 LAB
ALBUMIN SERPL ELPH-MCNC: 1.8 G/DL — LOW (ref 3.3–5)
ALP SERPL-CCNC: 57 U/L — SIGNIFICANT CHANGE UP (ref 40–120)
ALT FLD-CCNC: 43 U/L — SIGNIFICANT CHANGE UP (ref 12–78)
ANION GAP SERPL CALC-SCNC: 4 MMOL/L — LOW (ref 5–17)
AST SERPL-CCNC: 32 U/L — SIGNIFICANT CHANGE UP (ref 15–37)
BASOPHILS # BLD AUTO: 0 K/UL — SIGNIFICANT CHANGE UP (ref 0–0.2)
BASOPHILS NFR BLD AUTO: 0 % — SIGNIFICANT CHANGE UP (ref 0–2)
BILIRUB SERPL-MCNC: 0.3 MG/DL — SIGNIFICANT CHANGE UP (ref 0.2–1.2)
BUN SERPL-MCNC: 28 MG/DL — HIGH (ref 7–23)
CALCIUM SERPL-MCNC: 8 MG/DL — LOW (ref 8.5–10.1)
CHLORIDE SERPL-SCNC: 115 MMOL/L — HIGH (ref 96–108)
CO2 SERPL-SCNC: 24 MMOL/L — SIGNIFICANT CHANGE UP (ref 22–31)
CREAT SERPL-MCNC: 1.57 MG/DL — HIGH (ref 0.5–1.3)
EGFR: 43 ML/MIN/1.73M2 — LOW
EOSINOPHIL # BLD AUTO: 0.01 K/UL — SIGNIFICANT CHANGE UP (ref 0–0.5)
EOSINOPHIL NFR BLD AUTO: 0.1 % — SIGNIFICANT CHANGE UP (ref 0–6)
GLUCOSE SERPL-MCNC: 83 MG/DL — SIGNIFICANT CHANGE UP (ref 70–99)
HCT VFR BLD CALC: 25.7 % — LOW (ref 39–50)
HGB BLD-MCNC: 7.9 G/DL — LOW (ref 13–17)
IMM GRANULOCYTES NFR BLD AUTO: 0.7 % — SIGNIFICANT CHANGE UP (ref 0–0.9)
LYMPHOCYTES # BLD AUTO: 0.46 K/UL — LOW (ref 1–3.3)
LYMPHOCYTES # BLD AUTO: 6.8 % — LOW (ref 13–44)
MCHC RBC-ENTMCNC: 21.2 PG — LOW (ref 27–34)
MCHC RBC-ENTMCNC: 30.7 G/DL — LOW (ref 32–36)
MCV RBC AUTO: 68.9 FL — LOW (ref 80–100)
MONOCYTES # BLD AUTO: 0.34 K/UL — SIGNIFICANT CHANGE UP (ref 0–0.9)
MONOCYTES NFR BLD AUTO: 5 % — SIGNIFICANT CHANGE UP (ref 2–14)
MRSA PCR RESULT.: SIGNIFICANT CHANGE UP
NEUTROPHILS # BLD AUTO: 5.91 K/UL — SIGNIFICANT CHANGE UP (ref 1.8–7.4)
NEUTROPHILS NFR BLD AUTO: 87.4 % — HIGH (ref 43–77)
NRBC # BLD: 0 /100 WBCS — SIGNIFICANT CHANGE UP (ref 0–0)
PLATELET # BLD AUTO: 187 K/UL — SIGNIFICANT CHANGE UP (ref 150–400)
POTASSIUM SERPL-MCNC: 3.8 MMOL/L — SIGNIFICANT CHANGE UP (ref 3.5–5.3)
POTASSIUM SERPL-SCNC: 3.8 MMOL/L — SIGNIFICANT CHANGE UP (ref 3.5–5.3)
PROT SERPL-MCNC: 6.1 GM/DL — SIGNIFICANT CHANGE UP (ref 6–8.3)
RBC # BLD: 3.73 M/UL — LOW (ref 4.2–5.8)
RBC # FLD: 25.4 % — HIGH (ref 10.3–14.5)
S AUREUS DNA NOSE QL NAA+PROBE: SIGNIFICANT CHANGE UP
SODIUM SERPL-SCNC: 143 MMOL/L — SIGNIFICANT CHANGE UP (ref 135–145)
VANCOMYCIN TROUGH SERPL-MCNC: 6.3 UG/ML — LOW (ref 10–20)
WBC # BLD: 6.77 K/UL — SIGNIFICANT CHANGE UP (ref 3.8–10.5)
WBC # FLD AUTO: 6.77 K/UL — SIGNIFICANT CHANGE UP (ref 3.8–10.5)

## 2023-08-24 PROCEDURE — 93010 ELECTROCARDIOGRAM REPORT: CPT

## 2023-08-24 PROCEDURE — 99233 SBSQ HOSP IP/OBS HIGH 50: CPT | Mod: FS

## 2023-08-24 RX ORDER — BUDESONIDE AND FORMOTEROL FUMARATE DIHYDRATE 160; 4.5 UG/1; UG/1
2 AEROSOL RESPIRATORY (INHALATION)
Refills: 0 | Status: DISCONTINUED | OUTPATIENT
Start: 2023-08-24 | End: 2023-09-12

## 2023-08-24 RX ADMIN — PANTOPRAZOLE SODIUM 40 MILLIGRAM(S): 20 TABLET, DELAYED RELEASE ORAL at 06:02

## 2023-08-24 RX ADMIN — HEPARIN SODIUM 5000 UNIT(S): 5000 INJECTION INTRAVENOUS; SUBCUTANEOUS at 21:16

## 2023-08-24 RX ADMIN — Medication 250 MILLIGRAM(S): at 12:57

## 2023-08-24 RX ADMIN — Medication 325 MILLIGRAM(S): at 05:53

## 2023-08-24 RX ADMIN — Medication 325 MILLIGRAM(S): at 13:06

## 2023-08-24 RX ADMIN — Medication 325 MILLIGRAM(S): at 21:17

## 2023-08-24 RX ADMIN — BUDESONIDE AND FORMOTEROL FUMARATE DIHYDRATE 2 PUFF(S): 160; 4.5 AEROSOL RESPIRATORY (INHALATION) at 18:07

## 2023-08-24 RX ADMIN — AMIODARONE HYDROCHLORIDE 200 MILLIGRAM(S): 400 TABLET ORAL at 05:52

## 2023-08-24 RX ADMIN — HEPARIN SODIUM 5000 UNIT(S): 5000 INJECTION INTRAVENOUS; SUBCUTANEOUS at 05:53

## 2023-08-24 RX ADMIN — Medication 100 MILLIGRAM(S): at 15:24

## 2023-08-24 RX ADMIN — TIOTROPIUM BROMIDE 2 PUFF(S): 18 CAPSULE ORAL; RESPIRATORY (INHALATION) at 15:28

## 2023-08-24 RX ADMIN — HEPARIN SODIUM 5000 UNIT(S): 5000 INJECTION INTRAVENOUS; SUBCUTANEOUS at 15:24

## 2023-08-24 NOTE — PROGRESS NOTE ADULT - SUBJECTIVE AND OBJECTIVE BOX
Patient is a 88y old  Male who was found  down (23 Aug 2023 21:57)    PAST MEDICAL & SURGICAL HISTORY:    lightheadedness and dizziness    INTERVAL HISTORY:  	  MEDICATIONS:  MEDICATIONS  (STANDING):  aMIOdarone    Tablet 200 milliGRAM(s) Oral daily  budesonide  80 MICROgram(s)/formoterol 4.5 MICROgram(s) Inhaler 2 Puff(s) Inhalation two times a day  ferrous    sulfate 325 milliGRAM(s) Oral three times a day  heparin   Injectable 5000 Unit(s) SubCutaneous every 8 hours  pantoprazole    Tablet 40 milliGRAM(s) Oral before breakfast  tiotropium 2.5 MICROgram(s) Inhaler 2 Puff(s) Inhalation daily  vancomycin  IVPB 750 milliGRAM(s) IV Intermittent every 24 hours    MEDICATIONS  (PRN):  acetaminophen     Tablet .. 650 milliGRAM(s) Oral every 6 hours PRN Moderate Pain (4 - 6)  albuterol    90 MICROgram(s) HFA Inhaler 2 Puff(s) Inhalation every 6 hours PRN Shortness of Breath and/or Wheezing  aluminum hydroxide/magnesium hydroxide/simethicone Suspension 30 milliLiter(s) Oral every 4 hours PRN Dyspepsia  benzocaine/menthol Lozenge 1 Lozenge Oral four times a day PRN Sore Throat  diltiazem Injectable 10 milliGRAM(s) IV Push once PRN sustained HR >130    Vitals:  T(F): 98.6 (08-24-23 @ 04:13), Max: 98.6 (08-24-23 @ 04:13)  HR: 71 (08-24-23 @ 04:13) (53 - 71)  BP: 123/67 (08-24-23 @ 04:13) (101/66 - 129/76)  RR: 19 (08-24-23 @ 04:13) (19 - 19)  SpO2: 96% (08-24-23 @ 04:13) (94% - 98%)    08-23 @ 07:01  -  08-24 @ 07:00  --------------------------------------------------------  IN:    Oral Fluid: 240 mL  Total IN: 240 mL    OUT:  Total OUT: 0 mL    Total NET: 240 mL    Weight (kg): 50.3 (08-22 @ 18:05)  BMI (kg/m2): 16.4 (08-22 @ 18:05)    PHYSICAL EXAM:  Neuro: Awake, responsive  CV: S1 S2 RRR  Lungs: CTABL  GI: Soft, BS +, ND, NT  Extremities: No edema    TELEMETRY: sinus     RADIOLOGY:   < from: CT Chest No Cont (08.20.23 @ 06:49) >  CHEST:  LUNGS AND LARGE AIRWAYS: Patent central airways. Bibasilar patchy   consolidations suggesting pneumonia. Superimposed emphysematous lung   change and interstitial lung disease/honeycombing at the lung bases..  PLEURA: No pleural effusion or pneumothorax.  VESSELS: Limited evaluation of the thoracic aorta without intravenous   contrast, however there is calcific atherosclerosis without aneurysmal   dilatation.  HEART: Borderline enlarged heart.. No pericardial effusion. Coronary   artery calcifications. Anemia suggested.  MEDIASTINUM AND STACY: No lymphadenopathy.  CHEST WALL AND LOWER NECK: Within normal limits.      ABDOMEN AND PELVIS:  LIVER: Within normal limits.  BILE DUCTS: Normal caliber.  GALLBLADDER: Elongated gallbladder without definite calcified gallstone..  SPLEEN: Within normal limits.  PANCREAS: Within normal limits.  ADRENALS: Normal right adrenal gland. Limited left adrenal gland.  KIDNEYS/URETERS: Small bilateral kidneys. No renal stone or   hydronephrosis.    BLADDER: Within normal limits.  REPRODUCTIVE ORGANS: Prostate gland is not grossly enlarged.    BOWEL: Evaluation of bowel is limited without distention with oral   contrast and lack of mesenteric fat, however there is no bowel   obstruction. There is a 7 cm stool distended rectum. Small bowel loops   are not grossly dilated. Stomach is mildly distended with debris.  PERITONEUM: No free air or significant ascites.  VESSELS:  Limited evaluation of the abdominal aorta without intravenous   contrast, demonstrates tortuosity and calcific atherosclerosis without   aneurysmal dilatation. Negative  RETROPERITONEUM: No lymphadenopathy.  ABDOMINAL WALL: Within normal limits.  BONES: Severe scoliosis of the spine with associated multilevel   degenerative changes. Question pagetoid appearance of the right iliac   bone.    IMPRESSION:    Bilateral pneumonia.    < end of copied text >  < from: US Duplex Venous Lower Ext Complete, Bilateral (08.20.23 @ 17:44) >  Acute deep venous thrombosis: below the knee.    DVT within the left tibioperoneal trunk and proximal posterior tibial   veins.    < end of copied text >  < from: CT Head No Cont (08.20.23 @ 12:29) >  IMPRESSION:  Similar left frontal subdural collection. Mild chronic   microvascular changes without evidence of an acute transcortical   infarction or hemorrhage.    < end of copied text >    DIAGNOSTIC TESTING:    [x ] Echocardiogram:   < from: TTE Echo Complete w/o Contrast w/ Doppler (08.21.23 @ 08:17) >  Left Ventricle: Normal left ventricular size and wall thicknesses, with   normal systolic and diastolic function.  Global LV systolic function was mildly decreased. Global longitudinal   strain imaging was performed on GE Vivid S70 at a heart rate of 68BPM and   a blood pressure of 108/57 mmHg, average GLS calculated was -19.1%   (normal).  Right Ventricle: Normal right ventricular size and function.  Left Atrium: Moderately enlarged left atrium.  Right Atrium: The right atrium is normal in size. Moderately enlarged   right atrium.  Pericardium: There is no evidence of pericardial effusion.  Mitral Valve: Structurally normal mitral valve, with normal leaflet   excursion. Moderate to severe mitral valve regurgitation is seen.  Tricuspid Valve: Structurally normal tricuspid valve, with normal leaflet   excursion. Moderate-severe tricuspid regurgitation is visualized.   Estimated pulmonary artery systolic pressure is 65.8 mmHg assuming a   right atrial pressure of 15 mmHg, which is consistent with severe   pulmonary hypertension.  Aortic Valve: Normal trileaflet aortic valve with normal opening. No   evidence of aortic valve regurgitation is seen.  Pulmonic Valve: Structurally normal pulmonic valve, with normal leaflet   excursion. Mild pulmonic valve regurgitation.  Aorta: The aortic root and ascending aorta are structurally normal, with   no evidence of dilitation.  Pulmonary Artery: The main pulmonary artery is normal in size.  Venous: The inferior vena cava was normal sized, with respiratory size   variation less than 50%.    Summary:   1. Mildly decreased global left ventricular systolic function.   2. Global longitudinal strain imaging was performed on GE Vivid S70 at a   heart rate of 68BPM and a blood pressure of 108/57 mmHg, average GLS   calculated was -19.1% (normal).   3. Moderately enlarged left atrium.   4. Moderately enlarged right atrium.   5. Moderate to severe mitral valve regurgitation.   6. Moderate-severe tricuspid regurgitation.   7. Mild pulmonic valve regurgitation.   8. Estimated pulmonary artery systolic pressure is 65.8 mmHg assuming a   right atrial pressure of 15 mmHg, which is consistent with severe   pulmonary hypertension.    < end of copied text >    LABS:	 	    24 Aug 2023 06:18    143    |  115    |  28     ----------------------------<  83     3.8     |  24     |  1.57   23 Aug 2023 07:05    143    |  113    |  27     ----------------------------<  79     3.8     |  23     |  1.57   22 Aug 2023 06:45    143    |  114    |  34     ----------------------------<  68     3.4     |  23     |  1.94     Ca    8.0        24 Aug 2023 06:18    TPro  6.1    /  Alb  1.8    /  TBili  0.3    /  DBili  x      /  AST  32     /  ALT  43     /  AlkPhos  57     24 Aug 2023 06:18                        7.9    6.77  )-----------( 187      ( 24 Aug 2023 06:18 )             25.7 ,                       9.7    11.58 )-----------( 231      ( 23 Aug 2023 07:05 )             31.3 ,                       8.6    13.97 )-----------( 239      ( 22 Aug 2023 06:45 )             26.5   Lipid Profile: 08-23 @ 07:05  HDL/Total Cholesterol: --  HDL Chol:              31 mg/dL  Serum Chol:            78 mg/dL  Direct LDL:            --  Triglycerides:         84 mg/dL    TSH: Thyroid Stimulating Hormone, Serum: 1.810 uIU/mL (08-20 @ 14:45)                 Patient is a 88y old  Male who was found  down (23 Aug 2023 21:57)    PAST MEDICAL & SURGICAL HISTORY:    lightheadedness and dizziness    INTERVAL HISTORY: in no distress, mild dyspnea  	  MEDICATIONS:  MEDICATIONS  (STANDING):  aMIOdarone    Tablet 200 milliGRAM(s) Oral daily  budesonide  80 MICROgram(s)/formoterol 4.5 MICROgram(s) Inhaler 2 Puff(s) Inhalation two times a day  ferrous    sulfate 325 milliGRAM(s) Oral three times a day  heparin   Injectable 5000 Unit(s) SubCutaneous every 8 hours  pantoprazole    Tablet 40 milliGRAM(s) Oral before breakfast  tiotropium 2.5 MICROgram(s) Inhaler 2 Puff(s) Inhalation daily  vancomycin  IVPB 750 milliGRAM(s) IV Intermittent every 24 hours    MEDICATIONS  (PRN):  acetaminophen     Tablet .. 650 milliGRAM(s) Oral every 6 hours PRN Moderate Pain (4 - 6)  albuterol    90 MICROgram(s) HFA Inhaler 2 Puff(s) Inhalation every 6 hours PRN Shortness of Breath and/or Wheezing  aluminum hydroxide/magnesium hydroxide/simethicone Suspension 30 milliLiter(s) Oral every 4 hours PRN Dyspepsia  benzocaine/menthol Lozenge 1 Lozenge Oral four times a day PRN Sore Throat  diltiazem Injectable 10 milliGRAM(s) IV Push once PRN sustained HR >130    Vitals:  T(F): 98.6 (08-24-23 @ 04:13), Max: 98.6 (08-24-23 @ 04:13)  HR: 71 (08-24-23 @ 04:13) (53 - 71)  BP: 123/67 (08-24-23 @ 04:13) (101/66 - 129/76)  RR: 19 (08-24-23 @ 04:13) (19 - 19)  SpO2: 96% (08-24-23 @ 04:13) (94% - 98%)    08-23 @ 07:01  -  08-24 @ 07:00  --------------------------------------------------------  IN:    Oral Fluid: 240 mL  Total IN: 240 mL    OUT:  Total OUT: 0 mL    Total NET: 240 mL    Weight (kg): 50.3 (08-22 @ 18:05)  BMI (kg/m2): 16.4 (08-22 @ 18:05)    PHYSICAL EXAM:  Neuro: Awake, responsive  CV: S1 S2 RRR + sM  Lungs: diminished to bases   GI: Soft, BS +, ND, NT  Extremities: Ankle edema    TELEMETRY: sinus     RADIOLOGY:   < from: CT Chest No Cont (08.20.23 @ 06:49) >  CHEST:  LUNGS AND LARGE AIRWAYS: Patent central airways. Bibasilar patchy   consolidations suggesting pneumonia. Superimposed emphysematous lung   change and interstitial lung disease/honeycombing at the lung bases..  PLEURA: No pleural effusion or pneumothorax.  VESSELS: Limited evaluation of the thoracic aorta without intravenous   contrast, however there is calcific atherosclerosis without aneurysmal   dilatation.  HEART: Borderline enlarged heart.. No pericardial effusion. Coronary   artery calcifications. Anemia suggested.  MEDIASTINUM AND STACY: No lymphadenopathy.  CHEST WALL AND LOWER NECK: Within normal limits.      ABDOMEN AND PELVIS:  LIVER: Within normal limits.  BILE DUCTS: Normal caliber.  GALLBLADDER: Elongated gallbladder without definite calcified gallstone..  SPLEEN: Within normal limits.  PANCREAS: Within normal limits.  ADRENALS: Normal right adrenal gland. Limited left adrenal gland.  KIDNEYS/URETERS: Small bilateral kidneys. No renal stone or   hydronephrosis.    BLADDER: Within normal limits.  REPRODUCTIVE ORGANS: Prostate gland is not grossly enlarged.    BOWEL: Evaluation of bowel is limited without distention with oral   contrast and lack of mesenteric fat, however there is no bowel   obstruction. There is a 7 cm stool distended rectum. Small bowel loops   are not grossly dilated. Stomach is mildly distended with debris.  PERITONEUM: No free air or significant ascites.  VESSELS:  Limited evaluation of the abdominal aorta without intravenous   contrast, demonstrates tortuosity and calcific atherosclerosis without   aneurysmal dilatation. Negative  RETROPERITONEUM: No lymphadenopathy.  ABDOMINAL WALL: Within normal limits.  BONES: Severe scoliosis of the spine with associated multilevel   degenerative changes. Question pagetoid appearance of the right iliac   bone.    IMPRESSION:    Bilateral pneumonia.    < end of copied text >  < from: US Duplex Venous Lower Ext Complete, Bilateral (08.20.23 @ 17:44) >  Acute deep venous thrombosis: below the knee.    DVT within the left tibioperoneal trunk and proximal posterior tibial   veins.    < end of copied text >  < from: CT Head No Cont (08.20.23 @ 12:29) >  IMPRESSION:  Similar left frontal subdural collection. Mild chronic   microvascular changes without evidence of an acute transcortical   infarction or hemorrhage.    < end of copied text >    DIAGNOSTIC TESTING:    [x ] Echocardiogram:   < from: TTE Echo Complete w/o Contrast w/ Doppler (08.21.23 @ 08:17) >  Left Ventricle: Normal left ventricular size and wall thicknesses, with   normal systolic and diastolic function.  Global LV systolic function was mildly decreased. Global longitudinal   strain imaging was performed on GE Vivid S70 at a heart rate of 68BPM and   a blood pressure of 108/57 mmHg, average GLS calculated was -19.1%   (normal).  Right Ventricle: Normal right ventricular size and function.  Left Atrium: Moderately enlarged left atrium.  Right Atrium: The right atrium is normal in size. Moderately enlarged   right atrium.  Pericardium: There is no evidence of pericardial effusion.  Mitral Valve: Structurally normal mitral valve, with normal leaflet   excursion. Moderate to severe mitral valve regurgitation is seen.  Tricuspid Valve: Structurally normal tricuspid valve, with normal leaflet   excursion. Moderate-severe tricuspid regurgitation is visualized.   Estimated pulmonary artery systolic pressure is 65.8 mmHg assuming a   right atrial pressure of 15 mmHg, which is consistent with severe   pulmonary hypertension.  Aortic Valve: Normal trileaflet aortic valve with normal opening. No   evidence of aortic valve regurgitation is seen.  Pulmonic Valve: Structurally normal pulmonic valve, with normal leaflet   excursion. Mild pulmonic valve regurgitation.  Aorta: The aortic root and ascending aorta are structurally normal, with   no evidence of dilitation.  Pulmonary Artery: The main pulmonary artery is normal in size.  Venous: The inferior vena cava was normal sized, with respiratory size   variation less than 50%.    Summary:   1. Mildly decreased global left ventricular systolic function.   2. Global longitudinal strain imaging was performed on GE Vivid S70 at a   heart rate of 68BPM and a blood pressure of 108/57 mmHg, average GLS   calculated was -19.1% (normal).   3. Moderately enlarged left atrium.   4. Moderately enlarged right atrium.   5. Moderate to severe mitral valve regurgitation.   6. Moderate-severe tricuspid regurgitation.   7. Mild pulmonic valve regurgitation.   8. Estimated pulmonary artery systolic pressure is 65.8 mmHg assuming a   right atrial pressure of 15 mmHg, which is consistent with severe   pulmonary hypertension.    < end of copied text >    LABS:	 	    24 Aug 2023 06:18    143    |  115    |  28     ----------------------------<  83     3.8     |  24     |  1.57   23 Aug 2023 07:05    143    |  113    |  27     ----------------------------<  79     3.8     |  23     |  1.57   22 Aug 2023 06:45    143    |  114    |  34     ----------------------------<  68     3.4     |  23     |  1.94     Ca    8.0        24 Aug 2023 06:18    TPro  6.1    /  Alb  1.8    /  TBili  0.3    /  DBili  x      /  AST  32     /  ALT  43     /  AlkPhos  57     24 Aug 2023 06:18                        7.9    6.77  )-----------( 187      ( 24 Aug 2023 06:18 )             25.7 ,                       9.7    11.58 )-----------( 231      ( 23 Aug 2023 07:05 )             31.3 ,                       8.6    13.97 )-----------( 239      ( 22 Aug 2023 06:45 )             26.5   Lipid Profile: 08-23 @ 07:05  HDL/Total Cholesterol: --  HDL Chol:              31 mg/dL  Serum Chol:            78 mg/dL  Direct LDL:            --  Triglycerides:         84 mg/dL    TSH: Thyroid Stimulating Hormone, Serum: 1.810 uIU/mL (08-20 @ 14:45)

## 2023-08-24 NOTE — PROGRESS NOTE ADULT - SUBJECTIVE AND OBJECTIVE BOX
PROGRESS NOTE:     Patient is a 88y old  Male who presents with a chief complaint of found  down (24 Aug 2023 08:45)          SUBJECTIVE & OBJECTIVE:   Pt seen and examined at bedside in AM    no overnight events.   no new complaints    REVIEW OF SYSTEMS: remaining ROS negative     PHYSICAL EXAM:  VITALS:  Vital Signs Last 24 Hrs  T(C): 36.4 (24 Aug 2023 11:46), Max: 37 (24 Aug 2023 04:13)  T(F): 97.5 (24 Aug 2023 11:46), Max: 98.6 (24 Aug 2023 04:13)  HR: 63 (24 Aug 2023 11:46) (53 - 71)  BP: 94/59 (24 Aug 2023 11:46) (94/59 - 129/76)  BP(mean): --  RR: 18 (24 Aug 2023 11:46) (18 - 19)  SpO2: 98% (24 Aug 2023 11:46) (95% - 98%)    Parameters below as of 24 Aug 2023 11:46  Patient On (Oxygen Delivery Method): room air          GENERAL: NAD,  no increased WOB  HEAD:  Atraumatic, Normocephalic  EYES: EOMI, PERRLA, conjunctiva and sclera clear  ENMT: Moist mucous membranes  NECK: Supple, No JVD  NERVOUS SYSTEM:  Alert, no focal neuro deficits   CHEST/LUNG: Clear to auscultation bilaterally; No rales, rhonchi, wheezing, or rubs  HEART: Regular rate and rhythm; No murmurs, rubs, or gallops  ABDOMEN: Soft, Nontender, Nondistended; Bowel sounds present  EXTREMITIES:  2+ Peripheral Pulses b/l, No clubbing, cyanosis, calf tenderness or edema b/l      MEDICATIONS  (STANDING):  aMIOdarone    Tablet 200 milliGRAM(s) Oral daily  aMIOdarone Infusion 1 mG/Min (33.3 mL/Hr) IV Continuous <Continuous>  budesonide  80 MICROgram(s)/formoterol 4.5 MICROgram(s) Inhaler 2 Puff(s) Inhalation two times a day  ferrous    sulfate 325 milliGRAM(s) Oral three times a day  heparin   Injectable 5000 Unit(s) SubCutaneous every 8 hours  pantoprazole    Tablet 40 milliGRAM(s) Oral before breakfast  tiotropium 2.5 MICROgram(s) Inhaler 2 Puff(s) Inhalation daily  vancomycin  IVPB 750 milliGRAM(s) IV Intermittent every 24 hours  vancomycin  IVPB        MEDICATIONS  (PRN):  acetaminophen     Tablet .. 650 milliGRAM(s) Oral every 6 hours PRN Moderate Pain (4 - 6)  albuterol    90 MICROgram(s) HFA Inhaler 2 Puff(s) Inhalation every 6 hours PRN Shortness of Breath and/or Wheezing  aluminum hydroxide/magnesium hydroxide/simethicone Suspension 30 milliLiter(s) Oral every 4 hours PRN Dyspepsia  benzocaine/menthol Lozenge 1 Lozenge Oral four times a day PRN Sore Throat  diltiazem Injectable 10 milliGRAM(s) IV Push once PRN sustained HR >130  guaiFENesin Oral Liquid (Sugar-Free) 100 milliGRAM(s) Oral every 6 hours PRN Cough      Allergies    No Known Allergies    Intolerances              LABS:                           7.9    6.77  )-----------( 187      ( 24 Aug 2023 06:18 )             25.7     08-24    143  |  115<H>  |  28<H>  ----------------------------<  83  3.8   |  24  |  1.57<H>    Ca    8.0<L>      24 Aug 2023 06:18    TPro  6.1  /  Alb  1.8<L>  /  TBili  0.3  /  DBili  x   /  AST  32  /  ALT  43  /  AlkPhos  57  08-24      Urinalysis Basic - ( 24 Aug 2023 06:18 )    Color: x / Appearance: x / SG: x / pH: x  Gluc: 83 mg/dL / Ketone: x  / Bili: x / Urobili: x   Blood: x / Protein: x / Nitrite: x   Leuk Esterase: x / RBC: x / WBC x   Sq Epi: x / Non Sq Epi: x / Bacteria: x      CAPILLARY BLOOD GLUCOSE          Culture - Blood (collected 22 Aug 2023 06:45)  Source: .Blood Blood-Peripheral  Preliminary Report (24 Aug 2023 12:01):    No growth at 48 Hours    Culture - Blood (collected 22 Aug 2023 06:30)  Source: .Blood Blood-Peripheral  Preliminary Report (24 Aug 2023 12:01):    No growth at 48 Hours                RECENT CULTURES:          RADIOLOGY & ADDITIONAL TESTS:      Radiology reports read and imaging reviewed  :  [ x] YES  [ ] NO  (I am not a radiologist and therefore rely on Radiologist reports to facilitate with diagnosis and treatment plans)    Consultant(s) Notes Reviewed:  [x ] YES  [ ] NO    Care Discussed with Consultants/Other Providers [x ] YES  [ ] NO  Care plan and all findings were discussed in detail with patient.  All questions and concerns addressed

## 2023-08-24 NOTE — PROGRESS NOTE ADULT - NS ATTEND AMEND GEN_ALL_CORE FT
Attending Addendum--  Case reviewed with CATRINA Mcgarry. Her note reviewed and modified as appropriate.   FlU cx NTD  Overall appears to be making progress  Intensive monitoring of vancomycin levels is required to monitor for toxicity. Frequency and type of monitoring varies from patient to patient and case to case. Serum vancomycin trough levels are typically checked before the 3rd or 4th dose initially and as clinically indicated thereafter to monitor for drug levels which might contribute to nephrotoxicity.  Elroy Barnett MD  Attending Physician  [facility]  Division of Infectious Diseases  [phone]

## 2023-08-24 NOTE — PROGRESS NOTE ADULT - ASSESSMENT
86-year-old male with history of recurrent lightheadedness and dizziness with multiple admissions to the emergency room for above found down on street for unknown duration.   On admission found to be hypothermic and hypotensive with rapid atrial fibrillation requiring Pressor support    CT of chest appears to suggest bilateral pneumonia   Also, profound acidosis and anemia, receiving blood products while in the emergency room.  blood culture:  Streptococcus pneumoniae: Detec (08.20.23 @ 05:00)  Rates improved on IV amiodarone, converted to sinus  Evidence of acute renal insufficiency   Elevated troponin seen here likely significant demand ischemia in the setting of renal insufficiency.    8/21: transferred to medical floor  8/22: found to be in afib with RVR, 140-160s SBP 80-90s, transferred to tele bed, on amiodarone drip -> converted to sinus 8/23 am    -remains in sinus, s/p amiodarone IV loading,  currently continued on maintenance oral dose of amiodarone 200mg po daily, TSH wnl  -No Ac in the setting of acute anemia, subdural hematoma   -TTE with Mildly decreased global left ventricular systolic function. BLAE, Mod - severe MR/TR, severe pHTN, no overt fluid overload noted  -ABx as per ID for Strep pneumoniae bacteremia w/ evidence of b/l pneumonia and lactic acidosis  -Pt with underweight, Severe protein-calorie malnutrition, nutrition eval appreciated   -monitor h/h, transfuse as needed, cont on feso4, anemia w/u as per primary team  -vascular follow up for DVTs  -activities as tolerated

## 2023-08-24 NOTE — PROGRESS NOTE ADULT - ASSESSMENT
89 yo M w/ hx of multiple syncopal episodes presented via EMS for an unwitnessed fall w/ hypotension, hypothermia, and afib w/ RVR found to have Strep pneumoniae bacteremia w/ evidence of b/l pneumonia and lactic acidosis, iron deficiency anemia, stable sub-dural hematoma, ANGI, and demand ischemia.    8/22: no fever, RA, WBC elevated 13.97, Cr better 1.94, TTE no vegetation, repeat BCs were collected today in the morning, Ceftriaxone IV continued.   Attending Addendum--  Case reviewed with NP Pari Mcgarry. Her note reviewed and modified as appropriate.   Pneumococcal septicemia due to pneumonia  Minimal leukocytosis, bands present- need to be monitored  Awaiting follow up culture data    8/23: no fevers, tachycardic, RA, WBC better 11.58, Cr 1.57, repeat BCs preliminary with no growth. Initial BCs intermediate to ceftriaxone, ceftriaxone IV stopped, Vancomycin IV started.   Attending Addendum--  Case reviewed with NP Pari Mcgarry. Her note reviewed and modified as appropriate.   Pneumococcus relatively resistant  QTc prolonged, opinions divided as to whether fluoroquinolone ok to give on amiodarone  For now, vancomnycin, target levels 10-15    8/24: no fevers, RA, WBC normalized, Cr is about the same, repeat BCs remain with no growth to date, Vancomycin IV continued. Vanco level is low, drawn after one dose.     Plan:  continue IV Vancomycin  monitor Vancomycin levels, required  follow repeat blood cultures - NGTD  monitor respiratory status and O2 saturation   incentive spirometer   monitor Hb closely and transfuse if < 7.0  trend creatinine and ensure hydration with good urine output      discussed with Dr. Barnett

## 2023-08-24 NOTE — PROGRESS NOTE ADULT - SUBJECTIVE AND OBJECTIVE BOX
NAIN WEISS  MRN-[id number]    Follow Up:  strep pneumo bacteremia     Interval History: the pt was seen and examined earlier, no acute distress, no new complaints, pt's daughter and granddaughter are present. Pt has no new complaints, states that he wants to get some rest. Pt is afebrile, RA, WBC normalized.     PAST MEDICAL & SURGICAL HISTORY:  No pertinent past medical history          ROS:    [ ] Unobtainable because:  [x] All other systems negative    Constitutional: no fever, no chills  Head: no trauma  Eyes: no vision changes, no eye pain  ENT:  no sore throat, no rhinorrhea  Cardiovascular:  no chest pain, no palpitation  Respiratory:  no SOB, no cough  GI:  no abd pain, no vomiting, no diarrhea  urinary: no dysuria, no hematuria, no flank pain  musculoskeletal:  no joint pain, no joint swelling  skin:  no rash  neurology:  no headache, no seizure, no change in mental status  psych: no anxiety, no depression         Allergies  No Known Allergies        ANTIMICROBIALS:  vancomycin  IVPB 750 every 24 hours  vancomycin  IVPB        OTHER MEDS:  acetaminophen     Tablet .. 650 milliGRAM(s) Oral every 6 hours PRN  albuterol    90 MICROgram(s) HFA Inhaler 2 Puff(s) Inhalation every 6 hours PRN  aluminum hydroxide/magnesium hydroxide/simethicone Suspension 30 milliLiter(s) Oral every 4 hours PRN  aMIOdarone    Tablet 200 milliGRAM(s) Oral daily  aMIOdarone Infusion 1 mG/Min IV Continuous <Continuous>  benzocaine/menthol Lozenge 1 Lozenge Oral four times a day PRN  budesonide  80 MICROgram(s)/formoterol 4.5 MICROgram(s) Inhaler 2 Puff(s) Inhalation two times a day  diltiazem Injectable 10 milliGRAM(s) IV Push once PRN  ferrous    sulfate 325 milliGRAM(s) Oral three times a day  guaiFENesin Oral Liquid (Sugar-Free) 100 milliGRAM(s) Oral every 6 hours PRN  heparin   Injectable 5000 Unit(s) SubCutaneous every 8 hours  pantoprazole    Tablet 40 milliGRAM(s) Oral before breakfast  tiotropium 2.5 MICROgram(s) Inhaler 2 Puff(s) Inhalation daily      Vital Signs Last 24 Hrs  T(C): 36.4 (24 Aug 2023 11:46), Max: 37 (24 Aug 2023 04:13)  T(F): 97.5 (24 Aug 2023 11:46), Max: 98.6 (24 Aug 2023 04:13)  HR: 63 (24 Aug 2023 11:46) (53 - 71)  BP: 94/59 (24 Aug 2023 11:46) (94/59 - 129/76)  BP(mean): --  RR: 18 (24 Aug 2023 11:46) (18 - 19)  SpO2: 98% (24 Aug 2023 11:46) (95% - 98%)    Parameters below as of 24 Aug 2023 11:46  Patient On (Oxygen Delivery Method): room air        Physical Exam:  Constitutional: chronically ill appearing male, cachectic   HEAD/EYES: anicteric, no conjunctival injection, temporal wasting   ENT:  supple, no thrush, clavicular wasting dry dark colored tongue, no erythema of the throat noted    Cardiovascular:   normal S1, S2, no murmur, no edema  Respiratory:  very fine crackles bilaterally - with improvement   :  no beavers, no CVA tenderness  Musculoskeletal:  no synovitis, normal ROM  Neurologic: awake and alert, generally decreased strength no focal findings  Skin:  no rash, no erythema, no phlebitis  Heme/Onc: no lymphadenopathy   Psychiatric:  awake, alert, appropriate    WBC Count: 6.77 K/uL (08-24 @ 06:18)  WBC Count: 11.58 K/uL (08-23 @ 07:05)  WBC Count: 13.97 K/uL (08-22 @ 06:45)  WBC Count: 10.05 K/uL (08-21 @ 02:20)  WBC Count: 9.27 K/uL (08-20 @ 14:45)  WBC Count: 13.69 K/uL (08-20 @ 05:00)                            7.9    6.77  )-----------( 187      ( 24 Aug 2023 06:18 )             25.7       08-24    143  |  115<H>  |  28<H>  ----------------------------<  83  3.8   |  24  |  1.57<H>    Ca    8.0<L>      24 Aug 2023 06:18    TPro  6.1  /  Alb  1.8<L>  /  TBili  0.3  /  DBili  x   /  AST  32  /  ALT  43  /  AlkPhos  57  08-24      Urinalysis Basic - ( 24 Aug 2023 06:18 )    Color: x / Appearance: x / SG: x / pH: x  Gluc: 83 mg/dL / Ketone: x  / Bili: x / Urobili: x   Blood: x / Protein: x / Nitrite: x   Leuk Esterase: x / RBC: x / WBC x   Sq Epi: x / Non Sq Epi: x / Bacteria: x        Creatinine Trend: 1.57<--, 1.57<--, 1.94<--, 2.42<--, 2.64<--, 3.40<--      MICROBIOLOGY:  Vancomycin Level, Trough: 6.3 ug/mL (08-24-23 @ 10:25)  v  .Blood Blood-Peripheral  08-22-23   No growth at 48 Hours  --  --      .Blood Blood-Peripheral  08-22-23   No growth at 48 Hours  --  --      Clean Catch Clean Catch (Midstream)  08-20-23   No growth  --  --      .Blood Blood  08-20-23   No growth at 4 days  --  --      .Blood Blood  08-20-23   Growth in aerobic bottle: Streptococcus pneumoniae  Direct identification is available within approximately 3-5  hours either by Blood Panel Multiplexed PCR or Direct  MALDI-TOF. Details: [email or web address]  --  Blood Culture PCR  Streptococcus pneumoniae          Rapid RVP Result: Detected (08-20 @ 17:50)          Ferritin: 40 (08-21)  Ferritin: 46 (08-20)        Procalcitonin, Serum: 117.60 (08-20-23 @ 14:45)    SARS-CoV-2: NotDetec (08-20-23 @ 17:50)  Rapid RVP Result: Detected (08-20-23 @ 17:50)    SARS-CoV-2: NotDetec (20 Aug 2023 17:50)    RADIOLOGY:

## 2023-08-24 NOTE — PROGRESS NOTE ADULT - NS ATTEND AMEND GEN_ALL_CORE FT
86-year-old with malnutrition/debility and finding of anemia, atrial fibrillation, renal insufficiency maintaining sinus rhythm on amiodarone therapy now marginal BPs.  Severe MR and TR with severe pulmonary hypertension noted.  Continue antibiotics and supportive care.

## 2023-08-24 NOTE — PROGRESS NOTE ADULT - ASSESSMENT
87 yo M w/ hx of multiple syncopal episodes presented via EMS for an unwitnessed fall w/ hypotension, hypothermia, and afib w/ RVR concerning for sepsis. He was found to have Strep pneumonia bacteremia w/ evidence of b/l pneumonia and lactic acidosis, iron deficiency anemia, stable sub-dural hematoma, ANGI, and demand ischemia.      Metabolic encephalopathy:  - 2/2 sepsis  - Now A&Ox3, stable L frontal SDH    Acute hypoxic respiratory failure:  - pneumonia w/ findings c/w emphysema/ILD, weaned off NC on room air, on ipratropium q8h (consider transition to duonebs), chest PT, maintain SpO2 > 92%  - C/w symciort and Spiriva  - albuterol PRN  - Pulm following     Sepsis with septic shock POA:  - With strep pneumo bacteremia 2/2 PNA  - Weaned off norepinephrine  - Initial BC positive for strep, intermediate sensitivity to Rocephin, sensitive to Vanco  - Repeat Bcx negative  - Discontinue Rocephin, start on Vanco  - follow up repeat blood clx    Afib:  - s/p amio bolus and gtt for afib w/ RVR (converted), currently on sinus rthym   - troponin elevation c/w demand ischemia   - Echo with Mildly decreased global left ventricular systolic function. BLAE, Mod - severe MR/TR, severe pHTN  - Not on Ac for anemia and SDH  - Continue with PO amiodarone  - Cardio following     ANGI:  - 2/2 septic ATN  - Cr improving   - Pending serologies for pulm renal syndrome  - Renal following     Anemia:  - S/P 2 units PRBC  - Iron deficient, continue iron supplementation    Acute DVT :  - Not on AC for anemia and SDH  - On DVT ppx dose  - Vascular follow up    GI ppx  - Protonix

## 2023-08-24 NOTE — PROGRESS NOTE ADULT - SUBJECTIVE AND OBJECTIVE BOX
INTERVAL HPI:    89 yo M with multiple syncopal episodes per chart notes,  on no home meds   Brought by EMS after being found down on street minimally responsive (8/20/23).   In the ED, was hypotensive, hypothermic (rectal T 90F), and tachycardic (afib w/ RVR).   Labs were remarkable for anemia (Hgb 6.1), leukocytosis w/ 25% band, lactic acidosis (lactate 14.4), ANGI, and elevated troponin   Got treated w/ vanc/zosyn, fluids, 1U pRBCs, IV amiodarone bolus (resolved arrhythmia), and a non-rebreather mask.   CT imaging showed an age-indeterminate L frontal sub-dural hematoma (4mm) and lung findings with pneumonia and emphysema/ILD.   RVP was entero/rhinovirus positive.   He was switched to ceftriaxone/azithromycin, was started on ipratropium nebulizers, and got  admitted to the ICU for new pressor requirement (norepinephrine).    During his ICU course, his blood cultures grew Strep pneumonia (on appropriate abx), lower extremity duplex study showed L tibioperoneal trunk and proximal posterior tibial vein DVTs (on heparin ppx), norepinephrine was weaned off w/ adequate MAPs,   Repeat CT head showed stable sub-dural hematoma, and iron supplementation was started.   08/21/23:  Got transferred to regular medical floor.  08/22/23:  At time of my evaluation, awake, responsive. Denies SOB, cough, sputum production or chest pain.  Admits being a smoker but says quit about a year ago.  Again in rapid A Fib and being transferred to monitor bed. Seen by Cardiology.    OVERNIGHT EVENTS:  Resting comfortably, no SOB or distress. SPO2 98%     Vital Signs Last 24 Hrs  T(C): 36.4 (24 Aug 2023 11:46), Max: 37 (24 Aug 2023 04:13)  T(F): 97.5 (24 Aug 2023 11:46), Max: 98.6 (24 Aug 2023 04:13)  HR: 63 (24 Aug 2023 11:46) (53 - 71)  BP: 94/59 (24 Aug 2023 11:46) (94/59 - 123/67)  BP(mean): --  RR: 18 (24 Aug 2023 11:46) (18 - 19)  SpO2: 98% (24 Aug 2023 11:46) (95% - 98%)    Parameters below as of 24 Aug 2023 11:46  Patient On (Oxygen Delivery Method): room air    PHYSICAL EXAM:  GEN:         Awake, responsive and comfortable.  HEENT:    Normal.    RESP:       no wheezing.  CVS:          Regular rate and rhythm.     MEDICATIONS  (STANDING):  aMIOdarone    Tablet 200 milliGRAM(s) Oral daily  aMIOdarone Infusion 1 mG/Min (33.3 mL/Hr) IV Continuous <Continuous>  budesonide  80 MICROgram(s)/formoterol 4.5 MICROgram(s) Inhaler 2 Puff(s) Inhalation two times a day  ferrous    sulfate 325 milliGRAM(s) Oral three times a day  heparin   Injectable 5000 Unit(s) SubCutaneous every 8 hours  pantoprazole    Tablet 40 milliGRAM(s) Oral before breakfast  tiotropium 2.5 MICROgram(s) Inhaler 2 Puff(s) Inhalation daily  vancomycin  IVPB 750 milliGRAM(s) IV Intermittent every 24 hours  vancomycin  IVPB        MEDICATIONS  (PRN):  acetaminophen     Tablet .. 650 milliGRAM(s) Oral every 6 hours PRN Moderate Pain (4 - 6)  albuterol    90 MICROgram(s) HFA Inhaler 2 Puff(s) Inhalation every 6 hours PRN Shortness of Breath and/or Wheezing  aluminum hydroxide/magnesium hydroxide/simethicone Suspension 30 milliLiter(s) Oral every 4 hours PRN Dyspepsia  benzocaine/menthol Lozenge 1 Lozenge Oral four times a day PRN Sore Throat  diltiazem Injectable 10 milliGRAM(s) IV Push once PRN sustained HR >130  guaiFENesin Oral Liquid (Sugar-Free) 100 milliGRAM(s) Oral every 6 hours PRN Cough    LABS:                        7.9    6.77  )-----------( 187      ( 24 Aug 2023 06:18 )             25.7     08-24    143  |  115<H>  |  28<H>  ----------------------------<  83  3.8   |  24  |  1.57<H>    Ca    8.0<L>      24 Aug 2023 06:18    TPro  6.1  /  Alb  1.8<L>  /  TBili  0.3  /  DBili  x   /  AST  32  /  ALT  43  /  AlkPhos  57  08-24    08-20 @ 11:56  pH: --  pCO2: 25  pO2: 109  SaO2: 99.4  08-20 @ 10:46  pH: --  pCO2: 24  pO2: 306  SaO2: 100.0  08-20 @ 05:57  pH: --  pCO2: 28  pO2: 32  SaO2: 31.8    Urinalysis Basic - ( 24 Aug 2023 06:18 )    Color: x / Appearance: x / SG: x / pH: x  Gluc: 83 mg/dL / Ketone: x  / Bili: x / Urobili: x   Blood: x / Protein: x / Nitrite: x   Leuk Esterase: x / RBC: x / WBC x   Sq Epi: x / Non Sq Epi: x / Bacteria: x      ASSESSMENT AND PLAN:  Multifocal pneumonia.  S/P Septic shock.  Strep Pneumo Bacteremia.  Leukocytosis.  A Fib with RVR.  Anemia.  Renal Insuffiencey.  Hypokalemia.  Left peroneal+ post tibial DVT.    SPO2 in high 90s on room air.  Continue antibiotic per ID.  Being transferred to monitor bed for rapid A Fib.  Not a candidate for full anticoagulation due to SDH, and anemia requiring PRBC transfusion.  Consider Vascular evaluation for DVT.  56+ minutes spent.    08/23/23:   Awake, responsive. Complains of stomach discomfort and at times with difficult swallowing. SPO2 stable on room air.  Converted to sinus rhythm, still on Amiodarone drip.  Continue antibiotics per ID.    08/24/23:  Resting comfortably, no SOB or distress. SPO2 98% .  Converted to sinus rhythm, changed to PO Amiodarone.  Continue Vancomycin per ID.  Will change to adult dose Symbicort.

## 2023-08-25 LAB
ANION GAP SERPL CALC-SCNC: 4 MMOL/L — LOW (ref 5–17)
APTT BLD: 30.8 SEC — SIGNIFICANT CHANGE UP (ref 24.5–35.6)
BLD GP AB SCN SERPL QL: SIGNIFICANT CHANGE UP
BUN SERPL-MCNC: 21 MG/DL — SIGNIFICANT CHANGE UP (ref 7–23)
CALCIUM SERPL-MCNC: 7.9 MG/DL — LOW (ref 8.5–10.1)
CHLORIDE SERPL-SCNC: 113 MMOL/L — HIGH (ref 96–108)
CO2 SERPL-SCNC: 24 MMOL/L — SIGNIFICANT CHANGE UP (ref 22–31)
CREAT SERPL-MCNC: 1.38 MG/DL — HIGH (ref 0.5–1.3)
CULTURE RESULTS: SIGNIFICANT CHANGE UP
EGFR: 50 ML/MIN/1.73M2 — LOW
GLUCOSE SERPL-MCNC: 80 MG/DL — SIGNIFICANT CHANGE UP (ref 70–99)
HCT VFR BLD CALC: 23.3 % — LOW (ref 39–50)
HGB BLD-MCNC: 7.4 G/DL — LOW (ref 13–17)
INR BLD: 1.08 RATIO — SIGNIFICANT CHANGE UP (ref 0.85–1.18)
MAGNESIUM SERPL-MCNC: 2.3 MG/DL — SIGNIFICANT CHANGE UP (ref 1.6–2.6)
MCHC RBC-ENTMCNC: 21.8 PG — LOW (ref 27–34)
MCHC RBC-ENTMCNC: 31.8 G/DL — LOW (ref 32–36)
MCV RBC AUTO: 68.7 FL — LOW (ref 80–100)
NRBC # BLD: 0 /100 WBCS — SIGNIFICANT CHANGE UP (ref 0–0)
PHOSPHATE SERPL-MCNC: 2.6 MG/DL — SIGNIFICANT CHANGE UP (ref 2.5–4.5)
PLATELET # BLD AUTO: 195 K/UL — SIGNIFICANT CHANGE UP (ref 150–400)
POTASSIUM SERPL-MCNC: 3.7 MMOL/L — SIGNIFICANT CHANGE UP (ref 3.5–5.3)
POTASSIUM SERPL-SCNC: 3.7 MMOL/L — SIGNIFICANT CHANGE UP (ref 3.5–5.3)
PROTHROM AB SERPL-ACNC: 12.9 SEC — SIGNIFICANT CHANGE UP (ref 9.5–13)
RBC # BLD: 3.39 M/UL — LOW (ref 4.2–5.8)
RBC # FLD: 26.4 % — HIGH (ref 10.3–14.5)
SODIUM SERPL-SCNC: 141 MMOL/L — SIGNIFICANT CHANGE UP (ref 135–145)
SPECIMEN SOURCE: SIGNIFICANT CHANGE UP
VANCOMYCIN TROUGH SERPL-MCNC: 7.5 UG/ML — LOW (ref 10–20)
WBC # BLD: 6.16 K/UL — SIGNIFICANT CHANGE UP (ref 3.8–10.5)
WBC # FLD AUTO: 6.16 K/UL — SIGNIFICANT CHANGE UP (ref 3.8–10.5)

## 2023-08-25 PROCEDURE — 99232 SBSQ HOSP IP/OBS MODERATE 35: CPT | Mod: FS

## 2023-08-25 RX ORDER — VANCOMYCIN HCL 1 G
250 VIAL (EA) INTRAVENOUS ONCE
Refills: 0 | Status: COMPLETED | OUTPATIENT
Start: 2023-08-25 | End: 2023-08-25

## 2023-08-25 RX ADMIN — Medication 250 MILLIGRAM(S): at 11:37

## 2023-08-25 RX ADMIN — HEPARIN SODIUM 5000 UNIT(S): 5000 INJECTION INTRAVENOUS; SUBCUTANEOUS at 05:24

## 2023-08-25 RX ADMIN — BUDESONIDE AND FORMOTEROL FUMARATE DIHYDRATE 2 PUFF(S): 160; 4.5 AEROSOL RESPIRATORY (INHALATION) at 17:36

## 2023-08-25 RX ADMIN — HEPARIN SODIUM 5000 UNIT(S): 5000 INJECTION INTRAVENOUS; SUBCUTANEOUS at 21:17

## 2023-08-25 RX ADMIN — Medication 325 MILLIGRAM(S): at 21:17

## 2023-08-25 RX ADMIN — HEPARIN SODIUM 5000 UNIT(S): 5000 INJECTION INTRAVENOUS; SUBCUTANEOUS at 16:38

## 2023-08-25 RX ADMIN — Medication 325 MILLIGRAM(S): at 16:38

## 2023-08-25 RX ADMIN — BUDESONIDE AND FORMOTEROL FUMARATE DIHYDRATE 2 PUFF(S): 160; 4.5 AEROSOL RESPIRATORY (INHALATION) at 05:25

## 2023-08-25 RX ADMIN — AMIODARONE HYDROCHLORIDE 200 MILLIGRAM(S): 400 TABLET ORAL at 05:24

## 2023-08-25 RX ADMIN — PANTOPRAZOLE SODIUM 40 MILLIGRAM(S): 20 TABLET, DELAYED RELEASE ORAL at 05:24

## 2023-08-25 RX ADMIN — Medication 100 MILLIGRAM(S): at 13:38

## 2023-08-25 RX ADMIN — TIOTROPIUM BROMIDE 2 PUFF(S): 18 CAPSULE ORAL; RESPIRATORY (INHALATION) at 11:41

## 2023-08-25 RX ADMIN — Medication 325 MILLIGRAM(S): at 05:23

## 2023-08-25 NOTE — PROGRESS NOTE ADULT - SUBJECTIVE AND OBJECTIVE BOX
NAIN WEISS  MRN-[id number]    Follow Up:  strep pneumo bacteremia secondary to pna    Interval History: the pt was seen and examined earlier, no acute distress, no new complaints, states that he would like to get some sleep. Pt is afebrile, RA, no wbc. The pt states that he does not want any of his medical information shared with his family.     PAST MEDICAL & SURGICAL HISTORY:  No pertinent past medical history          ROS:    [ ] Unobtainable because:  [x ] All other systems negative    Constitutional: no fever, no chills  Head: no trauma  Eyes: no vision changes, no eye pain  ENT:  no sore throat, no rhinorrhea  Cardiovascular:  no chest pain, no palpitation  Respiratory:  no SOB, no cough  GI:  no abd pain, no vomiting, no diarrhea  urinary: no dysuria, no hematuria, no flank pain  musculoskeletal:  no joint pain, no joint swelling  skin:  no rash  neurology:  no headache, no seizure, no change in mental status  psych: no anxiety, no depression         Allergies  No Known Allergies        ANTIMICROBIALS:      OTHER MEDS:  acetaminophen     Tablet .. 650 milliGRAM(s) Oral every 6 hours PRN  albuterol    90 MICROgram(s) HFA Inhaler 2 Puff(s) Inhalation every 6 hours PRN  aluminum hydroxide/magnesium hydroxide/simethicone Suspension 30 milliLiter(s) Oral every 4 hours PRN  aMIOdarone    Tablet 200 milliGRAM(s) Oral daily  aMIOdarone Infusion 1 mG/Min IV Continuous <Continuous>  benzocaine/menthol Lozenge 1 Lozenge Oral four times a day PRN  budesonide 160 MICROgram(s)/formoterol 4.5 MICROgram(s) Inhaler 2 Puff(s) Inhalation two times a day  diltiazem Injectable 10 milliGRAM(s) IV Push once PRN  ferrous    sulfate 325 milliGRAM(s) Oral three times a day  guaiFENesin Oral Liquid (Sugar-Free) 100 milliGRAM(s) Oral every 6 hours PRN  heparin   Injectable 5000 Unit(s) SubCutaneous every 8 hours  pantoprazole    Tablet 40 milliGRAM(s) Oral before breakfast  tiotropium 2.5 MICROgram(s) Inhaler 2 Puff(s) Inhalation daily      Vital Signs Last 24 Hrs  T(C): 36.6 (25 Aug 2023 10:41), Max: 36.6 (25 Aug 2023 10:41)  T(F): 97.9 (25 Aug 2023 10:41), Max: 97.9 (25 Aug 2023 10:41)  HR: 71 (25 Aug 2023 10:41) (61 - 74)  BP: 111/56 (25 Aug 2023 10:41) (103/60 - 119/60)  BP(mean): --  RR: 18 (25 Aug 2023 10:41) (16 - 18)  SpO2: 97% (25 Aug 2023 10:41) (97% - 100%)    Parameters below as of 25 Aug 2023 10:41  Patient On (Oxygen Delivery Method): room air        Physical Exam:  Constitutional: chronically ill appearing male, cachectic   HEAD/EYES: anicteric, no conjunctival injection, temporal wasting   ENT:  supple, no thrush, clavicular wasting dry dark colored tongue  Cardiovascular:   normal S1, S2, no murmur, no edema  Respiratory:  diminished bilaterally, no wheezes, no rhonchi  Musculoskeletal:  no synovitis, normal ROM  Neurologic: awake and alert, generally decreased strength no focal findings  Skin:  no rash, no erythema, no phlebitis  Heme/Onc: no lymphadenopathy   Psychiatric:  awake, alert, appropriate    WBC Count: 6.16 K/uL (08-25 @ 07:15)  WBC Count: 6.77 K/uL (08-24 @ 06:18)  WBC Count: 11.58 K/uL (08-23 @ 07:05)  WBC Count: 13.97 K/uL (08-22 @ 06:45)  WBC Count: 10.05 K/uL (08-21 @ 02:20)  WBC Count: 9.27 K/uL (08-20 @ 14:45)  WBC Count: 13.69 K/uL (08-20 @ 05:00)                            7.4    6.16  )-----------( 195      ( 25 Aug 2023 07:15 )             23.3       08-25    141  |  113<H>  |  21  ----------------------------<  80  3.7   |  24  |  1.38<H>    Ca    7.9<L>      25 Aug 2023 07:15  Phos  2.6     08-25  Mg     2.3     08-25    TPro  6.1  /  Alb  1.8<L>  /  TBili  0.3  /  DBili  x   /  AST  32  /  ALT  43  /  AlkPhos  57  08-24      Urinalysis Basic - ( 25 Aug 2023 07:15 )    Color: x / Appearance: x / SG: x / pH: x  Gluc: 80 mg/dL / Ketone: x  / Bili: x / Urobili: x   Blood: x / Protein: x / Nitrite: x   Leuk Esterase: x / RBC: x / WBC x   Sq Epi: x / Non Sq Epi: x / Bacteria: x        Creatinine Trend: 1.38<--, 1.57<--, 1.57<--, 1.94<--, 2.42<--, 2.64<--      MICROBIOLOGY:  Vancomycin Level, Trough: 7.5 ug/mL (08-25-23 @ 10:40)  v  .Blood Blood-Peripheral  08-22-23   No growth at 72 Hours  --  --      .Blood Blood-Peripheral  08-22-23   No growth at 72 Hours  --  --      Clean Catch Clean Catch (Midstream)  08-20-23   No growth  --  --      .Blood Blood  08-20-23   No growth at 5 days  --  --      .Blood Blood  08-20-23   Growth in aerobic bottle: Streptococcus pneumoniae  Direct identification is available within approximately 3-5  hours either by Blood Panel Multiplexed PCR or Direct  MALDI-TOF. Details: [email or web address]  --  Blood Culture PCR  Streptococcus pneumoniae          Rapid RVP Result: Detected (08-20 @ 17:50)          Ferritin: 40 (08-21)  Ferritin: 46 (08-20)        Procalcitonin, Serum: 117.60 (08-20-23 @ 14:45)    SARS-CoV-2: NotDetec (08-20-23 @ 17:50)  Rapid RVP Result: Detected (08-20-23 @ 17:50)    SARS-CoV-2: NotDetec (20 Aug 2023 17:50)    RADIOLOGY:

## 2023-08-25 NOTE — PROGRESS NOTE ADULT - ASSESSMENT
87 yo M w/ hx of multiple syncopal episodes presented via EMS for an unwitnessed fall w/ hypotension, hypothermia, and afib w/ RVR found to have Strep pneumoniae bacteremia w/ evidence of b/l pneumonia and lactic acidosis, iron deficiency anemia, stable sub-dural hematoma, ANGI, and demand ischemia.    8/22: no fever, RA, WBC elevated 13.97, Cr better 1.94, TTE no vegetation, repeat BCs were collected today in the morning, Ceftriaxone IV continued.   Attending Addendum--  Case reviewed with CATRINA Mcgarry. Her note reviewed and modified as appropriate.   Pneumococcal septicemia due to pneumonia  Minimal leukocytosis, bands present- need to be monitored  Awaiting follow up culture data    8/23: no fevers, tachycardic, RA, WBC better 11.58, Cr 1.57, repeat BCs preliminary with no growth. Initial BCs intermediate to ceftriaxone, ceftriaxone IV stopped, Vancomycin IV started.   Attending Addendum--  Case reviewed with CATRINA Mcgarry. Her note reviewed and modified as appropriate.   Pneumococcus relatively resistant  QTc prolonged, opinions divided as to whether fluoroquinolone ok to give on amiodarone  For now, vancomnycin, target levels 10-15    8/24: no fevers, RA, WBC normalized, Cr is about the same, repeat BCs remain with no growth to date, Vancomycin IV continued. Vanco level is low, drawn after one dose.   Attending Addendum--  Case reviewed with CATRINA Mcgarry. Her note reviewed and modified as appropriate.   FlU cx NTD  Overall appears to be making progress  Intensive monitoring of vancomycin levels is required to monitor for toxicity. Frequency and type of monitoring varies from patient to patient and case to case. Serum vancomycin trough levels are typically checked before the 3rd or 4th dose initially and as clinically indicated thereafter to monitor for drug levels which might contribute to nephrotoxicity.    8/25: Pt does not want any of his information to be shared with his family    Pt is afebrile, RA, WBC, Cr better 1.38, repeat BCs with no growth to date, culture data reviewed, pt is on Vancomycin IV. The case was discussed with cardiology service, ok to change abx to po Levaquin with close monitoring of pt's LFTs and QT interval (current interval is 464) as the pt on Amiodarone. The pt will need two weeks of abx from negative BCs.     Plan:  stop IV Vancomycin  start po Levaquin 750 mg q 48 hrs - last dose 9/4/2023  monitor pt's LFTs closely (pt is on Amiodarone)  monitor pt's QT intervals closely (pt is on Amiodarone)  follow repeat blood cultures - NGTD  monitor respiratory status and O2 saturation   incentive spirometer   monitor Hb closely and transfuse if < 7.0  trend creatinine and ensure hydration with good urine output  Pt does not want any of his information to be shared with his family    discussed with Dr. Barnett   discussed with cardiology team   discussed with Dr. Beach

## 2023-08-25 NOTE — PROGRESS NOTE ADULT - SUBJECTIVE AND OBJECTIVE BOX
Patient is a 88y old  Male who presents with a chief complaint of found  down (24 Aug 2023 20:10)    PAST MEDICAL & SURGICAL HISTORY:  lightheadedness and dizziness    INTERVAL HISTORY:  	  MEDICATIONS:  MEDICATIONS  (STANDING):  aMIOdarone    Tablet 200 milliGRAM(s) Oral daily  aMIOdarone Infusion 1 mG/Min (33.3 mL/Hr) IV Continuous <Continuous>  budesonide 160 MICROgram(s)/formoterol 4.5 MICROgram(s) Inhaler 2 Puff(s) Inhalation two times a day  ferrous    sulfate 325 milliGRAM(s) Oral three times a day  heparin   Injectable 5000 Unit(s) SubCutaneous every 8 hours  pantoprazole    Tablet 40 milliGRAM(s) Oral before breakfast  tiotropium 2.5 MICROgram(s) Inhaler 2 Puff(s) Inhalation daily  vancomycin  IVPB 750 milliGRAM(s) IV Intermittent every 24 hours  vancomycin  IVPB        MEDICATIONS  (PRN):  acetaminophen     Tablet .. 650 milliGRAM(s) Oral every 6 hours PRN Moderate Pain (4 - 6)  albuterol    90 MICROgram(s) HFA Inhaler 2 Puff(s) Inhalation every 6 hours PRN Shortness of Breath and/or Wheezing  aluminum hydroxide/magnesium hydroxide/simethicone Suspension 30 milliLiter(s) Oral every 4 hours PRN Dyspepsia  benzocaine/menthol Lozenge 1 Lozenge Oral four times a day PRN Sore Throat  diltiazem Injectable 10 milliGRAM(s) IV Push once PRN sustained HR >130  guaiFENesin Oral Liquid (Sugar-Free) 100 milliGRAM(s) Oral every 6 hours PRN Cough    Vitals:  T(F): 97.6 (08-25-23 @ 04:19), Max: 97.6 (08-24-23 @ 23:49)  HR: 61 (08-25-23 @ 04:19) (61 - 74)  BP: 119/60 (08-25-23 @ 04:19) (94/59 - 119/60)  RR: 18 (08-25-23 @ 04:19) (16 - 18)  SpO2: 100% (08-25-23 @ 04:19) (98% - 100%)    Weight (kg): 50.3 (08-22 @ 18:05)  BMI (kg/m2): 16.4 (08-22 @ 18:05)    PHYSICAL EXAM:  Neuro: Awake, responsive  CV: S1 S2 RRR  Lungs: CTABL  GI: Soft, BS +, ND, NT  Extremities: No edema    TELEMETRY: sinus     RADIOLOGY: < from: US Duplex Venous Lower Ext Complete, Bilateral (08.20.23 @ 17:44) >  Acute deep venous thrombosis: below the knee.    DVT within the left tibioperoneal trunk and proximal posterior tibial   veins.    < end of copied text >    < from: CT Chest No Cont (08.20.23 @ 06:49) >  CHEST:  LUNGS AND LARGE AIRWAYS: Patent central airways. Bibasilar patchy   consolidations suggesting pneumonia. Superimposed emphysematous lung   change and interstitial lung disease/honeycombing at the lung bases..  PLEURA: No pleural effusion or pneumothorax.  VESSELS: Limited evaluation of the thoracic aorta without intravenous   contrast, however there is calcific atherosclerosis without aneurysmal   dilatation.  HEART: Borderline enlarged heart.. No pericardial effusion. Coronary   artery calcifications. Anemia suggested.  MEDIASTINUM AND STACY: No lymphadenopathy.  CHEST WALL AND LOWER NECK: Within normal limits.    ABDOMEN AND PELVIS:  LIVER: Within normal limits.  BILE DUCTS: Normal caliber.  GALLBLADDER: Elongated gallbladder without definite calcified gallstone..  SPLEEN: Within normal limits.  PANCREAS: Within normal limits.  ADRENALS: Normal right adrenal gland. Limited left adrenal gland.  KIDNEYS/URETERS: Small bilateral kidneys. No renal stone or   hydronephrosis.    BLADDER: Within normal limits.  REPRODUCTIVE ORGANS: Prostate gland is not grossly enlarged.    BOWEL: Evaluation of bowel is limited without distention with oral   contrast and lack of mesenteric fat, however there is no bowel   obstruction. There is a 7 cm stool distended rectum. Small bowel loops   are not grossly dilated. Stomach is mildly distended with debris.  PERITONEUM: No free air or significant ascites.  VESSELS:  Limited evaluation of the abdominal aorta without intravenous   contrast, demonstrates tortuosity and calcific atherosclerosis without   aneurysmal dilatation. Negative  RETROPERITONEUM: No lymphadenopathy.  ABDOMINAL WALL: Within normal limits.  BONES: Severe scoliosis of the spine with associated multilevel   degenerative changes. Question pagetoid appearance of the right iliac   bone.    IMPRESSION:    Bilateral pneumonia.    < end of copied text >  DIAGNOSTIC TESTING:    [x ] Echocardiogram: < from: TTE Echo Complete w/o Contrast w/ Doppler (08.21.23 @ 08:17) >  Left Ventricle: Normal left ventricular size and wall thicknesses, with   normal systolic and diastolic function.  Global LV systolic function was mildly decreased. Global longitudinal   strain imaging was performed on GE Vivid S70 at a heart rate of 68BPM and   a blood pressure of 108/57 mmHg, average GLS calculated was -19.1%   (normal).  Right Ventricle: Normal right ventricular size and function.  Left Atrium: Moderately enlarged left atrium.  Right Atrium: The right atrium is normal in size. Moderately enlarged   right atrium.  Pericardium: There is no evidence of pericardial effusion.  Mitral Valve: Structurally normal mitral valve, with normal leaflet   excursion. Moderate to severe mitral valve regurgitation is seen.  Tricuspid Valve: Structurally normal tricuspid valve, with normal leaflet   excursion. Moderate-severe tricuspid regurgitation is visualized.   Estimated pulmonary artery systolic pressure is 65.8 mmHg assuming a   right atrial pressure of 15 mmHg, which is consistent with severe   pulmonary hypertension.  Aortic Valve: Normal trileaflet aortic valve with normal opening. No   evidence of aortic valve regurgitation is seen.  Pulmonic Valve: Structurally normal pulmonic valve, with normal leaflet   excursion. Mild pulmonic valve regurgitation.  Aorta: The aortic root and ascending aorta are structurally normal, with   no evidence of dilitation.  Pulmonary Artery: The main pulmonary artery is normal in size.  Venous: The inferior vena cava was normal sized, with respiratory size   variation less than 50%.      Summary:   1. Mildly decreased global left ventricular systolic function.   2. Global longitudinal strain imaging was performed on GE Vivid S70 at a   heart rate of 68BPM and a blood pressure of 108/57 mmHg, average GLS   calculated was -19.1% (normal).   3. Moderately enlarged left atrium.   4. Moderately enlarged right atrium.   5. Moderate to severe mitral valve regurgitation.   6. Moderate-severe tricuspid regurgitation.   7. Mild pulmonic valve regurgitation.   8. Estimated pulmonary artery systolic pressure is 65.8 mmHg assuming a   right atrial pressure of 15 mmHg, which is consistent with severe   pulmonary hypertension.    < end of copied text >    LABS:	 	    25 Aug 2023 07:15    141    |  113    |  21     ----------------------------<  80     3.7     |  24     |  1.38   24 Aug 2023 06:18    143    |  115    |  28     ----------------------------<  83     3.8     |  24     |  1.57   23 Aug 2023 07:05    143    |  113    |  27     ----------------------------<  79     3.8     |  23     |  1.57     Ca    7.9        25 Aug 2023 07:15  Phos  2.6       25 Aug 2023 07:15  Mg     2.3       25 Aug 2023 07:15    TPro  6.1    /  Alb  1.8    /  TBili  0.3    /  DBili  x      /  AST  32     /  ALT  43     /  AlkPhos  57     24 Aug 2023 06:18                        7.4    6.16  )-----------( 195      ( 25 Aug 2023 07:15 )             23.3 ,                       7.9    6.77  )-----------( 187      ( 24 Aug 2023 06:18 )             25.7 ,                       9.7    11.58 )-----------( 231      ( 23 Aug 2023 07:05 )             31.3     Lipid Profile: 08-23 @ 07:05  HDL/Total Cholesterol: --  HDL Chol:              31 mg/dL  Serum Chol:            78 mg/dL  Direct LDL:            --  Triglycerides:         84 mg/dL                   Patient is a 88y old  Male who presents with a chief complaint of found  down (24 Aug 2023 20:10)    PAST MEDICAL & SURGICAL HISTORY:  lightheadedness and dizziness    INTERVAL HISTORY: resting comfortably in bed, mild dyspnea    	   MEDICATIONS:  MEDICATIONS  (STANDING):  aMIOdarone    Tablet 200 milliGRAM(s) Oral daily  budesonide 160 MICROgram(s)/formoterol 4.5 MICROgram(s) Inhaler 2 Puff(s) Inhalation two times a day  ferrous    sulfate 325 milliGRAM(s) Oral three times a day  heparin   Injectable 5000 Unit(s) SubCutaneous every 8 hours  pantoprazole    Tablet 40 milliGRAM(s) Oral before breakfast  tiotropium 2.5 MICROgram(s) Inhaler 2 Puff(s) Inhalation daily  vancomycin  IVPB 750 milliGRAM(s) IV Intermittent every 24 hours    MEDICATIONS  (PRN):  acetaminophen     Tablet .. 650 milliGRAM(s) Oral every 6 hours PRN Moderate Pain (4 - 6)  albuterol    90 MICROgram(s) HFA Inhaler 2 Puff(s) Inhalation every 6 hours PRN Shortness of Breath and/or Wheezing  aluminum hydroxide/magnesium hydroxide/simethicone Suspension 30 milliLiter(s) Oral every 4 hours PRN Dyspepsia  benzocaine/menthol Lozenge 1 Lozenge Oral four times a day PRN Sore Throat  diltiazem Injectable 10 milliGRAM(s) IV Push once PRN sustained HR >130  guaiFENesin Oral Liquid (Sugar-Free) 100 milliGRAM(s) Oral every 6 hours PRN Cough    Vitals:  T(F): 97.6 (08-25-23 @ 04:19), Max: 97.6 (08-24-23 @ 23:49)  HR: 61 (08-25-23 @ 04:19) (61 - 74)  BP: 119/60 (08-25-23 @ 04:19) (94/59 - 119/60)  RR: 18 (08-25-23 @ 04:19) (16 - 18)  SpO2: 100% (08-25-23 @ 04:19) (98% - 100%)    Weight (kg): 50.3 (08-22 @ 18:05)  BMI (kg/m2): 16.4 (08-22 @ 18:05)    PHYSICAL EXAM:  Neuro: Awake, responsive  CV: S1 S2 RRR + SM  Lungs: diminished to bases   GI: Soft, BS +, ND, NT  Extremities: ankle edema    TELEMETRY: sinus     RADIOLOGY: < from: US Duplex Venous Lower Ext Complete, Bilateral (08.20.23 @ 17:44) >  Acute deep venous thrombosis: below the knee.    DVT within the left tibioperoneal trunk and proximal posterior tibial   veins.    < end of copied text >    < from: CT Chest No Cont (08.20.23 @ 06:49) >  CHEST:  LUNGS AND LARGE AIRWAYS: Patent central airways. Bibasilar patchy   consolidations suggesting pneumonia. Superimposed emphysematous lung   change and interstitial lung disease/honeycombing at the lung bases..   PLEURA: No pleural effusion or pneumothorax.    VESSELS: Limited evaluation of the thoracic aorta without intravenous   contrast, however there is calcific atherosclerosis without aneurysmal   dilatation.  HEART: Borderline enlarged heart.. No pericardial effusion. Coronary   artery calcifications. Anemia suggested.  MEDIASTINUM AND STACY: No lymphadenopathy.  CHEST WALL AND LOWER NECK: Within normal limits.    ABDOMEN AND PELVIS:  LIVER: Within normal limits.  BILE DUCTS: Normal caliber.  GALLBLADDER: Elongated gallbladder without definite calcified gallstone..  SPLEEN: Within normal limits.  PANCREAS: Within normal limits.  ADRENALS: Normal right adrenal gland. Limited left adrenal gland.  KIDNEYS/URETERS: Small bilateral kidneys. No renal stone or   hydronephrosis.    BLADDER: Within normal limits.  REPRODUCTIVE ORGANS: Prostate gland is not grossly enlarged.    BOWEL: Evaluation of bowel is limited without distention with oral   contrast and lack of mesenteric fat, however there is no bowel   obstruction. There is a 7 cm stool distended rectum. Small bowel loops   are not grossly dilated. Stomach is mildly distended with debris.  PERITONEUM: No free air or significant ascites.  VESSELS:  Limited evaluation of the abdominal aorta without intravenous   contrast, demonstrates tortuosity and calcific atherosclerosis without   aneurysmal dilatation. Negative  RETROPERITONEUM: No lymphadenopathy.  ABDOMINAL WALL: Within normal limits.  BONES: Severe scoliosis of the spine with associated multilevel   degenerative changes. Question pagetoid appearance of the right iliac   bone.    IMPRESSION:    Bilateral pneumonia.    < end of copied text >  DIAGNOSTIC TESTING:    [x ] Echocardiogram: < from: TTE Echo Complete w/o Contrast w/ Doppler (08.21.23 @ 08:17) >  Left Ventricle: Normal left ventricular size and wall thicknesses, with   normal systolic and diastolic function.  Global LV systolic function was mildly decreased. Global longitudinal   strain imaging was performed on GE Vivid S70 at a heart rate of 68BPM and   a blood pressure of 108/57 mmHg, average GLS calculated was -19.1%   (normal).  Right Ventricle: Normal right ventricular size and function.  Left Atrium: Moderately enlarged left atrium.  Right Atrium: The right atrium is normal in size. Moderately enlarged   right atrium.  Pericardium: There is no evidence of pericardial effusion.  Mitral Valve: Structurally normal mitral valve, with normal leaflet   excursion. Moderate to severe mitral valve regurgitation is seen.  Tricuspid Valve: Structurally normal tricuspid valve, with normal leaflet   excursion. Moderate-severe tricuspid regurgitation is visualized.   Estimated pulmonary artery systolic pressure is 65.8 mmHg assuming a   right atrial pressure of 15 mmHg, which is consistent with severe   pulmonary hypertension.  Aortic Valve: Normal trileaflet aortic valve with normal opening. No   evidence of aortic valve regurgitation is seen.  Pulmonic Valve: Structurally normal pulmonic valve, with normal leaflet   excursion. Mild pulmonic valve regurgitation.  Aorta: The aortic root and ascending aorta are structurally normal, with   no evidence of dilitation.  Pulmonary Artery: The main pulmonary artery is normal in size.  Venous: The inferior vena cava was normal sized, with respiratory size   variation less than 50%.    Summary:   1. Mildly decreased global left ventricular systolic function.   2. Global longitudinal strain imaging was performed on GE Vivid S70 at a   heart rate of 68BPM and a blood pressure of 108/57 mmHg, average GLS   calculated was -19.1% (normal).   3. Moderately enlarged left atrium.   4. Moderately enlarged right atrium.   5. Moderate to severe mitral valve regurgitation.   6. Moderate-severe tricuspid regurgitation.   7. Mild pulmonic valve regurgitation.   8. Estimated pulmonary artery systolic pressure is 65.8 mmHg assuming a   right atrial pressure of 15 mmHg, which is consistent with severe   pulmonary hypertension.    < end of copied text >    LABS:	 	    25 Aug 2023 07:15    141    |  113    |  21     ----------------------------<  80     3.7     |  24     |  1.38   24 Aug 2023 06:18    143    |  115    |  28     ----------------------------<  83     3.8     |  24     |  1.57   23 Aug 2023 07:05    143    |  113    |  27     ----------------------------<  79     3.8     |  23     |  1.57     Ca    7.9        25 Aug 2023 07:15  Phos  2.6       25 Aug 2023 07:15  Mg     2.3       25 Aug 2023 07:15    TPro  6.1    /  Alb  1.8    /  TBili  0.3    /  DBili  x      /  AST  32     /  ALT  43     /  AlkPhos  57     24 Aug 2023 06:18                        7.4    6.16  )-----------( 195      ( 25 Aug 2023 07:15 )             23.3 ,                       7.9    6.77  )-----------( 187      ( 24 Aug 2023 06:18 )             25.7 ,                       9.7    11.58 )-----------( 231      ( 23 Aug 2023 07:05 )             31.3     Lipid Profile: 08-23 @ 07:05  HDL/Total Cholesterol: --  HDL Chol:              31 mg/dL  Serum Chol:            78 mg/dL  Direct LDL:            --  Triglycerides:         84 mg/dL                   Patient is a 88y old  Male who presents with a chief complaint of found  down (24 Aug 2023 20:10)    PAST MEDICAL & SURGICAL HISTORY:  lightheadedness and dizziness    INTERVAL HISTORY: resting comfortably in bed, denies any chest pain, mild chronic dyspnea   	  MEDICATIONS:  MEDICATIONS  (STANDING):  aMIOdarone    Tablet 200 milliGRAM(s) Oral daily  aMIOdarone Infusion 1 mG/Min (33.3 mL/Hr) IV Continuous <Continuous>  budesonide 160 MICROgram(s)/formoterol 4.5 MICROgram(s) Inhaler 2 Puff(s) Inhalation two times a day  ferrous    sulfate 325 milliGRAM(s) Oral three times a day  heparin   Injectable 5000 Unit(s) SubCutaneous every 8 hours  pantoprazole    Tablet 40 milliGRAM(s) Oral before breakfast  tiotropium 2.5 MICROgram(s) Inhaler 2 Puff(s) Inhalation daily  vancomycin  IVPB 750 milliGRAM(s) IV Intermittent every 24 hours  vancomycin  IVPB        MEDICATIONS  (PRN):  acetaminophen     Tablet .. 650 milliGRAM(s) Oral every 6 hours PRN Moderate Pain (4 - 6)  albuterol    90 MICROgram(s) HFA Inhaler 2 Puff(s) Inhalation every 6 hours PRN Shortness of Breath and/or Wheezing  aluminum hydroxide/magnesium hydroxide/simethicone Suspension 30 milliLiter(s) Oral every 4 hours PRN Dyspepsia  benzocaine/menthol Lozenge 1 Lozenge Oral four times a day PRN Sore Throat  diltiazem Injectable 10 milliGRAM(s) IV Push once PRN sustained HR >130  guaiFENesin Oral Liquid (Sugar-Free) 100 milliGRAM(s) Oral every 6 hours PRN Cough    Vitals:  T(F): 97.6 (08-25-23 @ 04:19), Max: 97.6 (08-24-23 @ 23:49)  HR: 61 (08-25-23 @ 04:19) (61 - 74)  BP: 119/60 (08-25-23 @ 04:19) (94/59 - 119/60)  RR: 18 (08-25-23 @ 04:19) (16 - 18)  SpO2: 100% (08-25-23 @ 04:19) (98% - 100%)    Weight (kg): 50.3 (08-22 @ 18:05)  BMI (kg/m2): 16.4 (08-22 @ 18:05)    PHYSICAL EXAM:  Neuro: Awake, responsive  CV: S1 S2 RRR + SM  Lungs: diminished to bases   GI: Soft, BS +, ND, NT  Extremities: ankle edema    TELEMETRY: sinus     RADIOLOGY: < from: US Duplex Venous Lower Ext Complete, Bilateral (08.20.23 @ 17:44) >  Acute deep venous thrombosis: below the knee.    DVT within the left tibioperoneal trunk and proximal posterior tibial   veins.    < end of copied text >    < from: CT Chest No Cont (08.20.23 @ 06:49) >  CHEST:  LUNGS AND LARGE AIRWAYS: Patent central airways. Bibasilar patchy   consolidations suggesting pneumonia. Superimposed emphysematous lung   change and interstitial lung disease/honeycombing at the lung bases..  PLEURA: No pleural effusion or pneumothorax.  VESSELS: Limited evaluation of the thoracic aorta without intravenous   contrast, however there is calcific atherosclerosis without aneurysmal   dilatation.  HEART: Borderline enlarged heart.. No pericardial effusion. Coronary   artery calcifications. Anemia suggested.  MEDIASTINUM AND STACY: No lymphadenopathy.  CHEST WALL AND LOWER NECK: Within normal limits.    ABDOMEN AND PELVIS:  LIVER: Within normal limits.  BILE DUCTS: Normal caliber.  GALLBLADDER: Elongated gallbladder without definite calcified gallstone..  SPLEEN: Within normal limits.  PANCREAS: Within normal limits.  ADRENALS: Normal right adrenal gland. Limited left adrenal gland.  KIDNEYS/URETERS: Small bilateral kidneys. No renal stone or   hydronephrosis.    BLADDER: Within normal limits.  REPRODUCTIVE ORGANS: Prostate gland is not grossly enlarged.    BOWEL: Evaluation of bowel is limited without distention with oral   contrast and lack of mesenteric fat, however there is no bowel   obstruction. There is a 7 cm stool distended rectum. Small bowel loops   are not grossly dilated. Stomach is mildly distended with debris.  PERITONEUM: No free air or significant ascites.  VESSELS:  Limited evaluation of the abdominal aorta without intravenous   contrast, demonstrates tortuosity and calcific atherosclerosis without   aneurysmal dilatation. Negative  RETROPERITONEUM: No lymphadenopathy.  ABDOMINAL WALL: Within normal limits.  BONES: Severe scoliosis of the spine with associated multilevel   degenerative changes. Question pagetoid appearance of the right iliac   bone.    IMPRESSION:    Bilateral pneumonia.    < end of copied text >  DIAGNOSTIC TESTING:    [x ] Echocardiogram: < from: TTE Echo Complete w/o Contrast w/ Doppler (08.21.23 @ 08:17) >  Left Ventricle: Normal left ventricular size and wall thicknesses, with   normal systolic and diastolic function.  Global LV systolic function was mildly decreased. Global longitudinal   strain imaging was performed on GE Vivid S70 at a heart rate of 68BPM and   a blood pressure of 108/57 mmHg, average GLS calculated was -19.1%   (normal).  Right Ventricle: Normal right ventricular size and function.  Left Atrium: Moderately enlarged left atrium.  Right Atrium: The right atrium is normal in size. Moderately enlarged   right atrium.  Pericardium: There is no evidence of pericardial effusion.  Mitral Valve: Structurally normal mitral valve, with normal leaflet   excursion. Moderate to severe mitral valve regurgitation is seen.  Tricuspid Valve: Structurally normal tricuspid valve, with normal leaflet   excursion. Moderate-severe tricuspid regurgitation is visualized.   Estimated pulmonary artery systolic pressure is 65.8 mmHg assuming a   right atrial pressure of 15 mmHg, which is consistent with severe   pulmonary hypertension.  Aortic Valve: Normal trileaflet aortic valve with normal opening. No   evidence of aortic valve regurgitation is seen.  Pulmonic Valve: Structurally normal pulmonic valve, with normal leaflet   excursion. Mild pulmonic valve regurgitation.  Aorta: The aortic root and ascending aorta are structurally normal, with   no evidence of dilitation.  Pulmonary Artery: The main pulmonary artery is normal in size.  Venous: The inferior vena cava was normal sized, with respiratory size   variation less than 50%.      Summary:   1. Mildly decreased global left ventricular systolic function.   2. Global longitudinal strain imaging was performed on GE Vivid S70 at a   heart rate of 68BPM and a blood pressure of 108/57 mmHg, average GLS   calculated was -19.1% (normal).   3. Moderately enlarged left atrium.   4. Moderately enlarged right atrium.   5. Moderate to severe mitral valve regurgitation.   6. Moderate-severe tricuspid regurgitation.   7. Mild pulmonic valve regurgitation.   8. Estimated pulmonary artery systolic pressure is 65.8 mmHg assuming a   right atrial pressure of 15 mmHg, which is consistent with severe   pulmonary hypertension.    < end of copied text >    LABS:	 	    25 Aug 2023 07:15    141    |  113    |  21     ----------------------------<  80     3.7     |  24     |  1.38   24 Aug 2023 06:18    143    |  115    |  28     ----------------------------<  83     3.8     |  24     |  1.57   23 Aug 2023 07:05    143    |  113    |  27     ----------------------------<  79     3.8     |  23     |  1.57     Ca    7.9        25 Aug 2023 07:15  Phos  2.6       25 Aug 2023 07:15  Mg     2.3       25 Aug 2023 07:15    TPro  6.1    /  Alb  1.8    /  TBili  0.3    /  DBili  x      /  AST  32     /  ALT  43     /  AlkPhos  57     24 Aug 2023 06:18                        7.4    6.16  )-----------( 195      ( 25 Aug 2023 07:15 )             23.3 ,                       7.9    6.77  )-----------( 187      ( 24 Aug 2023 06:18 )             25.7 ,                       9.7    11.58 )-----------( 231      ( 23 Aug 2023 07:05 )             31.3     Lipid Profile: 08-23 @ 07:05  HDL/Total Cholesterol: --  HDL Chol:              31 mg/dL  Serum Chol:            78 mg/dL  Direct LDL:            --  Triglycerides:         84 mg/dL

## 2023-08-25 NOTE — PROGRESS NOTE ADULT - SUBJECTIVE AND OBJECTIVE BOX
INTERVAL HPI:   87 yo M with multiple syncopal episodes per chart notes,  on no home meds   Brought by EMS after being found down on street minimally responsive (8/20/23).   In the ED, was hypotensive, hypothermic (rectal T 90F), and tachycardic (afib w/ RVR).   Labs were remarkable for anemia (Hgb 6.1), leukocytosis w/ 25% band, lactic acidosis (lactate 14.4), ANGI, and elevated troponin   Got treated w/ vanc/zosyn, fluids, 1U pRBCs, IV amiodarone bolus (resolved arrhythmia), and a non-rebreather mask.   CT imaging showed an age-indeterminate L frontal sub-dural hematoma (4mm) and lung findings with pneumonia and emphysema/ILD.   RVP was entero/rhinovirus positive.   He was switched to ceftriaxone/azithromycin, was started on ipratropium nebulizers, and got  admitted to the ICU for new pressor requirement (norepinephrine).    During his ICU course, his blood cultures grew Strep pneumonia (on appropriate abx), lower extremity duplex study showed L tibioperoneal trunk and proximal posterior tibial vein DVTs (on heparin ppx), norepinephrine was weaned off w/ adequate MAPs,   Repeat CT head showed stable sub-dural hematoma, and iron supplementation was started.   08/21/23:  Got transferred to regular medical floor.  08/22/23:  At time of my evaluation, awake, responsive. Denies SOB, cough, sputum production or chest pain.  Admits being a smoker but says quit about a year ago.  Again in rapid A Fib and being transferred to monitor bed. Seen by Cardiology.    OVERNIGHT EVENTS:  Awake, responsive and comfortable. SPo2 97%, no distress.  Complains of soreness in throat.    Vital Signs Last 24 Hrs  T(C): 37.7 (25 Aug 2023 17:56), Max: 37.7 (25 Aug 2023 17:56)  T(F): 99.9 (25 Aug 2023 17:56), Max: 99.9 (25 Aug 2023 17:56)  HR: 79 (25 Aug 2023 17:56) (61 - 79)  BP: 115/77 (25 Aug 2023 17:56) (103/60 - 119/60)  BP(mean): --  RR: 18 (25 Aug 2023 17:56) (16 - 18)  SpO2: 97% (25 Aug 2023 17:56) (97% - 100%)    Parameters below as of 25 Aug 2023 17:56  Patient On (Oxygen Delivery Method): room air    PHYSICAL EXAM:  GEN:         Awake, responsive and comfortable.  HEENT:    Normal.    RESP:        no distress  CVS:          Regular rate and rhythm.     MEDICATIONS  (STANDING):  aMIOdarone    Tablet 200 milliGRAM(s) Oral daily  aMIOdarone Infusion 1 mG/Min (33.3 mL/Hr) IV Continuous <Continuous>  budesonide 160 MICROgram(s)/formoterol 4.5 MICROgram(s) Inhaler 2 Puff(s) Inhalation two times a day  ferrous    sulfate 325 milliGRAM(s) Oral three times a day  heparin   Injectable 5000 Unit(s) SubCutaneous every 8 hours  pantoprazole    Tablet 40 milliGRAM(s) Oral before breakfast  tiotropium 2.5 MICROgram(s) Inhaler 2 Puff(s) Inhalation daily    MEDICATIONS  (PRN):  acetaminophen     Tablet .. 650 milliGRAM(s) Oral every 6 hours PRN Moderate Pain (4 - 6)  albuterol    90 MICROgram(s) HFA Inhaler 2 Puff(s) Inhalation every 6 hours PRN Shortness of Breath and/or Wheezing  aluminum hydroxide/magnesium hydroxide/simethicone Suspension 30 milliLiter(s) Oral every 4 hours PRN Dyspepsia  benzocaine/menthol Lozenge 1 Lozenge Oral four times a day PRN Sore Throat  diltiazem Injectable 10 milliGRAM(s) IV Push once PRN sustained HR >130  guaiFENesin Oral Liquid (Sugar-Free) 100 milliGRAM(s) Oral every 6 hours PRN Cough    LABS:                        7.4    6.16  )-----------( 195      ( 25 Aug 2023 07:15 )             23.3     08-25    141  |  113<H>  |  21  ----------------------------<  80  3.7   |  24  |  1.38<H>    Ca    7.9<L>      25 Aug 2023 07:15  Phos  2.6     08-25  Mg     2.3     08-25    TPro  6.1  /  Alb  1.8<L>  /  TBili  0.3  /  DBili  x   /  AST  32  /  ALT  43  /  AlkPhos  57  08-24    PT/INR - ( 25 Aug 2023 10:40 )   PT: 12.9 sec;   INR: 1.08 ratio      PTT - ( 25 Aug 2023 10:40 )  PTT:30.8 sec  08-20 @ 11:56  pH: --  pCO2: 25  pO2: 109  SaO2: 99.4  08-20 @ 10:46  pH: --  pCO2: 24  pO2: 306  SaO2: 100.0  08-20 @ 05:57  pH: --  pCO2: 28  pO2: 32  SaO2: 31.8    Urinalysis Basic - ( 25 Aug 2023 07:15 )    Color: x / Appearance: x / SG: x / pH: x  Gluc: 80 mg/dL / Ketone: x  / Bili: x / Urobili: x   Blood: x / Protein: x / Nitrite: x   Leuk Esterase: x / RBC: x / WBC x   Sq Epi: x / Non Sq Epi: x / Bacteria: x    ASSESSMENT AND PLAN:  Multifocal pneumonia.  S/P Septic shock.  Strep Pneumo Bacteremia.  Leukocytosis.  A Fib with RVR.  Anemia.  Renal Insuffiencey.  Hypokalemia.  Left peroneal+ post tibial DVT.    SPO2 in high 90s on room air.  Continue antibiotic per ID.  Being transferred to monitor bed for rapid A Fib.  Not a candidate for full anticoagulation due to SDH, and anemia requiring PRBC transfusion.  Consider Vascular evaluation for DVT.  56+ minutes spent.    08/23/23:   Awake, responsive. Complains of stomach discomfort and at times with difficult swallowing. SPO2 stable on room air.  Converted to sinus rhythm, still on Amiodarone drip.  Continue antibiotics per ID.    08/24/23:  Resting comfortably, no SOB or distress. SPO2 98% .  Converted to sinus rhythm, changed to PO Amiodarone.  Continue Vancomycin per ID.  Will change to adult dose Symbicort.    08/25/23: Awake, responsive and comfortable. SPO2 97%, no distress.  Complains of soreness in throat. Will add Cepacol lozenges.

## 2023-08-25 NOTE — PHARMACOTHERAPY INTERVENTION NOTE - COMMENTS
Patient on vancomycin 750mg q24h with trough of 7.5 and AUC of 385. Recommended to increase dose to 1000mg q24h to target estimated trough of 10 and AUC of 514.  Will give additional 250mg today as pt already received 750mg dose. 
Pt with vancomycin level of 6.2 but not yet at steady state. Calculator predicts trough of 10 and AUC of 465 on the current dose. Recommend to continue current dose and repeat a trough tomorrow.

## 2023-08-25 NOTE — PROGRESS NOTE ADULT - ASSESSMENT
89 yo M w/ hx of multiple syncopal episodes presented via EMS for an unwitnessed fall w/ hypotension, hypothermia, and afib w/ RVR concerning for sepsis. He was found to have Strep pneumonia bacteremia w/ evidence of b/l pneumonia and lactic acidosis, iron deficiency anemia, stable sub-dural hematoma, ANGI, and demand ischemia.        Metabolic encephalopathy:  - 2/2 sepsis  - Now A&Ox3, stable L frontal SDH    Acute hypoxic respiratory failure:  - pneumonia w/ findings c/w emphysema/ILD, weaned off NC on room air, on ipratropium q8h (consider transition to duonebs), chest PT, maintain SpO2 > 92%  - C/w symciort and Spiriva  - albuterol PRN  - Pulm following     Sepsis with septic shock POA:  - With strep pneumo bacteremia 2/2 PNA  - Weaned off norepinephrine  - Initial BC positive for strep, intermediate sensitivity to Rocephin, sensitive to Vanco  - Repeat Bcx negative  - Discontinue Rocephin, start on Vanco  - follow up repeat blood clx    Afib:  - s/p amio bolus and gtt for afib w/ RVR (converted), currently on sinus rthym   - troponin elevation c/w demand ischemia   - Echo with Mildly decreased global left ventricular systolic function. BLAE, Mod - severe MR/TR, severe pHTN  - Not on Ac for anemia and SDH  - Continue with PO amiodarone  - Cardio following     ANGI:  - 2/2 septic ATN  - Cr improving   - Pending serologies for pulm renal syndrome  - Renal following     Anemia:  - S/P 2 units PRBC  - HgB 7.4  - Iron deficient, continue iron supplementation  - Will transfuse 1 unit of pRBC    Acute DVT :  - Not on AC for anemia and SDH  - On DVT ppx dose  - Vascular follow up    GI ppx  - Protonix

## 2023-08-25 NOTE — PROGRESS NOTE ADULT - SUBJECTIVE AND OBJECTIVE BOX
PROGRESS NOTE:     Patient is a 88y old  Male who presents with a chief complaint of found  down (25 Aug 2023 09:04)          SUBJECTIVE & OBJECTIVE:   Pt seen and examined at bedside in AM    no overnight events.   no new complaints    REVIEW OF SYSTEMS: remaining ROS negative     PHYSICAL EXAM:  VITALS:  Vital Signs Last 24 Hrs  T(C): 36.6 (25 Aug 2023 10:41), Max: 36.6 (25 Aug 2023 10:41)  T(F): 97.9 (25 Aug 2023 10:41), Max: 97.9 (25 Aug 2023 10:41)  HR: 71 (25 Aug 2023 10:41) (61 - 74)  BP: 111/56 (25 Aug 2023 10:41) (103/60 - 119/60)  BP(mean): --  RR: 18 (25 Aug 2023 10:41) (16 - 18)  SpO2: 97% (25 Aug 2023 10:41) (97% - 100%)    Parameters below as of 25 Aug 2023 10:41  Patient On (Oxygen Delivery Method): room air          GENERAL: NAD,  no increased WOB  HEAD:  Atraumatic, Normocephalic  EYES: EOMI, PERRLA, conjunctiva and sclera clear  ENMT: Moist mucous membranes  NECK: Supple, No JVD  NERVOUS SYSTEM:  Alert, no focal neuro deficits   CHEST/LUNG: Clear to auscultation bilaterally; No rales, rhonchi, wheezing, or rubs  HEART: Regular rate and rhythm; No murmurs, rubs, or gallops  ABDOMEN: Soft, Nontender, Nondistended; Bowel sounds present  EXTREMITIES:  2+ Peripheral Pulses b/l, No clubbing, cyanosis, calf tenderness or edema b/l      MEDICATIONS  (STANDING):  aMIOdarone    Tablet 200 milliGRAM(s) Oral daily  aMIOdarone Infusion 1 mG/Min (33.3 mL/Hr) IV Continuous <Continuous>  budesonide 160 MICROgram(s)/formoterol 4.5 MICROgram(s) Inhaler 2 Puff(s) Inhalation two times a day  ferrous    sulfate 325 milliGRAM(s) Oral three times a day  heparin   Injectable 5000 Unit(s) SubCutaneous every 8 hours  pantoprazole    Tablet 40 milliGRAM(s) Oral before breakfast  tiotropium 2.5 MICROgram(s) Inhaler 2 Puff(s) Inhalation daily    MEDICATIONS  (PRN):  acetaminophen     Tablet .. 650 milliGRAM(s) Oral every 6 hours PRN Moderate Pain (4 - 6)  albuterol    90 MICROgram(s) HFA Inhaler 2 Puff(s) Inhalation every 6 hours PRN Shortness of Breath and/or Wheezing  aluminum hydroxide/magnesium hydroxide/simethicone Suspension 30 milliLiter(s) Oral every 4 hours PRN Dyspepsia  benzocaine/menthol Lozenge 1 Lozenge Oral four times a day PRN Sore Throat  diltiazem Injectable 10 milliGRAM(s) IV Push once PRN sustained HR >130  guaiFENesin Oral Liquid (Sugar-Free) 100 milliGRAM(s) Oral every 6 hours PRN Cough      Allergies    No Known Allergies    Intolerances              LABS:                           7.4    6.16  )-----------( 195      ( 25 Aug 2023 07:15 )             23.3     08-25    141  |  113<H>  |  21  ----------------------------<  80  3.7   |  24  |  1.38<H>    Ca    7.9<L>      25 Aug 2023 07:15  Phos  2.6     08-25  Mg     2.3     08-25    TPro  6.1  /  Alb  1.8<L>  /  TBili  0.3  /  DBili  x   /  AST  32  /  ALT  43  /  AlkPhos  57  08-24    PT/INR - ( 25 Aug 2023 10:40 )   PT: 12.9 sec;   INR: 1.08 ratio         PTT - ( 25 Aug 2023 10:40 )  PTT:30.8 sec  Urinalysis Basic - ( 25 Aug 2023 07:15 )    Color: x / Appearance: x / SG: x / pH: x  Gluc: 80 mg/dL / Ketone: x  / Bili: x / Urobili: x   Blood: x / Protein: x / Nitrite: x   Leuk Esterase: x / RBC: x / WBC x   Sq Epi: x / Non Sq Epi: x / Bacteria: x      CAPILLARY BLOOD GLUCOSE                    RECENT CULTURES:          RADIOLOGY & ADDITIONAL TESTS:      Radiology reports read and imaging reviewed  :  [ x] YES  [ ] NO  (I am not a radiologist and therefore rely on Radiologist reports to facilitate with diagnosis and treatment plans)    Consultant(s) Notes Reviewed:  [x ] YES  [ ] NO    Care Discussed with Consultants/Other Providers [x ] YES  [ ] NO  Care plan and all findings were discussed in detail with patient.  All questions and concerns addressed

## 2023-08-25 NOTE — PROGRESS NOTE ADULT - NS ATTEND AMEND GEN_ALL_CORE FT
88-year-old A-fib with RVR converted to sinus rhythm on amiodarone therapy.  Moderate to to severe MR and TR with severe pulmonary hypertension mildly diminished global LV systolic function seen on echo.  Continue antibiotic therapy for sepsis and pneumonia.  Supportive care.

## 2023-08-25 NOTE — PROGRESS NOTE ADULT - SUBJECTIVE AND OBJECTIVE BOX
[facility] NEPHROLOGY SERVICES, [facility]  NEPHROLOGY AND HYPERTENSION  [address]  [address]  [place] [address]  [phone]    MD LYNDSEY RAINEY MD YELENA ROSENBERG, MD BINNY KOSHY, MD CHRISTOPHER CAPUTO, MD EDWARD BOVER, MD          Patient events noted  No distress    MEDICATIONS  (STANDING):  aMIOdarone    Tablet 200 milliGRAM(s) Oral daily  aMIOdarone Infusion 1 mG/Min (33.3 mL/Hr) IV Continuous <Continuous>  budesonide 160 MICROgram(s)/formoterol 4.5 MICROgram(s) Inhaler 2 Puff(s) Inhalation two times a day  ferrous    sulfate 325 milliGRAM(s) Oral three times a day  heparin   Injectable 5000 Unit(s) SubCutaneous every 8 hours  pantoprazole    Tablet 40 milliGRAM(s) Oral before breakfast  tiotropium 2.5 MICROgram(s) Inhaler 2 Puff(s) Inhalation daily    MEDICATIONS  (PRN):  acetaminophen     Tablet .. 650 milliGRAM(s) Oral every 6 hours PRN Moderate Pain (4 - 6)  albuterol    90 MICROgram(s) HFA Inhaler 2 Puff(s) Inhalation every 6 hours PRN Shortness of Breath and/or Wheezing  aluminum hydroxide/magnesium hydroxide/simethicone Suspension 30 milliLiter(s) Oral every 4 hours PRN Dyspepsia  benzocaine/menthol Lozenge 1 Lozenge Oral four times a day PRN Sore Throat  diltiazem Injectable 10 milliGRAM(s) IV Push once PRN sustained HR >130  guaiFENesin Oral Liquid (Sugar-Free) 100 milliGRAM(s) Oral every 6 hours PRN Cough      08-25-23 @ 07:01  -  08-25-23 @ 21:24  --------------------------------------------------------  IN: 737 mL / OUT: 0 mL / NET: 737 mL      PHYSICAL EXAM:      T(C): 37.7 (08-25-23 @ 17:56), Max: 37.7 (08-25-23 @ 17:56)  HR: 79 (08-25-23 @ 17:56) (61 - 79)  BP: 115/77 (08-25-23 @ 17:56) (103/60 - 119/60)  RR: 18 (08-25-23 @ 17:56) (16 - 18)  SpO2: 97% (08-25-23 @ 17:56) (97% - 100%)  Wt(kg): --  Lungs clear  Heart S1S2  Abd soft NT ND  Extremities:   tr edema                                    7.4    6.16  )-----------( 195      ( 25 Aug 2023 07:15 )             23.3     08-25    141  |  113<H>  |  21  ----------------------------<  80  3.7   |  24  |  1.38<H>    Ca    7.9<L>      25 Aug 2023 07:15  Phos  2.6     08-25  Mg     2.3     08-25    TPro  6.1  /  Alb  1.8<L>  /  TBili  0.3  /  DBili  x   /  AST  32  /  ALT  43  /  AlkPhos  57  08-24      LIVER FUNCTIONS - ( 24 Aug 2023 06:18 )  Alb: 1.8 g/dL / Pro: 6.1 gm/dL / ALK PHOS: 57 U/L / ALT: 43 U/L / AST: 32 U/L / GGT: x           Creatinine Trend: 1.38<--, 1.57<--, 1.57<--, 1.94<--, 2.42<--, 2.64<--    Assessment   ANGI, degree of CKD unclear;   PNA emphysema   Metabolic acidosis, lactic, septic shock, risk for ATN  Afib  R/O occult pulm renal syndrome, vasculitic, connective tissue related but less likely   Afib; risk for rhabdomyolysis  Anemia, acute on chronic   DVT LLE     Plan  IVF maintenance   Telemetry monitoring           Vj Guadalupe MD

## 2023-08-25 NOTE — PROGRESS NOTE ADULT - NS ATTEND AMEND GEN_ALL_CORE FT
Attending Addendum--  Case reviewed with NP Pari Mcgarry. Her note reviewed and modified as appropriate.   Cardiology input appreciated.  Follow-up cultures remain negative to date.  White blood cell count has normalized.  Creatinine stable to improved.  Antibiotics as previous.  I will be available via Teams for any issues that may arise over the weekend.    Elroy Barnett MD  Attending Physician  [facility]  Division of Infectious Diseases  [phone]

## 2023-08-25 NOTE — PROGRESS NOTE ADULT - ASSESSMENT
86-year-old male with history of recurrent lightheadedness and dizziness with multiple admissions to the emergency room for above found down on street for unknown duration.   On admission found to be hypothermic and hypotensive with rapid atrial fibrillation requiring Pressor support    CT of chest appears to suggest bilateral pneumonia   Also, profound acidosis and anemia, receiving blood products while in the emergency room.  blood culture:  Streptococcus pneumoniae: Detec (08.20.23 @ 05:00)  Rates improved on IV amiodarone, converted to sinus  Evidence of acute renal insufficiency   Elevated troponin seen here likely significant demand ischemia in the setting of renal insufficiency.    8/21: transferred to medical floor  8/22: found to be in afib with RVR, 140-160s SBP 80-90s, transferred to tele bed,  amiodarone drip -> converted to sinus 8/23 am    -remains in sinus, s/p amiodarone IV loading,  currently continued on maintenance oral dose of amiodarone 200mg po daily, TSH wnl  -No AC in the setting of acute anemia, subdural hematoma   -TTE with Mildly decreased global left ventricular systolic function. BLAE, Mod - severe MR/TR, severe pHTN, no overt fluid overload noted  -Unable to add bbl 2/2 low BP  -ABx as per ID for Strep pneumoniae bacteremia w/ evidence of b/l pneumonia and lactic acidosis  -Pt with underweight, Severe protein-calorie malnutrition, nutrition eval appreciated   -monitor h/h, transfuse as needed, cont on feso4, anemia w/u as per primary team  -vascular follow up for DVTs  -activities as tolerated    86-year-old male with history of recurrent lightheadedness and dizziness with multiple admissions to the emergency room for above found down on street for unknown duration.   On admission found to be hypothermic and hypotensive with rapid atrial fibrillation requiring Pressor support    CT of chest appears to suggest bilateral pneumonia   Also, profound acidosis and anemia, receiving blood products while in the emergency room.  blood culture:  Streptococcus pneumoniae: Detec (08.20.23 @ 05:00)  Rates improved on IV amiodarone, converted to sinus  Evidence of acute renal insufficiency   Elevated troponin seen here likely significant demand ischemia in the setting of renal insufficiency.    8/21: transferred to medical floor  8/22: found to be in afib with RVR, 140-160s SBP 80-90s, transferred to tele bed, amiodarone drip -> converted to sinus 8/23 am    -remains in sinus, s/p amiodarone IV loading,  currently continued on maintenance oral dose of amiodarone 200mg po daily, TSH wnl, QTc 464 on 8/24  -No Ac in the setting of acute anemia, subdural hematoma   -TTE with Mildly decreased global left ventricular systolic function. BLAE, Mod - severe MR/TR, severe pHTN, Unknown previous Hx, no overt fluid overload noted  -ABx as per ID for Strep pneumoniae bacteremia w/ evidence of b/l pneumonia and lactic acidosis  -Pt with underweight, Severe protein-calorie malnutrition, nutrition eval appreciated   -monitor h/h, transfuse as needed, cont on feso4, anemia w/u as per primary team  -vascular follow up for DVTs  -activities as tolerated

## 2023-08-26 LAB
ANION GAP SERPL CALC-SCNC: 4 MMOL/L — LOW (ref 5–17)
BUN SERPL-MCNC: 20 MG/DL — SIGNIFICANT CHANGE UP (ref 7–23)
CALCIUM SERPL-MCNC: 7.6 MG/DL — LOW (ref 8.5–10.1)
CHLORIDE SERPL-SCNC: 114 MMOL/L — HIGH (ref 96–108)
CO2 SERPL-SCNC: 23 MMOL/L — SIGNIFICANT CHANGE UP (ref 22–31)
CREAT SERPL-MCNC: 1.28 MG/DL — SIGNIFICANT CHANGE UP (ref 0.5–1.3)
EGFR: 55 ML/MIN/1.73M2 — LOW
GLUCOSE BLDC GLUCOMTR-MCNC: 103 MG/DL — HIGH (ref 70–99)
GLUCOSE SERPL-MCNC: 99 MG/DL — SIGNIFICANT CHANGE UP (ref 70–99)
HCT VFR BLD CALC: 21.5 % — LOW (ref 39–50)
HGB BLD-MCNC: 6.9 G/DL — CRITICAL LOW (ref 13–17)
MCHC RBC-ENTMCNC: 22 PG — LOW (ref 27–34)
MCHC RBC-ENTMCNC: 32.1 G/DL — SIGNIFICANT CHANGE UP (ref 32–36)
MCV RBC AUTO: 68.7 FL — LOW (ref 80–100)
NRBC # BLD: 0 /100 WBCS — SIGNIFICANT CHANGE UP (ref 0–0)
PLATELET # BLD AUTO: 232 K/UL — SIGNIFICANT CHANGE UP (ref 150–400)
POTASSIUM SERPL-MCNC: 3.8 MMOL/L — SIGNIFICANT CHANGE UP (ref 3.5–5.3)
POTASSIUM SERPL-SCNC: 3.8 MMOL/L — SIGNIFICANT CHANGE UP (ref 3.5–5.3)
RBC # BLD: 3.13 M/UL — LOW (ref 4.2–5.8)
RBC # FLD: 26.9 % — HIGH (ref 10.3–14.5)
SODIUM SERPL-SCNC: 141 MMOL/L — SIGNIFICANT CHANGE UP (ref 135–145)
WBC # BLD: 8.86 K/UL — SIGNIFICANT CHANGE UP (ref 3.8–10.5)
WBC # FLD AUTO: 8.86 K/UL — SIGNIFICANT CHANGE UP (ref 3.8–10.5)

## 2023-08-26 RX ORDER — BENZOCAINE AND MENTHOL 5; 1 G/100ML; G/100ML
1 LIQUID ORAL EVERY 8 HOURS
Refills: 0 | Status: COMPLETED | OUTPATIENT
Start: 2023-08-26 | End: 2023-08-29

## 2023-08-26 RX ADMIN — BENZOCAINE AND MENTHOL 1 LOZENGE: 5; 1 LIQUID ORAL at 13:20

## 2023-08-26 RX ADMIN — Medication 325 MILLIGRAM(S): at 22:08

## 2023-08-26 RX ADMIN — BENZOCAINE AND MENTHOL 1 LOZENGE: 5; 1 LIQUID ORAL at 22:09

## 2023-08-26 RX ADMIN — HEPARIN SODIUM 5000 UNIT(S): 5000 INJECTION INTRAVENOUS; SUBCUTANEOUS at 22:08

## 2023-08-26 RX ADMIN — PANTOPRAZOLE SODIUM 40 MILLIGRAM(S): 20 TABLET, DELAYED RELEASE ORAL at 05:49

## 2023-08-26 RX ADMIN — Medication 325 MILLIGRAM(S): at 05:50

## 2023-08-26 RX ADMIN — BUDESONIDE AND FORMOTEROL FUMARATE DIHYDRATE 2 PUFF(S): 160; 4.5 AEROSOL RESPIRATORY (INHALATION) at 17:22

## 2023-08-26 RX ADMIN — TIOTROPIUM BROMIDE 2 PUFF(S): 18 CAPSULE ORAL; RESPIRATORY (INHALATION) at 13:21

## 2023-08-26 RX ADMIN — HEPARIN SODIUM 5000 UNIT(S): 5000 INJECTION INTRAVENOUS; SUBCUTANEOUS at 05:50

## 2023-08-26 RX ADMIN — BUDESONIDE AND FORMOTEROL FUMARATE DIHYDRATE 2 PUFF(S): 160; 4.5 AEROSOL RESPIRATORY (INHALATION) at 05:49

## 2023-08-26 RX ADMIN — HEPARIN SODIUM 5000 UNIT(S): 5000 INJECTION INTRAVENOUS; SUBCUTANEOUS at 13:19

## 2023-08-26 RX ADMIN — BENZOCAINE AND MENTHOL 1 LOZENGE: 5; 1 LIQUID ORAL at 22:36

## 2023-08-26 RX ADMIN — Medication 325 MILLIGRAM(S): at 13:20

## 2023-08-26 RX ADMIN — AMIODARONE HYDROCHLORIDE 200 MILLIGRAM(S): 400 TABLET ORAL at 05:50

## 2023-08-26 NOTE — PROGRESS NOTE ADULT - ASSESSMENT
89 yo M w/ hx of multiple syncopal episodes presented via EMS for an unwitnessed fall w/ hypotension, hypothermia, and afib w/ RVR concerning for sepsis. He was found to have Strep pneumonia bacteremia w/ evidence of b/l pneumonia and lactic acidosis, iron deficiency anemia, stable sub-dural hematoma, ANGI, and demand ischemia.        Metabolic encephalopathy:  - 2/2 sepsis  - Now A&Ox3, stable L frontal SDH    Acute hypoxic respiratory failure:  - pneumonia w/ findings c/w emphysema/ILD, weaned off NC on room air, on ipratropium q8h (consider transition to duonebs), chest PT, maintain SpO2 > 92%  - C/w symciort and Spiriva  - albuterol PRN  - Pulm following     Sepsis with septic shock POA:  - With strep pneumo bacteremia 2/2 PNA  - Weaned off norepinephrine  - Initial BC positive for strep, intermediate sensitivity to Rocephin, sensitive to Vanco  - Repeat Bcx negative  - Currently on levaquin 750mg q 48hrs  - follow up repeat blood clx    Afib:  - s/p amio bolus and gtt for afib w/ RVR (converted), currently on sinus rthym   - troponin elevation c/w demand ischemia   - Echo with Mildly decreased global left ventricular systolic function. BLAE, Mod - severe MR/TR, severe pHTN  - Not on Ac for anemia and SDH  - Continue with PO amiodarone  - Cardio following     ANGI:  - 2/2 septic ATN  - Cr improving   - Pending serologies for pulm renal syndrome  - Renal following     Anemia:  - S/P 2 units PRBC  - Did not recieve full amount of pRBC on 8/25  - HgB 6.9  - no obvious sign of bleed  - Iron deficient, continue iron supplementation  - Will transfuse 1 unit of pRBC    Acute DVT :  - Not on AC for anemia and SDH  - On DVT ppx dose  - Vascular follow up    GI ppx  - Protonix

## 2023-08-26 NOTE — PROGRESS NOTE ADULT - SUBJECTIVE AND OBJECTIVE BOX
PROGRESS NOTE:     Patient is a 88y old  Male who presents with a chief complaint of found  down (26 Aug 2023 13:15)          SUBJECTIVE & OBJECTIVE:   Pt seen and examined at bedside in AM    Pt was ordered 1 unit of pRBC. issue with peripheral line. did not receive the full amount  no overnight events.   no new complaints    REVIEW OF SYSTEMS: remaining ROS negative     PHYSICAL EXAM:  VITALS:  Vital Signs Last 24 Hrs  T(C): 36.5 (26 Aug 2023 11:35), Max: 37.7 (25 Aug 2023 17:56)  T(F): 97.7 (26 Aug 2023 11:35), Max: 99.9 (25 Aug 2023 17:56)  HR: 67 (26 Aug 2023 11:35) (67 - 79)  BP: 116/67 (26 Aug 2023 11:35) (108/59 - 127/69)  BP(mean): --  RR: 19 (26 Aug 2023 11:35) (18 - 20)  SpO2: 97% (26 Aug 2023 11:35) (97% - 99%)    Parameters below as of 26 Aug 2023 11:35  Patient On (Oxygen Delivery Method): room air          GENERAL: NAD,  no increased WOB  HEAD:  Atraumatic, Normocephalic  EYES: EOMI, conjunctiva and sclera clear  ENMT: Moist mucous membranes  NECK: Supple, No JVD  NERVOUS SYSTEM:  Alert, no focal neuro deficits   CHEST/LUNG: Clear to auscultation bilaterally; No rales, rhonchi, wheezing, or rubs  HEART: Regular rate and rhythm  ABDOMEN: Soft, Nontender, Nondistended; Bowel sounds present  EXTREMITIES:  2+ Peripheral Pulses b/l, No clubbing, cyanosis, calf tenderness or edema b/l      MEDICATIONS  (STANDING):  aMIOdarone    Tablet 200 milliGRAM(s) Oral daily  aMIOdarone Infusion 1 mG/Min (33.3 mL/Hr) IV Continuous <Continuous>  benzocaine/menthol Lozenge 1 Lozenge Oral every 8 hours  budesonide 160 MICROgram(s)/formoterol 4.5 MICROgram(s) Inhaler 2 Puff(s) Inhalation two times a day  ferrous    sulfate 325 milliGRAM(s) Oral three times a day  heparin   Injectable 5000 Unit(s) SubCutaneous every 8 hours  levoFLOXacin  Tablet 750 milliGRAM(s) Oral every 48 hours  pantoprazole    Tablet 40 milliGRAM(s) Oral before breakfast  tiotropium 2.5 MICROgram(s) Inhaler 2 Puff(s) Inhalation daily    MEDICATIONS  (PRN):  acetaminophen     Tablet .. 650 milliGRAM(s) Oral every 6 hours PRN Moderate Pain (4 - 6)  albuterol    90 MICROgram(s) HFA Inhaler 2 Puff(s) Inhalation every 6 hours PRN Shortness of Breath and/or Wheezing  aluminum hydroxide/magnesium hydroxide/simethicone Suspension 30 milliLiter(s) Oral every 4 hours PRN Dyspepsia  benzocaine/menthol Lozenge 1 Lozenge Oral four times a day PRN Sore Throat  diltiazem Injectable 10 milliGRAM(s) IV Push once PRN sustained HR >130  guaiFENesin Oral Liquid (Sugar-Free) 100 milliGRAM(s) Oral every 6 hours PRN Cough      Allergies    No Known Allergies    Intolerances              LABS:                           6.9    8.86  )-----------( 232      ( 26 Aug 2023 07:30 )             21.5     08-26    141  |  114<H>  |  20  ----------------------------<  99  3.8   |  23  |  1.28    Ca    7.6<L>      26 Aug 2023 07:30  Phos  2.6     08-25  Mg     2.3     08-25      PT/INR - ( 25 Aug 2023 10:40 )   PT: 12.9 sec;   INR: 1.08 ratio         PTT - ( 25 Aug 2023 10:40 )  PTT:30.8 sec  Urinalysis Basic - ( 26 Aug 2023 07:30 )    Color: x / Appearance: x / SG: x / pH: x  Gluc: 99 mg/dL / Ketone: x  / Bili: x / Urobili: x   Blood: x / Protein: x / Nitrite: x   Leuk Esterase: x / RBC: x / WBC x   Sq Epi: x / Non Sq Epi: x / Bacteria: x      CAPILLARY BLOOD GLUCOSE                    RECENT CULTURES:          RADIOLOGY & ADDITIONAL TESTS:        Consultant(s) Notes Reviewed:  [x ] YES  [ ] NO    Care Discussed with Consultants/Other Providers [x ] YES  [ ] NO  Care plan and all findings were discussed in detail with patient.  All questions and concerns addressed

## 2023-08-26 NOTE — PROGRESS NOTE ADULT - SUBJECTIVE AND OBJECTIVE BOX
INTERVAL HPI:  89 yo M with multiple syncopal episodes per chart notes,  on no home meds   Brought by EMS after being found down on street minimally responsive (8/20/23).   In the ED, was hypotensive, hypothermic (rectal T 90F), and tachycardic (afib w/ RVR).   Labs were remarkable for anemia (Hgb 6.1), leukocytosis w/ 25% band, lactic acidosis (lactate 14.4), ANGI, and elevated troponin   Got treated w/ vanc/zosyn, fluids, 1U pRBCs, IV amiodarone bolus (resolved arrhythmia), and a non-rebreather mask.   CT imaging showed an age-indeterminate L frontal sub-dural hematoma (4mm) and lung findings with pneumonia and emphysema/ILD.   RVP was entero/rhinovirus positive.   He was switched to ceftriaxone/azithromycin, was started on ipratropium nebulizers, and got  admitted to the ICU for new pressor requirement (norepinephrine).    During his ICU course, his blood cultures grew Strep pneumonia (on appropriate abx), lower extremity duplex study showed L tibioperoneal trunk and proximal posterior tibial vein DVTs (on heparin ppx), norepinephrine was weaned off w/ adequate MAPs,   Repeat CT head showed stable sub-dural hematoma, and iron supplementation was started.   08/21/23:  Got transferred to regular medical floor.  08/22/23:  At time of my evaluation, awake, responsive. Denies SOB, cough, sputum production or chest pain.  Admits being a smoker but says quit about a year ago.  Again in rapid A Fib and being transferred to monitor bed. Seen by Cardiology.    OVERNIGHT EVENTS:  Complains of soreness in throat. Getting PRBC transfusion    Vital Signs Last 24 Hrs  T(C): 36.5 (26 Aug 2023 11:35), Max: 37.7 (25 Aug 2023 17:56)  T(F): 97.7 (26 Aug 2023 11:35), Max: 99.9 (25 Aug 2023 17:56)  HR: 67 (26 Aug 2023 11:35) (67 - 79)  BP: 116/67 (26 Aug 2023 11:35) (108/59 - 127/69)  BP(mean): --  RR: 19 (26 Aug 2023 11:35) (18 - 20)  SpO2: 97% (26 Aug 2023 11:35) (97% - 99%)    Parameters below as of 26 Aug 2023 11:35  Patient On (Oxygen Delivery Method): room air    PHYSICAL EXAM:  GEN:         Awake, responsive and comfortable.  HEENT:    Normal.    RESP:      no wheezing.  CVS:         Regular rate and rhythm.     MEDICATIONS  (STANDING):  aMIOdarone    Tablet 200 milliGRAM(s) Oral daily  aMIOdarone Infusion 1 mG/Min (33.3 mL/Hr) IV Continuous <Continuous>  benzocaine/menthol Lozenge 1 Lozenge Oral every 8 hours  budesonide 160 MICROgram(s)/formoterol 4.5 MICROgram(s) Inhaler 2 Puff(s) Inhalation two times a day  ferrous    sulfate 325 milliGRAM(s) Oral three times a day  heparin   Injectable 5000 Unit(s) SubCutaneous every 8 hours  levoFLOXacin  Tablet 750 milliGRAM(s) Oral every 48 hours  pantoprazole    Tablet 40 milliGRAM(s) Oral before breakfast  tiotropium 2.5 MICROgram(s) Inhaler 2 Puff(s) Inhalation daily    MEDICATIONS  (PRN):  acetaminophen     Tablet .. 650 milliGRAM(s) Oral every 6 hours PRN Moderate Pain (4 - 6)  albuterol    90 MICROgram(s) HFA Inhaler 2 Puff(s) Inhalation every 6 hours PRN Shortness of Breath and/or Wheezing  aluminum hydroxide/magnesium hydroxide/simethicone Suspension 30 milliLiter(s) Oral every 4 hours PRN Dyspepsia  benzocaine/menthol Lozenge 1 Lozenge Oral four times a day PRN Sore Throat  diltiazem Injectable 10 milliGRAM(s) IV Push once PRN sustained HR >130  guaiFENesin Oral Liquid (Sugar-Free) 100 milliGRAM(s) Oral every 6 hours PRN Cough    LABS:                        6.9    8.86  )-----------( 232      ( 26 Aug 2023 07:30 )             21.5     08-26    141  |  114<H>  |  20  ----------------------------<  99  3.8   |  23  |  1.28    Ca    7.6<L>      26 Aug 2023 07:30  Phos  2.6     08-25  Mg     2.3     08-25    PT/INR - ( 25 Aug 2023 10:40 )   PT: 12.9 sec;   INR: 1.08 ratio       PTT - ( 25 Aug 2023 10:40 )  PTT:30.8 sec  08-20 @ 11:56  pH: --  pCO2: 25  pO2: 109  SaO2: 99.4  08-20 @ 10:46  pH: --  pCO2: 24  pO2: 306  SaO2: 100.0  08-20 @ 05:57  pH: --  pCO2: 28  pO2: 32  SaO2: 31.8    Urinalysis Basic - ( 26 Aug 2023 07:30 )    Color: x / Appearance: x / SG: x / pH: x  Gluc: 99 mg/dL / Ketone: x  / Bili: x / Urobili: x   Blood: x / Protein: x / Nitrite: x   Leuk Esterase: x / RBC: x / WBC x   Sq Epi: x / Non Sq Epi: x / Bacteria: x    ASSESSMENT AND PLAN:  Multifocal pneumonia.  S/P Septic shock.  Strep Pneumo Bacteremia.  Leukocytosis.  A Fib with RVR.  Anemia.  Renal Insuffiencey.  Hypokalemia.  Left peroneal+ post tibial DVT.    SPO2 in high 90s on room air.  Continue antibiotic per ID.  Being transferred to monitor bed for rapid A Fib.  Not a candidate for full anticoagulation due to SDH, and anemia requiring PRBC transfusion.  Consider Vascular evaluation for DVT.  56+ minutes spent.    08/23/23:   Awake, responsive. Complains of stomach discomfort and at times with difficult swallowing. SPO2 stable on room air.  Converted to sinus rhythm, still on Amiodarone drip.  Continue antibiotics per ID.    08/24/23:  Resting comfortably, no SOB or distress. SPO2 98% .  Converted to sinus rhythm, changed to PO Amiodarone.  Continue Vancomycin per ID.  Will change to adult dose Symbicort.    08/25/23: Awake, responsive and comfortable. SPO2 97%, no distress.  Complains of soreness in throat. Will add Cepacol lozenges.    08/26/23:  Complains of soreness in throat. Will start on Cepacol lozenges.  Getting PRBC transfusion  On Symbicort and Spiriva.  antibiotics per ID.

## 2023-08-27 LAB
CULTURE RESULTS: SIGNIFICANT CHANGE UP
CULTURE RESULTS: SIGNIFICANT CHANGE UP
GLUCOSE BLDC GLUCOMTR-MCNC: 106 MG/DL — HIGH (ref 70–99)
GLUCOSE BLDC GLUCOMTR-MCNC: 119 MG/DL — HIGH (ref 70–99)
HCT VFR BLD CALC: 25.6 % — LOW (ref 39–50)
HGB BLD-MCNC: 8.1 G/DL — LOW (ref 13–17)
MCHC RBC-ENTMCNC: 22.8 PG — LOW (ref 27–34)
MCHC RBC-ENTMCNC: 31.6 G/DL — LOW (ref 32–36)
MCV RBC AUTO: 71.9 FL — LOW (ref 80–100)
NRBC # BLD: 0 /100 WBCS — SIGNIFICANT CHANGE UP (ref 0–0)
PLATELET # BLD AUTO: 272 K/UL — SIGNIFICANT CHANGE UP (ref 150–400)
RBC # BLD: 3.56 M/UL — LOW (ref 4.2–5.8)
RBC # FLD: 27.5 % — HIGH (ref 10.3–14.5)
SPECIMEN SOURCE: SIGNIFICANT CHANGE UP
SPECIMEN SOURCE: SIGNIFICANT CHANGE UP
WBC # BLD: 9.22 K/UL — SIGNIFICANT CHANGE UP (ref 3.8–10.5)
WBC # FLD AUTO: 9.22 K/UL — SIGNIFICANT CHANGE UP (ref 3.8–10.5)

## 2023-08-27 PROCEDURE — 99233 SBSQ HOSP IP/OBS HIGH 50: CPT

## 2023-08-27 RX ADMIN — HEPARIN SODIUM 5000 UNIT(S): 5000 INJECTION INTRAVENOUS; SUBCUTANEOUS at 15:03

## 2023-08-27 RX ADMIN — BENZOCAINE AND MENTHOL 1 LOZENGE: 5; 1 LIQUID ORAL at 06:14

## 2023-08-27 RX ADMIN — Medication 325 MILLIGRAM(S): at 21:24

## 2023-08-27 RX ADMIN — HEPARIN SODIUM 5000 UNIT(S): 5000 INJECTION INTRAVENOUS; SUBCUTANEOUS at 06:13

## 2023-08-27 RX ADMIN — PANTOPRAZOLE SODIUM 40 MILLIGRAM(S): 20 TABLET, DELAYED RELEASE ORAL at 06:14

## 2023-08-27 RX ADMIN — BENZOCAINE AND MENTHOL 1 LOZENGE: 5; 1 LIQUID ORAL at 21:24

## 2023-08-27 RX ADMIN — TIOTROPIUM BROMIDE 2 PUFF(S): 18 CAPSULE ORAL; RESPIRATORY (INHALATION) at 17:19

## 2023-08-27 RX ADMIN — BUDESONIDE AND FORMOTEROL FUMARATE DIHYDRATE 2 PUFF(S): 160; 4.5 AEROSOL RESPIRATORY (INHALATION) at 17:58

## 2023-08-27 RX ADMIN — HEPARIN SODIUM 5000 UNIT(S): 5000 INJECTION INTRAVENOUS; SUBCUTANEOUS at 21:23

## 2023-08-27 RX ADMIN — BENZOCAINE AND MENTHOL 1 LOZENGE: 5; 1 LIQUID ORAL at 15:03

## 2023-08-27 RX ADMIN — Medication 325 MILLIGRAM(S): at 06:14

## 2023-08-27 RX ADMIN — Medication 325 MILLIGRAM(S): at 15:03

## 2023-08-27 RX ADMIN — AMIODARONE HYDROCHLORIDE 200 MILLIGRAM(S): 400 TABLET ORAL at 06:14

## 2023-08-27 NOTE — PROGRESS NOTE ADULT - ASSESSMENT
89 yo M w/ hx of multiple syncopal episodes presented via EMS for an unwitnessed fall w/ hypotension, hypothermia, and afib w/ RVR concerning for sepsis. He was found to have Strep pneumonia bacteremia w/ evidence of b/l pneumonia and lactic acidosis, iron deficiency anemia, stable sub-dural hematoma, ANGI, and demand ischemia.        Metabolic encephalopathy:  - 2/2 sepsis  - Now A&Ox3, stable L frontal SDH    Acute hypoxic respiratory failure:  - pneumonia w/ findings c/w emphysema/ILD, weaned off NC on room air, on ipratropium q8h (consider transition to duonebs), chest PT, maintain SpO2 > 92%  - C/w symciort and Spiriva  - albuterol PRN  - Pulm following     Sepsis with septic shock POA:  - With strep pneumo bacteremia 2/2 PNA  - Weaned off norepinephrine  - Initial BC positive for strep, intermediate sensitivity to Rocephin, sensitive to Vanco  - Repeat Bcx negative  - Currently on levaquin 750mg q 48hrs  - follow up repeat blood clx    Afib:  - s/p amio bolus and gtt for afib w/ RVR (converted), currently on sinus rthym   - troponin elevation c/w demand ischemia   - Echo with Mildly decreased global left ventricular systolic function. BLAE, Mod - severe MR/TR, severe pHTN  - Not on Ac for anemia and SDH  - Continue with PO amiodarone  - Cardio following     ANGI:  - 2/2 septic ATN  - Cr improving   - Pending serologies for pulm renal syndrome  - Renal following     Anemia:  - S/P 3 units PRBC  - Did not recieve full amount of pRBC on 8/25  - HgB 8.1  - Iron deficient, continue iron supplementation  - f/u H&H in AM      Acute DVT :  - Not on AC for anemia and SDH  - On DVT ppx dose  - Vascular follow up    GI ppx  - Protonix

## 2023-08-27 NOTE — PROGRESS NOTE ADULT - SUBJECTIVE AND OBJECTIVE BOX
Patient is a 88y old  Male who presents with a chief complaint of found  down (24 Aug 2023 20:10)    PAST MEDICAL & SURGICAL HISTORY:  lightheadedness and dizziness    INTERVAL HISTORY: resting comfortably in bed, denies any chest pain, mild chronic dyspnea   	  MEDICATIONS  (STANDING):  aMIOdarone    Tablet 200 milliGRAM(s) Oral daily  aMIOdarone Infusion 1 mG/Min (33.3 mL/Hr) IV Continuous <Continuous>  benzocaine/menthol Lozenge 1 Lozenge Oral every 8 hours  budesonide 160 MICROgram(s)/formoterol 4.5 MICROgram(s) Inhaler 2 Puff(s) Inhalation two times a day  ferrous    sulfate 325 milliGRAM(s) Oral three times a day  heparin   Injectable 5000 Unit(s) SubCutaneous every 8 hours  levoFLOXacin  Tablet 750 milliGRAM(s) Oral every 48 hours  pantoprazole    Tablet 40 milliGRAM(s) Oral before breakfast  tiotropium 2.5 MICROgram(s) Inhaler 2 Puff(s) Inhalation daily    MEDICATIONS  (PRN):  acetaminophen     Tablet .. 650 milliGRAM(s) Oral every 6 hours PRN Moderate Pain (4 - 6)  albuterol    90 MICROgram(s) HFA Inhaler 2 Puff(s) Inhalation every 6 hours PRN Shortness of Breath and/or Wheezing  aluminum hydroxide/magnesium hydroxide/simethicone Suspension 30 milliLiter(s) Oral every 4 hours PRN Dyspepsia  benzocaine/menthol Lozenge 1 Lozenge Oral four times a day PRN Sore Throat  diltiazem Injectable 10 milliGRAM(s) IV Push once PRN sustained HR >130  guaiFENesin Oral Liquid (Sugar-Free) 100 milliGRAM(s) Oral every 6 hours PRN Cough    Vitals:  Vital Signs Last 24 Hrs  T(C): 36.2 (27 Aug 2023 10:46), Max: 37.4 (26 Aug 2023 14:15)  T(F): 97.2 (27 Aug 2023 10:46), Max: 99.4 (26 Aug 2023 14:15)  HR: 68 (27 Aug 2023 10:46) (62 - 74)  BP: 128/68 (27 Aug 2023 10:46) (122/67 - 130/68)  RR: 18 (27 Aug 2023 10:46) (16 - 18)  SpO2: 97% (27 Aug 2023 10:46) (97% - 98%)    Parameters below as of 27 Aug 2023 10:46  Patient On (Oxygen Delivery Method): room air        PHYSICAL EXAM:  Neuro: Awake, responsive  CV: S1 S2 RRR + SM  Lungs: diminished to bases   GI: Soft, BS +, ND, NT  Extremities: ankle edema    TELEMETRY: sinus     RADIOLOGY: < from: US Duplex Venous Lower Ext Complete, Bilateral (08.20.23 @ 17:44) >  Acute deep venous thrombosis: below the knee.    DVT within the left tibioperoneal trunk and proximal posterior tibial   veins.    < end of copied text >    < from: CT Chest No Cont (08.20.23 @ 06:49) >  CHEST:  LUNGS AND LARGE AIRWAYS: Patent central airways. Bibasilar patchy   consolidations suggesting pneumonia. Superimposed emphysematous lung   change and interstitial lung disease/honeycombing at the lung bases..  PLEURA: No pleural effusion or pneumothorax.  VESSELS: Limited evaluation of the thoracic aorta without intravenous   contrast, however there is calcific atherosclerosis without aneurysmal   dilatation.  HEART: Borderline enlarged heart.. No pericardial effusion. Coronary   artery calcifications. Anemia suggested.  MEDIASTINUM AND STACY: No lymphadenopathy.  CHEST WALL AND LOWER NECK: Within normal limits.    ABDOMEN AND PELVIS:  LIVER: Within normal limits.  BILE DUCTS: Normal caliber.  GALLBLADDER: Elongated gallbladder without definite calcified gallstone..  SPLEEN: Within normal limits.  PANCREAS: Within normal limits.  ADRENALS: Normal right adrenal gland. Limited left adrenal gland.  KIDNEYS/URETERS: Small bilateral kidneys. No renal stone or   hydronephrosis.    BLADDER: Within normal limits.  REPRODUCTIVE ORGANS: Prostate gland is not grossly enlarged.    BOWEL: Evaluation of bowel is limited without distention with oral   contrast and lack of mesenteric fat, however there is no bowel   obstruction. There is a 7 cm stool distended rectum. Small bowel loops   are not grossly dilated. Stomach is mildly distended with debris.  PERITONEUM: No free air or significant ascites.  VESSELS:  Limited evaluation of the abdominal aorta without intravenous   contrast, demonstrates tortuosity and calcific atherosclerosis without   aneurysmal dilatation. Negative  RETROPERITONEUM: No lymphadenopathy.  ABDOMINAL WALL: Within normal limits.  BONES: Severe scoliosis of the spine with associated multilevel   degenerative changes. Question pagetoid appearance of the right iliac   bone.    IMPRESSION:    Bilateral pneumonia.    < end of copied text >  DIAGNOSTIC TESTING:    [x ] Echocardiogram: < from: TTE Echo Complete w/o Contrast w/ Doppler (08.21.23 @ 08:17) >  Left Ventricle: Normal left ventricular size and wall thicknesses, with   normal systolic and diastolic function.  Global LV systolic function was mildly decreased. Global longitudinal   strain imaging was performed on [employer] Vivid S70 at a heart rate of 68BPM and   a blood pressure of 108/57 mmHg, average GLS calculated was -19.1%   (normal).  Right Ventricle: Normal right ventricular size and function.  Left Atrium: Moderately enlarged left atrium.  Right Atrium: The right atrium is normal in size. Moderately enlarged   right atrium.  Pericardium: There is no evidence of pericardial effusion.  Mitral Valve: Structurally normal mitral valve, with normal leaflet   excursion. Moderate to severe mitral valve regurgitation is seen.  Tricuspid Valve: Structurally normal tricuspid valve, with normal leaflet   excursion. Moderate-severe tricuspid regurgitation is visualized.   Estimated pulmonary artery systolic pressure is 65.8 mmHg assuming a   right atrial pressure of 15 mmHg, which is consistent with severe   pulmonary hypertension.  Aortic Valve: Normal trileaflet aortic valve with normal opening. No   evidence of aortic valve regurgitation is seen.  Pulmonic Valve: Structurally normal pulmonic valve, with normal leaflet   excursion. Mild pulmonic valve regurgitation.  Aorta: The aortic root and ascending aorta are structurally normal, with   no evidence of dilitation.  Pulmonary Artery: The main pulmonary artery is normal in size.  Venous: The inferior vena cava was normal sized, with respiratory size   variation less than 50%.      Summary:   1. Mildly decreased global left ventricular systolic function.   2. Global longitudinal strain imaging was performed on [employer] Vivid S70 at a   heart rate of 68BPM and a blood pressure of 108/57 mmHg, average GLS   calculated was -19.1% (normal).   3. Moderately enlarged left atrium.   4. Moderately enlarged right atrium.   5. Moderate to severe mitral valve regurgitation.   6. Moderate-severe tricuspid regurgitation.   7. Mild pulmonic valve regurgitation.   8. Estimated pulmonary artery systolic pressure is 65.8 mmHg assuming a   right atrial pressure of 15 mmHg, which is consistent with severe   pulmonary hypertension.    < end of copied text >    LABS:	 	                        8.1    9.22  )-----------( 272      ( 27 Aug 2023 10:30 )             25.6     08-26    141  |  114<H>  |  20  ----------------------------<  99  3.8   |  23  |  1.28    Ca    7.6<L>      26 Aug 2023 07:30

## 2023-08-27 NOTE — PROGRESS NOTE ADULT - SUBJECTIVE AND OBJECTIVE BOX
PROGRESS NOTE:     Patient is a 88y old  Male who presents with a chief complaint of found  down (27 Aug 2023 13:58)          SUBJECTIVE & OBJECTIVE:   Pt seen and examined at bedside in AM    no overnight events.   no new complaints    REVIEW OF SYSTEMS: remaining ROS negative     PHYSICAL EXAM:  VITALS:  Vital Signs Last 24 Hrs  T(C): 36.2 (27 Aug 2023 10:46), Max: 36.8 (26 Aug 2023 23:59)  T(F): 97.2 (27 Aug 2023 10:46), Max: 98.2 (26 Aug 2023 23:59)  HR: 68 (27 Aug 2023 10:46) (62 - 74)  BP: 128/68 (27 Aug 2023 10:46) (122/67 - 128/68)  BP(mean): --  RR: 18 (27 Aug 2023 10:46) (16 - 18)  SpO2: 97% (27 Aug 2023 10:46) (97% - 98%)    Parameters below as of 27 Aug 2023 10:46  Patient On (Oxygen Delivery Method): room air          GENERAL: NAD,  no increased WOB  HEAD:  Atraumatic, Normocephalic  EYES: EOMI, conjunctiva and sclera clear  ENMT: Moist mucous membranes  NECK: Supple, No JVD  NERVOUS SYSTEM:  Alert, no focal neuro deficits   CHEST/LUNG: Clear to auscultation bilaterally; No rales, rhonchi, wheezing, or rubs  HEART: Regular rate and rhythm; No murmurs, rubs, or gallops  ABDOMEN: Soft, Nontender, Nondistended; Bowel sounds present  EXTREMITIES:  2+ Peripheral Pulses b/l, No clubbing, cyanosis, calf tenderness or edema b/l      MEDICATIONS  (STANDING):  aMIOdarone    Tablet 200 milliGRAM(s) Oral daily  aMIOdarone Infusion 1 mG/Min (33.3 mL/Hr) IV Continuous <Continuous>  benzocaine/menthol Lozenge 1 Lozenge Oral every 8 hours  budesonide 160 MICROgram(s)/formoterol 4.5 MICROgram(s) Inhaler 2 Puff(s) Inhalation two times a day  ferrous    sulfate 325 milliGRAM(s) Oral three times a day  heparin   Injectable 5000 Unit(s) SubCutaneous every 8 hours  levoFLOXacin  Tablet 750 milliGRAM(s) Oral every 48 hours  pantoprazole    Tablet 40 milliGRAM(s) Oral before breakfast  tiotropium 2.5 MICROgram(s) Inhaler 2 Puff(s) Inhalation daily    MEDICATIONS  (PRN):  acetaminophen     Tablet .. 650 milliGRAM(s) Oral every 6 hours PRN Moderate Pain (4 - 6)  albuterol    90 MICROgram(s) HFA Inhaler 2 Puff(s) Inhalation every 6 hours PRN Shortness of Breath and/or Wheezing  aluminum hydroxide/magnesium hydroxide/simethicone Suspension 30 milliLiter(s) Oral every 4 hours PRN Dyspepsia  benzocaine/menthol Lozenge 1 Lozenge Oral four times a day PRN Sore Throat  diltiazem Injectable 10 milliGRAM(s) IV Push once PRN sustained HR >130  guaiFENesin Oral Liquid (Sugar-Free) 100 milliGRAM(s) Oral every 6 hours PRN Cough      Allergies    No Known Allergies    Intolerances              LABS:                           8.1    9.22  )-----------( 272      ( 27 Aug 2023 10:30 )             25.6     08-26    141  |  114<H>  |  20  ----------------------------<  99  3.8   |  23  |  1.28    Ca    7.6<L>      26 Aug 2023 07:30        Urinalysis Basic - ( 26 Aug 2023 07:30 )    Color: x / Appearance: x / SG: x / pH: x  Gluc: 99 mg/dL / Ketone: x  / Bili: x / Urobili: x   Blood: x / Protein: x / Nitrite: x   Leuk Esterase: x / RBC: x / WBC x   Sq Epi: x / Non Sq Epi: x / Bacteria: x      CAPILLARY BLOOD GLUCOSE      POCT Blood Glucose.: 103 mg/dL (26 Aug 2023 16:53)                RECENT CULTURES:          RADIOLOGY & ADDITIONAL TESTS:      Radiology reports read and imaging reviewed  :  [ x] YES  [ ] NO  (I am not a radiologist and therefore rely on Radiologist reports to facilitate with diagnosis and treatment plans)    Consultant(s) Notes Reviewed:  [x ] YES  [ ] NO    Care Discussed with Consultants/Other Providers [x ] YES  [ ] NO  Care plan and all findings were discussed in detail with patient.  All questions and concerns addressed

## 2023-08-27 NOTE — PROGRESS NOTE ADULT - SUBJECTIVE AND OBJECTIVE BOX
INTERVAL HPI:  87 yo M with multiple syncopal episodes per chart notes,  on no home meds   Brought by EMS after being found down on street minimally responsive (8/20/23).   In the ED, was hypotensive, hypothermic (rectal T 90F), and tachycardic (afib w/ RVR).   Labs were remarkable for anemia (Hgb 6.1), leukocytosis w/ 25% band, lactic acidosis (lactate 14.4), ANGI, and elevated troponin   Got treated w/ vanc/zosyn, fluids, 1U pRBCs, IV amiodarone bolus (resolved arrhythmia), and a non-rebreather mask.   CT imaging showed an age-indeterminate L frontal sub-dural hematoma (4mm) and lung findings with pneumonia and emphysema/ILD.   RVP was entero/rhinovirus positive.   He was switched to ceftriaxone/azithromycin, was started on ipratropium nebulizers, and got  admitted to the ICU for new pressor requirement (norepinephrine).    During his ICU course, his blood cultures grew Strep pneumonia (on appropriate abx), lower extremity duplex study showed L tibioperoneal trunk and proximal posterior tibial vein DVTs (on heparin ppx), norepinephrine was weaned off w/ adequate MAPs,   Repeat CT head showed stable sub-dural hematoma, and iron supplementation was started.   08/21/23:  Got transferred to regular medical floor.  08/22/23:  At time of my evaluation, awake, responsive. Denies SOB, cough, sputum production or chest pain.  Admits being a smoker but says quit about a year ago.  Again in rapid A Fib and being transferred to monitor bed. Seen by Cardiology.    OVERNIGHT EVENTS:  Resting comfortably, no distress. SPo2 96%.    Vital Signs Last 24 Hrs  T(C): 37.2 (27 Aug 2023 17:07), Max: 37.2 (27 Aug 2023 17:07)  T(F): 99 (27 Aug 2023 17:07), Max: 99 (27 Aug 2023 17:07)  HR: 84 (27 Aug 2023 17:07) (68 - 84)  BP: 106/54 (27 Aug 2023 17:07) (106/54 - 128/68)  BP(mean): --  RR: 18 (27 Aug 2023 17:07) (16 - 18)  SpO2: 96% (27 Aug 2023 17:07) (96% - 97%)    Parameters below as of 27 Aug 2023 17:07  Patient On (Oxygen Delivery Method): room air    PHYSICAL EXAM:  GEN:        Resting, comfortable.  HEENT:    Normal.    RESP:      no distress.  CVS:          Regular rate and rhythm.     MEDICATIONS  (STANDING):  aMIOdarone    Tablet 200 milliGRAM(s) Oral daily  aMIOdarone Infusion 1 mG/Min (33.3 mL/Hr) IV Continuous <Continuous>  benzocaine/menthol Lozenge 1 Lozenge Oral every 8 hours  budesonide 160 MICROgram(s)/formoterol 4.5 MICROgram(s) Inhaler 2 Puff(s) Inhalation two times a day  ferrous    sulfate 325 milliGRAM(s) Oral three times a day  heparin   Injectable 5000 Unit(s) SubCutaneous every 8 hours  levoFLOXacin  Tablet 750 milliGRAM(s) Oral every 48 hours  pantoprazole    Tablet 40 milliGRAM(s) Oral before breakfast  tiotropium 2.5 MICROgram(s) Inhaler 2 Puff(s) Inhalation daily    MEDICATIONS  (PRN):  acetaminophen     Tablet .. 650 milliGRAM(s) Oral every 6 hours PRN Moderate Pain (4 - 6)  albuterol    90 MICROgram(s) HFA Inhaler 2 Puff(s) Inhalation every 6 hours PRN Shortness of Breath and/or Wheezing  aluminum hydroxide/magnesium hydroxide/simethicone Suspension 30 milliLiter(s) Oral every 4 hours PRN Dyspepsia  benzocaine/menthol Lozenge 1 Lozenge Oral four times a day PRN Sore Throat  diltiazem Injectable 10 milliGRAM(s) IV Push once PRN sustained HR >130  guaiFENesin Oral Liquid (Sugar-Free) 100 milliGRAM(s) Oral every 6 hours PRN Cough    LABS:                        8.1    9.22  )-----------( 272      ( 27 Aug 2023 10:30 )             25.6     08-26    141  |  114<H>  |  20  ----------------------------<  99  3.8   |  23  |  1.28    Ca    7.6<L>      26 Aug 2023 07:30    Urinalysis Basic - ( 26 Aug 2023 07:30 )    Color: x / Appearance: x / SG: x / pH: x  Gluc: 99 mg/dL / Ketone: x  / Bili: x / Urobili: x   Blood: x / Protein: x / Nitrite: x   Leuk Esterase: x / RBC: x / WBC x   Sq Epi: x / Non Sq Epi: x / Bacteria: x    ASSESSMENT AND PLAN:  Multifocal pneumonia.  S/P Septic shock.  Strep Pneumo Bacteremia.  Leukocytosis.  A Fib with RVR.  Anemia.  Renal Insuffiencey.  Hypokalemia.  Left peroneal+ post tibial DVT.    SPO2 in high 90s on room air.  Continue antibiotic per ID.  Being transferred to monitor bed for rapid A Fib.  Not a candidate for full anticoagulation due to SDH, and anemia requiring PRBC transfusion.  Consider Vascular evaluation for DVT.  56+ minutes spent.    08/23/23:   Awake, responsive. Complains of stomach discomfort and at times with difficult swallowing. SPO2 stable on room air.  Converted to sinus rhythm, still on Amiodarone drip.  Continue antibiotics per ID.    08/24/23:  Resting comfortably, no SOB or distress. SPO2 98% .  Converted to sinus rhythm, changed to PO Amiodarone.  Continue Vancomycin per ID.  Will change to adult dose Symbicort.    08/25/23: Awake, responsive and comfortable. SPO2 97%, no distress.  Complains of soreness in throat. Will add Cepacol lozenges.    08/26/23:  Complains of soreness in throat. Will start on Cepacol lozenges.  Getting PRBC transfusion  On Symbicort and Spiriva.  antibiotics per ID    08/27/23: Resting comfortably, no distress. SPO2 96%.  Continue treatment.

## 2023-08-27 NOTE — PROGRESS NOTE ADULT - ASSESSMENT
86-year-old male with history of recurrent lightheadedness and dizziness with multiple admissions to the emergency room for above found down on street for unknown duration.   On admission found to be hypothermic and hypotensive with rapid atrial fibrillation requiring Pressor support    CT of chest appears to suggest bilateral pneumonia   Also, profound acidosis and anemia, receiving blood products while in the emergency room.  blood culture:  Streptococcus pneumoniae: Detec (08.20.23 @ 05:00)  Rates improved on IV amiodarone, converted to sinus  Evidence of acute renal insufficiency   Elevated troponin seen here likely significant demand ischemia in the setting of renal insufficiency.    8/21: transferred to medical floor  8/22: found to be in afib with RVR, 140-160s SBP 80-90s, transferred to tele bed, amiodarone drip -> converted to sinus 8/23 am    -remains in sinus, s/p amiodarone IV loading,  currently continued on maintenance oral dose of amiodarone 200mg po daily, TSH wnl, QTc 464 on 8/24  -No Ac in the setting of acute anemia, subdural hematoma   -TTE with Mildly decreased global left ventricular systolic function. BLAE, Mod - severe MR/TR, severe pHTN, Unknown previous Hx, no overt fluid overload noted  -ABx as per ID for Strep pneumoniae bacteremia w/ evidence of b/l pneumonia and lactic acidosis  -Pt with underweight, Severe protein-calorie malnutrition, nutrition eval appreciated   -monitor h/h, transfuse as needed, cont on feso4, anemia w/u as per primary team  -vascular follow up for DVTs  -activities as tolerated

## 2023-08-28 LAB
ALBUMIN SERPL ELPH-MCNC: 1.7 G/DL — LOW (ref 3.3–5)
ALP SERPL-CCNC: 68 U/L — SIGNIFICANT CHANGE UP (ref 40–120)
ALT FLD-CCNC: 54 U/L — SIGNIFICANT CHANGE UP (ref 12–78)
ANION GAP SERPL CALC-SCNC: 4 MMOL/L — LOW (ref 5–17)
AST SERPL-CCNC: 50 U/L — HIGH (ref 15–37)
BILIRUB SERPL-MCNC: 0.4 MG/DL — SIGNIFICANT CHANGE UP (ref 0.2–1.2)
BLD GP AB SCN SERPL QL: SIGNIFICANT CHANGE UP
BUN SERPL-MCNC: 18 MG/DL — SIGNIFICANT CHANGE UP (ref 7–23)
CALCIUM SERPL-MCNC: 7.5 MG/DL — LOW (ref 8.5–10.1)
CHLORIDE SERPL-SCNC: 112 MMOL/L — HIGH (ref 96–108)
CO2 SERPL-SCNC: 25 MMOL/L — SIGNIFICANT CHANGE UP (ref 22–31)
CORTICOSTEROID BINDING GLOBULIN RESULT: 1.2 MG/DL — LOW
CORTIS F/TOTAL MFR SERPL: 85 % — SIGNIFICANT CHANGE UP
CORTIS SERPL-MCNC: 55 UG/DL — HIGH
CORTISOL, FREE RESULT: 47 UG/DL — HIGH
CREAT SERPL-MCNC: 1.13 MG/DL — SIGNIFICANT CHANGE UP (ref 0.5–1.3)
DIR ANTIGLOB POLYSPECIFIC INTERPRETATION: SIGNIFICANT CHANGE UP
EGFR: 63 ML/MIN/1.73M2 — SIGNIFICANT CHANGE UP
GLUCOSE BLDC GLUCOMTR-MCNC: 102 MG/DL — HIGH (ref 70–99)
GLUCOSE SERPL-MCNC: 137 MG/DL — HIGH (ref 70–99)
HAPTOGLOB SERPL-MCNC: 264 MG/DL — HIGH (ref 34–200)
HCT VFR BLD CALC: 22.7 % — LOW (ref 39–50)
HGB BLD-MCNC: 7.1 G/DL — LOW (ref 13–17)
LDH SERPL L TO P-CCNC: 335 U/L — HIGH (ref 50–242)
MCHC RBC-ENTMCNC: 22.5 PG — LOW (ref 27–34)
MCHC RBC-ENTMCNC: 31.3 G/DL — LOW (ref 32–36)
MCV RBC AUTO: 72.1 FL — LOW (ref 80–100)
NRBC # BLD: 0 /100 WBCS — SIGNIFICANT CHANGE UP (ref 0–0)
PLATELET # BLD AUTO: SIGNIFICANT CHANGE UP K/UL (ref 150–400)
POTASSIUM SERPL-MCNC: 4 MMOL/L — SIGNIFICANT CHANGE UP (ref 3.5–5.3)
POTASSIUM SERPL-SCNC: 4 MMOL/L — SIGNIFICANT CHANGE UP (ref 3.5–5.3)
PROT SERPL-MCNC: 6.1 GM/DL — SIGNIFICANT CHANGE UP (ref 6–8.3)
RBC # BLD: 3.15 M/UL — LOW (ref 4.2–5.8)
RBC # BLD: 3.28 M/UL — LOW (ref 4.2–5.8)
RBC # FLD: 28.2 % — HIGH (ref 10.3–14.5)
RETICS #: 27.9 K/UL — SIGNIFICANT CHANGE UP (ref 25–125)
RETICS/RBC NFR: 0.9 % — SIGNIFICANT CHANGE UP (ref 0.5–2.5)
SODIUM SERPL-SCNC: 141 MMOL/L — SIGNIFICANT CHANGE UP (ref 135–145)
WBC # BLD: 8.13 K/UL — SIGNIFICANT CHANGE UP (ref 3.8–10.5)
WBC # FLD AUTO: 8.13 K/UL — SIGNIFICANT CHANGE UP (ref 3.8–10.5)

## 2023-08-28 PROCEDURE — 99232 SBSQ HOSP IP/OBS MODERATE 35: CPT | Mod: FS

## 2023-08-28 PROCEDURE — 93010 ELECTROCARDIOGRAM REPORT: CPT

## 2023-08-28 PROCEDURE — 99233 SBSQ HOSP IP/OBS HIGH 50: CPT | Mod: FS

## 2023-08-28 RX ADMIN — Medication 325 MILLIGRAM(S): at 06:19

## 2023-08-28 RX ADMIN — HEPARIN SODIUM 5000 UNIT(S): 5000 INJECTION INTRAVENOUS; SUBCUTANEOUS at 14:15

## 2023-08-28 RX ADMIN — Medication 325 MILLIGRAM(S): at 21:30

## 2023-08-28 RX ADMIN — AMIODARONE HYDROCHLORIDE 200 MILLIGRAM(S): 400 TABLET ORAL at 06:20

## 2023-08-28 RX ADMIN — TIOTROPIUM BROMIDE 2 PUFF(S): 18 CAPSULE ORAL; RESPIRATORY (INHALATION) at 14:15

## 2023-08-28 RX ADMIN — PANTOPRAZOLE SODIUM 40 MILLIGRAM(S): 20 TABLET, DELAYED RELEASE ORAL at 06:19

## 2023-08-28 RX ADMIN — Medication 325 MILLIGRAM(S): at 14:11

## 2023-08-28 RX ADMIN — BENZOCAINE AND MENTHOL 1 LOZENGE: 5; 1 LIQUID ORAL at 14:11

## 2023-08-28 RX ADMIN — BENZOCAINE AND MENTHOL 1 LOZENGE: 5; 1 LIQUID ORAL at 06:20

## 2023-08-28 RX ADMIN — HEPARIN SODIUM 5000 UNIT(S): 5000 INJECTION INTRAVENOUS; SUBCUTANEOUS at 06:20

## 2023-08-28 RX ADMIN — HEPARIN SODIUM 5000 UNIT(S): 5000 INJECTION INTRAVENOUS; SUBCUTANEOUS at 21:30

## 2023-08-28 RX ADMIN — BUDESONIDE AND FORMOTEROL FUMARATE DIHYDRATE 2 PUFF(S): 160; 4.5 AEROSOL RESPIRATORY (INHALATION) at 18:48

## 2023-08-28 RX ADMIN — BUDESONIDE AND FORMOTEROL FUMARATE DIHYDRATE 2 PUFF(S): 160; 4.5 AEROSOL RESPIRATORY (INHALATION) at 06:25

## 2023-08-28 RX ADMIN — BENZOCAINE AND MENTHOL 1 LOZENGE: 5; 1 LIQUID ORAL at 21:30

## 2023-08-28 NOTE — PROGRESS NOTE ADULT - ASSESSMENT
87 yo M w/ hx of multiple syncopal episodes presented via EMS for an unwitnessed fall w/ hypotension, hypothermia, and afib w/ RVR found to have Strep pneumoniae bacteremia w/ evidence of b/l pneumonia and lactic acidosis, iron deficiency anemia, stable sub-dural hematoma, ANGI, and demand ischemia.    8/22: no fever, RA, WBC elevated 13.97, Cr better 1.94, TTE no vegetation, repeat BCs were collected today in the morning, Ceftriaxone IV continued.   Attending Addendum--  Case reviewed with CATRINA Mcgarry. Her note reviewed and modified as appropriate.   Pneumococcal septicemia due to pneumonia  Minimal leukocytosis, bands present- need to be monitored  Awaiting follow up culture data    8/23: no fevers, tachycardic, RA, WBC better 11.58, Cr 1.57, repeat BCs preliminary with no growth. Initial BCs intermediate to ceftriaxone, ceftriaxone IV stopped, Vancomycin IV started.   Attending Addendum--  Case reviewed with CATRINA Mcgarry. Her note reviewed and modified as appropriate.   Pneumococcus relatively resistant  QTc prolonged, opinions divided as to whether fluoroquinolone ok to give on amiodarone  For now, vancomnycin, target levels 10-15    8/24: no fevers, RA, WBC normalized, Cr is about the same, repeat BCs remain with no growth to date, Vancomycin IV continued. Vanco level is low, drawn after one dose.   Attending Addendum--  Case reviewed with CATRINA Mcgarry. Her note reviewed and modified as appropriate.   FlU cx NTD  Overall appears to be making progress  Intensive monitoring of vancomycin levels is required to monitor for toxicity. Frequency and type of monitoring varies from patient to patient and case to case. Serum vancomycin trough levels are typically checked before the 3rd or 4th dose initially and as clinically indicated thereafter to monitor for drug levels which might contribute to nephrotoxicity.    8/25: Pt does not want any of his information to be shared with his family    Pt is afebrile, RA, WBC, Cr better 1.38, repeat BCs with no growth to date, culture data reviewed, pt is on Vancomycin IV. The case was discussed with cardiology service, ok to change abx to po Levaquin with close monitoring of pt's LFTs and QT interval (current interval is 464) as the pt on Amiodarone. The pt will need two weeks of abx from negative BCs.   Attending Addendum--  Case reviewed with NP Pari Mcgarry. Her note reviewed and modified as appropriate.   Cardiology input appreciated.  Follow-up cultures remain negative to date.  White blood cell count has normalized.  Creatinine stable to improved.  Antibiotics as previous.  I will be available via Teams for any issues that may arise over the weekend.    8/28: no fevers, RA, no wbc, Hgb 7.1 - received PRBCs transfusions over the weekend, received one unit today, Cr ok, AST slightly elevated, ALT ok, repeat BCs remain with no growth to date, Levaquin po continued until 9/4/2023. LFTs and QT intervals need to be monitored as the pt is on combination of medications, Levaquin and Amiodarone.       Plan:  continue po Levaquin 750 mg q 48 hrs - last dose 9/4/2023  monitor pt's LFTs closely (pt is on Amiodarone)  monitor pt's QT intervals closely (pt is on Amiodarone)  follow repeat blood cultures - NGTD  monitor respiratory status and O2 saturation   incentive spirometer   monitor Hb closely and transfuse if < 7.0  trend creatinine and ensure hydration with good urine output  Pt does not want any of his information to be shared with his family

## 2023-08-28 NOTE — PROGRESS NOTE ADULT - ASSESSMENT
86-year-old male with history of recurrent lightheadedness and dizziness with multiple admissions to the emergency room for above found down on street for unknown duration.   On admission found to be hypothermic and hypotensive with rapid atrial fibrillation requiring Pressor support    CT of chest appears to suggest bilateral pneumonia   Also, profound acidosis and anemia, receiving blood products while in the emergency room.  blood culture:  Streptococcus pneumoniae: Detec (08.20.23 @ 05:00)  Rates improved on IV amiodarone, converted to sinus  Evidence of acute renal insufficiency   Elevated troponin seen here likely significant demand ischemia in the setting of renal insufficiency.    8/21: transferred to medical floor  8/22: found to be in afib with RVR, 140-160s SBP 80-90s, transferred to tele bed, amiodarone drip -> converted to sinus 8/23 am    -remains in sinus, s/p amiodarone IV loading,  currently continued on maintenance oral dose of amiodarone 200mg po daily, TSH wnl, QTc 464 on 8/24  -No Ac in the setting of acute anemia, subdural hematoma   -TTE with Mildly decreased global left ventricular systolic function. BLAE, Mod - severe MR/TR, severe pHTN, Unknown previous Hx, no overt fluid overload noted  -ABx as per ID for Strep pneumoniae bacteremia w/ evidence of b/l pneumonia and lactic acidosis  -Pt with underweight, Severe protein-calorie malnutrition, nutrition eval appreciated   -monitor h/h, transfuse as needed, cont on feso4, anemia w/u as per primary team, ?GI/hematology eval  -activities as tolerated    86-year-old male with history of recurrent lightheadedness and dizziness with multiple admissions to the emergency room for above found down on street for unknown duration.   On admission found to be hypothermic and hypotensive with rapid atrial fibrillation requiring Pressor support    CT of chest appears to suggest bilateral pneumonia   Also, profound acidosis and anemia, receiving blood products while in the emergency room.  blood culture:  Streptococcus pneumoniae: Detec (08.20.23 @ 05:00)  Rates improved on IV amiodarone, converted to sinus  Evidence of acute renal insufficiency   Elevated troponin seen here likely significant demand ischemia in the setting of renal insufficiency.    8/21: transferred to medical floor  8/22: found to be in afib with RVR, 140-160s SBP 80-90s, transferred to tele bed, amiodarone drip -> converted to sinus 8/23 am    -remains in sinus, s/p amiodarone IV loading,  currently continued on maintenance oral dose of amiodarone 200mg po daily, TSH wnl, QTc 464   -No Ac in the setting of acute anemia, subdural hematoma   -TTE with Mildly decreased global left ventricular systolic function. 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

## 2023-08-28 NOTE — PROGRESS NOTE ADULT - SUBJECTIVE AND OBJECTIVE BOX
NAIN WEISS  MRN-[id number]    Follow Up:  strep pneumo bacteremia, pna    Interval History: the pt was seen and examined earlier, no acute distress, pt is awake and alert, not very interested in a conversation, however, answers questions appropriately. Pt is afebrile, RA, denies any pain.     PAST MEDICAL & SURGICAL HISTORY:  No pertinent past medical history          ROS:    [ ] Unobtainable because:  [x ] All other systems negative    Constitutional: no fever, no chills  Head: no trauma  Eyes: no vision changes, no eye pain  ENT:  no sore throat, no rhinorrhea  Cardiovascular:  no chest pain, no palpitation  Respiratory:  no SOB, no cough  GI:  no abd pain, no vomiting, no diarrhea  urinary: no dysuria, no hematuria, no flank pain  musculoskeletal:  no joint pain, no joint swelling  skin:  no rash  neurology:  no headache, no seizure, no change in mental status  psych: no anxiety, no depression         Allergies  No Known Allergies        ANTIMICROBIALS:  levoFLOXacin  Tablet 750 every 48 hours      OTHER MEDS:  acetaminophen     Tablet .. 650 milliGRAM(s) Oral every 6 hours PRN  albuterol    90 MICROgram(s) HFA Inhaler 2 Puff(s) Inhalation every 6 hours PRN  aluminum hydroxide/magnesium hydroxide/simethicone Suspension 30 milliLiter(s) Oral every 4 hours PRN  aMIOdarone    Tablet 200 milliGRAM(s) Oral daily  benzocaine/menthol Lozenge 1 Lozenge Oral every 8 hours  benzocaine/menthol Lozenge 1 Lozenge Oral four times a day PRN  budesonide 160 MICROgram(s)/formoterol 4.5 MICROgram(s) Inhaler 2 Puff(s) Inhalation two times a day  diltiazem Injectable 10 milliGRAM(s) IV Push once PRN  ferrous    sulfate 325 milliGRAM(s) Oral three times a day  guaiFENesin Oral Liquid (Sugar-Free) 100 milliGRAM(s) Oral every 6 hours PRN  heparin   Injectable 5000 Unit(s) SubCutaneous every 8 hours  pantoprazole    Tablet 40 milliGRAM(s) Oral before breakfast  tiotropium 2.5 MICROgram(s) Inhaler 2 Puff(s) Inhalation daily      Vital Signs Last 24 Hrs  T(C): 36.7 (28 Aug 2023 13:15), Max: 37.2 (27 Aug 2023 17:07)  T(F): 98 (28 Aug 2023 13:15), Max: 99 (27 Aug 2023 17:07)  HR: 82 (28 Aug 2023 13:15) (69 - 84)  BP: 125/70 (28 Aug 2023 13:15) (106/54 - 132/56)  BP(mean): --  RR: 17 (28 Aug 2023 13:15) (16 - 18)  SpO2: 95% (28 Aug 2023 13:15) (95% - 98%)    Parameters below as of 28 Aug 2023 13:15  Patient On (Oxygen Delivery Method): room air        Physical Exam:  Constitutional: chronically ill appearing male, cachectic   HEAD/EYES: anicteric, no conjunctival injection, temporal wasting   ENT:  supple, no thrush, clavicular wasting dry dark colored tongue  Cardiovascular:   normal S1, S2, no murmur, no edema  Respiratory:  diminished bilaterally, no wheezes, no rhonchi  Musculoskeletal:  no synovitis, normal ROM  Neurologic: awake and alert, generally decreased strength no focal findings  Skin:  no rash, no erythema, no phlebitis  Heme/Onc: no lymphadenopathy   Psychiatric:  awake, alert, appropriate    WBC Count: 8.13 K/uL (08-28 @ 06:36)  WBC Count: 9.22 K/uL (08-27 @ 10:30)  WBC Count: 8.86 K/uL (08-26 @ 07:30)  WBC Count: 6.16 K/uL (08-25 @ 07:15)  WBC Count: 6.77 K/uL (08-24 @ 06:18)  WBC Count: 11.58 K/uL (08-23 @ 07:05)  WBC Count: 13.97 K/uL (08-22 @ 06:45)                            7.1    8.13  )-----------( CLUMPED    ( 28 Aug 2023 06:36 )             22.7       08-28    141  |  112<H>  |  18  ----------------------------<  137<H>  4.0   |  25  |  1.13    Ca    7.5<L>      28 Aug 2023 09:50    TPro  6.1  /  Alb  1.7<L>  /  TBili  0.4  /  DBili  x   /  AST  50<H>  /  ALT  54  /  AlkPhos  68  08-28      Urinalysis Basic - ( 28 Aug 2023 09:50 )    Color: x / Appearance: x / SG: x / pH: x  Gluc: 137 mg/dL / Ketone: x  / Bili: x / Urobili: x   Blood: x / Protein: x / Nitrite: x   Leuk Esterase: x / RBC: x / WBC x   Sq Epi: x / Non Sq Epi: x / Bacteria: x        Creatinine Trend: 1.13<--, 1.28<--, 1.38<--, 1.57<--, 1.57<--, 1.94<--      MICROBIOLOGY:  v  .Blood Blood-Peripheral  08-22-23   No growth at 5 days  --  --      .Blood Blood-Peripheral  08-22-23   No growth at 5 days  --  --      Clean Catch Clean Catch (Midstream)  08-20-23   No growth  --  --      .Blood Blood  08-20-23   No growth at 5 days  --  --      .Blood Blood  08-20-23   Growth in aerobic bottle: Streptococcus pneumoniae  Direct identification is available within approximately 3-5  hours either by Blood Panel Multiplexed PCR or Direct  MALDI-TOF. Details: [email or web address]  --  Blood Culture PCR  Streptococcus pneumoniae      Ferritin: 40 (08-21)  Ferritin: 46 (08-20)      SARS-CoV-2: NotDetec (20 Aug 2023 17:50)    RADIOLOGY:

## 2023-08-28 NOTE — PROGRESS NOTE ADULT - SUBJECTIVE AND OBJECTIVE BOX
INTERVAL HPI:   87 yo M with multiple syncopal episodes per chart notes,  on no home meds   Brought by EMS after being found down on street minimally responsive (8/20/23).   In the ED, was hypotensive, hypothermic (rectal T 90F), and tachycardic (afib w/ RVR).   Labs were remarkable for anemia (Hgb 6.1), leukocytosis w/ 25% band, lactic acidosis (lactate 14.4), ANGI, and elevated troponin   Got treated w/ vanc/zosyn, fluids, 1U pRBCs, IV amiodarone bolus (resolved arrhythmia), and a non-rebreather mask.   CT imaging showed an age-indeterminate L frontal sub-dural hematoma (4mm) and lung findings with pneumonia and emphysema/ILD.   RVP was entero/rhinovirus positive.   He was switched to ceftriaxone/azithromycin, was started on ipratropium nebulizers, and got  admitted to the ICU for new pressor requirement (norepinephrine).    During his ICU course, his blood cultures grew Strep pneumonia (on appropriate abx), lower extremity duplex study showed L tibioperoneal trunk and proximal posterior tibial vein DVTs (on heparin ppx), norepinephrine was weaned off w/ adequate MAPs,   Repeat CT head showed stable sub-dural hematoma, and iron supplementation was started.   08/21/23:  Got transferred to regular medical floor.  08/22/23:  At time of my evaluation, awake, responsive. Denies SOB, cough, sputum production or chest pain.  Admits being a smoker but says quit about a year ago.  Again in rapid A Fib and being transferred to monitor bed. Seen by Cardiology.    OVERNIGHT EVENTS:  Awake responsive and comfortable.    Vital Signs Last 24 Hrs  T(C): 36.7 (28 Aug 2023 16:05), Max: 37.2 (28 Aug 2023 00:33)  T(F): 98.1 (28 Aug 2023 16:05), Max: 98.9 (28 Aug 2023 00:33)  HR: 68 (28 Aug 2023 16:05) (68 - 82)  BP: 130/69 (28 Aug 2023 16:05) (111/58 - 132/56)  BP(mean): --  RR: 18 (28 Aug 2023 16:05) (16 - 18)  SpO2: 98% (28 Aug 2023 16:05) (95% - 98%)    Parameters below as of 28 Aug 2023 16:05  Patient On (Oxygen Delivery Method): room air    PHYSICAL EXAM:  GEN:         Awake, responsive and comfortable.  HEENT:    Normal.    RESP:       no wheezing.  CVS:          Regular rate and rhythm.     MEDICATIONS  (STANDING):  aMIOdarone    Tablet 200 milliGRAM(s) Oral daily  benzocaine/menthol Lozenge 1 Lozenge Oral every 8 hours  budesonide 160 MICROgram(s)/formoterol 4.5 MICROgram(s) Inhaler 2 Puff(s) Inhalation two times a day  ferrous    sulfate 325 milliGRAM(s) Oral three times a day  heparin   Injectable 5000 Unit(s) SubCutaneous every 8 hours  levoFLOXacin  Tablet 750 milliGRAM(s) Oral every 48 hours  pantoprazole    Tablet 40 milliGRAM(s) Oral before breakfast  tiotropium 2.5 MICROgram(s) Inhaler 2 Puff(s) Inhalation daily    MEDICATIONS  (PRN):  acetaminophen     Tablet .. 650 milliGRAM(s) Oral every 6 hours PRN Moderate Pain (4 - 6)  albuterol    90 MICROgram(s) HFA Inhaler 2 Puff(s) Inhalation every 6 hours PRN Shortness of Breath and/or Wheezing  aluminum hydroxide/magnesium hydroxide/simethicone Suspension 30 milliLiter(s) Oral every 4 hours PRN Dyspepsia  benzocaine/menthol Lozenge 1 Lozenge Oral four times a day PRN Sore Throat  diltiazem Injectable 10 milliGRAM(s) IV Push once PRN sustained HR >130  guaiFENesin Oral Liquid (Sugar-Free) 100 milliGRAM(s) Oral every 6 hours PRN Cough    LABS:                        7.1    8.13  )-----------( CLUMPED    ( 28 Aug 2023 06:36 )             22.7     08-28    141  |  112<H>  |  18  ----------------------------<  137<H>  4.0   |  25  |  1.13    Ca    7.5<L>      28 Aug 2023 09:50    TPro  6.1  /  Alb  1.7<L>  /  TBili  0.4  /  DBili  x   /  AST  50<H>  /  ALT  54  /  AlkPhos  68  08-28    Urinalysis Basic - ( 28 Aug 2023 09:50 )    Color: x / Appearance: x / SG: x / pH: x  Gluc: 137 mg/dL / Ketone: x  / Bili: x / Urobili: x   Blood: x / Protein: x / Nitrite: x   Leuk Esterase: x / RBC: x / WBC x   Sq Epi: x / Non Sq Epi: x / Bacteria: x    ASSESSMENT AND PLAN:  Multifocal pneumonia.  S/P Septic shock.  Strep Pneumo Bacteremia.  Leukocytosis.  A Fib with RVR.  Anemia.  Renal Insuffiencey.  Hypokalemia.  Left peroneal+ post tibial DVT.    SPO2 in high 90s on room air.  Continue antibiotic per ID.  Being transferred to monitor bed for rapid A Fib.  Not a candidate for full anticoagulation due to SDH, and anemia requiring PRBC transfusion.  Consider Vascular evaluation for DVT.  56+ minutes spent.    08/23/23:   Awake, responsive. Complains of stomach discomfort and at times with difficult swallowing. SPO2 stable on room air.  Converted to sinus rhythm, still on Amiodarone drip.  Continue antibiotics per ID.    08/24/23:  Resting comfortably, no SOB or distress. SPO2 98% .  Converted to sinus rhythm, changed to PO Amiodarone.  Continue Vancomycin per ID.  Will change to adult dose Symbicort.    08/25/23: Awake, responsive and comfortable. SPO2 97%, no distress.  Complains of soreness in throat. Will add Cepacol lozenges.    08/26/23:  Complains of soreness in throat. Will start on Cepacol lozenges.  Getting PRBC transfusion  On Symbicort and Spiriva.  antibiotics per ID    08/27/23: Resting comfortably, no distress. SPO2 96%.  Continue treatment.    08/28/23:  Awake, responsive and comfortable.  SPO2 98%.  On Symbicort and Levaquin.

## 2023-08-28 NOTE — PROGRESS NOTE ADULT - NS ATTEND AMEND GEN_ALL_CORE FT
I have reviewed all pertinent clinical information and agree with the NP's note.   continue po Levaquin 750 mg q 48 hrs - last dose 9/4/2023  monitor pt's LFTs closely (pt is on Amiodarone)  monitor pt's QT intervals closely (pt is on Amiodarone)    Jose Armando Sotelo, DO  Infectious Disease Attending  Reachable via Microsoft Teams or ID office: [phone]  After 5pm/weekends please call [phone] for all inquiries and new consults

## 2023-08-28 NOTE — PROGRESS NOTE ADULT - SUBJECTIVE AND OBJECTIVE BOX
REN SPOKE TO THE MOTHER AND THE NURSE DID NOT TELL HER THEY NEEDED TO GO BACK FOR LAB WORK. PLEASE CALL REN BACK. PROGRESS NOTE:     Patient is a 88y old  Male who presents with a chief complaint of found  down (27 Aug 2023 13:58)          SUBJECTIVE & OBJECTIVE:   Pt seen and examined at bedside in AM    no overnight events.   no new complaints    REVIEW OF SYSTEMS: remaining ROS negative     PHYSICAL EXAM:  Vital Signs Last 24 Hrs  T(C): 36.7 (28 Aug 2023 16:05), Max: 37.2 (27 Aug 2023 17:07)  T(F): 98.1 (28 Aug 2023 16:05), Max: 99 (27 Aug 2023 17:07)  HR: 68 (28 Aug 2023 16:05) (68 - 84)  BP: 130/69 (28 Aug 2023 16:05) (106/54 - 132/56)  BP(mean): --  RR: 18 (28 Aug 2023 16:05) (16 - 18)  SpO2: 98% (28 Aug 2023 16:05) (95% - 98%)    Parameters below as of 28 Aug 2023 16:05  Patient On (Oxygen Delivery Method): room air            GENERAL: NAD,  no increased WOB  HEAD:  Atraumatic, Normocephalic  EYES: EOMI, conjunctiva and sclera clear  ENMT: Moist mucous membranes  NECK: Supple, No JVD  NERVOUS SYSTEM:  Alert, no focal neuro deficits   CHEST/LUNG: Clear to auscultation bilaterally; No rales, rhonchi, wheezing, or rubs  HEART: Regular rate and rhythm; No murmurs, rubs, or gallops  ABDOMEN: Soft, Nontender, Nondistended; Bowel sounds present  EXTREMITIES:  2+ Peripheral Pulses b/l, No clubbing, cyanosis, calf tenderness or edema b/l      MEDICATIONS  (STANDING):  aMIOdarone    Tablet 200 milliGRAM(s) Oral daily  benzocaine/menthol Lozenge 1 Lozenge Oral every 8 hours  budesonide 160 MICROgram(s)/formoterol 4.5 MICROgram(s) Inhaler 2 Puff(s) Inhalation two times a day  ferrous    sulfate 325 milliGRAM(s) Oral three times a day  heparin   Injectable 5000 Unit(s) SubCutaneous every 8 hours  levoFLOXacin  Tablet 750 milliGRAM(s) Oral every 48 hours  pantoprazole    Tablet 40 milliGRAM(s) Oral before breakfast  tiotropium 2.5 MICROgram(s) Inhaler 2 Puff(s) Inhalation daily      MEDICATIONS  (PRN):  acetaminophen     Tablet .. 650 milliGRAM(s) Oral every 6 hours PRN Moderate Pain (4 - 6)  albuterol    90 MICROgram(s) HFA Inhaler 2 Puff(s) Inhalation every 6 hours PRN Shortness of Breath and/or Wheezing  aluminum hydroxide/magnesium hydroxide/simethicone Suspension 30 milliLiter(s) Oral every 4 hours PRN Dyspepsia  benzocaine/menthol Lozenge 1 Lozenge Oral four times a day PRN Sore Throat  diltiazem Injectable 10 milliGRAM(s) IV Push once PRN sustained HR >130  guaiFENesin Oral Liquid (Sugar-Free) 100 milliGRAM(s) Oral every 6 hours PRN Cough      Allergies    No Known Allergies    Intolerances              LABS:                           7.1    8.13  )-----------( CLUMPED    ( 28 Aug 2023 06:36 )             22.7     08-28    141  |  112<H>  |  18  ----------------------------<  137<H>  4.0   |  25  |  1.13    Ca    7.5<L>      28 Aug 2023 09:50    TPro  6.1  /  Alb  1.7<L>  /  TBili  0.4  /  DBili  x   /  AST  50<H>  /  ALT  54  /  AlkPhos  68  08-28    LIVER FUNCTIONS - ( 28 Aug 2023 09:50 )  Alb: 1.7 g/dL / Pro: 6.1 gm/dL / ALK PHOS: 68 U/L / ALT: 54 U/L / AST: 50 U/L / GGT: x             Urinalysis Basic - ( 28 Aug 2023 09:50 )    Color: x / Appearance: x / SG: x / pH: x  Gluc: 137 mg/dL / Ketone: x  / Bili: x / Urobili: x   Blood: x / Protein: x / Nitrite: x   Leuk Esterase: x / RBC: x / WBC x   Sq Epi: x / Non Sq Epi: x / Bacteria: x          CAPILLARY BLOOD GLUCOSE      POCT Blood Glucose.: 103 mg/dL (26 Aug 2023 16:53)                RECENT CULTURES:          RADIOLOGY & ADDITIONAL TESTS:      Radiology reports read and imaging reviewed  :  [ ] YES  [ ] NO  (I am not a radiologist and therefore rely on Radiologist reports to facilitate with diagnosis and treatment plans)    Consultant(s) Notes Reviewed:  [x ] YES  [ ] NO    Care Discussed with Consultants/Other Providers [x ] YES  [ ] NO  Care plan and all findings were discussed in detail with patient.  All questions and concerns addressed

## 2023-08-28 NOTE — CHART NOTE - NSCHARTNOTEFT_GEN_A_CORE
Chart reviewed and events to date noted. Patient received x1 unit of pRBC today and yet to have CBC resulted. Patient reporting fatigue at rest and feels chills. Discussed c ANNA Da Silva -- defer PT attempt tonight c patient's refusal and pending CBC s/p transfusion, following recent symptomatic cardiac events. Will continue to follow.

## 2023-08-28 NOTE — PROGRESS NOTE ADULT - ASSESSMENT
89 yo M w/ hx of multiple syncopal episodes presented via EMS for an unwitnessed fall w/ hypotension, hypothermia, and afib w/ RVR concerning for sepsis. He was found to have Strep pneumonia bacteremia w/ evidence of b/l pneumonia and lactic acidosis, iron deficiency anemia, stable sub-dural hematoma, ANGI, and demand ischemia.        Metabolic encephalopathy:  - 2/2 sepsis  - Now A&Ox3, stable L frontal SDH    Acute hypoxic respiratory failure:  - pneumonia w/ findings c/w emphysema/ILD, weaned off NC on room air, on ipratropium q8h (consider transition to duonebs), chest PT, maintain SpO2 > 92%  - C/w symciort and Spiriva  - albuterol PRN  - Pulm following     Sepsis with septic shock POA:  - With strep pneumo bacteremia 2/2 PNA  - Weaned off norepinephrine  - Initial BC positive for strep, intermediate sensitivity to Rocephin, sensitive to Vanco  - Repeat Bcx negative  - Currently on levaquin 750mg q 48hrs  - follow up repeat blood clx    Afib:  - s/p amio bolus and gtt for afib w/ RVR (converted), currently on sinus rthym   - troponin elevation c/w demand ischemia   - Echo with Mildly decreased global left ventricular systolic function. BLAE, Mod - severe MR/TR, severe pHTN  - Not on Ac for anemia and SDH  - Continue with PO amiodarone  - Cardio following     ANGI:  - 2/2 septic ATN  - Cr improving   - Pending serologies for pulm renal syndrome  - Renal following     acute blood loss Anemia:  - hx of iron deficiency anemia  - S/P 3 units PRBC  - Did not recieve full amount of pRBC on 8/25  - HgB 7.1  - Retic negative, haptoglobin high, LDH high  - No obvious sign of bleed  - F/u FOBT  - Transfuse 1 unit of pRBC  - Iron deficient, continue iron supplementation  - f/u H&H in AM      Acute DVT :  - Not on AC for anemia and SDH  - On DVT ppx dose  - Vascular follow up    GI ppx  - Protonix

## 2023-08-28 NOTE — PROGRESS NOTE ADULT - SUBJECTIVE AND OBJECTIVE BOX
Patient is a 88y old  Male who presents with AMS, SEPSIS, ANEMIA, pt was found down    PAST MEDICAL & SURGICAL HISTORY:    lightheadedness and dizziness    INTERVAL HISTORY: in no acute distress, + dyspnea, denies any chest pain    	  MEDICATIONS:  MEDICATIONS  (STANDING):  aMIOdarone    Tablet 200 milliGRAM(s) Oral daily  benzocaine/menthol Lozenge 1 Lozenge Oral every 8 hours  budesonide 160 MICROgram(s)/formoterol 4.5 MICROgram(s) Inhaler 2 Puff(s) Inhalation two times a day  ferrous    sulfate 325 milliGRAM(s) Oral three times a day  heparin   Injectable 5000 Unit(s) SubCutaneous every 8 hours  levoFLOXacin  Tablet 750 milliGRAM(s) Oral every 48 hours  pantoprazole    Tablet 40 milliGRAM(s) Oral before breakfast  tiotropium 2.5 MICROgram(s) Inhaler 2 Puff(s) Inhalation daily    MEDICATIONS  (PRN):  acetaminophen     Tablet .. 650 milliGRAM(s) Oral every 6 hours PRN Moderate Pain (4 - 6)  albuterol    90 MICROgram(s) HFA Inhaler 2 Puff(s) Inhalation every 6 hours PRN Shortness of Breath and/or Wheezing  aluminum hydroxide/magnesium hydroxide/simethicone Suspension 30 milliLiter(s) Oral every 4 hours PRN Dyspepsia  benzocaine/menthol Lozenge 1 Lozenge Oral four times a day PRN Sore Throat  diltiazem Injectable 10 milliGRAM(s) IV Push once PRN sustained HR >130  guaiFENesin Oral Liquid (Sugar-Free) 100 milliGRAM(s) Oral every 6 hours PRN Cough    Vitals:  T(F): 97.9 (08-28-23 @ 10:19), Max: 99 (08-27-23 @ 17:07)  HR: 75 (08-28-23 @ 10:19) (69 - 84)  BP: 123/65 (08-28-23 @ 10:19) (106/54 - 132/56)  RR: 18 (08-28-23 @ 10:19) (16 - 18)  SpO2: 95% (08-28-23 @ 10:19) (95% - 98%)    08-27 @ 07:01  -  08-28 @ 07:00  --------------------------------------------------------  IN:    Oral Fluid: 780 mL  Total IN: 780 mL    OUT:  Total OUT: 0 mL    Total NET: 780 mL    08-28 @ 07:01  - 08-28 @ 13:17  --------------------------------------------------------  IN:    Oral Fluid: 420 mL  Total IN: 420 mL    OUT:  Total OUT: 0 mL    Total NET: 420 mL    PHYSICAL EXAM:  Neuro: Awake, responsive  CV: S1 S2 RRR + SM  Lungs: diminished to bases   GI: Soft, BS +, ND, NT  Extremities: ankle edema    TELEMETRY: sinus     RADIOLOGY:  < from: US Duplex Venous Lower Ext Complete, Bilateral (08.20.23 @ 17:44) >  Acute deep venous thrombosis: below the knee.    DVT within the left tibioperoneal trunk and proximal posterior tibial   veins.    < end of copied text >  < from: CT Abdomen and Pelvis No Cont (08.20.23 @ 06:49) >  CHEST:  LUNGS AND LARGE AIRWAYS: Patent central airways. Bibasilar patchy   consolidations suggesting pneumonia. Superimposed emphysematous lung   change and interstitial lung disease/honeycombing at the lung bases..  PLEURA: No pleural effusion or pneumothorax.  VESSELS: Limited evaluation of the thoracic aorta without intravenous   contrast, however there is calcific atherosclerosis without aneurysmal   dilatation.  HEART: Borderline enlarged heart.. No pericardial effusion. Coronary   artery calcifications. Anemia suggested.  MEDIASTINUM AND STACY: No lymphadenopathy.  CHEST WALL AND LOWER NECK: Within normal limits.      ABDOMEN AND PELVIS:  LIVER: Within normal limits.  BILE DUCTS: Normal caliber.  GALLBLADDER: Elongated gallbladder without definite calcified gallstone..  SPLEEN: Within normal limits.  PANCREAS: Within normal limits.  ADRENALS: Normal right adrenal gland. Limited left adrenal gland.  KIDNEYS/URETERS: Small bilateral kidneys. No renal stone or   hydronephrosis.    BLADDER: Within normal limits.  REPRODUCTIVE ORGANS: Prostate gland is not grossly enlarged.    BOWEL: Evaluation of bowel is limited without distention with oral   contrast and lack of mesenteric fat, however there is no bowel   obstruction. There is a 7 cm stool distended rectum. Small bowel loops   are not grossly dilated. Stomach is mildly distended with debris.  PERITONEUM: No free air or significant ascites.  VESSELS:  Limited evaluation of the abdominal aorta without intravenous   contrast, demonstrates tortuosity and calcific atherosclerosis without   aneurysmal dilatation. Negative  RETROPERITONEUM: No lymphadenopathy.  ABDOMINAL WALL: Within normal limits.  BONES: Severe scoliosis of the spine with associated multilevel   degenerative changes. Question pagetoid appearance of the right iliac   bone.    IMPRESSION:    Bilateral pneumonia.    < end of copied text >    DIAGNOSTIC TESTING:    [x ] Echocardiogram:  < from: TTE Echo Complete w/o Contrast w/ Doppler (08.21.23 @ 08:17) >  Left Ventricle: Normal left ventricular size and wall thicknesses, with   normal systolic and diastolic function.  Global LV systolic function was mildly decreased. Global longitudinal   strain imaging was performed on [employer] Vivid S70 at a heart rate of 68BPM and   a blood pressure of 108/57 mmHg, average GLS calculated was -19.1%   (normal).  Right Ventricle: Normal right ventricular size and function.  Left Atrium: Moderately enlarged left atrium.  Right Atrium: The right atrium is normal in size. Moderately enlarged   right atrium.  Pericardium: There is no evidence of pericardial effusion.  Mitral Valve: Structurally normal mitral valve, with normal leaflet   excursion. Moderate to severe mitral valve regurgitation is seen.  Tricuspid Valve: Structurally normal tricuspid valve, with normal leaflet   excursion. Moderate-severe tricuspid regurgitation is visualized.   Estimated pulmonary artery systolic pressure is 65.8 mmHg assuming a   right atrial pressure of 15 mmHg, which is consistent with severe   pulmonary hypertension.  Aortic Valve: Normal trileaflet aortic valve with normal opening. No   evidence of aortic valve regurgitation is seen.  Pulmonic Valve: Structurally normal pulmonic valve, with normal leaflet   excursion. Mild pulmonic valve regurgitation.  Aorta: The aortic root and ascending aorta are structurally normal, with   no evidence of dilitation.  Pulmonary Artery: The main pulmonary artery is normal in size.  Venous: The inferior vena cava was normal sized, with respiratory size   variation less than 50%.      Summary:   1. Mildly decreased global left ventricular systolic function.   2. Global longitudinal strain imaging was performed on [employer] Vivid S70 at a   heart rate of 68BPM and a blood pressure of 108/57 mmHg, average GLS   calculated was -19.1% (normal).   3. Moderately enlarged left atrium.   4. Moderately enlarged right atrium.   5. Moderate to severe mitral valve regurgitation.   6. Moderate-severe tricuspid regurgitation.   7. Mild pulmonic valve regurgitation.   8. Estimated pulmonary artery systolic pressure is 65.8 mmHg assuming a   right atrial pressure of 15 mmHg, which is consistent with severe   pulmonary hypertension.    < end of copied text >    LABS:	 	    28 Aug 2023 09:50    141    |  112    |  18     ----------------------------<  137    4.0     |  25     |  1.13   26 Aug 2023 07:30    141    |  114    |  20     ----------------------------<  99     3.8     |  23     |  1.28     Ca    7.5        28 Aug 2023 09:50    TPro  6.1    /  Alb  1.7    /  TBili  0.4    /  DBili  x      /  AST  50     /  ALT  54     /  AlkPhos  68     28 Aug 2023 09:50                        7.1    8.13  )-----------( CLUMPED    ( 28 Aug 2023 06:36 )             22.7 ,                       8.1    9.22  )-----------( 272      ( 27 Aug 2023 10:30 )             25.6 ,                       6.9    8.86  )-----------( 232      ( 26 Aug 2023 07:30 )             21.5

## 2023-08-29 LAB
ALBUMIN SERPL ELPH-MCNC: 1.4 G/DL — LOW (ref 3.3–5)
ALP SERPL-CCNC: 65 U/L — SIGNIFICANT CHANGE UP (ref 40–120)
ALT FLD-CCNC: 51 U/L — SIGNIFICANT CHANGE UP (ref 12–78)
ANION GAP SERPL CALC-SCNC: 4 MMOL/L — LOW (ref 5–17)
AST SERPL-CCNC: 44 U/L — HIGH (ref 15–37)
BASOPHILS # BLD AUTO: 0.03 K/UL — SIGNIFICANT CHANGE UP (ref 0–0.2)
BASOPHILS NFR BLD AUTO: 0.4 % — SIGNIFICANT CHANGE UP (ref 0–2)
BILIRUB DIRECT SERPL-MCNC: 0.2 MG/DL — SIGNIFICANT CHANGE UP (ref 0–0.3)
BILIRUB INDIRECT FLD-MCNC: 0.2 MG/DL — SIGNIFICANT CHANGE UP (ref 0.2–1)
BILIRUB SERPL-MCNC: 0.4 MG/DL — SIGNIFICANT CHANGE UP (ref 0.2–1.2)
BUN SERPL-MCNC: 16 MG/DL — SIGNIFICANT CHANGE UP (ref 7–23)
CALCIUM SERPL-MCNC: 7.4 MG/DL — LOW (ref 8.5–10.1)
CHLORIDE SERPL-SCNC: 113 MMOL/L — HIGH (ref 96–108)
CO2 SERPL-SCNC: 24 MMOL/L — SIGNIFICANT CHANGE UP (ref 22–31)
CREAT SERPL-MCNC: 0.98 MG/DL — SIGNIFICANT CHANGE UP (ref 0.5–1.3)
EGFR: 75 ML/MIN/1.73M2 — SIGNIFICANT CHANGE UP
EOSINOPHIL # BLD AUTO: 0.01 K/UL — SIGNIFICANT CHANGE UP (ref 0–0.5)
EOSINOPHIL NFR BLD AUTO: 0.1 % — SIGNIFICANT CHANGE UP (ref 0–6)
GLUCOSE BLDC GLUCOMTR-MCNC: 104 MG/DL — HIGH (ref 70–99)
GLUCOSE BLDC GLUCOMTR-MCNC: 114 MG/DL — HIGH (ref 70–99)
GLUCOSE BLDC GLUCOMTR-MCNC: 126 MG/DL — HIGH (ref 70–99)
GLUCOSE BLDC GLUCOMTR-MCNC: 97 MG/DL — SIGNIFICANT CHANGE UP (ref 70–99)
GLUCOSE SERPL-MCNC: 83 MG/DL — SIGNIFICANT CHANGE UP (ref 70–99)
HCT VFR BLD CALC: 25.9 % — LOW (ref 39–50)
HGB BLD-MCNC: 7.8 G/DL — LOW (ref 13–17)
HIV 1+2 AB+HIV1 P24 AG SERPL QL IA: SIGNIFICANT CHANGE UP
IMM GRANULOCYTES NFR BLD AUTO: 0.8 % — SIGNIFICANT CHANGE UP (ref 0–0.9)
LYMPHOCYTES # BLD AUTO: 0.55 K/UL — LOW (ref 1–3.3)
LYMPHOCYTES # BLD AUTO: 7 % — LOW (ref 13–44)
MAGNESIUM SERPL-MCNC: 1.9 MG/DL — SIGNIFICANT CHANGE UP (ref 1.6–2.6)
MCHC RBC-ENTMCNC: 23.4 PG — LOW (ref 27–34)
MCHC RBC-ENTMCNC: 30.1 G/DL — LOW (ref 32–36)
MCV RBC AUTO: 77.5 FL — LOW (ref 80–100)
MONOCYTES # BLD AUTO: 0.44 K/UL — SIGNIFICANT CHANGE UP (ref 0–0.9)
MONOCYTES NFR BLD AUTO: 5.6 % — SIGNIFICANT CHANGE UP (ref 2–14)
NEUTROPHILS # BLD AUTO: 6.8 K/UL — SIGNIFICANT CHANGE UP (ref 1.8–7.4)
NEUTROPHILS NFR BLD AUTO: 86.1 % — HIGH (ref 43–77)
NRBC # BLD: 0 /100 WBCS — SIGNIFICANT CHANGE UP (ref 0–0)
PHOSPHATE SERPL-MCNC: 1.7 MG/DL — LOW (ref 2.5–4.5)
PLATELET # BLD AUTO: 295 K/UL — SIGNIFICANT CHANGE UP (ref 150–400)
POTASSIUM SERPL-MCNC: 4.2 MMOL/L — SIGNIFICANT CHANGE UP (ref 3.5–5.3)
POTASSIUM SERPL-SCNC: 4.2 MMOL/L — SIGNIFICANT CHANGE UP (ref 3.5–5.3)
PROT SERPL-MCNC: 5.8 GM/DL — LOW (ref 6–8.3)
RBC # BLD: 3.34 M/UL — LOW (ref 4.2–5.8)
RBC # FLD: 28 % — HIGH (ref 10.3–14.5)
SODIUM SERPL-SCNC: 141 MMOL/L — SIGNIFICANT CHANGE UP (ref 135–145)
WBC # BLD: 7.89 K/UL — SIGNIFICANT CHANGE UP (ref 3.8–10.5)
WBC # FLD AUTO: 7.89 K/UL — SIGNIFICANT CHANGE UP (ref 3.8–10.5)

## 2023-08-29 PROCEDURE — 99231 SBSQ HOSP IP/OBS SF/LOW 25: CPT

## 2023-08-29 PROCEDURE — 99233 SBSQ HOSP IP/OBS HIGH 50: CPT

## 2023-08-29 PROCEDURE — 99232 SBSQ HOSP IP/OBS MODERATE 35: CPT | Mod: FS

## 2023-08-29 PROCEDURE — 93010 ELECTROCARDIOGRAM REPORT: CPT

## 2023-08-29 PROCEDURE — 93970 EXTREMITY STUDY: CPT | Mod: 26

## 2023-08-29 RX ADMIN — BENZOCAINE AND MENTHOL 1 LOZENGE: 5; 1 LIQUID ORAL at 05:12

## 2023-08-29 RX ADMIN — HEPARIN SODIUM 5000 UNIT(S): 5000 INJECTION INTRAVENOUS; SUBCUTANEOUS at 05:12

## 2023-08-29 RX ADMIN — BUDESONIDE AND FORMOTEROL FUMARATE DIHYDRATE 2 PUFF(S): 160; 4.5 AEROSOL RESPIRATORY (INHALATION) at 06:32

## 2023-08-29 RX ADMIN — Medication 325 MILLIGRAM(S): at 14:30

## 2023-08-29 RX ADMIN — AMIODARONE HYDROCHLORIDE 200 MILLIGRAM(S): 400 TABLET ORAL at 05:13

## 2023-08-29 RX ADMIN — Medication 325 MILLIGRAM(S): at 21:11

## 2023-08-29 RX ADMIN — TIOTROPIUM BROMIDE 2 PUFF(S): 18 CAPSULE ORAL; RESPIRATORY (INHALATION) at 12:41

## 2023-08-29 RX ADMIN — Medication 325 MILLIGRAM(S): at 05:13

## 2023-08-29 RX ADMIN — HEPARIN SODIUM 5000 UNIT(S): 5000 INJECTION INTRAVENOUS; SUBCUTANEOUS at 21:11

## 2023-08-29 RX ADMIN — BUDESONIDE AND FORMOTEROL FUMARATE DIHYDRATE 2 PUFF(S): 160; 4.5 AEROSOL RESPIRATORY (INHALATION) at 17:08

## 2023-08-29 RX ADMIN — HEPARIN SODIUM 5000 UNIT(S): 5000 INJECTION INTRAVENOUS; SUBCUTANEOUS at 14:30

## 2023-08-29 RX ADMIN — PANTOPRAZOLE SODIUM 40 MILLIGRAM(S): 20 TABLET, DELAYED RELEASE ORAL at 05:13

## 2023-08-29 NOTE — PROGRESS NOTE ADULT - SUBJECTIVE AND OBJECTIVE BOX
INTERVAL HPI:   87 yo M with multiple syncopal episodes per chart notes,  on no home meds   Brought by EMS after being found down on street minimally responsive (8/20/23).   In the ED, was hypotensive, hypothermic (rectal T 90F), and tachycardic (afib w/ RVR).   Labs were remarkable for anemia (Hgb 6.1), leukocytosis w/ 25% band, lactic acidosis (lactate 14.4), ANGI, and elevated troponin   Got treated w/ vanc/zosyn, fluids, 1U pRBCs, IV amiodarone bolus (resolved arrhythmia), and a non-rebreather mask.   CT imaging showed an age-indeterminate L frontal sub-dural hematoma (4mm) and lung findings with pneumonia and emphysema/ILD.   RVP was entero/rhinovirus positive.   He was switched to ceftriaxone/azithromycin, was started on ipratropium nebulizers, and got  admitted to the ICU for new pressor requirement (norepinephrine).    During his ICU course, his blood cultures grew Strep pneumonia (on appropriate abx), lower extremity duplex study showed L tibioperoneal trunk and proximal posterior tibial vein DVTs (on heparin ppx), norepinephrine was weaned off w/ adequate MAPs,   Repeat CT head showed stable sub-dural hematoma, and iron supplementation was started.   08/21/23:  Got transferred to regular medical floor.  08/22/23:  At time of my evaluation, awake, responsive. Denies SOB, cough, sputum production or chest pain.  Admits being a smoker but says quit about a year ago.  Again in rapid A Fib and being transferred to monitor bed. Seen by Cardiology.    OVERNIGHT EVENTS:  Weak looking, denies SOB. SPO2 97%     Vital Signs Last 24 Hrs  T(C): 37.1 (29 Aug 2023 16:21), Max: 37.2 (29 Aug 2023 11:17)  T(F): 98.8 (29 Aug 2023 16:21), Max: 98.9 (29 Aug 2023 11:17)  HR: 68 (29 Aug 2023 16:21) (62 - 74)  BP: 126/57 (29 Aug 2023 16:21) (112/52 - 144/69)  BP(mean): --  RR: 18 (29 Aug 2023 16:21) (16 - 18)  SpO2: 97% (29 Aug 2023 16:21) (96% - 97%)    Parameters below as of 29 Aug 2023 16:21  Patient On (Oxygen Delivery Method): room air    PHYSICAL EXAM:  GEN:         Awake, responsive and comfortable.  HEENT:    Normal.    RESP:       no wheezing.  CVS:         Regular rate and rhythm.     MEDICATIONS  (STANDING):  aMIOdarone    Tablet 200 milliGRAM(s) Oral daily  budesonide 160 MICROgram(s)/formoterol 4.5 MICROgram(s) Inhaler 2 Puff(s) Inhalation two times a day  ferrous    sulfate 325 milliGRAM(s) Oral three times a day  heparin   Injectable 5000 Unit(s) SubCutaneous every 8 hours  levoFLOXacin  Tablet 750 milliGRAM(s) Oral every 48 hours  pantoprazole    Tablet 40 milliGRAM(s) Oral before breakfast  tiotropium 2.5 MICROgram(s) Inhaler 2 Puff(s) Inhalation daily    MEDICATIONS  (PRN):  acetaminophen     Tablet .. 650 milliGRAM(s) Oral every 6 hours PRN Moderate Pain (4 - 6)  albuterol    90 MICROgram(s) HFA Inhaler 2 Puff(s) Inhalation every 6 hours PRN Shortness of Breath and/or Wheezing  aluminum hydroxide/magnesium hydroxide/simethicone Suspension 30 milliLiter(s) Oral every 4 hours PRN Dyspepsia  benzocaine/menthol Lozenge 1 Lozenge Oral four times a day PRN Sore Throat  diltiazem Injectable 10 milliGRAM(s) IV Push once PRN sustained HR >130  guaiFENesin Oral Liquid (Sugar-Free) 100 milliGRAM(s) Oral every 6 hours PRN Cough    LABS:                        7.8    7.89  )-----------( 295      ( 29 Aug 2023 06:56 )             25.9     08-29    141  |  113<H>  |  16  ----------------------------<  83  4.2   |  24  |  0.98    Ca    7.4<L>      29 Aug 2023 06:56  Phos  1.7     08-29  Mg     1.9     08-29    TPro  5.8<L>  /  Alb  1.4<L>  /  TBili  0.4  /  DBili  0.2  /  AST  44<H>  /  ALT  51  /  AlkPhos  65  08-29    Urinalysis Basic - ( 29 Aug 2023 06:56 )    Color: x / Appearance: x / SG: x / pH: x  Gluc: 83 mg/dL / Ketone: x  / Bili: x / Urobili: x   Blood: x / Protein: x / Nitrite: x   Leuk Esterase: x / RBC: x / WBC x   Sq Epi: x / Non Sq Epi: x / Bacteria: x    ASSESSMENT AND PLAN:  Multifocal pneumonia.  S/P Septic shock.  Strep Pneumo Bacteremia.  Leukocytosis.  A Fib with RVR.  Anemia.  Renal Insuffiencey.  Hypokalemia.  Left peroneal+ post tibial DVT.    SPO2 in high 90s on room air.  Continue antibiotic per ID.  Being transferred to monitor bed for rapid A Fib.  Not a candidate for full anticoagulation due to SDH, and anemia requiring PRBC transfusion.  Consider Vascular evaluation for DVT.  56+ minutes spent.    08/23/23:   Awake, responsive. Complains of stomach discomfort and at times with difficult swallowing. SPO2 stable on room air.  Converted to sinus rhythm, still on Amiodarone drip.  Continue antibiotics per ID.    08/24/23:  Resting comfortably, no SOB or distress. SPO2 98% .  Converted to sinus rhythm, changed to PO Amiodarone.  Continue Vancomycin per ID.  Will change to adult dose Symbicort.    08/25/23: Awake, responsive and comfortable. SPO2 97%, no distress.  Complains of soreness in throat. Will add Cepacol lozenges.    08/26/23:  Complains of soreness in throat. Will start on Cepacol lozenges.  Getting PRBC transfusion  On Symbicort and Spiriva.  antibiotics per ID    08/27/23: Resting comfortably, no distress. SPO2 96%.  Continue treatment.    08/28/23:  Awake, responsive and comfortable.  SPO2 98%.  On Symbicort and Levaquin.    08/29/23: Weak looking, denies SOB. SPO2 97%   Over all pulmonary status close to base line.  Continue treatment.

## 2023-08-29 NOTE — PROGRESS NOTE ADULT - ASSESSMENT
86-year-old male with history of recurrent lightheadedness and dizziness with multiple admissions to the emergency room for above found down on street for unknown duration.   On admission found to be hypothermic and hypotensive with rapid atrial fibrillation requiring Pressor support    CT of chest appears to suggest bilateral pneumonia   Also, profound acidosis and anemia, receiving blood products while in the emergency room.  blood culture:  Streptococcus pneumoniae: Detec (08.20.23 @ 05:00)  Rates improved on IV amiodarone, converted to sinus  Evidence of acute renal insufficiency   Elevated troponin seen here likely significant demand ischemia in the setting of renal insufficiency.    8/21: transferred to medical floor  8/22: found to be in afib with RVR, 140-160s SBP 80-90s, transferred to tele bed, amiodarone drip -> converted to sinus 8/23 am    -remains in sinus, s/p amiodarone IV loading,  currently continued on maintenance oral dose of amiodarone 200mg po daily, TSH wnl, QTc 464   -No Ac in the setting of acute anemia, subdural hematoma   -TTE with Mildly decreased global left ventricular systolic function. BLAE, Mod - severe MR/TR, severe pHTN, Unknown previous Hx, no overt fluid overload noted  -ABx as per ID for Strep pneumoniae bacteremia w/ evidence of b/l pneumonia and lactic acidosis  -Pt with underweight, Severe protein-calorie malnutrition, nutrition eval appreciated   -monitor h/h, transfuse as needed, cont on feso4, anemia w/u as per primary team  - vascular f/u for DVTs  -activities as tolerated   -monitor QTc and LFTs closely while pt on both amiodarone and Levaquin

## 2023-08-29 NOTE — PROGRESS NOTE ADULT - ASSESSMENT
A/P  87 yo M with no PMH, BIBEMS after being found down outside. Admitted with septic shock 2/2 PNA, and found to have ?chronic SDH and DVT in left tibioperoneal trunk and proximal posterior tibial veins    - patient is at high risk for propagation as he is unable to be fully anticoagulated due to subdural hematomas, and risk of falls.   - Repeat duplex of bilateral lower extremities to evaluate DVTs  - Patient would benefit from IVC filter. Patient aware of all risks of delaying IVC filter   - recommend SCDs on right leg  - continue DVT ppx with [facility]  Discussed and seen with Dr. Medeiros

## 2023-08-29 NOTE — PROGRESS NOTE ADULT - ASSESSMENT
89 yo M w/ hx of multiple syncopal episodes presented via EMS for an unwitnessed fall w/ hypotension, hypothermia, and afib w/ RVR found to have Strep pneumoniae bacteremia w/ evidence of b/l pneumonia and lactic acidosis, iron deficiency anemia, stable sub-dural hematoma, ANGI, and demand ischemia.    8/22: no fever, RA, WBC elevated 13.97, Cr better 1.94, TTE no vegetation, repeat BCs were collected today in the morning, Ceftriaxone IV continued.   Attending Addendum--  Case reviewed with CATRINA Mcgarry. Her note reviewed and modified as appropriate.   Pneumococcal septicemia due to pneumonia  Minimal leukocytosis, bands present- need to be monitored  Awaiting follow up culture data    8/23: no fevers, tachycardic, RA, WBC better 11.58, Cr 1.57, repeat BCs preliminary with no growth. Initial BCs intermediate to ceftriaxone, ceftriaxone IV stopped, Vancomycin IV started.   Attending Addendum--  Case reviewed with CATRINA Mcgarry. Her note reviewed and modified as appropriate.   Pneumococcus relatively resistant  QTc prolonged, opinions divided as to whether fluoroquinolone ok to give on amiodarone  For now, vancomnycin, target levels 10-15    8/24: no fevers, RA, WBC normalized, Cr is about the same, repeat BCs remain with no growth to date, Vancomycin IV continued. Vanco level is low, drawn after one dose.   Attending Addendum--  Case reviewed with CATRINA Mcgarry. Her note reviewed and modified as appropriate.   FlU cx NTD  Overall appears to be making progress  Intensive monitoring of vancomycin levels is required to monitor for toxicity. Frequency and type of monitoring varies from patient to patient and case to case. Serum vancomycin trough levels are typically checked before the 3rd or 4th dose initially and as clinically indicated thereafter to monitor for drug levels which might contribute to nephrotoxicity.    8/25: Pt does not want any of his information to be shared with his family    Pt is afebrile, RA, WBC, Cr better 1.38, repeat BCs with no growth to date, culture data reviewed, pt is on Vancomycin IV. The case was discussed with cardiology service, ok to change abx to po Levaquin with close monitoring of pt's LFTs and QT interval (current interval is 464) as the pt on Amiodarone. The pt will need two weeks of abx from negative BCs.   Attending Addendum--  Case reviewed with NP Pari Mcgarry. Her note reviewed and modified as appropriate.   Cardiology input appreciated.  Follow-up cultures remain negative to date.  White blood cell count has normalized.  Creatinine stable to improved.  Antibiotics as previous.  I will be available via Teams for any issues that may arise over the weekend.    8/28: no fevers, RA, no wbc, Hgb 7.1 - received PRBCs transfusions over the weekend, received one unit today, Cr ok, AST slightly elevated, ALT ok, repeat BCs remain with no growth to date, Levaquin po continued until 9/4/2023. LFTs and QT intervals need to be monitored as the pt is on combination of medications, Levaquin and Amiodarone.   Attending addendum:  I have reviewed all pertinent clinical information and agree with the NP's note.   continue po Levaquin 750 mg q 48 hrs - last dose 9/4/2023  monitor pt's LFTs closely (pt is on Amiodarone)  monitor pt's QT intervals closely (pt is on Amiodarone)    8/29: no fevers, RA, no WBC, Cr ok, repeat BCs remain with no growth to date, Levaquin po continued. Today's LFTs: AST 44 and ALT ok, today's QT interval is 436. Pt was seen by vascular surgery, candidate for IVCF, pt declined at this time.       Plan:  continue po Levaquin 750 mg q 48 hrs - last dose 9/4/2023  monitor pt's LFTs closely (pt is on Amiodarone)  monitor pt's QT intervals closely (pt is on Amiodarone)  follow repeat blood cultures - NGTD  monitor respiratory status and O2 saturation   incentive spirometer   monitor Hb closely and transfuse if < 7.0  trend creatinine and ensure hydration with good urine output  Vascular evaluation is appreciated   Pt does not want any of his information to be shared with his family

## 2023-08-29 NOTE — PROGRESS NOTE ADULT - NS PANP COMMENT GEN_ALL_CORE FT
Admitted after fall in sepsis from pneumonia.  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  Vascular consulted for evaluation for IVC filter placement.   Patient is on DVT prophylaxis currently.   Patient is being followed by neurology and is suspected for history of multiple prior falls and mild dementia.     - Patient would be a candidate for IVCF placement as he has chronic subdural hematomas (not a candidate for full AC), high fall risk, and increased risk of clot propagation in setting of hospitalization.  - However, at this time, patient does not want any intervention until he feels better despite risk of clot propagation. Therefore, recommend continued DVT prophylaxis, venodynes to right lower extremity  - Please repeat LE duplex for evaluation for clot propagation.   - Patient also should be evaluated by PT and encouraged ambulation if no contraindication.

## 2023-08-29 NOTE — PROGRESS NOTE ADULT - NS ATTEND AMEND GEN_ALL_CORE FT
I have reviewed all pertinent clinical information and agree with the NP's note.   continue regimen as above   no objection to discharge from infectious disease perspective     Will sign off. Please call with questions.     Jose Armando Sotelo, DO  Infectious Disease Attending  Reachable via Microsoft Teams or [place] office: [phone]  After 5pm/weekends please call [phone] for all inquiries and new consult(s)

## 2023-08-29 NOTE — PROGRESS NOTE ADULT - SUBJECTIVE AND OBJECTIVE BOX
NAIN WEISS  MRN-[id number]    Follow Up:  pna, strep pneumo bacteremia     Interval History: the pt was seen and examined earlier, no acute distress, pt's family is present, pt states that he is tired and would like to get some sleep, wants his family to go home - advised.  is afebrile, RA, no wbc.     PAST MEDICAL & SURGICAL HISTORY:  No pertinent past medical history          ROS:    [ ] Unobtainable because:  [x ] All other systems negative    Constitutional: no fever, no chills  Head: no trauma  Eyes: no vision changes, no eye pain  ENT:  no sore throat, no rhinorrhea  Cardiovascular:  no chest pain, no palpitation  Respiratory:  no SOB, no cough  GI:  no abd pain, no vomiting, no diarrhea  urinary: no dysuria, no hematuria, no flank pain  musculoskeletal:  no joint pain, no joint swelling  skin:  no rash  neurology:  no headache, no seizure, no change in mental status  psych: no anxiety, no depression         Allergies  No Known Allergies        ANTIMICROBIALS:  levoFLOXacin  Tablet 750 every 48 hours      OTHER MEDS:  acetaminophen     Tablet .. 650 milliGRAM(s) Oral every 6 hours PRN  albuterol    90 MICROgram(s) HFA Inhaler 2 Puff(s) Inhalation every 6 hours PRN  aluminum hydroxide/magnesium hydroxide/simethicone Suspension 30 milliLiter(s) Oral every 4 hours PRN  aMIOdarone    Tablet 200 milliGRAM(s) Oral daily  benzocaine/menthol Lozenge 1 Lozenge Oral four times a day PRN  budesonide 160 MICROgram(s)/formoterol 4.5 MICROgram(s) Inhaler 2 Puff(s) Inhalation two times a day  diltiazem Injectable 10 milliGRAM(s) IV Push once PRN  ferrous    sulfate 325 milliGRAM(s) Oral three times a day  guaiFENesin Oral Liquid (Sugar-Free) 100 milliGRAM(s) Oral every 6 hours PRN  heparin   Injectable 5000 Unit(s) SubCutaneous every 8 hours  pantoprazole    Tablet 40 milliGRAM(s) Oral before breakfast  tiotropium 2.5 MICROgram(s) Inhaler 2 Puff(s) Inhalation daily      Vital Signs Last 24 Hrs  T(C): 37.1 (29 Aug 2023 16:21), Max: 37.2 (29 Aug 2023 11:17)  T(F): 98.8 (29 Aug 2023 16:21), Max: 98.9 (29 Aug 2023 11:17)  HR: 68 (29 Aug 2023 16:21) (62 - 74)  BP: 126/57 (29 Aug 2023 16:21) (112/52 - 144/69)  BP(mean): --  RR: 18 (29 Aug 2023 16:21) (16 - 18)  SpO2: 97% (29 Aug 2023 16:21) (96% - 97%)    Parameters below as of 29 Aug 2023 16:21  Patient On (Oxygen Delivery Method): room air        Physical Exam:  Constitutional: chronically ill appearing male, cachectic   HEAD/EYES: anicteric, no conjunctival injection, temporal wasting   ENT:  supple, no thrush, clavicular wasting dry dark colored tongue  Cardiovascular:   normal S1, S2, no murmur, no edema  Respiratory:  diminished bilaterally, no wheezes, no rhonchi  Musculoskeletal:  no synovitis, normal ROM  Neurologic: awake and alert, generally decreased strength no focal findings  Skin:  no rash, no erythema, no phlebitis  Heme/Onc: no lymphadenopathy   Psychiatric:  awake, alert, appropriate    WBC Count: 7.89 K/uL (08-29 @ 06:56)  WBC Count: 8.13 K/uL (08-28 @ 06:36)  WBC Count: 9.22 K/uL (08-27 @ 10:30)  WBC Count: 8.86 K/uL (08-26 @ 07:30)  WBC Count: 6.16 K/uL (08-25 @ 07:15)  WBC Count: 6.77 K/uL (08-24 @ 06:18)  WBC Count: 11.58 K/uL (08-23 @ 07:05)                            7.8    7.89  )-----------( 295      ( 29 Aug 2023 06:56 )             25.9       08-29    141  |  113<H>  |  16  ----------------------------<  83  4.2   |  24  |  0.98    Ca    7.4<L>      29 Aug 2023 06:56  Phos  1.7     08-29  Mg     1.9     08-29    TPro  5.8<L>  /  Alb  1.4<L>  /  TBili  0.4  /  DBili  0.2  /  AST  44<H>  /  ALT  51  /  AlkPhos  65  08-29      Urinalysis Basic - ( 29 Aug 2023 06:56 )    Color: x / Appearance: x / SG: x / pH: x  Gluc: 83 mg/dL / Ketone: x  / Bili: x / Urobili: x   Blood: x / Protein: x / Nitrite: x   Leuk Esterase: x / RBC: x / WBC x   Sq Epi: x / Non Sq Epi: x / Bacteria: x        Creatinine Trend: 0.98<--, 1.13<--, 1.28<--, 1.38<--, 1.57<--, 1.57<--      MICROBIOLOGY:  v  .Blood Blood-Peripheral  08-22-23   No growth at 5 days  --  --      .Blood Blood-Peripheral  08-22-23   No growth at 5 days  --  --      Clean Catch Clean Catch (Midstream)  08-20-23   No growth  --  --      .Blood Blood  08-20-23   No growth at 5 days  --  --      .Blood Blood  08-20-23   Growth in aerobic bottle: Streptococcus pneumoniae  Direct identification is available within approximately 3-5  hours either by Blood Panel Multiplexed PCR or Direct  MALDI-TOF. Details: [email or web address]  --  Blood Culture PCR  Streptococcus pneumoniae      Ferritin: 40 (08-21)  Ferritin: 46 (08-20)      SARS-CoV-2: NotDetec (20 Aug 2023 17:50)    RADIOLOGY:

## 2023-08-29 NOTE — PROGRESS NOTE ADULT - ASSESSMENT
87 yo M w/ hx of multiple syncopal episodes presented via EMS for an unwitnessed fall w/ hypotension, hypothermia, and afib w/ RVR concerning for sepsis. He was found to have Strep pneumonia bacteremia w/ evidence of b/l pneumonia and lactic acidosis, iron deficiency anemia, stable sub-dural hematoma, ANGI, and demand ischemia.        Metabolic encephalopathy:  - 2/2 sepsis  - Now A&Ox3, stable L frontal SDH    Acute hypoxic respiratory failure:  - pneumonia w/ findings c/w emphysema/ILD, weaned off NC on room air, on ipratropium q8h (consider transition to duonebs), chest PT, maintain SpO2 > 92%  - C/w symciort and Spiriva  - albuterol PRN  - Pulm following     Sepsis with septic shock POA:  - With strep pneumo bacteremia 2/2 PNA  - Weaned off norepinephrine  - Initial BC positive for strep, intermediate sensitivity to Rocephin, sensitive to Vanco  - Repeat Bcx negative  - Currently on levaquin 750mg q 48hrs  - follow up repeat blood clx    Afib:  - s/p amio bolus and gtt for afib w/ RVR (converted), currently on sinus rthym   - troponin elevation c/w demand ischemia   - Echo with Mildly decreased global left ventricular systolic function. BLAE, Mod - severe MR/TR, severe pHTN  - Not on Ac for anemia and SDH  - Continue with PO amiodarone  - Cardio following     ANGI:  - 2/2 septic ATN  - Cr improving   - Pending serologies for pulm renal syndrome  - Renal following     acute blood loss Anemia:  - hx of iron deficiency anemia  - S/P 4 units PRBC  - Did not recieve full amount of pRBC on 8/25  - HgB 7.8  - Retic negative, haptoglobin high, LDH high  - peripheral blood smear shows no schistocytes, tear drop cell  - No obvious sign of bleed  - Pt deferring digital rectal exam. Reports has regular BM and will give sample for fecal occult blood testing  - Iron deficient, continue iron supplementation  - f/u H&H in AM      Acute DVT :  - Not on AC for anemia and SDH  - On DVT ppx dose  - f/u repeat US  - Vascular follow up    GI ppx  - Protonix

## 2023-08-29 NOTE — PROGRESS NOTE ADULT - SUBJECTIVE AND OBJECTIVE BOX
Patient is a 88y old  Male who presents with AMS, SEPSIS, ANEMIA, pt was found down    PAST MEDICAL & SURGICAL HISTORY:  lightheadedness and dizziness    INTERVAL HISTORY: Pt resting in bed comfortably. No c/o CP, +mild dyspnea.   	  MEDICATIONS:  MEDICATIONS  (STANDING):  aMIOdarone    Tablet 200 milliGRAM(s) Oral daily  budesonide 160 MICROgram(s)/formoterol 4.5 MICROgram(s) Inhaler 2 Puff(s) Inhalation two times a day  ferrous    sulfate 325 milliGRAM(s) Oral three times a day  heparin   Injectable 5000 Unit(s) SubCutaneous every 8 hours  levoFLOXacin  Tablet 750 milliGRAM(s) Oral every 48 hours  pantoprazole    Tablet 40 milliGRAM(s) Oral before breakfast  tiotropium 2.5 MICROgram(s) Inhaler 2 Puff(s) Inhalation daily    MEDICATIONS  (PRN):  acetaminophen     Tablet .. 650 milliGRAM(s) Oral every 6 hours PRN Moderate Pain (4 - 6)  albuterol    90 MICROgram(s) HFA Inhaler 2 Puff(s) Inhalation every 6 hours PRN Shortness of Breath and/or Wheezing  aluminum hydroxide/magnesium hydroxide/simethicone Suspension 30 milliLiter(s) Oral every 4 hours PRN Dyspepsia  benzocaine/menthol Lozenge 1 Lozenge Oral four times a day PRN Sore Throat  diltiazem Injectable 10 milliGRAM(s) IV Push once PRN sustained HR >130  guaiFENesin Oral Liquid (Sugar-Free) 100 milliGRAM(s) Oral every 6 hours PRN Cough    Vitals:  T(F): 97.6 (08-29-23 @ 04:56), Max: 98.1 (08-28-23 @ 16:05)  HR: 62 (08-29-23 @ 04:56) (62 - 82)  BP: 126/58 (08-29-23 @ 04:56) (125/70 - 144/69)  RR: 16 (08-29-23 @ 04:56) (16 - 18)  SpO2: 97% (08-28-23 @ 23:49) (95% - 98%)  08-28 @ 07:01  -  08-29 @ 07:00  --------------------------------------------------------  IN:  Oral Fluid: 420 mL  Total IN: 420 mL    OUT:  Total OUT: 0 mL  Total NET: 420 mL    PHYSICAL EXAM:  Neuro: Awake, responsive  CV: S1 S2 RRR, +SM  Lungs: diminished to bases  GI: Soft, BS +, ND, NT  Extremities: +ankle edema    TELEMETRY: sinus    RADIOLOGY: < from: US Duplex Venous Lower Ext Complete, Bilateral (08.29.23 @ 10:53) >  IMPRESSION:  Acute deep venous thrombosis: below the knee.    Acute left soleal vein deep vein thrombosis, unchanged.    < end of copied text >    < from: CT Abdomen and Pelvis No Cont (08.20.23 @ 06:49) >  CHEST:  LUNGS AND LARGE AIRWAYS: Patent central airways. Bibasilar patchy   consolidations suggesting pneumonia. Superimposed emphysematous lung   change and interstitial lung disease/honeycombing at the lung bases..  PLEURA: No pleural effusion or pneumothorax.  VESSELS: Limited evaluation of the thoracic aorta without intravenous   contrast, however there is calcific atherosclerosis without aneurysmal   dilatation.  HEART: Borderline enlarged heart.. No pericardial effusion. Coronary   artery calcifications. Anemia suggested.  MEDIASTINUM AND STACY: No lymphadenopathy.  CHEST WALL AND LOWER NECK: Within normal limits.      ABDOMEN AND PELVIS:  LIVER: Within normal limits.  BILE DUCTS: Normal caliber.  GALLBLADDER: Elongated gallbladder without definite calcified gallstone..  SPLEEN: Within normal limits.  PANCREAS: Within normal limits.  ADRENALS: Normal right adrenal gland. Limited left adrenal gland.  KIDNEYS/URETERS: Small bilateral kidneys. No renal stone or   hydronephrosis.    BLADDER: Within normal limits.  REPRODUCTIVE ORGANS: Prostate gland is not grossly enlarged.    BOWEL: Evaluation of bowel is limited without distention with oral   contrast and lack of mesenteric fat, however there is no bowel   obstruction. There is a 7 cm stool distended rectum. Small bowel loops   are not grossly dilated. Stomach is mildly distended with debris.  PERITONEUM: No free air or significant ascites.  VESSELS:  Limited evaluation of the abdominal aorta without intravenous   contrast, demonstrates tortuosity and calcific atherosclerosis without   aneurysmal dilatation. Negative  RETROPERITONEUM: No lymphadenopathy.  ABDOMINAL WALL: Within normal limits.  BONES: Severe scoliosis of the spine with associated multilevel   degenerative changes. Question pagetoid appearance of the right iliac   bone.    IMPRESSION:    Bilateral pneumonia.    < end of copied text >    DIAGNOSTIC TESTING:    [x] Echocardiogram: < from: TTE Echo Complete w/o Contrast w/ Doppler (08.21.23 @ 08:17) >  Left Ventricle: Normal left ventricular size and wall thicknesses, with   normal systolic and diastolic function.  Global LV systolic function was mildly decreased. Global longitudinal   strain imaging was performed on [employer] Vivid S70 at a heart rate of 68BPM and   a blood pressure of 108/57 mmHg, average GLS calculated was -19.1%   (normal).  Right Ventricle: Normal right ventricular size and function.  Left Atrium: Moderately enlarged left atrium.  Right Atrium: The right atrium is normal in size. Moderately enlarged   right atrium.  Pericardium: There is no evidence of pericardial effusion.  Mitral Valve: Structurally normal mitral valve, with normal leaflet   excursion. Moderate to severe mitral valve regurgitation is seen.  Tricuspid Valve: Structurally normal tricuspid valve, with normal leaflet   excursion. Moderate-severe tricuspid regurgitation is visualized.   Estimated pulmonary artery systolic pressure is 65.8 mmHg assuming a   right atrial pressure of 15 mmHg, which is consistent with severe   pulmonary hypertension.  Aortic Valve: Normal trileaflet aortic valve with normal opening. No   evidence of aortic valve regurgitation is seen.  Pulmonic Valve: Structurally normal pulmonic valve, with normal leaflet   excursion. Mild pulmonic valve regurgitation.  Aorta: The aortic root and ascending aorta are structurally normal, with   no evidence of dilitation.  Pulmonary Artery: The main pulmonary artery is normal in size.  Venous: The inferior vena cava was normal sized, with respiratory size   variation less than 50%.      Summary:   1. Mildly decreased global left ventricular systolic function.   2. Global longitudinal strain imaging was performed on [employer] Vivid S70 at a   heart rate of 68BPM and a blood pressure of 108/57 mmHg, average GLS   calculated was -19.1% (normal).   3. Moderately enlarged left atrium.   4. Moderately enlarged right atrium.   5. Moderate to severe mitral valve regurgitation.   6. Moderate-severe tricuspid regurgitation.   7. Mild pulmonic valve regurgitation.   8. Estimated pulmonary artery systolic pressure is 65.8 mmHg assuming a   right atrial pressure of 15 mmHg, which is consistent with severe   pulmonary hypertension.      < end of copied text >    LABS:	 	    29 Aug 2023 06:56    141    |  113    |  16     ----------------------------<  83     4.2     |  24     |  0.98   28 Aug 2023 09:50    141    |  112    |  18     ----------------------------<  137    4.0     |  25     |  1.13     Ca    7.4        29 Aug 2023 06:56  Phos  1.7       29 Aug 2023 06:56  Mg     1.9       29 Aug 2023 06:56    TPro  5.8    /  Alb  1.4    /  TBili  0.4    /  DBili  0.2    /  AST  44     /  ALT  51     /  AlkPhos  65     29 Aug 2023 06:56               7.8    7.89  )-----------( 295      ( 29 Aug 2023 06:56 )             25.9 ,                       7.1    8.13  )-----------( CLUMPED    ( 28 Aug 2023 06:36 )             22.7 ,                       8.1    9.22  )-----------( 272      ( 27 Aug 2023 10:30 )             25.6

## 2023-08-29 NOTE — PROGRESS NOTE ADULT - SUBJECTIVE AND OBJECTIVE BOX
Patient seen and examined at bedside. No complaints offered.      MEDICATIONS  (STANDING):  aMIOdarone    Tablet 200 milliGRAM(s) Oral daily  budesonide 160 MICROgram(s)/formoterol 4.5 MICROgram(s) Inhaler 2 Puff(s) Inhalation two times a day  ferrous    sulfate 325 milliGRAM(s) Oral three times a day  heparin   Injectable 5000 Unit(s) SubCutaneous every 8 hours  levoFLOXacin  Tablet 750 milliGRAM(s) Oral every 48 hours  pantoprazole    Tablet 40 milliGRAM(s) Oral before breakfast  tiotropium 2.5 MICROgram(s) Inhaler 2 Puff(s) Inhalation daily    MEDICATIONS  (PRN):  acetaminophen     Tablet .. 650 milliGRAM(s) Oral every 6 hours PRN Moderate Pain (4 - 6)  albuterol    90 MICROgram(s) HFA Inhaler 2 Puff(s) Inhalation every 6 hours PRN Shortness of Breath and/or Wheezing  aluminum hydroxide/magnesium hydroxide/simethicone Suspension 30 milliLiter(s) Oral every 4 hours PRN Dyspepsia  benzocaine/menthol Lozenge 1 Lozenge Oral four times a day PRN Sore Throat  diltiazem Injectable 10 milliGRAM(s) IV Push once PRN sustained HR >130  guaiFENesin Oral Liquid (Sugar-Free) 100 milliGRAM(s) Oral every 6 hours PRN Cough      Vital Signs Last 24 Hrs  T(C): 36.4 (29 Aug 2023 04:56), Max: 37.2 (28 Aug 2023 09:49)  T(F): 97.6 (29 Aug 2023 04:56), Max: 98.9 (28 Aug 2023 09:49)  HR: 62 (29 Aug 2023 04:56) (62 - 82)  BP: 126/58 (29 Aug 2023 04:56) (118/66 - 144/69)  RR: 16 (29 Aug 2023 04:56) (16 - 18)  SpO2: 97% (28 Aug 2023 23:49) (95% - 98%)    Parameters below as of 29 Aug 2023 04:56  Patient On (Oxygen Delivery Method): room air      PHYSICAL EXAM:  General: NAD  Neuro:  Alert  HEENT: NCAT, EOMI, conjunctiva clear  CV: +S1+S2  Lung: Respirations nonlabored, good inspiratory effort  Abdomen: soft, NTND  Extremities: no pedal edema or calf tenderness noted         LABS:                        7.8    7.89  )-----------( 295      ( 29 Aug 2023 06:56 )             25.9     08-29    141  |  113<H>  |  16  ----------------------------<  83  4.2   |  24  |  0.98    Ca    7.4<L>      29 Aug 2023 06:56  Phos  1.7     08-29  Mg     1.9     08-29    TPro  5.8<L>  /  Alb  1.4<L>  /  TBili  0.4  /  DBili  0.2  /  AST  44<H>  /  ALT  51  /  AlkPhos  65  08-29      Urinalysis Basic - ( 29 Aug 2023 06:56 )    Color: x / Appearance: x / SG: x / pH: x  Gluc: 83 mg/dL / Ketone: x  / Bili: x / Urobili: x   Blood: x / Protein: x / Nitrite: x   Leuk Esterase: x / RBC: x / WBC x   Sq Epi: x / Non Sq Epi: x / Bacteria: x

## 2023-08-29 NOTE — PROGRESS NOTE ADULT - SUBJECTIVE AND OBJECTIVE BOX
PROGRESS NOTE:     Patient is a 88y old  Male who presents with a chief complaint of found  down (27 Aug 2023 13:58)          SUBJECTIVE & OBJECTIVE:   Pt seen and examined at bedside in AM    no overnight events.   no new complaints    REVIEW OF SYSTEMS: remaining ROS negative     PHYSICAL EXAM:  Vital Signs Last 24 Hrs  T(C): 36.4 (29 Aug 2023 04:56), Max: 36.7 (28 Aug 2023 13:15)  T(F): 97.6 (29 Aug 2023 04:56), Max: 98.1 (28 Aug 2023 16:05)  HR: 62 (29 Aug 2023 04:56) (62 - 82)  BP: 126/58 (29 Aug 2023 04:56) (125/70 - 144/69)  BP(mean): --  RR: 16 (29 Aug 2023 04:56) (16 - 18)  SpO2: 97% (28 Aug 2023 23:49) (95% - 98%)    Parameters below as of 29 Aug 2023 04:56  Patient On (Oxygen Delivery Method): room air            GENERAL: NAD,  no increased WOB  HEAD:  Atraumatic, Normocephalic  EYES: EOMI, conjunctiva and sclera clear  ENMT: Moist mucous membranes  NECK: Supple, No JVD  NERVOUS SYSTEM:  Alert, no focal neuro deficits   CHEST/LUNG: Clear to auscultation bilaterally; No rales, rhonchi, wheezing, or rubs  HEART: Regular rate and rhythm;  ABDOMEN: Soft, Nontender, Nondistended; Bowel sounds present  EXTREMITIES:  2+ Peripheral Pulses b/l, No clubbing, cyanosis, calf tenderness or edema b/l      MEDICATIONS  (STANDING):  MEDICATIONS  (STANDING):  aMIOdarone    Tablet 200 milliGRAM(s) Oral daily  budesonide 160 MICROgram(s)/formoterol 4.5 MICROgram(s) Inhaler 2 Puff(s) Inhalation two times a day  ferrous    sulfate 325 milliGRAM(s) Oral three times a day  heparin   Injectable 5000 Unit(s) SubCutaneous every 8 hours  levoFLOXacin  Tablet 750 milliGRAM(s) Oral every 48 hours  pantoprazole    Tablet 40 milliGRAM(s) Oral before breakfast  tiotropium 2.5 MICROgram(s) Inhaler 2 Puff(s) Inhalation daily      MEDICATIONS  (PRN):  acetaminophen     Tablet .. 650 milliGRAM(s) Oral every 6 hours PRN Moderate Pain (4 - 6)  albuterol    90 MICROgram(s) HFA Inhaler 2 Puff(s) Inhalation every 6 hours PRN Shortness of Breath and/or Wheezing  aluminum hydroxide/magnesium hydroxide/simethicone Suspension 30 milliLiter(s) Oral every 4 hours PRN Dyspepsia  benzocaine/menthol Lozenge 1 Lozenge Oral four times a day PRN Sore Throat  diltiazem Injectable 10 milliGRAM(s) IV Push once PRN sustained HR >130  guaiFENesin Oral Liquid (Sugar-Free) 100 milliGRAM(s) Oral every 6 hours PRN Cough      Allergies    No Known Allergies    Intolerances              LABS:                                      7.8    7.89  )-----------( 295      ( 29 Aug 2023 06:56 )             25.9     08-29    141  |  113<H>  |  16  ----------------------------<  83  4.2   |  24  |  0.98    Ca    7.4<L>      29 Aug 2023 06:56  Phos  1.7     08-29  Mg     1.9     08-29    TPro  5.8<L>  /  Alb  1.4<L>  /  TBili  0.4  /  DBili  0.2  /  AST  44<H>  /  ALT  51  /  AlkPhos  65  08-29    LIVER FUNCTIONS - ( 29 Aug 2023 06:56 )  Alb: 1.4 g/dL / Pro: 5.8 gm/dL / ALK PHOS: 65 U/L / ALT: 51 U/L / AST: 44 U/L / GGT: x             Urinalysis Basic - ( 29 Aug 2023 06:56 )    Color: x / Appearance: x / SG: x / pH: x  Gluc: 83 mg/dL / Ketone: x  / Bili: x / Urobili: x   Blood: x / Protein: x / Nitrite: x   Leuk Esterase: x / RBC: x / WBC x   Sq Epi: x / Non Sq Epi: x / Bacteria: x              CAPILLARY BLOOD GLUCOSE                      RECENT CULTURES:          RADIOLOGY & ADDITIONAL TESTS:      Radiology reports read and imaging reviewed  :  [ ] YES  [ ] NO  (I am not a radiologist and therefore rely on Radiologist reports to facilitate with diagnosis and treatment plans)    Consultant(s) Notes Reviewed:  [x ] YES  [ ] NO    Care Discussed with Consultants/Other Providers [x ] YES  [ ] NO  Care plan and all findings were discussed in detail with patient.  All questions and concerns addressed

## 2023-08-30 LAB
ALBUMIN SERPL ELPH-MCNC: 1.6 G/DL — LOW (ref 3.3–5)
ALP SERPL-CCNC: 71 U/L — SIGNIFICANT CHANGE UP (ref 40–120)
ALT FLD-CCNC: 49 U/L — SIGNIFICANT CHANGE UP (ref 12–78)
AST SERPL-CCNC: 35 U/L — SIGNIFICANT CHANGE UP (ref 15–37)
BILIRUB DIRECT SERPL-MCNC: 0.2 MG/DL — SIGNIFICANT CHANGE UP (ref 0–0.3)
BILIRUB INDIRECT FLD-MCNC: 0.2 MG/DL — SIGNIFICANT CHANGE UP (ref 0.2–1)
BILIRUB SERPL-MCNC: 0.4 MG/DL — SIGNIFICANT CHANGE UP (ref 0.2–1.2)
GLUCOSE BLDC GLUCOMTR-MCNC: 107 MG/DL — HIGH (ref 70–99)
GLUCOSE BLDC GLUCOMTR-MCNC: 110 MG/DL — HIGH (ref 70–99)
GLUCOSE BLDC GLUCOMTR-MCNC: 118 MG/DL — HIGH (ref 70–99)
HCT VFR BLD CALC: 25.4 % — LOW (ref 39–50)
HGB BLD-MCNC: 8.3 G/DL — LOW (ref 13–17)
MAGNESIUM SERPL-MCNC: 2 MG/DL — SIGNIFICANT CHANGE UP (ref 1.6–2.6)
MCHC RBC-ENTMCNC: 24.1 PG — LOW (ref 27–34)
MCHC RBC-ENTMCNC: 32.7 G/DL — SIGNIFICANT CHANGE UP (ref 32–36)
MCV RBC AUTO: 73.6 FL — LOW (ref 80–100)
NRBC # BLD: 0 /100 WBCS — SIGNIFICANT CHANGE UP (ref 0–0)
OB PNL STL IA: POSITIVE
PHOSPHATE SERPL-MCNC: 2.1 MG/DL — LOW (ref 2.5–4.5)
PLATELET # BLD AUTO: 383 K/UL — SIGNIFICANT CHANGE UP (ref 150–400)
PROT SERPL-MCNC: 6.2 GM/DL — SIGNIFICANT CHANGE UP (ref 6–8.3)
RBC # BLD: 3.45 M/UL — LOW (ref 4.2–5.8)
RBC # FLD: SIGNIFICANT CHANGE UP (ref 10.3–14.5)
WBC # BLD: 7.39 K/UL — SIGNIFICANT CHANGE UP (ref 3.8–10.5)
WBC # FLD AUTO: 7.39 K/UL — SIGNIFICANT CHANGE UP (ref 3.8–10.5)

## 2023-08-30 PROCEDURE — 99231 SBSQ HOSP IP/OBS SF/LOW 25: CPT

## 2023-08-30 PROCEDURE — 99232 SBSQ HOSP IP/OBS MODERATE 35: CPT

## 2023-08-30 PROCEDURE — 99233 SBSQ HOSP IP/OBS HIGH 50: CPT

## 2023-08-30 PROCEDURE — 93010 ELECTROCARDIOGRAM REPORT: CPT

## 2023-08-30 RX ORDER — POTASSIUM PHOSPHATE, MONOBASIC POTASSIUM PHOSPHATE, DIBASIC 236; 224 MG/ML; MG/ML
15 INJECTION, SOLUTION INTRAVENOUS ONCE
Refills: 0 | Status: COMPLETED | OUTPATIENT
Start: 2023-08-30 | End: 2023-08-30

## 2023-08-30 RX ADMIN — BUDESONIDE AND FORMOTEROL FUMARATE DIHYDRATE 2 PUFF(S): 160; 4.5 AEROSOL RESPIRATORY (INHALATION) at 19:20

## 2023-08-30 RX ADMIN — Medication 325 MILLIGRAM(S): at 14:21

## 2023-08-30 RX ADMIN — POTASSIUM PHOSPHATE, MONOBASIC POTASSIUM PHOSPHATE, DIBASIC 62.5 MILLIMOLE(S): 236; 224 INJECTION, SOLUTION INTRAVENOUS at 17:50

## 2023-08-30 RX ADMIN — Medication 325 MILLIGRAM(S): at 21:44

## 2023-08-30 RX ADMIN — AMIODARONE HYDROCHLORIDE 200 MILLIGRAM(S): 400 TABLET ORAL at 05:23

## 2023-08-30 RX ADMIN — Medication 325 MILLIGRAM(S): at 05:23

## 2023-08-30 RX ADMIN — HEPARIN SODIUM 5000 UNIT(S): 5000 INJECTION INTRAVENOUS; SUBCUTANEOUS at 05:24

## 2023-08-30 RX ADMIN — HEPARIN SODIUM 5000 UNIT(S): 5000 INJECTION INTRAVENOUS; SUBCUTANEOUS at 21:44

## 2023-08-30 RX ADMIN — PANTOPRAZOLE SODIUM 40 MILLIGRAM(S): 20 TABLET, DELAYED RELEASE ORAL at 05:24

## 2023-08-30 RX ADMIN — BUDESONIDE AND FORMOTEROL FUMARATE DIHYDRATE 2 PUFF(S): 160; 4.5 AEROSOL RESPIRATORY (INHALATION) at 05:24

## 2023-08-30 RX ADMIN — HEPARIN SODIUM 5000 UNIT(S): 5000 INJECTION INTRAVENOUS; SUBCUTANEOUS at 14:21

## 2023-08-30 RX ADMIN — TIOTROPIUM BROMIDE 2 PUFF(S): 18 CAPSULE ORAL; RESPIRATORY (INHALATION) at 14:20

## 2023-08-30 NOTE — PROGRESS NOTE ADULT - SUBJECTIVE AND OBJECTIVE BOX
INTERVAL HPI:  89 yo M with multiple syncopal episodes per chart notes,  on no home meds   Brought by EMS after being found down on street minimally responsive (8/20/23).   In the ED, was hypotensive, hypothermic (rectal T 90F), and tachycardic (afib w/ RVR).   Labs were remarkable for anemia (Hgb 6.1), leukocytosis w/ 25% band, lactic acidosis (lactate 14.4), ANGI, and elevated troponin   Got treated w/ vanc/zosyn, fluids, 1U pRBCs, IV amiodarone bolus (resolved arrhythmia), and a non-rebreather mask.   CT imaging showed an age-indeterminate L frontal sub-dural hematoma (4mm) and lung findings with pneumonia and emphysema/ILD.   RVP was entero/rhinovirus positive.   He was switched to ceftriaxone/azithromycin, was started on ipratropium nebulizers, and got  admitted to the ICU for new pressor requirement (norepinephrine).    During his ICU course, his blood cultures grew Strep pneumonia (on appropriate abx), lower extremity duplex study showed L tibioperoneal trunk and proximal posterior tibial vein DVTs (on heparin ppx), norepinephrine was weaned off w/ adequate MAPs,   Repeat CT head showed stable sub-dural hematoma, and iron supplementation was started.   08/21/23:  Got transferred to regular medical floor.  08/22/23:  At time of my evaluation, awake, responsive. Denies SOB, cough, sputum production or chest pain.  Admits being a smoker but says quit about a year ago.  Again in rapid A Fib and being transferred to monitor bed. Seen by Cardiology.    OVERNIGHT EVENTS:  Resting comfortably in bed, no distress observed.    Vital Signs Last 24 Hrs  T(C): 36.8 (30 Aug 2023 16:15), Max: 37.8 (30 Aug 2023 04:45)  T(F): 98.2 (30 Aug 2023 16:15), Max: 100 (30 Aug 2023 04:45)  HR: 72 (30 Aug 2023 16:15) (72 - 93)  BP: 132/70 (30 Aug 2023 16:15) (110/58 - 133/64)  BP(mean): --  RR: 18 (30 Aug 2023 16:15) (18 - 19)  SpO2: 95% (30 Aug 2023 16:15) (95% - 96%)    Parameters below as of 30 Aug 2023 16:15  Patient On (Oxygen Delivery Method): room air    PHYSICAL EXAM:  GEN:        Resting, comfortable.  HEENT:    Normal.    RESP:       no distress.  CVS:          Regular rate and rhythm.     MEDICATIONS  (STANDING):  aMIOdarone    Tablet 200 milliGRAM(s) Oral daily  budesonide 160 MICROgram(s)/formoterol 4.5 MICROgram(s) Inhaler 2 Puff(s) Inhalation two times a day  ferrous    sulfate 325 milliGRAM(s) Oral three times a day  heparin   Injectable 5000 Unit(s) SubCutaneous every 8 hours  levoFLOXacin  Tablet 750 milliGRAM(s) Oral every 48 hours  pantoprazole    Tablet 40 milliGRAM(s) Oral before breakfast  tiotropium 2.5 MICROgram(s) Inhaler 2 Puff(s) Inhalation daily    MEDICATIONS  (PRN):  acetaminophen     Tablet .. 650 milliGRAM(s) Oral every 6 hours PRN Moderate Pain (4 - 6)  albuterol    90 MICROgram(s) HFA Inhaler 2 Puff(s) Inhalation every 6 hours PRN Shortness of Breath and/or Wheezing  aluminum hydroxide/magnesium hydroxide/simethicone Suspension 30 milliLiter(s) Oral every 4 hours PRN Dyspepsia  benzocaine/menthol Lozenge 1 Lozenge Oral four times a day PRN Sore Throat  diltiazem Injectable 10 milliGRAM(s) IV Push once PRN sustained HR >130  guaiFENesin Oral Liquid (Sugar-Free) 100 milliGRAM(s) Oral every 6 hours PRN Cough    LABS:                        8.3    7.39  )-----------( 383      ( 30 Aug 2023 06:35 )             25.4     08-29    141  |  113<H>  |  16  ----------------------------<  83  4.2   |  24  |  0.98    Ca    7.4<L>      29 Aug 2023 06:56  Phos  2.1     08-30  Mg     2.0     08-30    TPro  6.2  /  Alb  1.6<L>  /  TBili  0.4  /  DBili  0.2  /  AST  35  /  ALT  49  /  AlkPhos  71  08-30    Urinalysis Basic - ( 29 Aug 2023 06:56 )    Color: x / Appearance: x / SG: x / pH: x  Gluc: 83 mg/dL / Ketone: x  / Bili: x / Urobili: x   Blood: x / Protein: x / Nitrite: x   Leuk Esterase: x / RBC: x / WBC x   Sq Epi: x / Non Sq Epi: x / Bacteria: x    ASSESSMENT AND PLAN:  Multifocal pneumonia.  S/P Septic shock.  Strep Pneumo Bacteremia.  Leukocytosis.  A Fib with RVR.  Anemia.  Renal Insuffiencey.  Hypokalemia.  Left peroneal+ post tibial DVT.    SPO2 in high 90s on room air.  Continue antibiotic per ID.  Being transferred to monitor bed for rapid A Fib.  Not a candidate for full anticoagulation due to SDH, and anemia requiring PRBC transfusion.  Consider Vascular evaluation for DVT.  56+ minutes spent.    08/23/23:   Awake, responsive. Complains of stomach discomfort and at times with difficult swallowing. SPO2 stable on room air.  Converted to sinus rhythm, still on Amiodarone drip.  Continue antibiotics per ID.    08/24/23:  Resting comfortably, no SOB or distress. SPO2 98% .  Converted to sinus rhythm, changed to PO Amiodarone.  Continue Vancomycin per ID.  Will change to adult dose Symbicort.    08/25/23: Awake, responsive and comfortable. SPO2 97%, no distress.  Complains of soreness in throat. Will add Cepacol lozenges.    08/26/23:  Complains of soreness in throat. Will start on Cepacol lozenges.  Getting PRBC transfusion  On Symbicort and Spiriva.  antibiotics per ID    08/27/23: Resting comfortably, no distress. SPO2 96%.  Continue treatment.    08/28/23:  Awake, responsive and comfortable.  SPO2 98%.  On Symbicort and Levaquin.    08/29/23: Weak looking, denies SOB. SPO2 97%   Over all pulmonary status close to base line.  Continue treatment.    08/30/23: Resting comfortably in bed, no distress observed.  SPO2 95% on nasal O2.  Continue treatment.  Vascular surgery follow up noted.

## 2023-08-30 NOTE — PROGRESS NOTE ADULT - ASSESSMENT
89 yo M w/ hx of multiple syncopal episodes presented via EMS for an unwitnessed fall w/ hypotension, hypothermia, and afib w/ RVR concerning for sepsis. He was found to have Strep pneumonia bacteremia w/ evidence of b/l pneumonia and lactic acidosis, iron deficiency anemia, stable sub-dural hematoma, ANGI, and demand ischemia.    Metabolic encephalopathy:  - 2/2 sepsis  - Now A&Ox3, stable L frontal SDH    Acute hypoxic respiratory failure:  - pneumonia w/ findings c/w emphysema/ILD, weaned off NC on room air, on ipratropium q8h (consider transition to duonebs), chest PT, maintain SpO2 > 92%  - C/w symciort and Spiriva  - albuterol PRN  - Pulm following     Sepsis with septic shock POA:  - With strep pneumo bacteremia 2/2 PNA  - Weaned off norepinephrine  - Initial BC positive for strep, intermediate sensitivity to Rocephin, sensitive to Vanco  - Repeat Bcx negative  - Currently on levaquin 750mg q 48hrs, last dose 9/4  - repeat blood clx neg    Afib:  - s/p amio bolus and gtt for afib w/ RVR (converted), currently on sinus rthym   - troponin elevation c/w demand ischemia   - Echo with Mildly decreased global left ventricular systolic function. BLAE, Mod - severe MR/TR, severe pHTN  - Not on Ac for anemia and SDH  - Continue with PO amiodarone  - Cardio following     ANGI:  - 2/2 septic ATN  - Cr improving   - Pending serologies for pulm renal syndrome  - Renal following     Acute blood loss Anemia:  - hx of iron deficiency anemia  - S/P 4 units PRBC  - Did not recieve full amount of pRBC on 8/25  - Retic negative, haptoglobin high, LDH high  - peripheral blood smear shows no schistocytes, tear drop cell  - No obvious sign of bleed  - Pt deferring digital rectal exam. Reports has regular BM and will give sample for fecal occult blood testing  - Iron deficient, continue iron supplementation  - f/u H&H in AM      Acute DVT :  - Not on AC for anemia and SDH  - On DVT ppx dose  - repeat US unchanged  - Vascular follow up, no intervention    GI ppx  - Protonix    daughter at bedside, requesting psych eval to assess capacity.  PT eval

## 2023-08-30 NOTE — PROGRESS NOTE ADULT - ASSESSMENT
86-year-old male with history of recurrent lightheadedness and dizziness with multiple admissions to the emergency room for above found down on street for unknown duration.   On admission found to be hypothermic and hypotensive with rapid atrial fibrillation requiring Pressor support    CT of chest appears to suggest bilateral pneumonia   Also, profound acidosis and anemia, receiving blood products while in the emergency room.  blood culture:  Streptococcus pneumoniae: Detec (08.20.23 @ 05:00)  Rates improved on IV amiodarone, converted to sinus  Evidence of acute renal insufficiency   Elevated troponin seen here likely significant demand ischemia in the setting of renal insufficiency.    8/21: transferred to medical floor  8/22: found to be in afib with RVR, 140-160s SBP 80-90s, transferred to tele bed, amiodarone drip -> converted to sinus 8/23 am    -remains in sinus, s/p amiodarone IV loading,  currently continued on maintenance oral dose of amiodarone 200mg po daily, TSH wnl, QTc 473  -No Ac in the setting of acute anemia, subdural hematoma   -TTE with Mildly decreased global left ventricular systolic function. BLAE, Mod - severe MR/TR, severe pHTN, Unknown previous Hx, no overt fluid overload noted  -ABx as per ID for Strep pneumoniae bacteremia w/ evidence of b/l pneumonia and lactic acidosis  -Pt with underweight, Severe protein-calorie malnutrition, nutrition eval appreciated   -monitor h/h, transfuse as needed, cont on feso4, anemia w/u as per primary team  -vascular f/u for DVTs  -activities as tolerated   -monitor QTc and LFTs closely while pt on both amiodarone and Levaquin

## 2023-08-30 NOTE — PROGRESS NOTE ADULT - NS ATTEND AMEND GEN_ALL_CORE FT
Admitted after fall in sepsis from pneumonia.  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  Vascular consulted for evaluation for IVC filter placement.   Patient is on DVT prophylaxis currently.   Patient is being followed by neurology.  Repeat duplex shows stable DVT but reports prior duplex misidentified DVT in tibioperoneal trunk and PTV vs. soleal DVT.   Legs soft. nontender. nonswollen.     - In review of recent duplex, patient with unilateral below knee DVT (less proximal than previously mentioned on prior duplex and less risk of PE). Additionally, recent duplex shows no propagation during hospitalization. Although can consider IVCF placement given sdh, patient has been tolerating DVT prophylaxis without clot propagation of below knee DVT. As an IVCF can also be a nidus for thrombus formation, at this time would recommend continued conservative management with serial duplex while inpatient.   - Repeat duplex in 1 week if patient remains hospitalized.   - SCDs to right leg.   - OOB and ambulate as tolerated and safe.

## 2023-08-30 NOTE — PROGRESS NOTE ADULT - SUBJECTIVE AND OBJECTIVE BOX
Patient is a 88y old  Male who presents with a chief complaint of found  down (30 Aug 2023 19:00)      INTERVAL HPI/OVERNIGHT EVENTS:  Pt was seen and examined, no acute events.      MEDICATIONS  (STANDING):  aMIOdarone    Tablet 200 milliGRAM(s) Oral daily  budesonide 160 MICROgram(s)/formoterol 4.5 MICROgram(s) Inhaler 2 Puff(s) Inhalation two times a day  ferrous    sulfate 325 milliGRAM(s) Oral three times a day  heparin   Injectable 5000 Unit(s) SubCutaneous every 8 hours  levoFLOXacin  Tablet 750 milliGRAM(s) Oral every 48 hours  pantoprazole    Tablet 40 milliGRAM(s) Oral before breakfast  tiotropium 2.5 MICROgram(s) Inhaler 2 Puff(s) Inhalation daily    MEDICATIONS  (PRN):  acetaminophen     Tablet .. 650 milliGRAM(s) Oral every 6 hours PRN Moderate Pain (4 - 6)  albuterol    90 MICROgram(s) HFA Inhaler 2 Puff(s) Inhalation every 6 hours PRN Shortness of Breath and/or Wheezing  aluminum hydroxide/magnesium hydroxide/simethicone Suspension 30 milliLiter(s) Oral every 4 hours PRN Dyspepsia  benzocaine/menthol Lozenge 1 Lozenge Oral four times a day PRN Sore Throat  diltiazem Injectable 10 milliGRAM(s) IV Push once PRN sustained HR >130  guaiFENesin Oral Liquid (Sugar-Free) 100 milliGRAM(s) Oral every 6 hours PRN Cough      Allergies  No Known Allergies        Vital Signs Last 24 Hrs  T(C): 36.8 (30 Aug 2023 16:15), Max: 37.8 (30 Aug 2023 04:45)  T(F): 98.2 (30 Aug 2023 16:15), Max: 100 (30 Aug 2023 04:45)  HR: 72 (30 Aug 2023 16:15) (72 - 93)  BP: 132/70 (30 Aug 2023 16:15) (110/58 - 133/64)  BP(mean): --  RR: 18 (30 Aug 2023 16:15) (18 - 19)  SpO2: 95% (30 Aug 2023 16:15) (95% - 96%)    Parameters below as of 30 Aug 2023 16:15  Patient On (Oxygen Delivery Method): room air        PHYSICAL EXAM:  GENERAL: NAD,  no increased WOB  HEAD:  Atraumatic, Normocephalic  EYES: EOMI, conjunctiva and sclera clear  ENMT: Moist mucous membranes  NECK: Supple, No JVD  NERVOUS SYSTEM:  Alert, no focal neuro deficits   CHEST/LUNG: Clear to auscultation bilaterally; No rales, rhonchi, wheezing, or rubs  HEART: Regular rate and rhythm;  ABDOMEN: Soft, Nontender, Nondistended; Bowel sounds present  EXTREMITIES:  2+ Peripheral Pulses b/l, No clubbing, cyanosis, calf tenderness or edema b/l        LABS:                        8.3    7.39  )-----------( 383      ( 30 Aug 2023 06:35 )             25.4     08-29    141  |  113<H>  |  16  ----------------------------<  83  4.2   |  24  |  0.98    Ca    7.4<L>      29 Aug 2023 06:56  Phos  2.1     08-30  Mg     2.0     08-30    TPro  6.2  /  Alb  1.6<L>  /  TBili  0.4  /  DBili  0.2  /  AST  35  /  ALT  49  /  AlkPhos  71  08-30      Urinalysis Basic - ( 29 Aug 2023 06:56 )    Color: x / Appearance: x / SG: x / pH: x  Gluc: 83 mg/dL / Ketone: x  / Bili: x / Urobili: x   Blood: x / Protein: x / Nitrite: x   Leuk Esterase: x / RBC: x / WBC x   Sq Epi: x / Non Sq Epi: x / Bacteria: x      CAPILLARY BLOOD GLUCOSE      POCT Blood Glucose.: 110 mg/dL (30 Aug 2023 19:26)  POCT Blood Glucose.: 118 mg/dL (30 Aug 2023 08:17)  POCT Blood Glucose.: 107 mg/dL (30 Aug 2023 00:36)      RADIOLOGY & ADDITIONAL TESTS:    Imaging Personally Reviewed:  [ ] YES  [ ] NO    Consultant(s) Notes Reviewed:  [ ] YES  [ ] NO    Care Discussed with Consultants/Other Providers [ ] YES  [ ] NO

## 2023-08-30 NOTE — PROGRESS NOTE ADULT - SUBJECTIVE AND OBJECTIVE BOX
Patient is a 88y old  Male who presents with  AMS, SEPSIS, ANEMIA, pt was found down    PAST MEDICAL & SURGICAL HISTORY:  lightheadedness and dizziness    INTERVAL HISTORY:  	  MEDICATIONS:  MEDICATIONS  (STANDING):  aMIOdarone    Tablet 200 milliGRAM(s) Oral daily  budesonide 160 MICROgram(s)/formoterol 4.5 MICROgram(s) Inhaler 2 Puff(s) Inhalation two times a day  ferrous    sulfate 325 milliGRAM(s) Oral three times a day  heparin   Injectable 5000 Unit(s) SubCutaneous every 8 hours  levoFLOXacin  Tablet 750 milliGRAM(s) Oral every 48 hours  pantoprazole    Tablet 40 milliGRAM(s) Oral before breakfast  tiotropium 2.5 MICROgram(s) Inhaler 2 Puff(s) Inhalation daily    MEDICATIONS  (PRN):  acetaminophen     Tablet .. 650 milliGRAM(s) Oral every 6 hours PRN Moderate Pain (4 - 6)  albuterol    90 MICROgram(s) HFA Inhaler 2 Puff(s) Inhalation every 6 hours PRN Shortness of Breath and/or Wheezing  aluminum hydroxide/magnesium hydroxide/simethicone Suspension 30 milliLiter(s) Oral every 4 hours PRN Dyspepsia  benzocaine/menthol Lozenge 1 Lozenge Oral four times a day PRN Sore Throat  diltiazem Injectable 10 milliGRAM(s) IV Push once PRN sustained HR >130  guaiFENesin Oral Liquid (Sugar-Free) 100 milliGRAM(s) Oral every 6 hours PRN Cough    Vitals:  T(F): 100 (08-30-23 @ 04:45), Max: 100 (08-30-23 @ 04:45)  HR: 75 (08-30-23 @ 04:45) (66 - 75)  BP: 110/58 (08-30-23 @ 04:45) (110/58 - 133/64)  RR: 18 (08-30-23 @ 04:45) (18 - 18)  SpO2: 96% (08-30-23 @ 04:45) (96% - 97%)    08-29 @ 07:01  -  08-30 @ 07:00  --------------------------------------------------------  IN:    Oral Fluid: 320 mL  Total IN: 320 mL    OUT:    Voided (mL): 500 mL  Total OUT: 500 mL    Total NET: -180 mL    PHYSICAL EXAM:  Neuro: Awake, responsive  CV: S1 S2 RRR + SM  Lungs: diminished to bases   GI: Soft, BS +, ND, NT  Extremities: ankle edema    TELEMETRY: sinus     RADIOLOGY: < from: US Duplex Venous Lower Ext Complete, Bilateral (08.29.23 @ 10:53) >  IMPRESSION:  Acute deep venous thrombosis: below the knee.    Acute left soleal vein deep vein thrombosis, unchanged.    < end of copied text >    < from: CT Abdomen and Pelvis No Cont (08.20.23 @ 06:49) >  LUNGS AND LARGE AIRWAYS: Patent central airways. Bibasilar patchy   consolidations suggesting pneumonia. Superimposed emphysematous lung   change and interstitial lung disease/honeycombing at the lung bases..  PLEURA: No pleural effusion or pneumothorax.  VESSELS: Limited evaluation of the thoracic aorta without intravenous   contrast, however there is calcific atherosclerosis without aneurysmal   dilatation.  HEART: Borderline enlarged heart.. No pericardial effusion. Coronary   artery calcifications. Anemia suggested.  MEDIASTINUM AND STACY: No lymphadenopathy.  CHEST WALL AND LOWER NECK: Within normal limits.      ABDOMEN AND PELVIS:  LIVER: Within normal limits.  BILE DUCTS: Normal caliber.  GALLBLADDER: Elongated gallbladder without definite calcified gallstone..  SPLEEN: Within normal limits.  PANCREAS: Within normal limits.  ADRENALS: Normal right adrenal gland. Limited left adrenal gland.  KIDNEYS/URETERS: Small bilateral kidneys. No renal stone or   hydronephrosis.    BLADDER: Within normal limits.  REPRODUCTIVE ORGANS: Prostate gland is not grossly enlarged.    BOWEL: Evaluation of bowel is limited without distention with oral   contrast and lack of mesenteric fat, however there is no bowel   obstruction. There is a 7 cm stool distended rectum. Small bowel loops   are not grossly dilated. Stomach is mildly distended with debris.  PERITONEUM: No free air or significant ascites.  VESSELS:  Limited evaluation of the abdominal aorta without intravenous   contrast, demonstrates tortuosity and calcific atherosclerosis without   aneurysmal dilatation. Negative  RETROPERITONEUM: No lymphadenopathy.  ABDOMINAL WALL: Within normal limits.  BONES: Severe scoliosis of the spine with associated multilevel   degenerative changes. Question pagetoid appearance of the right iliac   bone.    IMPRESSION:    Bilateral pneumonia.    < end of copied text >    DIAGNOSTIC TESTING:    [x ] Echocardiogram:   < from: TTE Echo Complete w/o Contrast w/ Doppler (08.21.23 @ 08:17) >  Left Ventricle: Normal left ventricular size and wall thicknesses, with   normal systolic and diastolic function.  Global LV systolic function was mildly decreased. Global longitudinal   strain imaging was performed on GE Vivid S70 at a heart rate of 68BPM and   a blood pressure of 108/57 mmHg, average GLS calculated was -19.1%   (normal).  Right Ventricle: Normal right ventricular size and function.  Left Atrium: Moderately enlarged left atrium.  Right Atrium: The right atrium is normal in size. Moderately enlarged   right atrium.  Pericardium: There is no evidence of pericardial effusion.  Mitral Valve: Structurally normal mitral valve, with normal leaflet   excursion. Moderate to severe mitral valve regurgitation is seen.  Tricuspid Valve: Structurally normal tricuspid valve, with normal leaflet   excursion. Moderate-severe tricuspid regurgitation is visualized.   Estimated pulmonary artery systolic pressure is 65.8 mmHg assuming a   right atrial pressure of 15 mmHg, which is consistent with severe   pulmonary hypertension.  Aortic Valve: Normal trileaflet aortic valve with normal opening. No   evidence of aortic valve regurgitation is seen.  Pulmonic Valve: Structurally normal pulmonic valve, with normal leaflet   excursion. Mild pulmonic valve regurgitation.  Aorta: The aortic root and ascending aorta are structurally normal, with   no evidence of dilitation.  Pulmonary Artery: The main pulmonary artery is normal in size.  Venous: The inferior vena cava was normal sized, with respiratory size   variation less than 50%.      Summary:   1. Mildly decreased global left ventricular systolic function.   2. Global longitudinal strain imaging was performed on GE Vivid S70 at a   heart rate of 68BPM and a blood pressure of 108/57 mmHg, average GLS   calculated was -19.1% (normal).   3. Moderately enlarged left atrium.   4. Moderately enlarged right atrium.   5. Moderate to severe mitral valve regurgitation.   6. Moderate-severe tricuspid regurgitation.   7. Mild pulmonic valve regurgitation.   8. Estimated pulmonary artery systolic pressure is 65.8 mmHg assuming a   right atrial pressure of 15 mmHg, which is consistent with severe   pulmonary hypertension.    < end of copied text >    LABS:	 	    29 Aug 2023 06:56    141    |  113    |  16     ----------------------------<  83     4.2     |  24     |  0.98   28 Aug 2023 09:50    141    |  112    |  18     ----------------------------<  137    4.0     |  25     |  1.13     Ca    7.4        29 Aug 2023 06:56  Phos  1.7       29 Aug 2023 06:56  Mg     1.9       29 Aug 2023 06:56    TPro  6.2    /  Alb  1.6    /  TBili  0.4    /  DBili  0.2    /  AST  35     /  ALT  49     /  AlkPhos  71     30 Aug 2023 06:35                        8.3    7.39  )-----------( 383      ( 30 Aug 2023 06:35 )             25.4 ,                       7.8    7.89  )-----------( 295      ( 29 Aug 2023 06:56 )             25.9 ,                       7.1    8.13  )-----------( CLUMPED    ( 28 Aug 2023 06:36 )             22.7 ,                       8.1    9.22  )-----------( 272      ( 27 Aug 2023 10:30 )             25.6                Patient is a 88y old  Male who presents with  AMS, SEPSIS, ANEMIA, pt was found down    PAST MEDICAL & SURGICAL HISTORY:  lightheadedness and dizziness    INTERVAL HISTORY: in no acute distress   	  MEDICATIONS:  MEDICATIONS  (STANDING):  aMIOdarone    Tablet 200 milliGRAM(s) Oral daily  budesonide 160 MICROgram(s)/formoterol 4.5 MICROgram(s) Inhaler 2 Puff(s) Inhalation two times a day  ferrous    sulfate 325 milliGRAM(s) Oral three times a day  heparin   Injectable 5000 Unit(s) SubCutaneous every 8 hours  levoFLOXacin  Tablet 750 milliGRAM(s) Oral every 48 hours  pantoprazole    Tablet 40 milliGRAM(s) Oral before breakfast  tiotropium 2.5 MICROgram(s) Inhaler 2 Puff(s) Inhalation daily    MEDICATIONS  (PRN):  acetaminophen     Tablet .. 650 milliGRAM(s) Oral every 6 hours PRN Moderate Pain (4 - 6)  albuterol    90 MICROgram(s) HFA Inhaler 2 Puff(s) Inhalation every 6 hours PRN Shortness of Breath and/or Wheezing  aluminum hydroxide/magnesium hydroxide/simethicone Suspension 30 milliLiter(s) Oral every 4 hours PRN Dyspepsia  benzocaine/menthol Lozenge 1 Lozenge Oral four times a day PRN Sore Throat  diltiazem Injectable 10 milliGRAM(s) IV Push once PRN sustained HR >130  guaiFENesin Oral Liquid (Sugar-Free) 100 milliGRAM(s) Oral every 6 hours PRN Cough    Vitals:  T(F): 100 (08-30-23 @ 04:45), Max: 100 (08-30-23 @ 04:45)  HR: 75 (08-30-23 @ 04:45) (66 - 75)  BP: 110/58 (08-30-23 @ 04:45) (110/58 - 133/64)  RR: 18 (08-30-23 @ 04:45) (18 - 18)  SpO2: 96% (08-30-23 @ 04:45) (96% - 97%)    08-29 @ 07:01  -  08-30 @ 07:00  --------------------------------------------------------  IN:    Oral Fluid: 320 mL  Total IN: 320 mL    OUT:    Voided (mL): 500 mL  Total OUT: 500 mL    Total NET: -180 mL    PHYSICAL EXAM:  Neuro: Awake, responsive  CV: S1 S2 RRR + SM  Lungs: diminished to bases   GI: Soft, BS +, ND, NT  Extremities: ankle edema    TELEMETRY: sinus     RADIOLOGY: < from: US Duplex Venous Lower Ext Complete, Bilateral (08.29.23 @ 10:53) >  IMPRESSION:  Acute deep venous thrombosis: below the knee.    Acute left soleal vein deep vein thrombosis, unchanged.    < end of copied text >    < from: CT Abdomen and Pelvis No Cont (08.20.23 @ 06:49) >  LUNGS AND LARGE AIRWAYS: Patent central airways. Bibasilar patchy   consolidations suggesting pneumonia. Superimposed emphysematous lung   change and interstitial lung disease/honeycombing at the lung bases..  PLEURA: No pleural effusion or pneumothorax.  VESSELS: Limited evaluation of the thoracic aorta without intravenous   contrast, however there is calcific atherosclerosis without aneurysmal   dilatation.  HEART: Borderline enlarged heart.. No pericardial effusion. Coronary   artery calcifications. Anemia suggested.  MEDIASTINUM AND STACY: No lymphadenopathy.  CHEST WALL AND LOWER NECK: Within normal limits.      ABDOMEN AND PELVIS:  LIVER: Within normal limits.  BILE DUCTS: Normal caliber.  GALLBLADDER: Elongated gallbladder without definite calcified gallstone..  SPLEEN: Within normal limits.  PANCREAS: Within normal limits.  ADRENALS: Normal right adrenal gland. Limited left adrenal gland.  KIDNEYS/URETERS: Small bilateral kidneys. No renal stone or   hydronephrosis.    BLADDER: Within normal limits.  REPRODUCTIVE ORGANS: Prostate gland is not grossly enlarged.    BOWEL: Evaluation of bowel is limited without distention with oral   contrast and lack of mesenteric fat, however there is no bowel   obstruction. There is a 7 cm stool distended rectum. Small bowel loops   are not grossly dilated. Stomach is mildly distended with debris.  PERITONEUM: No free air or significant ascites.  VESSELS:  Limited evaluation of the abdominal aorta without intravenous   contrast, demonstrates tortuosity and calcific atherosclerosis without   aneurysmal dilatation. Negative  RETROPERITONEUM: No lymphadenopathy.  ABDOMINAL WALL: Within normal limits.  BONES: Severe scoliosis of the spine with associated multilevel   degenerative changes. Question pagetoid appearance of the right iliac   bone.    IMPRESSION:    Bilateral pneumonia.    < end of copied text >    DIAGNOSTIC TESTING:    [x ] Echocardiogram:   < from: TTE Echo Complete w/o Contrast w/ Doppler (08.21.23 @ 08:17) >  Left Ventricle: Normal left ventricular size and wall thicknesses, with   normal systolic and diastolic function.  Global LV systolic function was mildly decreased. Global longitudinal   strain imaging was performed on [employer] Vivid S70 at a heart rate of 68BPM and   a blood pressure of 108/57 mmHg, average GLS calculated was -19.1%   (normal).  Right Ventricle: Normal right ventricular size and function.  Left Atrium: Moderately enlarged left atrium.  Right Atrium: The right atrium is normal in size. Moderately enlarged   right atrium.  Pericardium: There is no evidence of pericardial effusion.  Mitral Valve: Structurally normal mitral valve, with normal leaflet   excursion. Moderate to severe mitral valve regurgitation is seen.  Tricuspid Valve: Structurally normal tricuspid valve, with normal leaflet   excursion. Moderate-severe tricuspid regurgitation is visualized.   Estimated pulmonary artery systolic pressure is 65.8 mmHg assuming a   right atrial pressure of 15 mmHg, which is consistent with severe   pulmonary hypertension.  Aortic Valve: Normal trileaflet aortic valve with normal opening. No   evidence of aortic valve regurgitation is seen.  Pulmonic Valve: Structurally normal pulmonic valve, with normal leaflet   excursion. Mild pulmonic valve regurgitation.  Aorta: The aortic root and ascending aorta are structurally normal, with   no evidence of dilitation.  Pulmonary Artery: The main pulmonary artery is normal in size.  Venous: The inferior vena cava was normal sized, with respiratory size   variation less than 50%.      Summary:   1. Mildly decreased global left ventricular systolic function.   2. Global longitudinal strain imaging was performed on GE Vivid S70 at a   heart rate of 68BPM and a blood pressure of 108/57 mmHg, average GLS   calculated was -19.1% (normal).   3. Moderately enlarged left atrium.   4. Moderately enlarged right atrium.   5. Moderate to severe mitral valve regurgitation.   6. Moderate-severe tricuspid regurgitation.   7. Mild pulmonic valve regurgitation.   8. Estimated pulmonary artery systolic pressure is 65.8 mmHg assuming a   right atrial pressure of 15 mmHg, which is consistent with severe   pulmonary hypertension.    < end of copied text >    LABS:	 	    29 Aug 2023 06:56    141    |  113    |  16     ----------------------------<  83     4.2     |  24     |  0.98   28 Aug 2023 09:50    141    |  112    |  18     ----------------------------<  137    4.0     |  25     |  1.13     Ca    7.4        29 Aug 2023 06:56  Phos  1.7       29 Aug 2023 06:56  Mg     1.9       29 Aug 2023 06:56    TPro  6.2    /  Alb  1.6    /  TBili  0.4    /  DBili  0.2    /  AST  35     /  ALT  49     /  AlkPhos  71     30 Aug 2023 06:35                        8.3    7.39  )-----------( 383      ( 30 Aug 2023 06:35 )             25.4 ,                       7.8    7.89  )-----------( 295      ( 29 Aug 2023 06:56 )             25.9 ,                       7.1    8.13  )-----------( CLUMPED    ( 28 Aug 2023 06:36 )             22.7 ,                       8.1    9.22  )-----------( 272      ( 27 Aug 2023 10:30 )             25.6

## 2023-08-30 NOTE — CHART NOTE - NSCHARTNOTEFT_GEN_A_CORE
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  No known PMHx    Factors impacting intake: [ X] none [ ] nausea  [ ] vomiting [ ] diarrhea [ ] constipation  [ ]chewing problems [ ] swallowing issues  [ ] other:     Diet Prescription:   Easy to Chew c Ensure Plus High Protein x 3/day (provides 1050 kcal, 60 g protein)   Intake:  pt eating well; % most meals, drinking some of supplements    Current Weight:   52.1 kg (8/28); previous wt 50.6 kg (8/22)  % Weight Change:  wt gain of 2.9% x 6 days    No edema noted since 8/24    Pertinent Medications: MEDICATIONS  (STANDING):  aMIOdarone    Tablet 200 milliGRAM(s) Oral daily  budesonide 160 MICROgram(s)/formoterol 4.5 MICROgram(s) Inhaler 2 Puff(s) Inhalation two times a day  ferrous    sulfate 325 milliGRAM(s) Oral three times a day  heparin   Injectable 5000 Unit(s) SubCutaneous every 8 hours  levoFLOXacin  Tablet 750 milliGRAM(s) Oral every 48 hours  pantoprazole    Tablet 40 milliGRAM(s) Oral before breakfast  tiotropium 2.5 MICROgram(s) Inhaler 2 Puff(s) Inhalation daily    MEDICATIONS  (PRN):  acetaminophen     Tablet .. 650 milliGRAM(s) Oral every 6 hours PRN Moderate Pain (4 - 6)  albuterol    90 MICROgram(s) HFA Inhaler 2 Puff(s) Inhalation every 6 hours PRN Shortness of Breath and/or Wheezing  aluminum hydroxide/magnesium hydroxide/simethicone Suspension 30 milliLiter(s) Oral every 4 hours PRN Dyspepsia  benzocaine/menthol Lozenge 1 Lozenge Oral four times a day PRN Sore Throat  diltiazem Injectable 10 milliGRAM(s) IV Push once PRN sustained HR >130  guaiFENesin Oral Liquid (Sugar-Free) 100 milliGRAM(s) Oral every 6 hours PRN Cough    Pertinent Labs: 08-29 Na141 mmol/L Glu 83 mg/dL K+ 4.2 mmol/L Cr  0.98 mg/dL BUN 16 mg/dL 08-29 Phos 1.7 mg/dL<L> 08-30 Alb 1.6 g/dL<L> 08-23 Chol 78 mg/dL LDL --    HDL 31 mg/dL<L> Trig 84 mg/dL  08-20-23 A1C 5.6%; 08-29 POCT 114, 97, 104, 126    Skin:  stage I pressure ulcer noted since 8/27    Estimated Needs:   [ x] no change since previous assessment (8/22)  [ ] recalculated:     Previous Nutrition Diagnosis:   [X ]  Severe Malnutrition in context of chronic illness  Etiology:  Inadequate energy/protein intake related to emphysema/lung disease, SDH, possible social circumstances  Signs & Symptoms:  Physical findings of severe fat depletion & muscle wasting as noted    GOAL:  Pt to consume >50% meals/supplements during LOS - being met at this time    Nutrition Diagnosis is [ x] ongoing  [ ] resolved [ ] not applicable     New Nutrition Diagnosis: [x ] not applicable      Interventions:   continue current diet rx as noted  Recommend  [ ] Change Diet To:  [ ] Nutrition Supplement  [ ] Nutrition Support  [ ] Other:     Monitoring and Evaluation:   [x ] PO intake [ x ] Tolerance to diet prescription [ x ] weights [ x ] labs[ x ] follow up per protocol  [ ] other:

## 2023-08-30 NOTE — PROGRESS NOTE ADULT - ASSESSMENT
87 Y/O male was initially BIBEMS when he was found down on the street. After reviewing the lower extremity duplex US results, no surgical intervention with IVC filter is needed at this time.         PLAN:     - Continue heparin injectable therapy for anticoagulation   - Recommend OOB and walking regularly   - SCDs to be used while in bed  - Multimodal pain control  - IS to be used hourly at bedside   - Discussed with Dr. Medeiros  - No acute surgical intervention warranted at this time  89 Y/O male was initially BIBEMS when he was found down on the street. After reviewing the lower extremity duplex US results, no surgical intervention with IVC filter is needed at this time.         PLAN:     - Continue heparin injectable therapy for anticoagulation   - Recommend OOB and walking regularly   - SCDs to be used while in bed  - Recommend repeat bilateral lower extremity duplex US in 1 week   - Multimodal pain control  - IS to be used hourly at bedside   - Discussed with Dr. Medeiros  - No acute surgical intervention warranted at this time  89 Y/O male was initially BIBEMS when he was found down on the street. No change in venous duplex US results. Repeat venous duplex US. No acute surgical intervention needed at this time.         PLAN:     - Continue heparin injectable therapy for anticoagulation   - Recommend OOB and walking regularly   - SCDs to be used while in bed  - Recommend repeat bilateral lower extremity duplex US in 1 week   - Multimodal pain control  - IS to be used hourly at bedside   - Discussed with Dr. Medeiros  - No acute surgical intervention warranted at this time

## 2023-08-30 NOTE — PROGRESS NOTE ADULT - SUBJECTIVE AND OBJECTIVE BOX
Patient seen and examined at bedside with no complaints.   Denies pain, nausea/ vomiting, chills, SOB, chest pain, calf tenderness.    Tolerating diet.    Vital Signs Last 24 Hrs  T(F): 98 (08-30-23 @ 11:07), Max: 100 (08-30-23 @ 04:45)  HR: 93 (08-30-23 @ 11:07)  BP: 116/77 (08-30-23 @ 11:07)  RR: 19 (08-30-23 @ 11:07)  SpO2: 96% (08-30-23 @ 11:07)  Wt(kg): --   CAPILLARY BLOOD GLUCOSE      POCT Blood Glucose.: 118 mg/dL (30 Aug 2023 08:17)      GENERAL: Alert, NAD  CHEST/LUNG: Clear to auscultation bilaterally, respirations nonlabored  HEART: S1S2, Regular rate and rhythm;   ABDOMEN: Nondistended, + bowel sounds, nontender  EXTREMITIES:  No calf tenderness, no edema    I&O's Detail    29 Aug 2023 07:01  -  30 Aug 2023 07:00  --------------------------------------------------------  IN:    Oral Fluid: 320 mL  Total IN: 320 mL    OUT:    Voided (mL): 500 mL  Total OUT: 500 mL    Total NET: -180 mL      30 Aug 2023 07:01  -  30 Aug 2023 15:07  --------------------------------------------------------  IN:    Oral Fluid: 480 mL  Total IN: 480 mL    OUT:  Total OUT: 0 mL    Total NET: 480 mL          LABS:                        8.3    7.39  )-----------( 383      ( 30 Aug 2023 06:35 )             25.4     08-29    141  |  113<H>  |  16  ----------------------------<  83  4.2   |  24  |  0.98    Ca    7.4<L>      29 Aug 2023 06:56  Phos  2.1     08-30  Mg     2.0     08-30    TPro  6.2  /  Alb  1.6<L>  /  TBili  0.4  /  DBili  0.2  /  AST  35  /  ALT  49  /  AlkPhos  71  08-30        RADIOLOGY & ADDITIONAL STUDIES:    < from: US Duplex Venous Lower Ext Complete, Bilateral (08.29.23 @ 10:53) >  ACC: [id number] EXAM:  US DPLX LWR EXT VEINS COMPL BI   ORDERED BY: CALVIN BISHOP     PROCEDURE DATE:  08/29/2023          INTERPRETATION:  CLINICAL INFORMATION: Left calf deep vein thrombosis.    COMPARISON: Venous Doppler dated 08/20/2023    TECHNIQUE: Duplex sonography of the BILATERAL LOWER extremity veins with   color and spectral Doppler, with and without compression.    FINDINGS:    RIGHT:  Normal compressibility of the RIGHT common femoral, femoral and popliteal   veins.  Doppler examination shows normal spontaneous and phasic flow.  No RIGHT calf vein thrombosis is detected.    LEFT:  Normal compressibility of the LEFT common femoral, femoral and popliteal   veins.  Doppler examination shows normal spontaneous and phasic flow.  There is acute left soleal vein deep vein thrombosis, unchanged as   compared with the prior study. Please note previously the clot was   erroneously presumed to be in the posterior tibial vein. The rest of the   visualized calf veins are unremarkable.    IMPRESSION:  Acute deep venous thrombosis: below the knee.    Acute left soleal vein deep vein thrombosis, unchanged.        --- End of Report ---            LIZBETH TATUM MD; Attending Radiologist  This document has been electronically signed. Aug29 2023 11:19AM    < end of copied text >       88y old  Male s/p secondary to AMS    AMS,SEPSIS,ANEMIA REQUIRING TRANSFUSION,ANGI,ELEVATED TROPONI    , POD# with PMH No pertinent past medical history         Patient seen and examined at bedside with no complaints.   Denies pain, nausea/ vomiting, chills, SOB, chest pain, calf tenderness.    Tolerating diet.    Vital Signs Last 24 Hrs  T(F): 98 (08-30-23 @ 11:07), Max: 100 (08-30-23 @ 04:45)  HR: 93 (08-30-23 @ 11:07)  BP: 116/77 (08-30-23 @ 11:07)  RR: 19 (08-30-23 @ 11:07)  SpO2: 96% (08-30-23 @ 11:07)  Wt(kg): --   CAPILLARY BLOOD GLUCOSE      POCT Blood Glucose.: 118 mg/dL (30 Aug 2023 08:17)      GENERAL: Alert, NAD  CHEST/LUNG: Clear to auscultation bilaterally, respirations nonlabored  HEART: S1S2, Regular rate and rhythm;   ABDOMEN: Nondistended, + bowel sounds, nontender  EXTREMITIES:  No calf tenderness, no edema, palpable bilateral pedal pulses     I&O's Detail    29 Aug 2023 07:01  -  30 Aug 2023 07:00  --------------------------------------------------------  IN:    Oral Fluid: 320 mL  Total IN: 320 mL    OUT:    Voided (mL): 500 mL  Total OUT: 500 mL    Total NET: -180 mL      30 Aug 2023 07:01  -  30 Aug 2023 15:07  --------------------------------------------------------  IN:    Oral Fluid: 480 mL  Total IN: 480 mL    OUT:  Total OUT: 0 mL    Total NET: 480 mL          LABS:                        8.3    7.39  )-----------( 383      ( 30 Aug 2023 06:35 )             25.4     08-29    141  |  113<H>  |  16  ----------------------------<  83  4.2   |  24  |  0.98    Ca    7.4<L>      29 Aug 2023 06:56  Phos  2.1     08-30  Mg     2.0     08-30    TPro  6.2  /  Alb  1.6<L>  /  TBili  0.4  /  DBili  0.2  /  AST  35  /  ALT  49  /  AlkPhos  71  08-30        RADIOLOGY & ADDITIONAL STUDIES:    < from: US Duplex Venous Lower Ext Complete, Bilateral (08.29.23 @ 10:53) >  ACC: [id number] EXAM:  US DPLX LWR EXT VEINS COMPL BI   ORDERED BY: CALVIN BISHOP     PROCEDURE DATE:  08/29/2023          INTERPRETATION:  CLINICAL INFORMATION: Left calf deep vein thrombosis.    COMPARISON: Venous Doppler dated 08/20/2023    TECHNIQUE: Duplex sonography of the BILATERAL LOWER extremity veins with   color and spectral Doppler, with and without compression.    FINDINGS:    RIGHT:  Normal compressibility of the RIGHT common femoral, femoral and popliteal   veins.  Doppler examination shows normal spontaneous and phasic flow.  No RIGHT calf vein thrombosis is detected.    LEFT:  Normal compressibility of the LEFT common femoral, femoral and popliteal   veins.  Doppler examination shows normal spontaneous and phasic flow.  There is acute left soleal vein deep vein thrombosis, unchanged as   compared with the prior study. Please note previously the clot was   erroneously presumed to be in the posterior tibial vein. The rest of the   visualized calf veins are unremarkable.    IMPRESSION:  Acute deep venous thrombosis: below the knee.    Acute left soleal vein deep vein thrombosis, unchanged.        --- End of Report ---            LIZBETH TATUM MD; Attending Radiologist  This document has been electronically signed. Aug29 2023 11:19AM    < end of copied text >       88y old  Male s/p secondary to AMS    AMS,SEPSIS,ANEMIA REQUIRING TRANSFUSION,ANGI,ELEVATED TROPONI    , POD# with PMH No pertinent past medical history

## 2023-08-31 LAB
ALBUMIN SERPL ELPH-MCNC: 1.5 G/DL — LOW (ref 3.3–5)
ALP SERPL-CCNC: 60 U/L — SIGNIFICANT CHANGE UP (ref 40–120)
ALT FLD-CCNC: 43 U/L — SIGNIFICANT CHANGE UP (ref 12–78)
AST SERPL-CCNC: 38 U/L — HIGH (ref 15–37)
BILIRUB DIRECT SERPL-MCNC: 0.1 MG/DL — SIGNIFICANT CHANGE UP (ref 0–0.3)
BILIRUB INDIRECT FLD-MCNC: 0.2 MG/DL — SIGNIFICANT CHANGE UP (ref 0.2–1)
BILIRUB SERPL-MCNC: 0.3 MG/DL — SIGNIFICANT CHANGE UP (ref 0.2–1.2)
PROT SERPL-MCNC: 6 GM/DL — SIGNIFICANT CHANGE UP (ref 6–8.3)

## 2023-08-31 PROCEDURE — 99232 SBSQ HOSP IP/OBS MODERATE 35: CPT

## 2023-08-31 PROCEDURE — 93010 ELECTROCARDIOGRAM REPORT: CPT

## 2023-08-31 PROCEDURE — 99233 SBSQ HOSP IP/OBS HIGH 50: CPT

## 2023-08-31 RX ADMIN — BUDESONIDE AND FORMOTEROL FUMARATE DIHYDRATE 2 PUFF(S): 160; 4.5 AEROSOL RESPIRATORY (INHALATION) at 17:37

## 2023-08-31 RX ADMIN — PANTOPRAZOLE SODIUM 40 MILLIGRAM(S): 20 TABLET, DELAYED RELEASE ORAL at 05:25

## 2023-08-31 RX ADMIN — Medication 325 MILLIGRAM(S): at 05:25

## 2023-08-31 RX ADMIN — BUDESONIDE AND FORMOTEROL FUMARATE DIHYDRATE 2 PUFF(S): 160; 4.5 AEROSOL RESPIRATORY (INHALATION) at 05:25

## 2023-08-31 RX ADMIN — HEPARIN SODIUM 5000 UNIT(S): 5000 INJECTION INTRAVENOUS; SUBCUTANEOUS at 13:16

## 2023-08-31 RX ADMIN — Medication 325 MILLIGRAM(S): at 13:15

## 2023-08-31 RX ADMIN — AMIODARONE HYDROCHLORIDE 200 MILLIGRAM(S): 400 TABLET ORAL at 05:25

## 2023-08-31 RX ADMIN — TIOTROPIUM BROMIDE 2 PUFF(S): 18 CAPSULE ORAL; RESPIRATORY (INHALATION) at 13:16

## 2023-08-31 RX ADMIN — Medication 325 MILLIGRAM(S): at 21:07

## 2023-08-31 RX ADMIN — Medication 100 MILLIGRAM(S): at 17:37

## 2023-08-31 RX ADMIN — HEPARIN SODIUM 5000 UNIT(S): 5000 INJECTION INTRAVENOUS; SUBCUTANEOUS at 05:25

## 2023-08-31 RX ADMIN — HEPARIN SODIUM 5000 UNIT(S): 5000 INJECTION INTRAVENOUS; SUBCUTANEOUS at 21:07

## 2023-08-31 NOTE — PROGRESS NOTE ADULT - SUBJECTIVE AND OBJECTIVE BOX
Patient is a 88y old  Male who presents with  AMS, SEPSIS, ANEMIA, pt was found down    PAST MEDICAL & SURGICAL HISTORY:  lightheadedness and dizziness    INTERVAL HISTORY: in no acute distress  	  MEDICATIONS:  MEDICATIONS  (STANDING):  aMIOdarone    Tablet 200 milliGRAM(s) Oral daily  budesonide 160 MICROgram(s)/formoterol 4.5 MICROgram(s) Inhaler 2 Puff(s) Inhalation two times a day  ferrous    sulfate 325 milliGRAM(s) Oral three times a day  heparin   Injectable 5000 Unit(s) SubCutaneous every 8 hours  levoFLOXacin  Tablet 750 milliGRAM(s) Oral every 48 hours  pantoprazole    Tablet 40 milliGRAM(s) Oral before breakfast  tiotropium 2.5 MICROgram(s) Inhaler 2 Puff(s) Inhalation daily    MEDICATIONS  (PRN):  acetaminophen     Tablet .. 650 milliGRAM(s) Oral every 6 hours PRN Moderate Pain (4 - 6)  albuterol    90 MICROgram(s) HFA Inhaler 2 Puff(s) Inhalation every 6 hours PRN Shortness of Breath and/or Wheezing  aluminum hydroxide/magnesium hydroxide/simethicone Suspension 30 milliLiter(s) Oral every 4 hours PRN Dyspepsia  benzocaine/menthol Lozenge 1 Lozenge Oral four times a day PRN Sore Throat  diltiazem Injectable 10 milliGRAM(s) IV Push once PRN sustained HR >130  guaiFENesin Oral Liquid (Sugar-Free) 100 milliGRAM(s) Oral every 6 hours PRN Cough    Vitals:  T(F): 99.4 (08-31-23 @ 04:26), Max: 99.4 (08-31-23 @ 04:26)  HR: 79 (08-31-23 @ 04:26) (69 - 93)  BP: 110/62 (08-31-23 @ 04:26) (110/62 - 162/69)  RR: 18 (08-31-23 @ 04:26) (16 - 19)  SpO2: 98% (08-31-23 @ 04:26) (95% - 98%)    08-30 @ 07:01  -  08-31 @ 07:00  --------------------------------------------------------  IN:    Oral Fluid: 480 mL  Total IN: 480 mL    OUT:    Voided (mL): 400 mL  Total OUT: 400 mL    Total NET: 80 mL    PHYSICAL EXAM:  Neuro: Awake, responsive  CV: S1 S2 RRR + SM  Lungs: diminished to bases   GI: Soft, BS +, ND, NT  Extremities: ankle edema    TELEMETRY: sinus    RADIOLOGY: < from: CT Chest No Cont (08.20.23 @ 06:49) >  CHEST:  LUNGS AND LARGE AIRWAYS: Patent central airways. Bibasilar patchy   consolidations suggesting pneumonia. Superimposed emphysematous lung   change and interstitial lung disease/honeycombing at the lung bases..  PLEURA: No pleural effusion or pneumothorax.  VESSELS: Limited evaluation of the thoracic aorta without intravenous   contrast, however there is calcific atherosclerosis without aneurysmal   dilatation.  HEART: Borderline enlarged heart.. No pericardial effusion. Coronary   artery calcifications. Anemia suggested.  MEDIASTINUM AND STACY: No lymphadenopathy.  CHEST WALL AND LOWER NECK: Within normal limits.      ABDOMEN AND PELVIS:  LIVER: Within normal limits.  BILE DUCTS: Normal caliber.  GALLBLADDER: Elongated gallbladder without definite calcified gallstone..  SPLEEN: Within normal limits.  PANCREAS: Within normal limits.  ADRENALS: Normal right adrenal gland. Limited left adrenal gland.  KIDNEYS/URETERS: Small bilateral kidneys. No renal stone or   hydronephrosis.    BLADDER: Within normal limits.  REPRODUCTIVE ORGANS: Prostate gland is not grossly enlarged.    BOWEL: Evaluation of bowel is limited without distention with oral   contrast and lack of mesenteric fat, however there is no bowel   obstruction. There is a 7 cm stool distended rectum. Small bowel loops   are not grossly dilated. Stomach is mildly distended with debris.  PERITONEUM: No free air or significant ascites.  VESSELS:  Limited evaluation of the abdominal aorta without intravenous   contrast, demonstrates tortuosity and calcific atherosclerosis without   aneurysmal dilatation. Negative  RETROPERITONEUM: No lymphadenopathy.  ABDOMINAL WALL: Within normal limits.  BONES: Severe scoliosis of the spine with associated multilevel   degenerative changes. Question pagetoid appearance of the right iliac   bone.    IMPRESSION:    Bilateral pneumonia.    < end of copied text >    < from: US Duplex Venous Lower Ext Complete, Bilateral (08.29.23 @ 10:53) >  Acute deep venous thrombosis: below the knee.    Acute left soleal vein deep vein thrombosis, unchanged.    < end of copied text >    DIAGNOSTIC TESTING:    [x ] Echocardiogram:   < from: TTE Echo Complete w/o Contrast w/ Doppler (08.21.23 @ 08:17) >  Left Ventricle: Normal left ventricular size and wall thicknesses, with   normal systolic and diastolic function.  Global LV systolic function was mildly decreased. Global longitudinal   strain imaging was performed on [employer] Vivid S70 at a heart rate of 68BPM and   a blood pressure of 108/57 mmHg, average GLS calculated was -19.1%   (normal).  Right Ventricle: Normal right ventricular size and function.  Left Atrium: Moderately enlarged left atrium.  Right Atrium: The right atrium is normal in size. Moderately enlarged   right atrium.  Pericardium: There is no evidence of pericardial effusion.  Mitral Valve: Structurally normal mitral valve, with normal leaflet   excursion. Moderate to severe mitral valve regurgitation is seen.  Tricuspid Valve: Structurally normal tricuspid valve, with normal leaflet   excursion. Moderate-severe tricuspid regurgitation is visualized.   Estimated pulmonary artery systolic pressure is 65.8 mmHg assuming a   right atrial pressure of 15 mmHg, which is consistent with severe   pulmonary hypertension.  Aortic Valve: Normal trileaflet aortic valve with normal opening. No   evidence of aortic valve regurgitation is seen.  Pulmonic Valve: Structurally normal pulmonic valve, with normal leaflet   excursion. Mild pulmonic valve regurgitation.  Aorta: The aortic root and ascending aorta are structurally normal, with   no evidence of dilitation.  Pulmonary Artery: The main pulmonary artery is normal in size.  Venous: The inferior vena cava was normal sized, with respiratory size   variation less than 50%.      Summary:   1. Mildly decreased global left ventricular systolic function.   2. Global longitudinal strain imaging was performed on GE Vivid S70 at a   heart rate of 68BPM and a blood pressure of 108/57 mmHg, average GLS   calculated was -19.1% (normal).   3. Moderately enlarged left atrium.   4. Moderately enlarged right atrium.   5. Moderate to severe mitral valve regurgitation.   6. Moderate-severe tricuspid regurgitation.   7. Mild pulmonic valve regurgitation.   8. Estimated pulmonary artery systolic pressure is 65.8 mmHg assuming a   right atrial pressure of 15 mmHg, which is consistent with severe   pulmonary hypertension.    < end of copied text >    LABS:	 	    29 Aug 2023 06:56    141    |  113    |  16     ----------------------------<  83     4.2     |  24     |  0.98     Phos  2.1       30 Aug 2023 06:35  Mg     2.0       30 Aug 2023 06:35    TPro  6.0    /  Alb  1.5    /  TBili  0.3    /  DBili  0.1    /  AST  38     /  ALT  43     /  AlkPhos  60     31 Aug 2023 07:36                        8.3    7.39  )-----------( 383      ( 30 Aug 2023 06:35 )             25.4 ,                       7.8    7.89  )-----------( 295      ( 29 Aug 2023 06:56 )             25.9

## 2023-08-31 NOTE — PROGRESS NOTE ADULT - SUBJECTIVE AND OBJECTIVE BOX
INTERVAL HPI:  89 yo M with multiple syncopal episodes per chart notes,  on no home meds   Brought by EMS after being found down on street minimally responsive (8/20/23).   In the ED, was hypotensive, hypothermic (rectal T 90F), and tachycardic (afib w/ RVR).   Labs were remarkable for anemia (Hgb 6.1), leukocytosis w/ 25% band, lactic acidosis (lactate 14.4), ANGI, and elevated troponin   Got treated w/ vanc/zosyn, fluids, 1U pRBCs, IV amiodarone bolus (resolved arrhythmia), and a non-rebreather mask.   CT imaging showed an age-indeterminate L frontal sub-dural hematoma (4mm) and lung findings with pneumonia and emphysema/ILD.   RVP was entero/rhinovirus positive.   He was switched to ceftriaxone/azithromycin, was started on ipratropium nebulizers, and got  admitted to the ICU for new pressor requirement (norepinephrine).    During his ICU course, his blood cultures grew Strep pneumonia (on appropriate abx), lower extremity duplex study showed L tibioperoneal trunk and proximal posterior tibial vein DVTs (on heparin ppx), norepinephrine was weaned off w/ adequate MAPs,   Repeat CT head showed stable sub-dural hematoma, and iron supplementation was started.   08/21/23:  Got transferred to regular medical floor.  08/22/23:  At time of my evaluation, awake, responsive. Denies SOB, cough, sputum production or chest pain.  Admits being a smoker but says quit about a year ago.  Again in rapid A Fib and being transferred to monitor bed. Seen by Cardiology.    OVERNIGHT EVENTS:  Awake, responsive and comfortable. Says breathing is up and down.  SPO2 98% on nasal O2.    Vital Signs Last 24 Hrs  T(C): 37.4 (31 Aug 2023 04:26), Max: 37.4 (30 Aug 2023 23:59)  T(F): 99.4 (31 Aug 2023 04:26), Max: 99.4 (31 Aug 2023 04:26)  HR: 79 (31 Aug 2023 04:26) (69 - 93)  BP: 110/62 (31 Aug 2023 04:26) (110/62 - 162/69)  BP(mean): --  RR: 18 (31 Aug 2023 04:26) (16 - 19)  SpO2: 98% (31 Aug 2023 04:26) (95% - 98%)    Parameters below as of 31 Aug 2023 04:26  Patient On (Oxygen Delivery Method): room air    PHYSICAL EXAM:  GEN:         Awake, responsive and comfortable.  HEENT:     Normal.    RESP:       no wheezing.  CVS:         Regular rate and rhythm.     MEDICATIONS  (STANDING):  aMIOdarone    Tablet 200 milliGRAM(s) Oral daily  budesonide 160 MICROgram(s)/formoterol 4.5 MICROgram(s) Inhaler 2 Puff(s) Inhalation two times a day  ferrous    sulfate 325 milliGRAM(s) Oral three times a day  heparin   Injectable 5000 Unit(s) SubCutaneous every 8 hours  levoFLOXacin  Tablet 750 milliGRAM(s) Oral every 48 hours  pantoprazole    Tablet 40 milliGRAM(s) Oral before breakfast  tiotropium 2.5 MICROgram(s) Inhaler 2 Puff(s) Inhalation daily    MEDICATIONS  (PRN):  acetaminophen     Tablet .. 650 milliGRAM(s) Oral every 6 hours PRN Moderate Pain (4 - 6)  albuterol    90 MICROgram(s) HFA Inhaler 2 Puff(s) Inhalation every 6 hours PRN Shortness of Breath and/or Wheezing  aluminum hydroxide/magnesium hydroxide/simethicone Suspension 30 milliLiter(s) Oral every 4 hours PRN Dyspepsia  benzocaine/menthol Lozenge 1 Lozenge Oral four times a day PRN Sore Throat  diltiazem Injectable 10 milliGRAM(s) IV Push once PRN sustained HR >130  guaiFENesin Oral Liquid (Sugar-Free) 100 milliGRAM(s) Oral every 6 hours PRN Cough    LABS:                        8.3    7.39  )-----------( 383      ( 30 Aug 2023 06:35 )             25.4     Phos  2.1     08-30  Mg     2.0     08-30    TPro  6.0  /  Alb  1.5<L>  /  TBili  0.3  /  DBili  0.1  /  AST  38<H>  /  ALT  43  /  AlkPhos  60  08-31    ASSESSMENT AND PLAN:  Multifocal pneumonia.  S/P Septic shock.  Strep Pneumo Bacteremia.  Leukocytosis.  A Fib with RVR.  Anemia.  Renal Insuffiencey.  Hypokalemia.  Left peroneal+ post tibial DVT.    SPO2 in high 90s on room air.  Continue antibiotic per ID.  Being transferred to monitor bed for rapid A Fib.  Not a candidate for full anticoagulation due to SDH, and anemia requiring PRBC transfusion.  Consider Vascular evaluation for DVT.  56+ minutes spent.    08/23/23:   Awake, responsive. Complains of stomach discomfort and at times with difficult swallowing. SPO2 stable on room air.  Converted to sinus rhythm, still on Amiodarone drip.  Continue antibiotics per ID.    08/24/23:  Resting comfortably, no SOB or distress. SPO2 98% .  Converted to sinus rhythm, changed to PO Amiodarone.  Continue Vancomycin per ID.  Will change to adult dose Symbicort.    08/25/23: Awake, responsive and comfortable. SPO2 97%, no distress.  Complains of soreness in throat. Will add Cepacol lozenges.    08/26/23:  Complains of soreness in throat. Will start on Cepacol lozenges.  Getting PRBC transfusion  On Symbicort and Spiriva.  antibiotics per ID    08/27/23: Resting comfortably, no distress. SPO2 96%.  Continue treatment.    08/28/23:  Awake, responsive and comfortable.  SPO2 98%.  On Symbicort and Levaquin.    08/29/23: Weak looking, denies SOB. SPO2 97%   Over all pulmonary status close to base line.  Continue treatment.    08/30/23: Resting comfortably in bed, no distress observed.  SPO2 95% on nasal O2.  Continue treatment.  Vascular surgery follow up noted.    08/31/23: Awake, responsive and comfortable. Says breathing is up and down.  SPO2 98% on nasal O2.  Complete antibiotic per ID.  Follow H & H.

## 2023-08-31 NOTE — PROGRESS NOTE ADULT - ASSESSMENT
87 yo M w/ hx of multiple syncopal episodes presented via EMS for an unwitnessed fall w/ hypotension, hypothermia, and afib w/ RVR concerning for sepsis. He was found to have Strep pneumonia bacteremia w/ evidence of b/l pneumonia and lactic acidosis, iron deficiency anemia, stable sub-dural hematoma, ANGI, and demand ischemia.    Metabolic encephalopathy:  - 2/2 sepsis  - Now A&Ox3, stable L frontal SDH    Acute hypoxic respiratory failure:  - pneumonia w/ findings c/w emphysema/ILD, weaned off NC on room air, on ipratropium q8h (consider transition to duonebs), chest PT, maintain SpO2 > 92%  - C/w symciort and Spiriva  - albuterol PRN  - Pulm following     Sepsis with septic shock POA:  - With strep pneumo bacteremia 2/2 PNA  - Weaned off norepinephrine  - Initial BC positive for strep, intermediate sensitivity to Rocephin, sensitive to Vanco  - Repeat Bcx negative  - Currently on levaquin 750mg q 48hrs, last dose 9/4  - repeat blood clx neg    Afib:  - s/p amio bolus and gtt for afib w/ RVR (converted), currently on sinus rthym   - troponin elevation c/w demand ischemia   - Echo with Mildly decreased global left ventricular systolic function. BLAE, Mod - severe MR/TR, severe pHTN  - Not on Ac for anemia and SDH  - Continue with PO amiodarone  - Cardio following     ANGI:  - 2/2 septic ATN  - Cr improving   - Pending serologies for pulm renal syndrome  - Renal following     Acute blood loss Anemia:  - hx of iron deficiency anemia  - S/P 4 units PRBC  - Did not recieve full amount of pRBC on 8/25  - Retic negative, haptoglobin high, LDH high  - peripheral blood smear shows no schistocytes, tear drop cell  - No obvious sign of bleed  - Pt deferring digital rectal exam. Reports has regular BM and will give sample for fecal occult blood testing  - Iron deficient, continue iron supplementation  - f/u H&H in AM      Acute DVT :  - Not on AC for anemia and SDH  - On DVT ppx dose  - repeat US unchanged  - Vascular follow up, no intervention    GI ppx  - Protonix    daughter at bedside, requesting psych eval to assess capacity.  PT eval

## 2023-08-31 NOTE — CHART NOTE - NSCHARTNOTEFT_GEN_A_CORE
New consult received. Patient initially evaluated on 8/23 c recommendation for Short Term Rehab. Today visited and remains to demonstrate functional deficits in basic bed mobility, strength, balance and impaired functional capacity due to dyspnea at rest and activity. Required empirical supplemental O2 at 2Lpm to enable less dyspnea c activity. ANNA Curran aware. Left sat up on edge of bed to facilitate chest expansion to affect less dyspnea. Discharge recommendation remains Short Term Rehab.

## 2023-08-31 NOTE — PROGRESS NOTE ADULT - ASSESSMENT
86-year-old male with history of recurrent lightheadedness and dizziness with multiple admissions to the emergency room for above found down on street for unknown duration.   On admission found to be hypothermic and hypotensive with rapid atrial fibrillation requiring Pressor support    CT of chest appears to suggest bilateral pneumonia   Also, profound acidosis and anemia, receiving blood products while in the emergency room.  blood culture:  Streptococcus pneumoniae: Detec (08.20.23 @ 05:00)  Rates improved on IV amiodarone, converted to sinus  Evidence of acute renal insufficiency   Elevated troponin seen here likely significant demand ischemia in the setting of renal insufficiency.    8/21: transferred to medical floor  8/22: found to be in afib with RVR, 140-160s SBP 80-90s, transferred to tele bed, amiodarone drip -> converted to sinus 8/23 am    -remains in sinus, s/p amiodarone IV loading,  currently continued on maintenance oral dose of amiodarone 200mg po daily, TSH wnl, QTc 453 on 8/30  -No Ac in the setting of acute anemia, subdural hematoma   -TTE with Mildly decreased global left ventricular systolic function. BLAE, Mod - severe MR/TR, severe pHTN, Unknown previous Hx, no overt fluid overload noted  -ABx as per ID for Strep pneumoniae bacteremia w/ evidence of b/l pneumonia and lactic acidosis  -Pt with underweight, Severe protein-calorie malnutrition, nutrition eval appreciated   -monitor h/h, transfuse as needed, cont on feso4, anemia w/u as per primary team  -vascular f/u for DVTs  -activities as tolerated   -monitor QTc and LFTs closely while pt on both amiodarone and Levaquin

## 2023-08-31 NOTE — PROGRESS NOTE ADULT - SUBJECTIVE AND OBJECTIVE BOX
Patient is a 88y old  Male who presents with a chief complaint of found  down (31 Aug 2023 11:01)      INTERVAL HPI/OVERNIGHT EVENTS:  Pt was seen and examined, no acute events.      MEDICATIONS  (STANDING):  aMIOdarone    Tablet 200 milliGRAM(s) Oral daily  budesonide 160 MICROgram(s)/formoterol 4.5 MICROgram(s) Inhaler 2 Puff(s) Inhalation two times a day  ferrous    sulfate 325 milliGRAM(s) Oral three times a day  heparin   Injectable 5000 Unit(s) SubCutaneous every 8 hours  levoFLOXacin  Tablet 750 milliGRAM(s) Oral every 48 hours  pantoprazole    Tablet 40 milliGRAM(s) Oral before breakfast  tiotropium 2.5 MICROgram(s) Inhaler 2 Puff(s) Inhalation daily    MEDICATIONS  (PRN):  acetaminophen     Tablet .. 650 milliGRAM(s) Oral every 6 hours PRN Moderate Pain (4 - 6)  albuterol    90 MICROgram(s) HFA Inhaler 2 Puff(s) Inhalation every 6 hours PRN Shortness of Breath and/or Wheezing  aluminum hydroxide/magnesium hydroxide/simethicone Suspension 30 milliLiter(s) Oral every 4 hours PRN Dyspepsia  benzocaine/menthol Lozenge 1 Lozenge Oral four times a day PRN Sore Throat  benzonatate 100 milliGRAM(s) Oral three times a day PRN Cough  diltiazem Injectable 10 milliGRAM(s) IV Push once PRN sustained HR >130  guaiFENesin Oral Liquid (Sugar-Free) 100 milliGRAM(s) Oral every 6 hours PRN Cough      Allergies  No Known Allergies        Vital Signs Last 24 Hrs  T(C): 36.9 (31 Aug 2023 15:39), Max: 37.4 (30 Aug 2023 23:59)  T(F): 98.4 (31 Aug 2023 15:39), Max: 99.4 (31 Aug 2023 04:26)  HR: 69 (31 Aug 2023 15:39) (69 - 98)  BP: 121/64 (31 Aug 2023 15:39) (110/51 - 162/69)  BP(mean): --  RR: 18 (31 Aug 2023 15:39) (16 - 18)  SpO2: 97% (31 Aug 2023 15:39) (91% - 98%)    Parameters below as of 31 Aug 2023 10:38  Patient On (Oxygen Delivery Method): room air        PHYSICAL EXAM:  GENERAL: NAD,  no increased WOB  HEAD:  Atraumatic, Normocephalic  EYES: EOMI, conjunctiva and sclera clear  ENMT: Moist mucous membranes  NECK: Supple, No JVD  NERVOUS SYSTEM:  Alert, no focal neuro deficits   CHEST/LUNG: Clear to auscultation bilaterally; No rales, rhonchi, wheezing, or rubs  HEART: Regular rate and rhythm;  ABDOMEN: Soft, Nontender, Nondistended; Bowel sounds present  EXTREMITIES:  2+ Peripheral Pulses b/l, No clubbing, cyanosis, calf tenderness or edema b/l        LABS:                        8.3    7.39  )-----------( 383      ( 30 Aug 2023 06:35 )             25.4       Phos  2.1     08-30  Mg     2.0     08-30    TPro  6.0  /  Alb  1.5<L>  /  TBili  0.3  /  DBili  0.1  /  AST  38<H>  /  ALT  43  /  AlkPhos  60  08-31        CAPILLARY BLOOD GLUCOSE          RADIOLOGY & ADDITIONAL TESTS:    Imaging Personally Reviewed:  [ ] YES  [ ] NO    Consultant(s) Notes Reviewed:  [ ] YES  [ ] NO    Care Discussed with Consultants/Other Providers [ ] YES  [ ] NO

## 2023-09-01 DIAGNOSIS — R63.6 UNDERWEIGHT: ICD-10-CM

## 2023-09-01 DIAGNOSIS — E46 UNSPECIFIED PROTEIN-CALORIE MALNUTRITION: ICD-10-CM

## 2023-09-01 DIAGNOSIS — E43 UNSPECIFIED SEVERE PROTEIN-CALORIE MALNUTRITION: ICD-10-CM

## 2023-09-01 LAB
ALBUMIN SERPL ELPH-MCNC: 1.5 G/DL — LOW (ref 3.3–5)
ALP SERPL-CCNC: 56 U/L — SIGNIFICANT CHANGE UP (ref 40–120)
ALT FLD-CCNC: 40 U/L — SIGNIFICANT CHANGE UP (ref 12–78)
ANION GAP SERPL CALC-SCNC: 6 MMOL/L — SIGNIFICANT CHANGE UP (ref 5–17)
AST SERPL-CCNC: 36 U/L — SIGNIFICANT CHANGE UP (ref 15–37)
BILIRUB DIRECT SERPL-MCNC: 0.1 MG/DL — SIGNIFICANT CHANGE UP (ref 0–0.3)
BILIRUB INDIRECT FLD-MCNC: 0.1 MG/DL — LOW (ref 0.2–1)
BILIRUB SERPL-MCNC: 0.2 MG/DL — SIGNIFICANT CHANGE UP (ref 0.2–1.2)
BLD GP AB SCN SERPL QL: SIGNIFICANT CHANGE UP
BUN SERPL-MCNC: 19 MG/DL — SIGNIFICANT CHANGE UP (ref 7–23)
CALCIUM SERPL-MCNC: 7.4 MG/DL — LOW (ref 8.5–10.1)
CHLORIDE SERPL-SCNC: 109 MMOL/L — HIGH (ref 96–108)
CO2 SERPL-SCNC: 24 MMOL/L — SIGNIFICANT CHANGE UP (ref 22–31)
CREAT SERPL-MCNC: 1.01 MG/DL — SIGNIFICANT CHANGE UP (ref 0.5–1.3)
EGFR: 72 ML/MIN/1.73M2 — SIGNIFICANT CHANGE UP
GLUCOSE SERPL-MCNC: 87 MG/DL — SIGNIFICANT CHANGE UP (ref 70–99)
HCT VFR BLD CALC: 21.3 % — LOW (ref 39–50)
HCT VFR BLD CALC: 21.6 % — LOW (ref 39–50)
HCT VFR BLD CALC: 23.8 % — LOW (ref 39–50)
HGB BLD-MCNC: 6.6 G/DL — CRITICAL LOW (ref 13–17)
HGB BLD-MCNC: 6.7 G/DL — CRITICAL LOW (ref 13–17)
HGB BLD-MCNC: 7.9 G/DL — LOW (ref 13–17)
MCHC RBC-ENTMCNC: 23.7 PG — LOW (ref 27–34)
MCHC RBC-ENTMCNC: 23.7 PG — LOW (ref 27–34)
MCHC RBC-ENTMCNC: 25.2 PG — LOW (ref 27–34)
MCHC RBC-ENTMCNC: 31 G/DL — LOW (ref 32–36)
MCHC RBC-ENTMCNC: 31 G/DL — LOW (ref 32–36)
MCHC RBC-ENTMCNC: 33.2 G/DL — SIGNIFICANT CHANGE UP (ref 32–36)
MCV RBC AUTO: 76 FL — LOW (ref 80–100)
MCV RBC AUTO: 76.3 FL — LOW (ref 80–100)
MCV RBC AUTO: 76.3 FL — LOW (ref 80–100)
NRBC # BLD: 0 /100 WBCS — SIGNIFICANT CHANGE UP (ref 0–0)
PLATELET # BLD AUTO: 348 K/UL — SIGNIFICANT CHANGE UP (ref 150–400)
PLATELET # BLD AUTO: 354 K/UL — SIGNIFICANT CHANGE UP (ref 150–400)
PLATELET # BLD AUTO: 356 K/UL — SIGNIFICANT CHANGE UP (ref 150–400)
POTASSIUM SERPL-MCNC: 4.2 MMOL/L — SIGNIFICANT CHANGE UP (ref 3.5–5.3)
POTASSIUM SERPL-SCNC: 4.2 MMOL/L — SIGNIFICANT CHANGE UP (ref 3.5–5.3)
PROT SERPL-MCNC: 5.8 GM/DL — LOW (ref 6–8.3)
RBC # BLD: 2.79 M/UL — LOW (ref 4.2–5.8)
RBC # BLD: 2.83 M/UL — LOW (ref 4.2–5.8)
RBC # BLD: 3.13 M/UL — LOW (ref 4.2–5.8)
RBC # FLD: 26.7 % — HIGH (ref 10.3–14.5)
RBC # FLD: SIGNIFICANT CHANGE UP (ref 10.3–14.5)
RBC # FLD: SIGNIFICANT CHANGE UP (ref 10.3–14.5)
SODIUM SERPL-SCNC: 139 MMOL/L — SIGNIFICANT CHANGE UP (ref 135–145)
WBC # BLD: 3.79 K/UL — LOW (ref 3.8–10.5)
WBC # BLD: 4.24 K/UL — SIGNIFICANT CHANGE UP (ref 3.8–10.5)
WBC # BLD: 4.61 K/UL — SIGNIFICANT CHANGE UP (ref 3.8–10.5)
WBC # FLD AUTO: 3.79 K/UL — LOW (ref 3.8–10.5)
WBC # FLD AUTO: 4.24 K/UL — SIGNIFICANT CHANGE UP (ref 3.8–10.5)
WBC # FLD AUTO: 4.61 K/UL — SIGNIFICANT CHANGE UP (ref 3.8–10.5)

## 2023-09-01 PROCEDURE — 99232 SBSQ HOSP IP/OBS MODERATE 35: CPT

## 2023-09-01 PROCEDURE — 99233 SBSQ HOSP IP/OBS HIGH 50: CPT | Mod: FS

## 2023-09-01 PROCEDURE — 99222 1ST HOSP IP/OBS MODERATE 55: CPT

## 2023-09-01 PROCEDURE — 99223 1ST HOSP IP/OBS HIGH 75: CPT

## 2023-09-01 RX ORDER — POLYETHYLENE GLYCOL 3350 17 G/17G
17 POWDER, FOR SOLUTION ORAL DAILY
Refills: 0 | Status: DISCONTINUED | OUTPATIENT
Start: 2023-09-01 | End: 2023-09-05

## 2023-09-01 RX ADMIN — Medication 325 MILLIGRAM(S): at 22:34

## 2023-09-01 RX ADMIN — BUDESONIDE AND FORMOTEROL FUMARATE DIHYDRATE 2 PUFF(S): 160; 4.5 AEROSOL RESPIRATORY (INHALATION) at 17:39

## 2023-09-01 RX ADMIN — Medication 100 MILLIGRAM(S): at 01:05

## 2023-09-01 RX ADMIN — TIOTROPIUM BROMIDE 2 PUFF(S): 18 CAPSULE ORAL; RESPIRATORY (INHALATION) at 17:38

## 2023-09-01 RX ADMIN — HEPARIN SODIUM 5000 UNIT(S): 5000 INJECTION INTRAVENOUS; SUBCUTANEOUS at 05:22

## 2023-09-01 RX ADMIN — HEPARIN SODIUM 5000 UNIT(S): 5000 INJECTION INTRAVENOUS; SUBCUTANEOUS at 17:35

## 2023-09-01 RX ADMIN — Medication 325 MILLIGRAM(S): at 05:22

## 2023-09-01 RX ADMIN — PANTOPRAZOLE SODIUM 40 MILLIGRAM(S): 20 TABLET, DELAYED RELEASE ORAL at 05:21

## 2023-09-01 RX ADMIN — BUDESONIDE AND FORMOTEROL FUMARATE DIHYDRATE 2 PUFF(S): 160; 4.5 AEROSOL RESPIRATORY (INHALATION) at 05:22

## 2023-09-01 RX ADMIN — HEPARIN SODIUM 5000 UNIT(S): 5000 INJECTION INTRAVENOUS; SUBCUTANEOUS at 22:34

## 2023-09-01 RX ADMIN — AMIODARONE HYDROCHLORIDE 200 MILLIGRAM(S): 400 TABLET ORAL at 05:21

## 2023-09-01 RX ADMIN — Medication 650 MILLIGRAM(S): at 05:21

## 2023-09-01 RX ADMIN — Medication 325 MILLIGRAM(S): at 17:36

## 2023-09-01 NOTE — PROGRESS NOTE ADULT - NS ATTEND BILL GEN_ALL_CORE
Attending to bill
PA/NP to bill
Attending to bill

## 2023-09-01 NOTE — PROGRESS NOTE ADULT - ASSESSMENT
86-year-old male with history of recurrent lightheadedness and dizziness with multiple admissions to the emergency room for above found down on street for unknown duration.   On admission found to be hypothermic and hypotensive with rapid atrial fibrillation requiring Pressor support    CT of chest appears to suggest bilateral pneumonia   Also, profound acidosis and anemia, receiving blood products while in the emergency room.  blood culture:  Streptococcus pneumoniae: Detec (08.20.23 @ 05:00)  Rates improved on IV amiodarone, converted to sinus  Evidence of acute renal insufficiency   Elevated troponin seen here likely significant demand ischemia in the setting of renal insufficiency.    8/21: transferred to medical floor  8/22: found to be in afib with RVR, 140-160s SBP 80-90s, transferred to tele bed, amiodarone drip -> converted to sinus 8/23 am  Remains consistently anemic requiring multiple transfusions     -remains in sinus, s/p amiodarone IV loading,  currently continued on maintenance oral dose of amiodarone 200mg po daily, TSH wnl, QTc 453 on 8/30  -No Ac in the setting of acute anemia, subdural hematoma   -TTE with Mildly decreased global left ventricular systolic function. BLAE, Mod - severe MR/TR, severe pHTN, Unknown previous Hx, no overt fluid overload noted  -ABx as per ID for Strep pneumoniae bacteremia w/ evidence of b/l pneumonia and lactic acidosis  -Pt with underweight, Severe protein-calorie malnutrition, nutrition eval appreciated   -monitor h/h, transfuse as needed, cont on feso4, anemia w/u and management as per primary team  -vascular f/u for DVTs  -activities as tolerated   -monitor QTc and LFTs closely while pt on both amiodarone and Levaquin    86-year-old male with history of recurrent lightheadedness and dizziness with multiple admissions to the emergency room for above found down on street for unknown duration.   On admission found to be hypothermic and hypotensive with rapid atrial fibrillation requiring Pressor support    CT of chest appears to suggest bilateral pneumonia   Also, profound acidosis and anemia, receiving blood products while in the emergency room.  blood culture:  Streptococcus pneumoniae: Detec (08.20.23 @ 05:00)  Rates improved on IV amiodarone, converted to sinus  Evidence of acute renal insufficiency   Elevated troponin seen here likely significant demand ischemia in the setting of renal insufficiency.    8/21: transferred to medical floor  8/22: found to be in afib with RVR, 140-160s SBP 80-90s, transferred to tele bed, amiodarone drip -> converted to sinus 8/23 am  Remains consistently anemic requiring multiple transfusions     -remains in sinus, s/p amiodarone IV loading,  currently continued on maintenance oral dose of amiodarone 200mg po daily, TSH wnl, QTc 453 on 8/30  -No Ac in the setting of acute anemia, subdural hematoma   -TTE with Mildly decreased global left ventricular systolic function. BLAE, Mod - severe MR/TR, severe pHTN, Unknown previous Hx  -ABx as per ID for Strep pneumoniae bacteremia w/ evidence of b/l pneumonia and lactic acidosis  -Pt with underweight, Severe protein-calorie malnutrition, nutrition eval appreciated   -monitor h/h, transfuse as needed, cont on feso4, anemia w/u and management as per primary team, GI eval  -vascular f/u for DVTs  -activities as tolerated   -monitor QTc and LFTs closely while pt on both amiodarone and Levaquin   -further cardiac w/u can be done as outpatient once anemia is stabilized   -signing off now, please reconsult as needed   86-year-old male with history of recurrent lightheadedness and dizziness with multiple admissions to the emergency room for above found down on street for unknown duration.   On admission found to be hypothermic and hypotensive with rapid atrial fibrillation requiring Pressor support    CT of chest appears to suggest bilateral pneumonia   Also, profound acidosis and anemia, receiving blood products while in the emergency room.  blood culture:  Streptococcus pneumoniae: Detec (08.20.23 @ 05:00)  Rates improved on IV amiodarone, converted to sinus  Evidence of acute renal insufficiency   Elevated troponin seen here likely significant demand ischemia in the setting of renal insufficiency.    8/21: transferred to medical floor  8/22: found to be in afib with RVR, 140-160s SBP 80-90s, transferred to tele bed, amiodarone drip -> converted to sinus 8/23 am  Remains consistently anemic requiring multiple transfusions     -remains in sinus, s/p amiodarone IV loading,  currently continued on maintenance oral dose of amiodarone 200mg po daily, TSH wnl, QTc 453 on 8/30  -No Ac in the setting of acute anemia, subdural hematoma   -TTE with Mildly decreased global left ventricular systolic function. BLAE, Mod - severe MR/TR, severe pHTN, Unknown previous Hx  -ABx as per ID for Strep pneumoniae bacteremia w/ evidence of b/l pneumonia and lactic acidosis  -Pt with underweight, Severe protein-calorie malnutrition, nutrition eval appreciated   -monitor h/h, transfuse as needed, cont on feso4, anemia w/u and management as per primary team, GI eval  -vascular f/u for DVTs  -activities as tolerated   -monitor QTc and LFTs closely while pt on both amiodarone and Levaquin

## 2023-09-01 NOTE — PROGRESS NOTE ADULT - ASSESSMENT
87 yo M w/ hx of multiple syncopal episodes presented via EMS for an unwitnessed fall w/ hypotension, hypothermia, and afib w/ RVR concerning for sepsis. He was found to have Strep pneumonia bacteremia w/ evidence of b/l pneumonia and lactic acidosis, iron deficiency anemia, stable sub-dural hematoma, ANGI, and demand ischemia.    Metabolic encephalopathy:  - 2/2 sepsis  - Now A&Ox3, stable L frontal SDH    Acute hypoxic respiratory failure:  - pneumonia w/ findings c/w emphysema/ILD, weaned off NC on room air, on ipratropium q8h (consider transition to duonebs), chest PT, maintain SpO2 > 92%  - C/w symciort and Spiriva  - albuterol PRN  - Pulm following     Sepsis with septic shock POA:  - With strep pneumo bacteremia 2/2 PNA  - Weaned off norepinephrine  - Initial BC positive for strep, intermediate sensitivity to Rocephin, sensitive to Vanco  - Repeat Bcx negative  - Currently on levaquin 750mg q 48hrs, last dose 9/4  - repeat blood clx neg    Afib:  - s/p amio bolus and gtt for afib w/ RVR (converted), currently on sinus rthym   - troponin elevation c/w demand ischemia   - Echo with Mildly decreased global left ventricular systolic function. BLAE, Mod - severe MR/TR, severe pHTN  - Not on Ac for anemia and SDH  - Continue with PO amiodarone  - Cardio following     ANGI:  - 2/2 septic ATN  - Cr improving   - Pending serologies for pulm renal syndrome  - Renal following     Acute blood loss Anemia:  - hx of iron deficiency anemia  - S/P 4 units PRBC  - Another unit ordered today  - Retic negative, haptoglobin high, LDH high  - peripheral blood smear shows no schistocytes, tear drop cell  - Iron deficient, continue iron supplementation  - some rectal bleed reported last night,  LGIB, either ischemic colitis vs. stercoral colitis in the setting of constipation   - Transfuse one unit PRBC  - Gi consult appreciated  - Pt and family doesn't want any intervention for now, will monitor and re assess if bleeding recurs  - On ppx dose of hep, may need to hold if bleeding persists  - PPI  - Miralax for constipation    Acute DVT :  - Not on full dose AC for anemia and SDH  - On DVT ppx dose  - repeat US unchanged  - Vascular follow up, no intervention, pt refused IVC filter    GI ppx  - Protonix    Psych consult appreciated, pt doesn't have  capacity to make decision regarding dc plan.    PT eval: AMADOR

## 2023-09-01 NOTE — PROGRESS NOTE ADULT - NS ATTEND OPT1A GEN_ALL_CORE
Medical decision making
History/Exam
Medical decision making

## 2023-09-01 NOTE — PROGRESS NOTE ADULT - SUBJECTIVE AND OBJECTIVE BOX
Patient is a 88y old  Male who presents with  AMS, SEPSIS, ANEMIA, pt was found down    PAST MEDICAL & SURGICAL HISTORY:  lightheadedness and dizziness    INTERVAL HISTORY:  	  MEDICATIONS:  MEDICATIONS  (STANDING):  aMIOdarone    Tablet 200 milliGRAM(s) Oral daily  budesonide 160 MICROgram(s)/formoterol 4.5 MICROgram(s) Inhaler 2 Puff(s) Inhalation two times a day  ferrous    sulfate 325 milliGRAM(s) Oral three times a day  heparin   Injectable 5000 Unit(s) SubCutaneous every 8 hours  levoFLOXacin  Tablet 750 milliGRAM(s) Oral every 48 hours  pantoprazole    Tablet 40 milliGRAM(s) Oral before breakfast  tiotropium 2.5 MICROgram(s) Inhaler 2 Puff(s) Inhalation daily    MEDICATIONS  (PRN):  acetaminophen     Tablet .. 650 milliGRAM(s) Oral every 6 hours PRN Moderate Pain (4 - 6)  albuterol    90 MICROgram(s) HFA Inhaler 2 Puff(s) Inhalation every 6 hours PRN Shortness of Breath and/or Wheezing  aluminum hydroxide/magnesium hydroxide/simethicone Suspension 30 milliLiter(s) Oral every 4 hours PRN Dyspepsia  benzocaine/menthol Lozenge 1 Lozenge Oral four times a day PRN Sore Throat  benzonatate 100 milliGRAM(s) Oral three times a day PRN Cough  diltiazem Injectable 10 milliGRAM(s) IV Push once PRN sustained HR >130  guaiFENesin Oral Liquid (Sugar-Free) 100 milliGRAM(s) Oral every 6 hours PRN Cough    Vitals:  T(F): 99.2 (09-01-23 @ 05:05), Max: 99.2 (09-01-23 @ 05:05)  HR: 78 (09-01-23 @ 05:05) (69 - 98)  BP: 107/54 (09-01-23 @ 05:05) (107/54 - 127/56)  RR: 16 (09-01-23 @ 05:05) (16 - 18)  SpO2: 97% (09-01-23 @ 05:05) (90% - 97%)    08-31 @ 07:01  -  09-01 @ 07:00  --------------------------------------------------------  IN:  Total IN: 0 mL    OUT:    Voided (mL): 350 mL  Total OUT: 350 mL    Total NET: -350 mL    PHYSICAL EXAM:  Neuro: Awake, responsive  CV: S1 S2 RRR + SM  Lungs: diminished to bases   GI: Soft, BS +, ND, NT  Extremities: ankle edema    TELEMETRY: sinus    RADIOLOGY: < from: US Duplex Venous Lower Ext Complete, Bilateral (08.29.23 @ 10:53) >  Acute deep venous thrombosis: below the knee.    Acute left soleal vein deep vein thrombosis, unchanged.    < end of copied text >    < from: CT Abdomen and Pelvis No Cont (08.20.23 @ 06:49) >  CHEST:  LUNGS AND LARGE AIRWAYS: Patent central airways. Bibasilar patchy   consolidations suggesting pneumonia. Superimposed emphysematous lung   change and interstitial lung disease/honeycombing at the lung bases..  PLEURA: No pleural effusion or pneumothorax.  VESSELS: Limited evaluation of the thoracic aorta without intravenous   contrast, however there is calcific atherosclerosis without aneurysmal   dilatation.  HEART: Borderline enlarged heart.. No pericardial effusion. Coronary   artery calcifications. Anemia suggested.  MEDIASTINUM AND STACY: No lymphadenopathy.  CHEST WALL AND LOWER NECK: Within normal limits.    ABDOMEN AND PELVIS:  LIVER: Within normal limits.  BILE DUCTS: Normal caliber.  GALLBLADDER: Elongated gallbladder without definite calcified gallstone..  SPLEEN: Within normal limits.  PANCREAS: Within normal limits.  ADRENALS: Normal right adrenal gland. Limited left adrenal gland.  KIDNEYS/URETERS: Small bilateral kidneys. No renal stone or   hydronephrosis.    BLADDER: Within normal limits.  REPRODUCTIVE ORGANS: Prostate gland is not grossly enlarged.    BOWEL: Evaluation of bowel is limited without distention with oral   contrast and lack of mesenteric fat, however there is no bowel   obstruction. There is a 7 cm stool distended rectum. Small bowel loops   are not grossly dilated. Stomach is mildly distended with debris.  PERITONEUM: No free air or significant ascites.  VESSELS:  Limited evaluation of the abdominal aorta without intravenous   contrast, demonstrates tortuosity and calcific atherosclerosis without   aneurysmal dilatation. Negative  RETROPERITONEUM: No lymphadenopathy.  ABDOMINAL WALL: Within normal limits.  BONES: Severe scoliosis of the spine with associated multilevel   degenerative changes. Question pagetoid appearance of the right iliac   bone.    IMPRESSION:    Bilateral pneumonia.    < end of copied text >    DIAGNOSTIC TESTING:    [x ] Echocardiogram:   < from: TTE Echo Complete w/o Contrast w/ Doppler (08.21.23 @ 08:17) >  Left Ventricle: Normal left ventricular size and wall thicknesses, with   normal systolic and diastolic function.  Global LV systolic function was mildly decreased. Global longitudinal   strain imaging was performed on "[employer] Vivid S70 at a heart rate of 68BPM and   a blood pressure of 108/57 mmHg, average GLS calculated was -19.1%   (normal).  Right Ventricle: Normal right ventricular size and function.  Left Atrium: Moderately enlarged left atrium.  Right Atrium: The right atrium is normal in size. Moderately enlarged   right atrium.  Pericardium: There is no evidence of pericardial effusion.  Mitral Valve: Structurally normal mitral valve, with normal leaflet   excursion. Moderate to severe mitral valve regurgitation is seen.  Tricuspid Valve: Structurally normal tricuspid valve, with normal leaflet   excursion. Moderate-severe tricuspid regurgitation is visualized.   Estimated pulmonary artery systolic pressure is 65.8 mmHg assuming a   right atrial pressure of 15 mmHg, which is consistent with severe   pulmonary hypertension.  Aortic Valve: Normal trileaflet aortic valve with normal opening. No   evidence of aortic valve regurgitation is seen.  Pulmonic Valve: Structurally normal pulmonic valve, with normal leaflet   excursion. Mild pulmonic valve regurgitation.  Aorta: The aortic root and ascending aorta are structurally normal, with   no evidence of dilitation.  Pulmonary Artery: The main pulmonary artery is normal in size.  Venous: The inferior vena cava was normal sized, with respiratory size   variation less than 50%.      Summary:   1. Mildly decreased global left ventricular systolic function.   2. Global longitudinal strain imaging was performed on [employer] Vivid S70 at a   heart rate of 68BPM and a blood pressure of 108/57 mmHg, average GLS   calculated was -19.1% (normal).   3. Moderately enlarged left atrium.   4. Moderately enlarged right atrium.   5. Moderate to severe mitral valve regurgitation.   6. Moderate-severe tricuspid regurgitation.   7. Mild pulmonic valve regurgitation.   8. Estimated pulmonary artery systolic pressure is 65.8 mmHg assuming a   right atrial pressure of 15 mmHg, which is consistent with severe   pulmonary hypertension.    < end of copied text >    LABS:	 	    01 Sep 2023 06:21    139    |  109    |  19     ----------------------------<  87     4.2     |  24     |  1.01     Ca    7.4        01 Sep 2023 06:21    TPro  5.8    /  Alb  1.5    /  TBili  0.2    /  DBili  0.1    /  AST  36     /  ALT  40     /  AlkPhos  56     01 Sep 2023 06:21                        6.6    4.24  )-----------( 354      ( 01 Sep 2023 06:21 )             21.3 ,                       8.3    7.39  )-----------( 383      ( 30 Aug 2023 06:35 )             25.4                Patient is a 88y old  Male who presents with  AMS, SEPSIS, ANEMIA, pt was found down    PAST MEDICAL & SURGICAL HISTORY:  lightheadedness and dizziness    INTERVAL HISTORY: in no acute distress, c/o feeling just tired and cold, denies any chest pain, +mild dyspnea   	  MEDICATIONS:  MEDICATIONS  (STANDING):  aMIOdarone    Tablet 200 milliGRAM(s) Oral daily  budesonide 160 MICROgram(s)/formoterol 4.5 MICROgram(s) Inhaler 2 Puff(s) Inhalation two times a day  ferrous    sulfate 325 milliGRAM(s) Oral three times a day  heparin   Injectable 5000 Unit(s) SubCutaneous every 8 hours  levoFLOXacin  Tablet 750 milliGRAM(s) Oral every 48 hours  pantoprazole    Tablet 40 milliGRAM(s) Oral before breakfast  tiotropium 2.5 MICROgram(s) Inhaler 2 Puff(s) Inhalation daily    MEDICATIONS  (PRN):  acetaminophen     Tablet .. 650 milliGRAM(s) Oral every 6 hours PRN Moderate Pain (4 - 6)  albuterol    90 MICROgram(s) HFA Inhaler 2 Puff(s) Inhalation every 6 hours PRN Shortness of Breath and/or Wheezing  aluminum hydroxide/magnesium hydroxide/simethicone Suspension 30 milliLiter(s) Oral every 4 hours PRN Dyspepsia  benzocaine/menthol Lozenge 1 Lozenge Oral four times a day PRN Sore Throat  benzonatate 100 milliGRAM(s) Oral three times a day PRN Cough  diltiazem Injectable 10 milliGRAM(s) IV Push once PRN sustained HR >130  guaiFENesin Oral Liquid (Sugar-Free) 100 milliGRAM(s) Oral every 6 hours PRN Cough    Vitals:  T(F): 99.2 (09-01-23 @ 05:05), Max: 99.2 (09-01-23 @ 05:05)  HR: 78 (09-01-23 @ 05:05) (69 - 98)  BP: 107/54 (09-01-23 @ 05:05) (107/54 - 127/56)  RR: 16 (09-01-23 @ 05:05) (16 - 18)  SpO2: 97% (09-01-23 @ 05:05) (90% - 97%)    08-31 @ 07:01  -  09-01 @ 07:00  --------------------------------------------------------  IN:  Total IN: 0 mL    OUT:    Voided (mL): 350 mL  Total OUT: 350 mL    Total NET: -350 mL    PHYSICAL EXAM:  Neuro: Awake, responsive  CV: S1 S2 RRR + SM  Lungs: diminished to bases   GI: Soft, BS +, ND, NT  Extremities: ankle edema    TELEMETRY: sinus    RADIOLOGY: < from: US Duplex Venous Lower Ext Complete, Bilateral (08.29.23 @ 10:53) >  Acute deep venous thrombosis: below the knee.    Acute left soleal vein deep vein thrombosis, unchanged.    < end of copied text >    < from: CT Abdomen and Pelvis No Cont (08.20.23 @ 06:49) >  CHEST:  LUNGS AND LARGE AIRWAYS: Patent central airways. Bibasilar patchy   consolidations suggesting pneumonia. Superimposed emphysematous lung   change and interstitial lung disease/honeycombing at the lung bases..  PLEURA: No pleural effusion or pneumothorax.  VESSELS: Limited evaluation of the thoracic aorta without intravenous   contrast, however there is calcific atherosclerosis without aneurysmal   dilatation.  HEART: Borderline enlarged heart.. No pericardial effusion. Coronary   artery calcifications. Anemia suggested.  MEDIASTINUM AND STACY: No lymphadenopathy.  CHEST WALL AND LOWER NECK: Within normal limits.    ABDOMEN AND PELVIS:  LIVER: Within normal limits.  BILE DUCTS: Normal caliber.  GALLBLADDER: Elongated gallbladder without definite calcified gallstone..  SPLEEN: Within normal limits.  PANCREAS: Within normal limits.  ADRENALS: Normal right adrenal gland. Limited left adrenal gland.  KIDNEYS/URETERS: Small bilateral kidneys. No renal stone or   hydronephrosis.    BLADDER: Within normal limits.  REPRODUCTIVE ORGANS: Prostate gland is not grossly enlarged.    BOWEL: Evaluation of bowel is limited without distention with oral   contrast and lack of mesenteric fat, however there is no bowel   obstruction. There is a 7 cm stool distended rectum. Small bowel loops   are not grossly dilated. Stomach is mildly distended with debris.  PERITONEUM: No free air or significant ascites.  VESSELS:  Limited evaluation of the abdominal aorta without intravenous   contrast, demonstrates tortuosity and calcific atherosclerosis without   aneurysmal dilatation. Negative  RETROPERITONEUM: No lymphadenopathy.  ABDOMINAL WALL: Within normal limits.  BONES: Severe scoliosis of the spine with associated multilevel   degenerative changes. Question pagetoid appearance of the right iliac   bone.    IMPRESSION:    Bilateral pneumonia.    < end of copied text >    DIAGNOSTIC TESTING:    [x ] Echocardiogram:   < from: TTE Echo Complete w/o Contrast w/ Doppler (08.21.23 @ 08:17) >  Left Ventricle: Normal left ventricular size and wall thicknesses, with   normal systolic and diastolic function.  Global LV systolic function was mildly decreased. Global longitudinal   strain imaging was performed on [employer] Vivid S70 at a heart rate of 68BPM and   a blood pressure of 108/57 mmHg, average GLS calculated was -19.1%   (normal).  Right Ventricle: Normal right ventricular size and function.  Left Atrium: Moderately enlarged left atrium.  Right Atrium: The right atrium is normal in size. Moderately enlarged   right atrium.  Pericardium: There is no evidence of pericardial effusion.  Mitral Valve: Structurally normal mitral valve, with normal leaflet   excursion. Moderate to severe mitral valve regurgitation is seen.  Tricuspid Valve: Structurally normal tricuspid valve, with normal leaflet   excursion. Moderate-severe tricuspid regurgitation is visualized.   Estimated pulmonary artery systolic pressure is 65.8 mmHg assuming a   right atrial pressure of 15 mmHg, which is consistent with severe   pulmonary hypertension.  Aortic Valve: Normal trileaflet aortic valve with normal opening. No   evidence of aortic valve regurgitation is seen.  Pulmonic Valve: Structurally normal pulmonic valve, with normal leaflet   excursion. Mild pulmonic valve regurgitation.  Aorta: The aortic root and ascending aorta are structurally normal, with   no evidence of dilitation.  Pulmonary Artery: The main pulmonary artery is normal in size.  Venous: The inferior vena cava was normal sized, with respiratory size   variation less than 50%.      Summary:   1. Mildly decreased global left ventricular systolic function.   2. Global longitudinal strain imaging was performed on [employer] Vivid S70 at a   heart rate of 68BPM and a blood pressure of 108/57 mmHg, average GLS   calculated was -19.1% (normal).   3. Moderately enlarged left atrium.   4. Moderately enlarged right atrium.   5. Moderate to severe mitral valve regurgitation.   6. Moderate-severe tricuspid regurgitation.   7. Mild pulmonic valve regurgitation.   8. Estimated pulmonary artery systolic pressure is 65.8 mmHg assuming a   right atrial pressure of 15 mmHg, which is consistent with severe   pulmonary hypertension.    < end of copied text >    LABS:	 	    01 Sep 2023 06:21    139    |  109    |  19     ----------------------------<  87     4.2     |  24     |  1.01     Ca    7.4        01 Sep 2023 06:21    TPro  5.8    /  Alb  1.5    /  TBili  0.2    /  DBili  0.1    /  AST  36     /  ALT  40     /  AlkPhos  56     01 Sep 2023 06:21                        6.6    4.24  )-----------( 354      ( 01 Sep 2023 06:21 )             21.3 ,                       8.3    7.39  )-----------( 383      ( 30 Aug 2023 06:35 )             25.4

## 2023-09-01 NOTE — PROGRESS NOTE ADULT - SUBJECTIVE AND OBJECTIVE BOX
INTERVAL HPI:  87 yo M with multiple syncopal episodes per chart notes,  on no home meds   Brought by EMS after being found down on street minimally responsive (8/20/23).   In the ED, was hypotensive, hypothermic (rectal T 90F), and tachycardic (afib w/ RVR).   Labs were remarkable for anemia (Hgb 6.1), leukocytosis w/ 25% band, lactic acidosis (lactate 14.4), ANGI, and elevated troponin   Got treated w/ vanc/zosyn, fluids, 1U pRBCs, IV amiodarone bolus (resolved arrhythmia), and a non-rebreather mask.   CT imaging showed an age-indeterminate L frontal sub-dural hematoma (4mm) and lung findings with pneumonia and emphysema/ILD.   RVP was entero/rhinovirus positive.   He was switched to ceftriaxone/azithromycin, was started on ipratropium nebulizers, and got  admitted to the ICU for new pressor requirement (norepinephrine).    During his ICU course, his blood cultures grew Strep pneumonia (on appropriate abx), lower extremity duplex study showed L tibioperoneal trunk and proximal posterior tibial vein DVTs (on heparin ppx), norepinephrine was weaned off w/ adequate MAPs,   Repeat CT head showed stable sub-dural hematoma, and iron supplementation was started.   08/21/23:  Got transferred to regular medical floor.  08/22/23:  At time of my evaluation, awake, responsive. Denies SOB, cough, sputum production or chest pain.  Admits being a smoker but says quit about a year ago.  Again in rapid A Fib and being transferred to monitor bed. Seen by Cardiology.    OVERNIGHT EVENTS:  Continues to complain of sore throat. Reported by RN  to have some rectal bleed  overnight  Again getting PRBC transfusion.  Seen by GI but pt/family does not want any procedure. Before has refused IVC filter.  SPO2 96% on nasal O2.    Vital Signs Last 24 Hrs  T(C): 36.8 (01 Sep 2023 13:00), Max: 37.3 (01 Sep 2023 05:05)  T(F): 98.2 (01 Sep 2023 13:00), Max: 99.2 (01 Sep 2023 05:05)  HR: 69 (01 Sep 2023 13:00) (69 - 83)  BP: 132/57 (01 Sep 2023 13:00) (104/56 - 132/57)  BP(mean): --  RR: 18 (01 Sep 2023 13:00) (16 - 18)  SpO2: 96% (01 Sep 2023 13:00) (96% - 98%)    Parameters below as of 01 Sep 2023 13:00  Patient On (Oxygen Delivery Method): room air    PHYSICAL EXAM:  GEN:         Awake, responsive and comfortable.  HEENT:    Normal.    RESP:        no distress  CVS:         Regular rate and rhythm. .    MEDICATIONS  (STANDING):  aMIOdarone    Tablet 200 milliGRAM(s) Oral daily  budesonide 160 MICROgram(s)/formoterol 4.5 MICROgram(s) Inhaler 2 Puff(s) Inhalation two times a day  ferrous    sulfate 325 milliGRAM(s) Oral three times a day  heparin   Injectable 5000 Unit(s) SubCutaneous every 8 hours  levoFLOXacin  Tablet 750 milliGRAM(s) Oral every 48 hours  pantoprazole    Tablet 40 milliGRAM(s) Oral before breakfast  tiotropium 2.5 MICROgram(s) Inhaler 2 Puff(s) Inhalation daily    MEDICATIONS  (PRN):  acetaminophen     Tablet .. 650 milliGRAM(s) Oral every 6 hours PRN Moderate Pain (4 - 6)  albuterol    90 MICROgram(s) HFA Inhaler 2 Puff(s) Inhalation every 6 hours PRN Shortness of Breath and/or Wheezing  aluminum hydroxide/magnesium hydroxide/simethicone Suspension 30 milliLiter(s) Oral every 4 hours PRN Dyspepsia  benzocaine/menthol Lozenge 1 Lozenge Oral four times a day PRN Sore Throat  benzonatate 100 milliGRAM(s) Oral three times a day PRN Cough  diltiazem Injectable 10 milliGRAM(s) IV Push once PRN sustained HR >130  guaiFENesin Oral Liquid (Sugar-Free) 100 milliGRAM(s) Oral every 6 hours PRN Cough  polyethylene glycol 3350 17 Gram(s) Oral daily PRN Constipation    LABS:                        6.7    3.79  )-----------( 348      ( 01 Sep 2023 10:02 )             21.6     09-01    139  |  109<H>  |  19  ----------------------------<  87  4.2   |  24  |  1.01    Ca    7.4<L>      01 Sep 2023 06:21    TPro  5.8<L>  /  Alb  1.5<L>  /  TBili  0.2  /  DBili  0.1  /  AST  36  /  ALT  40  /  AlkPhos  56  09-01    Urinalysis Basic - ( 01 Sep 2023 06:21 )    Color: x / Appearance: x / SG: x / pH: x  Gluc: 87 mg/dL / Ketone: x  / Bili: x / Urobili: x   Blood: x / Protein: x / Nitrite: x   Leuk Esterase: x / RBC: x / WBC x   Sq Epi: x / Non Sq Epi: x / Bacteria: x    ASSESSMENT AND PLAN:  Multifocal pneumonia.  S/P Septic shock.  Strep Pneumo Bacteremia.  Leukocytosis.  A Fib with RVR.  Anemia.  Renal Insuffiencey.  Hypokalemia.  Left peroneal+ post tibial DVT.    SPO2 in high 90s on room air.  Continue antibiotic per ID.  Being transferred to monitor bed for rapid A Fib.  Not a candidate for full anticoagulation due to SDH, and anemia requiring PRBC transfusion.  Consider Vascular evaluation for DVT.  56+ minutes spent.    08/23/23:   Awake, responsive. Complains of stomach discomfort and at times with difficult swallowing. SPO2 stable on room air.  Converted to sinus rhythm, still on Amiodarone drip.  Continue antibiotics per ID.    08/24/23:  Resting comfortably, no SOB or distress. SPO2 98% .  Converted to sinus rhythm, changed to PO Amiodarone.  Continue Vancomycin per ID.  Will change to adult dose Symbicort.    08/25/23: Awake, responsive and comfortable. SPO2 97%, no distress.  Complains of soreness in throat. Will add Cepacol lozenges.    08/26/23:  Complains of soreness in throat. Will start on Cepacol lozenges.  Getting PRBC transfusion  On Symbicort and Spiriva.  antibiotics per ID    08/27/23: Resting comfortably, no distress. SPO2 96%.  Continue treatment.    08/28/23:  Awake, responsive and comfortable.  SPO2 98%.  On Symbicort and Levaquin.    08/29/23: Weak looking, denies SOB. SPO2 97%   Over all pulmonary status close to base line.  Continue treatment.    08/30/23: Resting comfortably in bed, no distress observed.  SPO2 95% on nasal O2.  Continue treatment.  Vascular surgery follow up noted.    08/31/23: Awake, responsive and comfortable. Says breathing is up and down.  SPO2 98% on nasal O2.  Complete antibiotic per ID.  Follow H & H.    09/01/23:  Continues to complain of sore throat.   Reported by RN  to have some rectal bleed  overnight  Again getting PRBC transfusion.  Seen by GI but pt/family does not want any procedure. Before has refused IVC filter.  SPO2 96% on nasal O2.  On Levaquin per ID.

## 2023-09-01 NOTE — BH CONSULTATION LIAISON ASSESSMENT NOTE - CURRENT MEDICATION
MEDICATIONS  (STANDING):  aMIOdarone    Tablet 200 milliGRAM(s) Oral daily  budesonide 160 MICROgram(s)/formoterol 4.5 MICROgram(s) Inhaler 2 Puff(s) Inhalation two times a day  ferrous    sulfate 325 milliGRAM(s) Oral three times a day  heparin   Injectable 5000 Unit(s) SubCutaneous every 8 hours  levoFLOXacin  Tablet 750 milliGRAM(s) Oral every 48 hours  pantoprazole    Tablet 40 milliGRAM(s) Oral before breakfast  tiotropium 2.5 MICROgram(s) Inhaler 2 Puff(s) Inhalation daily    MEDICATIONS  (PRN):  acetaminophen     Tablet .. 650 milliGRAM(s) Oral every 6 hours PRN Moderate Pain (4 - 6)  albuterol    90 MICROgram(s) HFA Inhaler 2 Puff(s) Inhalation every 6 hours PRN Shortness of Breath and/or Wheezing  aluminum hydroxide/magnesium hydroxide/simethicone Suspension 30 milliLiter(s) Oral every 4 hours PRN Dyspepsia  benzocaine/menthol Lozenge 1 Lozenge Oral four times a day PRN Sore Throat  benzonatate 100 milliGRAM(s) Oral three times a day PRN Cough  diltiazem Injectable 10 milliGRAM(s) IV Push once PRN sustained HR >130  guaiFENesin Oral Liquid (Sugar-Free) 100 milliGRAM(s) Oral every 6 hours PRN Cough

## 2023-09-01 NOTE — BH CONSULTATION LIAISON ASSESSMENT NOTE - NSBHCHARTREVIEWLAB_PSY_A_CORE FT
09-01    139  |  109<H>  |  19  ----------------------------<  87  4.2   |  24  |  1.01    Ca    7.4<L>      01 Sep 2023 06:21    TPro  5.8<L>  /  Alb  1.5<L>  /  TBili  0.2  /  DBili  0.1  /  AST  36  /  ALT  40  /  AlkPhos  56  09-01

## 2023-09-01 NOTE — BH CONSULTATION LIAISON ASSESSMENT NOTE - RISK ASSESSMENT
Chronic risk factors: single, male gender, advanced aged, limited daily social supports, lives alone, limited resources. Protective factors: no known formal psychiatric history; no known suicide attempts; no self-injurious behavior; no known substance use, no hx of aggression/violence; no legal issues. Acute risk factors identified: underweight with severe protein-calorie deficiency, low albumin, low iron panel, significant anemia with BMI of 16; potential issue regarding current domicile, no current access to care

## 2023-09-01 NOTE — BH CONSULTATION LIAISON ASSESSMENT NOTE - SUMMARY
Medically optimize including hydration, nutrition, transfusion, supplements - then track of MSE improves.

## 2023-09-01 NOTE — BH CONSULTATION LIAISON ASSESSMENT NOTE - NSSUICPROTFACT_PSY_ALL_CORE
Identifies reasons for living/Supportive social network of family or friends/Fear of death or the actual act of killing self/Cultural, spiritual and/or moral attitudes against suicide/Yarsani beliefs

## 2023-09-01 NOTE — CONSULT NOTE ADULT - SUBJECTIVE AND OBJECTIVE BOX
Chief Complaint:  Patient is a 88y old  Male who presents with a chief complaint of found  down (01 Sep 2023 09:48)      HPI:  Mr. Koenig is an 89yo M with PMH of multiple syncopal episodes who initially presented (8/20) after being found down on the street. Found to be in septic shock 2/2 PNA with associated strep pneumonia bacteremia. Also found to have age-indeterminate L frontal sub-dural hematoma and DVT. Anemia of Hb 6.1 was noted with no overt GI bleed initially, s/p occsional pRBCs for Hb~7 with adequate response. Patient was noted to have "blood streak" in stool on 8/31 with Hb 7.8-->6.6. No previous overt bleeding. Patient denies any constipation, diarrhea, epigastric pain, heartburn. No previous history of rectal bleeding.      Allergies:  No Known Allergies      Home Medications:    Hospital Medications:  acetaminophen     Tablet .. 650 milliGRAM(s) Oral every 6 hours PRN  albuterol    90 MICROgram(s) HFA Inhaler 2 Puff(s) Inhalation every 6 hours PRN  aluminum hydroxide/magnesium hydroxide/simethicone Suspension 30 milliLiter(s) Oral every 4 hours PRN  aMIOdarone    Tablet 200 milliGRAM(s) Oral daily  benzocaine/menthol Lozenge 1 Lozenge Oral four times a day PRN  benzonatate 100 milliGRAM(s) Oral three times a day PRN  budesonide 160 MICROgram(s)/formoterol 4.5 MICROgram(s) Inhaler 2 Puff(s) Inhalation two times a day  diltiazem Injectable 10 milliGRAM(s) IV Push once PRN  ferrous    sulfate 325 milliGRAM(s) Oral three times a day  guaiFENesin Oral Liquid (Sugar-Free) 100 milliGRAM(s) Oral every 6 hours PRN  heparin   Injectable 5000 Unit(s) SubCutaneous every 8 hours  levoFLOXacin  Tablet 750 milliGRAM(s) Oral every 48 hours  pantoprazole    Tablet 40 milliGRAM(s) Oral before breakfast  tiotropium 2.5 MICROgram(s) Inhaler 2 Puff(s) Inhalation daily      PMHX/PSHX:  No pertinent past medical history        Family history:      Denies family history of colon cancer/polyps, stomach cancer/polyps, pancreatic cancer/masses, liver cancer/disease, ovarian cancer and endometrial cancer.    Social History:     Tob: Denies  EtOH: As above  Illicit Drugs: Denies    ROS:   General:  No wt loss, fevers, chills, night sweats, fatigue  Eyes:  Good vision, no reported pain  ENT:  No sore throat, pain, runny nose, dysphagia  CV:  No pain, palpitations, hypo/hypertension  Pulm:  No dyspnea, cough, tachypnea, wheezing  GI:  As per HPI  :  No pain, bleeding, incontinence, nocturia  Muscle:  No pain, weakness  Neuro:  No weakness, tingling, memory problems  Psych:  No fatigue, insomnia, mood problems, depression  Endocrine:  No polyuria, polydipsia, cold/heat intolerance  Heme:  No petechiae, ecchymosis, easy bruisability  Skin:  No rash, tattoos, scars, edema    PHYSICAL EXAM:   GENERAL:  No acute distress  HEENT:  Normocephalic/atraumatic, no scleral icterus  CHEST:  No accessory muscle use  HEART:  Regular rate and rhythm  ABDOMEN:  Soft, non-tender, non-distended, normoactive bowel sounds,  no masses, no hepato-splenomegaly, no signs of chronic liver disease  EXTREMITIES: No cyanosis, clubbing, or edema  SKIN:  No rash  NEURO:  Alert and oriented x 3, no asterixis    Vital Signs:  Vital Signs Last 24 Hrs  T(C): 36.7 (01 Sep 2023 10:17), Max: 37.3 (01 Sep 2023 05:05)  T(F): 98 (01 Sep 2023 10:17), Max: 99.2 (01 Sep 2023 05:05)  HR: 73 (01 Sep 2023 10:17) (69 - 83)  BP: 104/56 (01 Sep 2023 10:17) (104/56 - 127/56)  BP(mean): --  RR: 17 (01 Sep 2023 10:17) (16 - 18)  SpO2: 98% (01 Sep 2023 10:17) (90% - 98%)    Parameters below as of 01 Sep 2023 10:17  Patient On (Oxygen Delivery Method): room air      Daily     Daily     LABS:                        6.7    3.79  )-----------( 348      ( 01 Sep 2023 10:02 )             21.6     Mean Cell Volume: 76.3 fl (09-01-23 @ 10:02)    09-01    139  |  109<H>  |  19  ----------------------------<  87  4.2   |  24  |  1.01    Ca    7.4<L>      01 Sep 2023 06:21    TPro  5.8<L>  /  Alb  1.5<L>  /  TBili  0.2  /  DBili  0.1  /  AST  36  /  ALT  40  /  AlkPhos  56  09-01    LIVER FUNCTIONS - ( 01 Sep 2023 06:21 )  Alb: 1.5 g/dL / Pro: 5.8 gm/dL / ALK PHOS: 56 U/L / ALT: 40 U/L / AST: 36 U/L / GGT: x             Urinalysis Basic - ( 01 Sep 2023 06:21 )    Color: x / Appearance: x / SG: x / pH: x  Gluc: 87 mg/dL / Ketone: x  / Bili: x / Urobili: x   Blood: x / Protein: x / Nitrite: x   Leuk Esterase: x / RBC: x / WBC x   Sq Epi: x / Non Sq Epi: x / Bacteria: x                              6.7    3.79  )-----------( 348      ( 01 Sep 2023 10:02 )             21.6                         6.6    4.24  )-----------( 354      ( 01 Sep 2023 06:21 )             21.3                         8.3    7.39  )-----------( 383      ( 30 Aug 2023 06:35 )             25.4       Imaging:           Chief Complaint:  Patient is a 88y old  Male who presents with a chief complaint of found  down (01 Sep 2023 09:48)      HPI:  Mr. Koenig is an 87yo M with PMH of multiple syncopal episodes who initially presented (8/20) after being found down on the street. Found to be in septic shock 2/2 PNA with associated strep pneumonia bacteremia. Also found to have age-indeterminate L frontal sub-dural hematoma and DVT. Anemia of Hb 6.1 was noted with no overt GI bleed initially, s/p occsional pRBCs for Hb~7 with adequate response. Patient was noted to have "blood streak" in stool on 8/31 with Hb 7.8-->6.6. No previous overt bleeding. Patient denies any constipation, diarrhea, epigastric pain, heartburn. No previous history of rectal bleeding. Never had a colonoscopy or endoscopy. No NSAIDS use.  Per collateral obtained from daughter, patient is living on the street and was lost to his family for many years. This admission is the first time patient was seen by family for many years. Daughter had voiced concerns regarding patient's capacity to make medical decisions.       Allergies:  No Known Allergies      Home Medications:    Hospital Medications:  acetaminophen     Tablet .. 650 milliGRAM(s) Oral every 6 hours PRN  albuterol    90 MICROgram(s) HFA Inhaler 2 Puff(s) Inhalation every 6 hours PRN  aluminum hydroxide/magnesium hydroxide/simethicone Suspension 30 milliLiter(s) Oral every 4 hours PRN  aMIOdarone    Tablet 200 milliGRAM(s) Oral daily  benzocaine/menthol Lozenge 1 Lozenge Oral four times a day PRN  benzonatate 100 milliGRAM(s) Oral three times a day PRN  budesonide 160 MICROgram(s)/formoterol 4.5 MICROgram(s) Inhaler 2 Puff(s) Inhalation two times a day  diltiazem Injectable 10 milliGRAM(s) IV Push once PRN  ferrous    sulfate 325 milliGRAM(s) Oral three times a day  guaiFENesin Oral Liquid (Sugar-Free) 100 milliGRAM(s) Oral every 6 hours PRN  heparin   Injectable 5000 Unit(s) SubCutaneous every 8 hours  levoFLOXacin  Tablet 750 milliGRAM(s) Oral every 48 hours  pantoprazole    Tablet 40 milliGRAM(s) Oral before breakfast  tiotropium 2.5 MICROgram(s) Inhaler 2 Puff(s) Inhalation daily      PMHX/PSHX:  No pertinent past medical history        Family history:      Denies family history of colon cancer/polyps, stomach cancer/polyps, pancreatic cancer/masses, liver cancer/disease, ovarian cancer and endometrial cancer.    Social History:     Tob: Denies  EtOH: As above  Illicit Drugs: Denies    ROS:   General:  No wt loss, fevers, chills, night sweats, fatigue  Eyes:  Good vision, no reported pain  ENT:  No sore throat, pain, runny nose, dysphagia  CV:  No pain, palpitations, hypo/hypertension  Pulm:  No dyspnea, cough, tachypnea, wheezing  GI:  As per HPI  :  No pain, bleeding, incontinence, nocturia  Muscle:  No pain, weakness  Neuro:  No weakness, tingling, memory problems  Psych:  No fatigue, insomnia, mood problems, depression  Endocrine:  No polyuria, polydipsia, cold/heat intolerance  Heme:  No petechiae, ecchymosis, easy bruisability  Skin:  No rash, tattoos, scars, edema    PHYSICAL EXAM:   GENERAL:  No acute distress  HEENT:  Normocephalic/atraumatic, no scleral icterus  CHEST:  No accessory muscle use  HEART:  Regular rate and rhythm  ABDOMEN:  Soft, non-tender, non-distended  RECTAL: External hemorrhoid, brown stool with light maroon tint  EXTREMITIES: No cyanosis, clubbing, or edema  SKIN:  No rash  NEURO:  Alert and oriented x 3, no asterixis    Vital Signs:  Vital Signs Last 24 Hrs  T(C): 36.7 (01 Sep 2023 10:17), Max: 37.3 (01 Sep 2023 05:05)  T(F): 98 (01 Sep 2023 10:17), Max: 99.2 (01 Sep 2023 05:05)  HR: 73 (01 Sep 2023 10:17) (69 - 83)  BP: 104/56 (01 Sep 2023 10:17) (104/56 - 127/56)  BP(mean): --  RR: 17 (01 Sep 2023 10:17) (16 - 18)  SpO2: 98% (01 Sep 2023 10:17) (90% - 98%)    Parameters below as of 01 Sep 2023 10:17  Patient On (Oxygen Delivery Method): room air      Daily     Daily     LABS:                        6.7    3.79  )-----------( 348      ( 01 Sep 2023 10:02 )             21.6     Mean Cell Volume: 76.3 fl (09-01-23 @ 10:02)    09-01    139  |  109<H>  |  19  ----------------------------<  87  4.2   |  24  |  1.01    Ca    7.4<L>      01 Sep 2023 06:21    TPro  5.8<L>  /  Alb  1.5<L>  /  TBili  0.2  /  DBili  0.1  /  AST  36  /  ALT  40  /  AlkPhos  56  09-01    LIVER FUNCTIONS - ( 01 Sep 2023 06:21 )  Alb: 1.5 g/dL / Pro: 5.8 gm/dL / ALK PHOS: 56 U/L / ALT: 40 U/L / AST: 36 U/L / GGT: x             Urinalysis Basic - ( 01 Sep 2023 06:21 )    Color: x / Appearance: x / SG: x / pH: x  Gluc: 87 mg/dL / Ketone: x  / Bili: x / Urobili: x   Blood: x / Protein: x / Nitrite: x   Leuk Esterase: x / RBC: x / WBC x   Sq Epi: x / Non Sq Epi: x / Bacteria: x                              6.7    3.79  )-----------( 348      ( 01 Sep 2023 10:02 )             21.6                         6.6    4.24  )-----------( 354      ( 01 Sep 2023 06:21 )             21.3                         8.3    7.39  )-----------( 383      ( 30 Aug 2023 06:35 )             25.4       Imaging:    ACC: [id number] EXAM:  CT ABDOMEN AND PELVIS   ORDERED BY: OSCAR TODD     ACC: [id number] EXAM:  CT CHEST   ORDERED BY: OSCAR TODD     PROCEDURE DATE:  08/20/2023          INTERPRETATION:  CLINICAL INFORMATION:  found down hypothermic to 90,   AMS, unknown history    COMPARISON: None.    CONTRAST/COMPLICATIONS:  IV Contrast: None  Oral Contrast: None  Complications: None      PROCEDURE:  Axial CT images were acquired through the chest, abdomen and pelvis   without intravenous contrast. Coronal and sagittal reformatted images   were generated.    LIMITATIONS: Limited due to the lack of oral and intravenous contrast.   Also limited due to suboptimal resolution and lack of body fat.    FINDINGS:  CHEST:  LUNGS AND LARGE AIRWAYS: Patent central airways. Bibasilar patchy   consolidations suggesting pneumonia. Superimposed emphysematous lung   change and interstitial lung disease/honeycombing at the lung bases..  PLEURA: No pleural effusion or pneumothorax.  VESSELS: Limited evaluation of the thoracic aorta without intravenous   contrast, however there is calcific atherosclerosis without aneurysmal   dilatation.  HEART: Borderline enlarged heart.. No pericardial effusion. Coronary   artery calcifications. Anemia suggested.  MEDIASTINUM AND STACY: No lymphadenopathy.  CHEST WALL AND LOWER NECK: Within normal limits.      ABDOMEN AND PELVIS:  LIVER: Within normal limits.  BILE DUCTS: Normal caliber.  GALLBLADDER: Elongated gallbladder without definite calcified gallstone..  SPLEEN: Within normal limits.  PANCREAS: Within normal limits.  ADRENALS: Normal right adrenal gland. Limited left adrenal gland.  KIDNEYS/URETERS: Small bilateral kidneys. No renal stone or   hydronephrosis.    BLADDER: Within normal limits.  REPRODUCTIVE ORGANS: Prostate gland is not grossly enlarged.    BOWEL: Evaluation of bowel is limited without distention with oral   contrast and lack of mesenteric fat, however there is no bowel   obstruction. There is a 7 cm stool distended rectum. Small bowel loops   are not grossly dilated. Stomach is mildly distended with debris.  PERITONEUM: No free air or significant ascites.  VESSELS:  Limited evaluation of the abdominal aorta without intravenous   contrast, demonstrates tortuosity and calcific atherosclerosis without   aneurysmal dilatation. Negative  RETROPERITONEUM: No lymphadenopathy.  ABDOMINAL WALL: Within normal limits.  BONES: Severe scoliosis of the spine with associated multilevel   degenerative changes. Question pagetoid appearance of the right iliac   bone.    IMPRESSION:    Bilateral pneumonia.    Additional findings as above.    --- End of Report ---            ROBE RO MD; Attending Radiologist  This document has been electronically signed. Aug 20 2023  7:27AM

## 2023-09-01 NOTE — BH CONSULTATION LIAISON ASSESSMENT NOTE - HPI (INCLUDE ILLNESS QUALITY, SEVERITY, DURATION, TIMING, CONTEXT, MODIFYING FACTORS, ASSOCIATED SIGNS AND SYMPTOMS)
89yo M, single, noncaregiver, retired, rents a room in a private house, has 2 adult children - who do not know where Patient lives as he will not provide that information to them; daughter has not had contact with Patient in "a few years", + current smoker, BIB EMS on 8/20/23 after found laying on the street and found to be hypothermic, hypoxic, confused, rapid atrial fibrillation, with sepsis + elevated lactate of 14. CT Chest showed Bibasilar patchy consolidations suggesting pneumonia + Superimposed emphysematous lung change and interstitial lung disease/honeycombing at the lung bases. CT Head showed thin indeterminate age left frontal convexity subdural hematoma measuring up to 4 mm. blood cultures grew Strep pneumonia (on appropriate abx), lower extremity duplex study showed L tibioperoneal trunk and proximal posterior tibial vein DVTs. Seen by Neurology - Acute encephalopathy in the setting of sepsis, hypothermia, ?aspiration pneumonia; bilateral chronic subdural collections (Suspect multiple prior falls), Cognitive impairment; likely has dementia at baseline. Admitted to ICU, downgraded 8/21/23. Seen by Cardiology - TE with Mildly decreased global left ventricular systolic function. BLAE, Mod - severe MR/TR, severe pHTN. Pt noted to be underweight w/ Severe protein-calorie malnutrition.            87yo AAM, single, noncaregiver, retired, rents a room in a private house, has 2 adult children - who do not know where Patient lives as he will not provide that information to them; daughter has not had contact with Patient in "a few years", + current smoker, BIB EMS on 8/20/23 after found laying on the street and found to be hypothermic, hypoxic, confused, rapid atrial fibrillation, with sepsis + elevated lactate of 14. CT Chest showed Bibasilar patchy consolidations suggesting pneumonia + Superimposed emphysematous lung change and interstitial lung disease/honeycombing at the lung bases. CT Head showed thin indeterminate age left frontal convexity subdural hematoma measuring up to 4 mm. blood cultures grew Strep pneumonia (on appropriate abx), lower extremity duplex study showed L tibioperoneal trunk and proximal posterior tibial vein DVTs. Seen by Neurology - Acute encephalopathy in the setting of sepsis, hypothermia, ?aspiration pneumonia; bilateral chronic subdural collections (Suspect multiple prior falls), Cognitive impairment; likely has dementia at baseline. Admitted to ICU, downgraded 8/21/23. Seen by Cardiology - TE with Mildly decreased global left ventricular systolic function. BLAE, Mod - severe MR/TR, severe pHTN. Pt noted to be underweight w/ Severe protein-calorie malnutrition.     EXAM: calm, cooperative, very frail /gaunt, engages. Patient is pleasant, reports that he lives alone, cannot afford any HHA services/help but admits he would need it. States that his landlady contacted him saying he would need to move?? unclear when this was. 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 The freight elevator of the building was broken so he carried up this mannequin up 15 flights of stairs and felt dizzy when he came back down. Patient hazy as to details of who this  was, where he got the mannequin etc. reports that he gets Social Security, denies he runs out of money for groceries etc. He reports he buys his own groceries and carries the bags home, does his own cooking, cleaning. Denies mood changes, no suicidality elicited, reports poor sleep only in the hospital as the lights and people talking keeps him up. Asked if he could get some sleeping pills as he feels unrested. Recalls his daughter was here at  visiting yesterday; able to say he has a son, daughter but not sure of their ages. Patient unsure what medical issues he has at this time/what doctors told him was wrong with him.

## 2023-09-01 NOTE — BH CONSULTATION LIAISON ASSESSMENT NOTE - NS ED [facility] MED ROS PSYCHIATRIC
Patient missed a AIDE visit from [facility] on 5-22-23.     Reason: Called and text patient no response back.       For your records only.   As per home health protocol, MD must be notified of missed/cancelled visits; therefore the prescribed frequency was not met.  See HPI

## 2023-09-01 NOTE — BH CONSULTATION LIAISON ASSESSMENT NOTE - NSBHCHARTREVIEWVS_PSY_A_CORE FT
Vital Signs Last 24 Hrs  T(C): 36.7 (01 Sep 2023 10:17), Max: 37.3 (01 Sep 2023 05:05)  T(F): 98 (01 Sep 2023 10:17), Max: 99.2 (01 Sep 2023 05:05)  HR: 73 (01 Sep 2023 10:17) (69 - 83)  BP: 104/56 (01 Sep 2023 10:17) (104/56 - 127/56)  BP(mean): --  RR: 17 (01 Sep 2023 10:17) (16 - 18)  SpO2: 98% (01 Sep 2023 10:17) (90% - 98%)    Parameters below as of 01 Sep 2023 10:17  Patient On (Oxygen Delivery Method): room air

## 2023-09-01 NOTE — PROGRESS NOTE ADULT - NS ATTEND AMEND GEN_ALL_CORE FT
-rates adequate on amiodarone  -further cardiac w/u can be done as outpatient once anemia is stabilized   -signing off now, please reconsult as needed

## 2023-09-01 NOTE — BH CONSULTATION LIAISON ASSESSMENT NOTE - NSBHCHARTREVIEWINVESTIGATE_PSY_A_CORE FT
CT scan Chest - Bibasilar patchy consolidations suggesting pneumonia. Superimposed emphysematous lung change and interstitial lung disease/honeycombing at the lung bases. CT Head: Thin indeterminate age left frontal convexity subdural hematoma measuring up to 4 mm.

## 2023-09-01 NOTE — CONSULT NOTE ADULT - ASSESSMENT
# Rectal bleeding - Maroon tinted stool in a patient with recent septic shock. Suspect LGIB, either ischemic colitis vs. stercoral colitis in the setting of constipation as seen on previous CT. DDx also includes (with lower suspicion) AVMs, polyps and malignancy.   # Constipation  # ?Capacity  # Severe pulmonary hypertension  # DVT - recommended IVC filter  # Subdural hematoma # Rectal bleeding - Maroon tinted stool in a patient with recent septic shock. Suspect LGIB, either ischemic colitis vs. stercoral colitis in the setting of constipation as seen on previous CT. Low suspicion for brisk UGIB DDx also includes (with lower suspicion) AVMs, polyps and malignancy. Discussed with daughter, Neena, re: diagnostic colonoscopy vs. conservative management. Patient is at an increased sushant-procedural risk given severe pulmonary hypertension. Given increased risks, overall condition, Neena would like to continue with conservative management and defer an endoscopic evaluation for now, even with the possibility of malignancy - as patient will likely not be a candidate for treatment regardless, which is reasonable. Would concentrate on optimizing bowel movements at this time. Can reconsider endoscopic evaluation pending clinical course.  # Constipation - patient denies constipation but daughter reports he voiced concerns for being constipated  # ?Capacity -  consulted, Neena states patient does not have capacity and was living on the street  # Severe pulmonary hypertension  # DVT - recommended IVC filter  # Subdural hematoma    Recommendations:  - Aggressive laxatives with Miralax 17g BID for 1-2 soft BMS per day  - PPI 40mg daily  - trend CBC, CMP, INR  - 2 large bore IVs; active type and screen  - transfuse Hgb > 7, Platelets > 50  - No plans for endoscopy at this time  -  followup re: capacity  - supportive care as per primary team    Thank you for involving us in the care of this patient. Please reach out if any further questions.    Thomas Weinberg  Gastroenterology    Available on [employer] Teams  [phone]

## 2023-09-01 NOTE — BH CONSULTATION LIAISON ASSESSMENT NOTE - NSBHCONSULTRECOMMENDOTHER_PSY_A_CORE FT
- B12 and folate, prealbumin levels ordered for AM  - track calorie intake / food log   - Patient underweight with severe protein-calorie deficiency, low albumin, low iron panel, significant anemia with BMI of 16 all indicating he is not able to safely meet his basic needs (ie. sufficient food/nutrition intake) in his current state in his current home situation at this time for whatever reason  - it is unlikely Patient carried a mannequin up 15 flights of stairs in a [facility] etc  - Patient does NOT have capacity at this time to engage in discharge planning; decision maker are 2 adult children

## 2023-09-01 NOTE — PROGRESS NOTE ADULT - NS ATTEND OPT1 GEN_ALL_CORE
I independently performed the documented:
I attest my time as attending is greater than 50% of the total combined time spent on qualifying patient care activities by the PA/NP and attending.
I independently performed the documented:
I attest my time as attending is greater than 50% of the total combined time spent on qualifying patient care activities by the PA/NP and attending.
I independently performed the documented:
I attest my time as attending is greater than 50% of the total combined time spent on qualifying patient care activities by the PA/NP and attending.
I attest my time as attending is greater than 50% of the total combined time spent on qualifying patient care activities by the PA/NP and attending.
I independently performed the documented:

## 2023-09-01 NOTE — PROGRESS NOTE ADULT - SUBJECTIVE AND OBJECTIVE BOX
Patient is a 88y old  Male who presents with a chief complaint of found  down (01 Sep 2023 15:37)      INTERVAL HPI/OVERNIGHT EVENTS:  Pt was seen and examined, no acute events.      MEDICATIONS  (STANDING):  aMIOdarone    Tablet 200 milliGRAM(s) Oral daily  budesonide 160 MICROgram(s)/formoterol 4.5 MICROgram(s) Inhaler 2 Puff(s) Inhalation two times a day  ferrous    sulfate 325 milliGRAM(s) Oral three times a day  heparin   Injectable 5000 Unit(s) SubCutaneous every 8 hours  levoFLOXacin  Tablet 750 milliGRAM(s) Oral every 48 hours  pantoprazole    Tablet 40 milliGRAM(s) Oral before breakfast  tiotropium 2.5 MICROgram(s) Inhaler 2 Puff(s) Inhalation daily    MEDICATIONS  (PRN):  acetaminophen     Tablet .. 650 milliGRAM(s) Oral every 6 hours PRN Moderate Pain (4 - 6)  albuterol    90 MICROgram(s) HFA Inhaler 2 Puff(s) Inhalation every 6 hours PRN Shortness of Breath and/or Wheezing  aluminum hydroxide/magnesium hydroxide/simethicone Suspension 30 milliLiter(s) Oral every 4 hours PRN Dyspepsia  benzocaine/menthol Lozenge 1 Lozenge Oral four times a day PRN Sore Throat  benzonatate 100 milliGRAM(s) Oral three times a day PRN Cough  diltiazem Injectable 10 milliGRAM(s) IV Push once PRN sustained HR >130  guaiFENesin Oral Liquid (Sugar-Free) 100 milliGRAM(s) Oral every 6 hours PRN Cough  polyethylene glycol 3350 17 Gram(s) Oral daily PRN Constipation      Allergies  No Known Allergies        Vital Signs Last 24 Hrs  T(C): 36.3 (01 Sep 2023 16:10), Max: 37.3 (01 Sep 2023 05:05)  T(F): 97.3 (01 Sep 2023 16:10), Max: 99.2 (01 Sep 2023 05:05)  HR: 75 (01 Sep 2023 16:10) (68 - 83)  BP: 113/59 (01 Sep 2023 16:10) (104/56 - 132/57)  BP(mean): --  RR: 18 (01 Sep 2023 16:10) (16 - 18)  SpO2: 98% (01 Sep 2023 16:10) (96% - 98%)    Parameters below as of 01 Sep 2023 13:15  Patient On (Oxygen Delivery Method): room air        PHYSICAL EXAM:  PHYSICAL EXAM:  GENERAL: NAD,  no increased WOB  HEAD:  Atraumatic, Normocephalic  EYES: EOMI, conjunctiva and sclera clear  ENMT: Moist mucous membranes  NECK: Supple, No JVD  NERVOUS SYSTEM:  Alert, no focal neuro deficits   CHEST/LUNG: Clear to auscultation bilaterally; No rales, rhonchi, wheezing, or rubs  HEART: Regular rate and rhythm;  ABDOMEN: Soft, Nontender, Nondistended; Bowel sounds present  EXTREMITIES:  2+ Peripheral Pulses b/l, No clubbing, cyanosis, calf tenderness or edema b/l        LABS:                        7.9    4.61  )-----------( 356      ( 01 Sep 2023 19:50 )             23.8     09-01    139  |  109<H>  |  19  ----------------------------<  87  4.2   |  24  |  1.01    Ca    7.4<L>      01 Sep 2023 06:21    TPro  5.8<L>  /  Alb  1.5<L>  /  TBili  0.2  /  DBili  0.1  /  AST  36  /  ALT  40  /  AlkPhos  56  09-01      Urinalysis Basic - ( 01 Sep 2023 06:21 )    Color: x / Appearance: x / SG: x / pH: x  Gluc: 87 mg/dL / Ketone: x  / Bili: x / Urobili: x   Blood: x / Protein: x / Nitrite: x   Leuk Esterase: x / RBC: x / WBC x   Sq Epi: x / Non Sq Epi: x / Bacteria: x      CAPILLARY BLOOD GLUCOSE          RADIOLOGY & ADDITIONAL TESTS:    Imaging Personally Reviewed:  [ ] YES  [ ] NO    Consultant(s) Notes Reviewed:  [ ] YES  [ ] NO    Care Discussed with Consultants/Other Providers [ ] YES  [ ] NO

## 2023-09-02 LAB
ALBUMIN SERPL ELPH-MCNC: 1.5 G/DL — LOW (ref 3.3–5)
ALP SERPL-CCNC: 60 U/L — SIGNIFICANT CHANGE UP (ref 40–120)
ALT FLD-CCNC: 37 U/L — SIGNIFICANT CHANGE UP (ref 12–78)
ANION GAP SERPL CALC-SCNC: 7 MMOL/L — SIGNIFICANT CHANGE UP (ref 5–17)
AST SERPL-CCNC: 34 U/L — SIGNIFICANT CHANGE UP (ref 15–37)
BASOPHILS # BLD AUTO: 0.02 K/UL — SIGNIFICANT CHANGE UP (ref 0–0.2)
BASOPHILS NFR BLD AUTO: 0.4 % — SIGNIFICANT CHANGE UP (ref 0–2)
BILIRUB DIRECT SERPL-MCNC: 0.1 MG/DL — SIGNIFICANT CHANGE UP (ref 0–0.3)
BILIRUB INDIRECT FLD-MCNC: 0.3 MG/DL — SIGNIFICANT CHANGE UP (ref 0.2–1)
BILIRUB SERPL-MCNC: 0.4 MG/DL — SIGNIFICANT CHANGE UP (ref 0.2–1.2)
BUN SERPL-MCNC: 18 MG/DL — SIGNIFICANT CHANGE UP (ref 7–23)
CALCIUM SERPL-MCNC: 7.5 MG/DL — LOW (ref 8.5–10.1)
CHLORIDE SERPL-SCNC: 108 MMOL/L — SIGNIFICANT CHANGE UP (ref 96–108)
CO2 SERPL-SCNC: 24 MMOL/L — SIGNIFICANT CHANGE UP (ref 22–31)
CREAT SERPL-MCNC: 1.02 MG/DL — SIGNIFICANT CHANGE UP (ref 0.5–1.3)
EGFR: 72 ML/MIN/1.73M2 — SIGNIFICANT CHANGE UP
EOSINOPHIL # BLD AUTO: 0.01 K/UL — SIGNIFICANT CHANGE UP (ref 0–0.5)
EOSINOPHIL NFR BLD AUTO: 0.2 % — SIGNIFICANT CHANGE UP (ref 0–6)
FOLATE SERPL-MCNC: 7.4 NG/ML — SIGNIFICANT CHANGE UP
GLUCOSE SERPL-MCNC: 103 MG/DL — HIGH (ref 70–99)
HCT VFR BLD CALC: 24.6 % — LOW (ref 39–50)
HCT VFR BLD CALC: 27.1 % — LOW (ref 39–50)
HGB BLD-MCNC: 7.8 G/DL — LOW (ref 13–17)
HGB BLD-MCNC: 9 G/DL — LOW (ref 13–17)
IMM GRANULOCYTES NFR BLD AUTO: 0.7 % — SIGNIFICANT CHANGE UP (ref 0–0.9)
LYMPHOCYTES # BLD AUTO: 0.74 K/UL — LOW (ref 1–3.3)
LYMPHOCYTES # BLD AUTO: 16.3 % — SIGNIFICANT CHANGE UP (ref 13–44)
MAGNESIUM SERPL-MCNC: 1.8 MG/DL — SIGNIFICANT CHANGE UP (ref 1.6–2.6)
MCHC RBC-ENTMCNC: 24.5 PG — LOW (ref 27–34)
MCHC RBC-ENTMCNC: 25.3 PG — LOW (ref 27–34)
MCHC RBC-ENTMCNC: 31.7 G/DL — LOW (ref 32–36)
MCHC RBC-ENTMCNC: 33.2 G/DL — SIGNIFICANT CHANGE UP (ref 32–36)
MCV RBC AUTO: 76.1 FL — LOW (ref 80–100)
MCV RBC AUTO: 77.4 FL — LOW (ref 80–100)
MONOCYTES # BLD AUTO: 0.48 K/UL — SIGNIFICANT CHANGE UP (ref 0–0.9)
MONOCYTES NFR BLD AUTO: 10.5 % — SIGNIFICANT CHANGE UP (ref 2–14)
NEUTROPHILS # BLD AUTO: 3.27 K/UL — SIGNIFICANT CHANGE UP (ref 1.8–7.4)
NEUTROPHILS NFR BLD AUTO: 71.9 % — SIGNIFICANT CHANGE UP (ref 43–77)
NRBC # BLD: 0 /100 WBCS — SIGNIFICANT CHANGE UP (ref 0–0)
NRBC # BLD: 0 /100 WBCS — SIGNIFICANT CHANGE UP (ref 0–0)
PHOSPHATE SERPL-MCNC: 2.5 MG/DL — SIGNIFICANT CHANGE UP (ref 2.5–4.5)
PLATELET # BLD AUTO: 349 K/UL — SIGNIFICANT CHANGE UP (ref 150–400)
PLATELET # BLD AUTO: 361 K/UL — SIGNIFICANT CHANGE UP (ref 150–400)
POTASSIUM SERPL-MCNC: 4.4 MMOL/L — SIGNIFICANT CHANGE UP (ref 3.5–5.3)
POTASSIUM SERPL-SCNC: 4.4 MMOL/L — SIGNIFICANT CHANGE UP (ref 3.5–5.3)
PREALB SERPL-MCNC: 7 MG/DL — LOW (ref 20–40)
PROT SERPL-MCNC: 5.9 GM/DL — LOW (ref 6–8.3)
RBC # BLD: 3.18 M/UL — LOW (ref 4.2–5.8)
RBC # BLD: 3.56 M/UL — LOW (ref 4.2–5.8)
RBC # FLD: 27.1 % — HIGH (ref 10.3–14.5)
RBC # FLD: 27.3 % — HIGH (ref 10.3–14.5)
SODIUM SERPL-SCNC: 139 MMOL/L — SIGNIFICANT CHANGE UP (ref 135–145)
VIT B12 SERPL-MCNC: 708 PG/ML — SIGNIFICANT CHANGE UP (ref 232–1245)
WBC # BLD: 4.55 K/UL — SIGNIFICANT CHANGE UP (ref 3.8–10.5)
WBC # BLD: 5.52 K/UL — SIGNIFICANT CHANGE UP (ref 3.8–10.5)
WBC # FLD AUTO: 4.55 K/UL — SIGNIFICANT CHANGE UP (ref 3.8–10.5)
WBC # FLD AUTO: 5.52 K/UL — SIGNIFICANT CHANGE UP (ref 3.8–10.5)

## 2023-09-02 PROCEDURE — 93010 ELECTROCARDIOGRAM REPORT: CPT

## 2023-09-02 PROCEDURE — 99232 SBSQ HOSP IP/OBS MODERATE 35: CPT

## 2023-09-02 RX ADMIN — PANTOPRAZOLE SODIUM 40 MILLIGRAM(S): 20 TABLET, DELAYED RELEASE ORAL at 05:52

## 2023-09-02 RX ADMIN — AMIODARONE HYDROCHLORIDE 200 MILLIGRAM(S): 400 TABLET ORAL at 05:52

## 2023-09-02 RX ADMIN — Medication 325 MILLIGRAM(S): at 13:32

## 2023-09-02 RX ADMIN — BUDESONIDE AND FORMOTEROL FUMARATE DIHYDRATE 2 PUFF(S): 160; 4.5 AEROSOL RESPIRATORY (INHALATION) at 13:32

## 2023-09-02 RX ADMIN — Medication 325 MILLIGRAM(S): at 21:14

## 2023-09-02 RX ADMIN — Medication 325 MILLIGRAM(S): at 05:52

## 2023-09-02 NOTE — PROGRESS NOTE ADULT - SUBJECTIVE AND OBJECTIVE BOX
INTERVAL HPI:   87 yo M with multiple syncopal episodes per chart notes,  on no home meds   Brought by EMS after being found down on street minimally responsive (8/20/23).   In the ED, was hypotensive, hypothermic (rectal T 90F), and tachycardic (afib w/ RVR).   Labs were remarkable for anemia (Hgb 6.1), leukocytosis w/ 25% band, lactic acidosis (lactate 14.4), ANGI, and elevated troponin   Got treated w/ vanc/zosyn, fluids, 1U pRBCs, IV amiodarone bolus (resolved arrhythmia), and a non-rebreather mask.   CT imaging showed an age-indeterminate L frontal sub-dural hematoma (4mm) and lung findings with pneumonia and emphysema/ILD.   RVP was entero/rhinovirus positive.   He was switched to ceftriaxone/azithromycin, was started on ipratropium nebulizers, and got  admitted to the ICU for new pressor requirement (norepinephrine).    During his ICU course, his blood cultures grew Strep pneumonia (on appropriate abx), lower extremity duplex study showed L tibioperoneal trunk and proximal posterior tibial vein DVTs (on heparin ppx), norepinephrine was weaned off w/ adequate MAPs,   Repeat CT head showed stable sub-dural hematoma, and iron supplementation was started.   08/21/23:  Got transferred to regular medical floor.  08/22/23:  At time of my evaluation, awake, responsive. Denies SOB, cough, sputum production or chest pain.  Admits being a smoker but says quit about a year ago.  Again in rapid A Fib and being transferred to monitor bed. Seen by Cardiology.    OVERNIGHT EVENTS:  Resting comfortably in bed. RN reports rectal bleed. Pt has refused any invasive intervention.  SPO2 97% on nasal O2.    Vital Signs Last 24 Hrs  T(C): 36.8 (02 Sep 2023 16:00), Max: 36.9 (02 Sep 2023 04:58)  T(F): 98.3 (02 Sep 2023 16:00), Max: 98.4 (02 Sep 2023 04:58)  HR: 96 (02 Sep 2023 16:00) (67 - 111)  BP: 107/44 (02 Sep 2023 16:00) (102/57 - 133/68)  BP(mean): --  RR: 16 (02 Sep 2023 16:00) (16 - 18)  SpO2: 97% (02 Sep 2023 16:00) (97% - 99%)    Parameters below as of 02 Sep 2023 16:00  Patient On (Oxygen Delivery Method): room air    PHYSICAL EXAM:  GEN:        Resting, comfortable.  HEENT:    Normal.    RESP:       no distress  CVS:          Regular rate and rhythm.     MEDICATIONS  (STANDING):  aMIOdarone    Tablet 200 milliGRAM(s) Oral daily  budesonide 160 MICROgram(s)/formoterol 4.5 MICROgram(s) Inhaler 2 Puff(s) Inhalation two times a day  ferrous    sulfate 325 milliGRAM(s) Oral three times a day  levoFLOXacin  Tablet 750 milliGRAM(s) Oral every 48 hours  pantoprazole    Tablet 40 milliGRAM(s) Oral before breakfast  tiotropium 2.5 MICROgram(s) Inhaler 2 Puff(s) Inhalation daily    MEDICATIONS  (PRN):  acetaminophen     Tablet .. 650 milliGRAM(s) Oral every 6 hours PRN Moderate Pain (4 - 6)  albuterol    90 MICROgram(s) HFA Inhaler 2 Puff(s) Inhalation every 6 hours PRN Shortness of Breath and/or Wheezing  aluminum hydroxide/magnesium hydroxide/simethicone Suspension 30 milliLiter(s) Oral every 4 hours PRN Dyspepsia  benzocaine/menthol Lozenge 1 Lozenge Oral four times a day PRN Sore Throat  benzonatate 100 milliGRAM(s) Oral three times a day PRN Cough  diltiazem Injectable 10 milliGRAM(s) IV Push once PRN sustained HR >130  guaiFENesin Oral Liquid (Sugar-Free) 100 milliGRAM(s) Oral every 6 hours PRN Cough  polyethylene glycol 3350 17 Gram(s) Oral daily PRN Constipation    LABS:                        9.0    5.52  )-----------( 361      ( 02 Sep 2023 12:25 )             27.1     09-02    139  |  108  |  18  ----------------------------<  103<H>  4.4   |  24  |  1.02    Ca    7.5<L>      02 Sep 2023 06:53  Phos  2.5     09-02  Mg     1.8     09-02    TPro  5.9<L>  /  Alb  1.5<L>  /  TBili  0.4  /  DBili  0.1  /  AST  34  /  ALT  37  /  AlkPhos  60  09-02    Urinalysis Basic - ( 02 Sep 2023 06:53 )    Color: x / Appearance: x / SG: x / pH: x  Gluc: 103 mg/dL / Ketone: x  / Bili: x / Urobili: x   Blood: x / Protein: x / Nitrite: x   Leuk Esterase: x / RBC: x / WBC x   Sq Epi: x / Non Sq Epi: x / Bacteria: x    ASSESSMENT AND PLAN:  Multifocal pneumonia.  S/P Septic shock.  Strep Pneumo Bacteremia.  Leukocytosis.  A Fib with RVR.  Anemia.  Renal Insuffiencey.  Hypokalemia.  Left peroneal+ post tibial DVT.    SPO2 in high 90s on room air.  Continue antibiotic per ID.  Being transferred to monitor bed for rapid A Fib.  Not a candidate for full anticoagulation due to SDH, and anemia requiring PRBC transfusion.  Consider Vascular evaluation for DVT.  56+ minutes spent.    08/23/23:   Awake, responsive. Complains of stomach discomfort and at times with difficult swallowing. SPO2 stable on room air.  Converted to sinus rhythm, still on Amiodarone drip.  Continue antibiotics per ID.    08/24/23:  Resting comfortably, no SOB or distress. SPO2 98% .  Converted to sinus rhythm, changed to PO Amiodarone.  Continue Vancomycin per ID.  Will change to adult dose Symbicort.    08/25/23: Awake, responsive and comfortable. SPO2 97%, no distress.  Complains of soreness in throat. Will add Cepacol lozenges.    08/26/23:  Complains of soreness in throat. Will start on Cepacol lozenges.  Getting PRBC transfusion  On Symbicort and Spiriva.  antibiotics per ID    08/27/23: Resting comfortably, no distress. SPO2 96%.  Continue treatment.    08/28/23:  Awake, responsive and comfortable.  SPO2 98%.  On Symbicort and Levaquin.    08/29/23: Weak looking, denies SOB. SPO2 97%   Over all pulmonary status close to base line.  Continue treatment.    08/30/23: Resting comfortably in bed, no distress observed.  SPO2 95% on nasal O2.  Continue treatment.  Vascular surgery follow up noted.    08/31/23: Awake, responsive and comfortable. Says breathing is up and down.  SPO2 98% on nasal O2.  Complete antibiotic per ID.  Follow H & H.    09/01/23:  Continues to complain of sore throat.   Reported by RN  to have some rectal bleed  overnight  Again getting PRBC transfusion.  Seen by GI but pt/family does not want any procedure. Before has refused IVC filter.  SPO2 96% on nasal O2.  On Levaquin per ID.    09/02/23:  Resting comfortably in bed. RN reports rectal bleed. Pt has refused any invasive intervention.  SPO2 97% on nasal O2.  Consider palliative care evaluation.

## 2023-09-02 NOTE — PROGRESS NOTE ADULT - ASSESSMENT
89 yo M w/ hx of multiple syncopal episodes presented via EMS for an unwitnessed fall w/ hypotension, hypothermia, and afib w/ RVR concerning for sepsis. He was found to have Strep pneumonia bacteremia w/ evidence of b/l pneumonia and lactic acidosis, iron deficiency anemia, stable sub-dural hematoma, ANGI, and demand ischemia.    Metabolic encephalopathy:  - 2/2 sepsis  - Now A&Ox3, stable L frontal SDH    Acute hypoxic respiratory failure:  - pneumonia w/ findings c/w emphysema/ILD, weaned off NC on room air, on ipratropium q8h (consider transition to duonebs), chest PT, maintain SpO2 > 92%  - C/w symciort and Spiriva  - albuterol PRN  - Pulm following     Sepsis with septic shock POA:  - With strep pneumo bacteremia 2/2 PNA  - Weaned off norepinephrine  - Initial BC positive for strep, intermediate sensitivity to Rocephin, sensitive to Vanco  - Repeat Bcx negative  - Currently on levaquin 750mg q 48hrs, last dose 9/4  - repeat blood clx neg    Afib:  - s/p amio bolus and gtt for afib w/ RVR (converted), currently on sinus rthym   - troponin elevation c/w demand ischemia   - Echo with Mildly decreased global left ventricular systolic function. BLAE, Mod - severe MR/TR, severe pHTN  - Not on Ac for anemia and SDH  - Continue with PO amiodarone  - Cardio following     ANGI:  - 2/2 septic ATN  - Cr improving   - Pending serologies for pulm renal syndrome  - Renal following     Acute blood loss Anemia:  - hx of iron deficiency anemia  - S/P 4 units PRBC  - Another unit ordered today  - Retic negative, haptoglobin high, LDH high  - peripheral blood smear shows no schistocytes, tear drop cell  - Iron deficient, continue iron supplementation  - Pt had multiple episode of rectal bleed reported last night,  LGIB, either ischemic colitis vs. stercoral colitis in the setting of constipation   - Transfused one unit PRBC on 9/1/23. Hb stable today.  - Gi consult appreciated  - Pt and family doesn't want any intervention for now, will monitor and re assess if bleeding recurs  - On ppx dose of hep, may need to hold if bleeding persists  - PPI  - Miralax for constipation    Acute DVT :  - Not on full dose AC for anemia and SDH  - On DVT ppx dose  - repeat US unchanged  - Vascular follow up, no intervention, pt refused IVC filter    GI ppx  - Protonix    Psych consult appreciated, pt doesn't have  capacity to make decision regarding dc plan.    PT eval: AMADOR

## 2023-09-02 NOTE — PROGRESS NOTE ADULT - SUBJECTIVE AND OBJECTIVE BOX
Patient is a 88y old  Male who presents with a chief complaint of found  down (02 Sep 2023 16:42)      INTERVAL HPI/OVERNIGHT EVENTS:  Pt was seen and examined, no acute events.      MEDICATIONS  (STANDING):  aMIOdarone    Tablet 200 milliGRAM(s) Oral daily  budesonide 160 MICROgram(s)/formoterol 4.5 MICROgram(s) Inhaler 2 Puff(s) Inhalation two times a day  ferrous    sulfate 325 milliGRAM(s) Oral three times a day  levoFLOXacin  Tablet 750 milliGRAM(s) Oral every 48 hours  pantoprazole    Tablet 40 milliGRAM(s) Oral before breakfast  tiotropium 2.5 MICROgram(s) Inhaler 2 Puff(s) Inhalation daily    MEDICATIONS  (PRN):  acetaminophen     Tablet .. 650 milliGRAM(s) Oral every 6 hours PRN Moderate Pain (4 - 6)  albuterol    90 MICROgram(s) HFA Inhaler 2 Puff(s) Inhalation every 6 hours PRN Shortness of Breath and/or Wheezing  aluminum hydroxide/magnesium hydroxide/simethicone Suspension 30 milliLiter(s) Oral every 4 hours PRN Dyspepsia  benzocaine/menthol Lozenge 1 Lozenge Oral four times a day PRN Sore Throat  benzonatate 100 milliGRAM(s) Oral three times a day PRN Cough  diltiazem Injectable 10 milliGRAM(s) IV Push once PRN sustained HR >130  guaiFENesin Oral Liquid (Sugar-Free) 100 milliGRAM(s) Oral every 6 hours PRN Cough  polyethylene glycol 3350 17 Gram(s) Oral daily PRN Constipation      Allergies    No Known Allergies    Intolerances          Vital Signs Last 24 Hrs  T(C): 36.8 (02 Sep 2023 16:00), Max: 36.9 (02 Sep 2023 04:58)  T(F): 98.3 (02 Sep 2023 16:00), Max: 98.4 (02 Sep 2023 04:58)  HR: 96 (02 Sep 2023 16:00) (67 - 111)  BP: 107/44 (02 Sep 2023 16:00) (102/57 - 133/68)  BP(mean): --  RR: 16 (02 Sep 2023 16:00) (16 - 18)  SpO2: 97% (02 Sep 2023 16:00) (97% - 99%)    Parameters below as of 02 Sep 2023 16:00  Patient On (Oxygen Delivery Method): room air        PHYSICAL EXAM:  GENERAL: NAD,  no increased WOB  HEAD:  Atraumatic, Normocephalic  EYES: EOMI, conjunctiva and sclera clear  ENMT: Moist mucous membranes  NECK: Supple, No JVD  NERVOUS SYSTEM:  Alert, no focal neuro deficits   CHEST/LUNG: Clear to auscultation bilaterally; No rales, rhonchi, wheezing, or rubs  HEART: Regular rate and rhythm;  ABDOMEN: Soft, Nontender, Nondistended; Bowel sounds present  EXTREMITIES:  2+ Peripheral Pulses b/l, No clubbing, cyanosis, calf tenderness or edema b/l        LABS:                        9.0    5.52  )-----------( 361      ( 02 Sep 2023 12:25 )             27.1     09-02    139  |  108  |  18  ----------------------------<  103<H>  4.4   |  24  |  1.02    Ca    7.5<L>      02 Sep 2023 06:53  Phos  2.5     09-02  Mg     1.8     09-02    TPro  5.9<L>  /  Alb  1.5<L>  /  TBili  0.4  /  DBili  0.1  /  AST  34  /  ALT  37  /  AlkPhos  60  09-02      Urinalysis Basic - ( 02 Sep 2023 06:53 )    Color: x / Appearance: x / SG: x / pH: x  Gluc: 103 mg/dL / Ketone: x  / Bili: x / Urobili: x   Blood: x / Protein: x / Nitrite: x   Leuk Esterase: x / RBC: x / WBC x   Sq Epi: x / Non Sq Epi: x / Bacteria: x      CAPILLARY BLOOD GLUCOSE          RADIOLOGY & ADDITIONAL TESTS:    Imaging Personally Reviewed:  [ ] YES  [ ] NO    Consultant(s) Notes Reviewed:  [ ] YES  [ ] NO    Care Discussed with Consultants/Other Providers [ ] YES  [ ] NO

## 2023-09-02 NOTE — PROVIDER CONTACT NOTE (CHANGE IN STATUS NOTIFICATION) - SITUATION
Bloody stool
2nd bloody stool
Patient upgraded for change in EKG'S, Afib with RVR, monitor shows hr 130- 140's despite on amiodarone drip.

## 2023-09-03 LAB
ALBUMIN SERPL ELPH-MCNC: 1.5 G/DL — LOW (ref 3.3–5)
ALP SERPL-CCNC: 60 U/L — SIGNIFICANT CHANGE UP (ref 40–120)
ALT FLD-CCNC: 29 U/L — SIGNIFICANT CHANGE UP (ref 12–78)
ANION GAP SERPL CALC-SCNC: 4 MMOL/L — LOW (ref 5–17)
AST SERPL-CCNC: 20 U/L — SIGNIFICANT CHANGE UP (ref 15–37)
BILIRUB DIRECT SERPL-MCNC: 0.2 MG/DL — SIGNIFICANT CHANGE UP (ref 0–0.3)
BILIRUB INDIRECT FLD-MCNC: 0.2 MG/DL — SIGNIFICANT CHANGE UP (ref 0.2–1)
BILIRUB SERPL-MCNC: 0.4 MG/DL — SIGNIFICANT CHANGE UP (ref 0.2–1.2)
BUN SERPL-MCNC: 21 MG/DL — SIGNIFICANT CHANGE UP (ref 7–23)
CALCIUM SERPL-MCNC: 7.6 MG/DL — LOW (ref 8.5–10.1)
CHLORIDE SERPL-SCNC: 104 MMOL/L — SIGNIFICANT CHANGE UP (ref 96–108)
CO2 SERPL-SCNC: 26 MMOL/L — SIGNIFICANT CHANGE UP (ref 22–31)
CREAT SERPL-MCNC: 1.01 MG/DL — SIGNIFICANT CHANGE UP (ref 0.5–1.3)
EGFR: 72 ML/MIN/1.73M2 — SIGNIFICANT CHANGE UP
GLUCOSE SERPL-MCNC: 83 MG/DL — SIGNIFICANT CHANGE UP (ref 70–99)
HCT VFR BLD CALC: 22.2 % — LOW (ref 39–50)
HGB BLD-MCNC: 7.1 G/DL — LOW (ref 13–17)
MAGNESIUM SERPL-MCNC: 2 MG/DL — SIGNIFICANT CHANGE UP (ref 1.6–2.6)
MCHC RBC-ENTMCNC: 24.3 PG — LOW (ref 27–34)
MCHC RBC-ENTMCNC: 32 G/DL — SIGNIFICANT CHANGE UP (ref 32–36)
MCV RBC AUTO: 76 FL — LOW (ref 80–100)
NRBC # BLD: 0 /100 WBCS — SIGNIFICANT CHANGE UP (ref 0–0)
PHOSPHATE SERPL-MCNC: 3 MG/DL — SIGNIFICANT CHANGE UP (ref 2.5–4.5)
PLATELET # BLD AUTO: 335 K/UL — SIGNIFICANT CHANGE UP (ref 150–400)
POTASSIUM SERPL-MCNC: 4.9 MMOL/L — SIGNIFICANT CHANGE UP (ref 3.5–5.3)
POTASSIUM SERPL-SCNC: 4.9 MMOL/L — SIGNIFICANT CHANGE UP (ref 3.5–5.3)
PROT SERPL-MCNC: 5.8 GM/DL — LOW (ref 6–8.3)
RBC # BLD: 2.92 M/UL — LOW (ref 4.2–5.8)
RBC # FLD: 27.2 % — HIGH (ref 10.3–14.5)
SODIUM SERPL-SCNC: 134 MMOL/L — LOW (ref 135–145)
WBC # BLD: 5.98 K/UL — SIGNIFICANT CHANGE UP (ref 3.8–10.5)
WBC # FLD AUTO: 5.98 K/UL — SIGNIFICANT CHANGE UP (ref 3.8–10.5)

## 2023-09-03 PROCEDURE — 99232 SBSQ HOSP IP/OBS MODERATE 35: CPT

## 2023-09-03 PROCEDURE — 93010 ELECTROCARDIOGRAM REPORT: CPT

## 2023-09-03 RX ORDER — MAGNESIUM SULFATE 500 MG/ML
1 VIAL (ML) INJECTION ONCE
Refills: 0 | Status: DISCONTINUED | OUTPATIENT
Start: 2023-09-03 | End: 2023-09-03

## 2023-09-03 RX ORDER — MAGNESIUM OXIDE 400 MG ORAL TABLET 241.3 MG
400 TABLET ORAL ONCE
Refills: 0 | Status: COMPLETED | OUTPATIENT
Start: 2023-09-03 | End: 2023-09-03

## 2023-09-03 RX ADMIN — BUDESONIDE AND FORMOTEROL FUMARATE DIHYDRATE 2 PUFF(S): 160; 4.5 AEROSOL RESPIRATORY (INHALATION) at 15:48

## 2023-09-03 RX ADMIN — PANTOPRAZOLE SODIUM 40 MILLIGRAM(S): 20 TABLET, DELAYED RELEASE ORAL at 06:52

## 2023-09-03 RX ADMIN — Medication 325 MILLIGRAM(S): at 06:52

## 2023-09-03 RX ADMIN — Medication 325 MILLIGRAM(S): at 15:47

## 2023-09-03 RX ADMIN — Medication 650 MILLIGRAM(S): at 16:36

## 2023-09-03 RX ADMIN — MAGNESIUM OXIDE 400 MG ORAL TABLET 400 MILLIGRAM(S): 241.3 TABLET ORAL at 06:51

## 2023-09-03 RX ADMIN — BUDESONIDE AND FORMOTEROL FUMARATE DIHYDRATE 2 PUFF(S): 160; 4.5 AEROSOL RESPIRATORY (INHALATION) at 06:53

## 2023-09-03 RX ADMIN — Medication 650 MILLIGRAM(S): at 06:50

## 2023-09-03 RX ADMIN — Medication 650 MILLIGRAM(S): at 08:28

## 2023-09-03 RX ADMIN — TIOTROPIUM BROMIDE 2 PUFF(S): 18 CAPSULE ORAL; RESPIRATORY (INHALATION) at 15:47

## 2023-09-03 RX ADMIN — Medication 325 MILLIGRAM(S): at 22:07

## 2023-09-03 RX ADMIN — AMIODARONE HYDROCHLORIDE 200 MILLIGRAM(S): 400 TABLET ORAL at 06:52

## 2023-09-03 NOTE — PROGRESS NOTE ADULT - ASSESSMENT
87 yo M w/ hx of multiple syncopal episodes presented via EMS for an unwitnessed fall w/ hypotension, hypothermia, and afib w/ RVR concerning for sepsis. He was found to have Strep pneumonia bacteremia w/ evidence of b/l pneumonia and lactic acidosis, iron deficiency anemia, stable sub-dural hematoma, ANGI, and demand ischemia.    Metabolic encephalopathy:  - 2/2 sepsis  - Now A&Ox3, stable L frontal SDH    Acute hypoxic respiratory failure:  - pneumonia w/ findings c/w emphysema/ILD, weaned off NC on room air, on ipratropium q8h (consider transition to duonebs), chest PT, maintain SpO2 > 92%  - C/w symciort and Spiriva  - albuterol PRN  - Pulm following     Sepsis with septic shock POA:  - With strep pneumo bacteremia 2/2 PNA  - Weaned off norepinephrine  - Initial BC positive for strep, intermediate sensitivity to Rocephin, sensitive to Vanco  - Repeat Bcx negative  - Currently on levaquin 750mg q 48hrs, last dose 9/4  - repeat blood clx neg    Afib:  - s/p amio bolus and gtt for afib w/ RVR (converted), currently on sinus rthym   - troponin elevation c/w demand ischemia   - Echo with Mildly decreased global left ventricular systolic function. BLAE, Mod - severe MR/TR, severe pHTN  - Not on Ac for anemia and SDH  - Continue with PO amiodarone  - Cardio following     ANGI:  - 2/2 septic ATN  - Cr improving   - Pending serologies for pulm renal syndrome  - Renal following     Acute blood loss Anemia:  - hx of iron deficiency anemia  - S/P 4 units PRBC  - Another unit ordered today  - Retic negative, haptoglobin high, LDH high  - peripheral blood smear shows no schistocytes, tear drop cell  - Iron deficient, continue iron supplementation  - Pt had multiple episode of rectal bleed reported last night,  LGIB, either ischemic colitis vs. stercoral colitis in the setting of constipation   - Transfused one unit PRBC on 9/1/23. Hb stable today.  - Gi consult appreciated  - Pt and family doesn't want any intervention for now, will monitor and re assess if bleeding recurs  - On ppx dose of hep, may need to hold if bleeding persists  - PPI  - Miralax for constipation    Acute DVT :  - Not on full dose AC for anemia and SDH  - On DVT ppx dose  - repeat US unchanged  - Vascular follow up, no intervention, pt refused IVC filter    GI ppx  - Protonix    Psych consult appreciated, pt doesn't have  capacity to make decision regarding dc plan.    PT eval: AMADOR

## 2023-09-03 NOTE — PROGRESS NOTE ADULT - SUBJECTIVE AND OBJECTIVE BOX
INTERVAL HPI:    87 yo M with multiple syncopal episodes per chart notes,  on no home meds   Brought by EMS after being found down on street minimally responsive (8/20/23).   In the ED, was hypotensive, hypothermic (rectal T 90F), and tachycardic (afib w/ RVR).   Labs were remarkable for anemia (Hgb 6.1), leukocytosis w/ 25% band, lactic acidosis (lactate 14.4), ANGI, and elevated troponin   Got treated w/ vanc/zosyn, fluids, 1U pRBCs, IV amiodarone bolus (resolved arrhythmia), and a non-rebreather mask.   CT imaging showed an age-indeterminate L frontal sub-dural hematoma (4mm) and lung findings with pneumonia and emphysema/ILD.   RVP was entero/rhinovirus positive.   He was switched to ceftriaxone/azithromycin, was started on ipratropium nebulizers, and got  admitted to the ICU for new pressor requirement (norepinephrine).    During his ICU course, his blood cultures grew Strep pneumonia (on appropriate abx), lower extremity duplex study showed L tibioperoneal trunk and proximal posterior tibial vein DVTs (on heparin ppx), norepinephrine was weaned off w/ adequate MAPs,   Repeat CT head showed stable sub-dural hematoma, and iron supplementation was started.   08/21/23:  Got transferred to regular medical floor.  08/22/23:  At time of my evaluation, awake, responsive. Denies SOB, cough, sputum production or chest pain.  Admits being a smoker but says quit about a year ago.  Again in rapid A Fib and being transferred to monitor bed. Seen by Cardiology.    OVERNIGHT EVENTS:  Awake, responsive, comfortable and feels better.    Vital Signs Last 24 Hrs  T(C): 37.3 (03 Sep 2023 15:59), Max: 37.8 (02 Sep 2023 23:29)  T(F): 99.1 (03 Sep 2023 15:59), Max: 100 (02 Sep 2023 23:29)  HR: 76 (03 Sep 2023 15:59) (76 - 131)  BP: 126/52 (03 Sep 2023 15:59) (96/58 - 126/52)  BP(mean): --  RR: 18 (03 Sep 2023 15:59) (16 - 20)  SpO2: 93% (03 Sep 2023 15:59) (93% - 98%)    Parameters below as of 03 Sep 2023 05:00  Patient On (Oxygen Delivery Method): room air    PHYSICAL EXAM:  GEN:         Awake, responsive and comfortable.  HEENT:    Normal.    RESP:       no wheezing.  CVS:         Regular rate and rhythm.     MEDICATIONS  (STANDING):  aMIOdarone    Tablet 200 milliGRAM(s) Oral daily  budesonide 160 MICROgram(s)/formoterol 4.5 MICROgram(s) Inhaler 2 Puff(s) Inhalation two times a day  ferrous    sulfate 325 milliGRAM(s) Oral three times a day  pantoprazole    Tablet 40 milliGRAM(s) Oral before breakfast  tiotropium 2.5 MICROgram(s) Inhaler 2 Puff(s) Inhalation daily    MEDICATIONS  (PRN):  acetaminophen     Tablet .. 650 milliGRAM(s) Oral every 6 hours PRN Moderate Pain (4 - 6)  albuterol    90 MICROgram(s) HFA Inhaler 2 Puff(s) Inhalation every 6 hours PRN Shortness of Breath and/or Wheezing  aluminum hydroxide/magnesium hydroxide/simethicone Suspension 30 milliLiter(s) Oral every 4 hours PRN Dyspepsia  benzocaine/menthol Lozenge 1 Lozenge Oral four times a day PRN Sore Throat  benzonatate 100 milliGRAM(s) Oral three times a day PRN Cough  diltiazem Injectable 10 milliGRAM(s) IV Push once PRN sustained HR >130  guaiFENesin Oral Liquid (Sugar-Free) 100 milliGRAM(s) Oral every 6 hours PRN Cough  polyethylene glycol 3350 17 Gram(s) Oral daily PRN Constipation    LABS:                        7.1    5.98  )-----------( 335      ( 03 Sep 2023 07:02 )             22.2     09-03    134<L>  |  104  |  21  ----------------------------<  83  4.9   |  26  |  1.01    Ca    7.6<L>      03 Sep 2023 07:02  Phos  3.0     09-03  Mg     2.0     09-03    TPro  5.8<L>  /  Alb  1.5<L>  /  TBili  0.4  /  DBili  0.2  /  AST  20  /  ALT  29  /  AlkPhos  60  09-03    Urinalysis Basic - ( 03 Sep 2023 07:02 )    Color: x / Appearance: x / SG: x / pH: x  Gluc: 83 mg/dL / Ketone: x  / Bili: x / Urobili: x   Blood: x / Protein: x / Nitrite: x   Leuk Esterase: x / RBC: x / WBC x   Sq Epi: x / Non Sq Epi: x / Bacteria: x    ASSESSMENT AND PLAN:  Multifocal pneumonia.  S/P Septic shock.  Strep Pneumo Bacteremia.  Leukocytosis.  A Fib with RVR.  Anemia.  Renal Insuffiencey.  Hypokalemia.  Left peroneal+ post tibial DVT.    SPO2 in high 90s on room air.  Continue antibiotic per ID.  Being transferred to monitor bed for rapid A Fib.  Not a candidate for full anticoagulation due to SDH, and anemia requiring PRBC transfusion.  Consider Vascular evaluation for DVT.  56+ minutes spent.    08/23/23:   Awake, responsive. Complains of stomach discomfort and at times with difficult swallowing. SPO2 stable on room air.  Converted to sinus rhythm, still on Amiodarone drip.  Continue antibiotics per ID.    08/24/23:  Resting comfortably, no SOB or distress. SPO2 98% .  Converted to sinus rhythm, changed to PO Amiodarone.  Continue Vancomycin per ID.  Will change to adult dose Symbicort.    08/25/23: Awake, responsive and comfortable. SPO2 97%, no distress.  Complains of soreness in throat. Will add Cepacol lozenges.    08/26/23:  Complains of soreness in throat. Will start on Cepacol lozenges.  Getting PRBC transfusion    On Symbicort and Spiriva.  antibiotics per ID    08/27/23: Resting comfortably, no distress. SPO2 96%.  Continue treatment.    08/28/23:  Awake, responsive and comfortable.  SPO2 98%.  On Symbicort and Levaquin.    08/29/23: Weak looking, denies SOB. SPO2 97%   Over all pulmonary status close to base line.  Continue treatment.    08/30/23: Resting comfortably in bed, no distress observed.  SPO2 95% on nasal O2.  Continue treatment.  Vascular surgery follow up noted.    08/31/23: Awake, responsive and comfortable. Says breathing is up and down.  SPO2 98% on nasal O2.  Complete antibiotic per ID.  Follow H & H.    09/01/23:  Continues to complain of sore throat.   Reported by RN  to have some rectal bleed  overnight  Again getting PRBC transfusion.  Seen by GI but pt/family does not want any procedure. Before has refused IVC filter.  SPO2 96% on nasal O2.  On Levaquin per ID.    09/02/23:  Resting comfortably in bed. RN reports rectal bleed. Pt has refused any invasive intervention.  SPO2 97% on nasal O2.  Consider palliative care evaluation.    09/03/23: Awake, responsive, comfortable and feels better.  SPO2 93% on room air.  Oxygenation status stable.  Supportive care.

## 2023-09-03 NOTE — PROGRESS NOTE ADULT - SUBJECTIVE AND OBJECTIVE BOX
Patient is a 88y old  Male who presents with a chief complaint of found  down (03 Sep 2023 18:56)      INTERVAL HPI/OVERNIGHT EVENTS:  Pt was seen and examined, no acute events.      MEDICATIONS  (STANDING):  aMIOdarone    Tablet 200 milliGRAM(s) Oral daily  budesonide 160 MICROgram(s)/formoterol 4.5 MICROgram(s) Inhaler 2 Puff(s) Inhalation two times a day  ferrous    sulfate 325 milliGRAM(s) Oral three times a day  pantoprazole    Tablet 40 milliGRAM(s) Oral before breakfast  tiotropium 2.5 MICROgram(s) Inhaler 2 Puff(s) Inhalation daily    MEDICATIONS  (PRN):  acetaminophen     Tablet .. 650 milliGRAM(s) Oral every 6 hours PRN Moderate Pain (4 - 6)  albuterol    90 MICROgram(s) HFA Inhaler 2 Puff(s) Inhalation every 6 hours PRN Shortness of Breath and/or Wheezing  aluminum hydroxide/magnesium hydroxide/simethicone Suspension 30 milliLiter(s) Oral every 4 hours PRN Dyspepsia  benzocaine/menthol Lozenge 1 Lozenge Oral four times a day PRN Sore Throat  benzonatate 100 milliGRAM(s) Oral three times a day PRN Cough  diltiazem Injectable 10 milliGRAM(s) IV Push once PRN sustained HR >130  guaiFENesin Oral Liquid (Sugar-Free) 100 milliGRAM(s) Oral every 6 hours PRN Cough  polyethylene glycol 3350 17 Gram(s) Oral daily PRN Constipation      Allergies    No Known Allergies    Intolerances          Vital Signs Last 24 Hrs  T(C): 37.3 (03 Sep 2023 15:59), Max: 37.8 (02 Sep 2023 23:29)  T(F): 99.1 (03 Sep 2023 15:59), Max: 100 (02 Sep 2023 23:29)  HR: 76 (03 Sep 2023 15:59) (76 - 131)  BP: 126/52 (03 Sep 2023 15:59) (96/58 - 126/52)  BP(mean): --  RR: 18 (03 Sep 2023 15:59) (16 - 20)  SpO2: 93% (03 Sep 2023 15:59) (93% - 98%)    Parameters below as of 03 Sep 2023 05:00  Patient On (Oxygen Delivery Method): room air        PHYSICAL EXAM:  GENERAL: NAD,  no increased WOB  HEAD:  Atraumatic, Normocephalic  EYES: EOMI, conjunctiva and sclera clear  ENMT: Moist mucous membranes  NECK: Supple, No JVD  NERVOUS SYSTEM:  Alert, no focal neuro deficits   CHEST/LUNG: Clear to auscultation bilaterally; No rales, rhonchi, wheezing, or rubs  HEART: Regular rate and rhythm;  ABDOMEN: Soft, Nontender, Nondistended; Bowel sounds present  EXTREMITIES:  2+ Peripheral Pulses b/l, No clubbing, cyanosis, calf tenderness or edema b/l        LABS:                        7.1    5.98  )-----------( 335      ( 03 Sep 2023 07:02 )             22.2     09-03    134<L>  |  104  |  21  ----------------------------<  83  4.9   |  26  |  1.01    Ca    7.6<L>      03 Sep 2023 07:02  Phos  3.0     09-03  Mg     2.0     09-03    TPro  5.8<L>  /  Alb  1.5<L>  /  TBili  0.4  /  DBili  0.2  /  AST  20  /  ALT  29  /  AlkPhos  60  09-03      Urinalysis Basic - ( 03 Sep 2023 07:02 )    Color: x / Appearance: x / SG: x / pH: x  Gluc: 83 mg/dL / Ketone: x  / Bili: x / Urobili: x   Blood: x / Protein: x / Nitrite: x   Leuk Esterase: x / RBC: x / WBC x   Sq Epi: x / Non Sq Epi: x / Bacteria: x      CAPILLARY BLOOD GLUCOSE          RADIOLOGY & ADDITIONAL TESTS:    Imaging Personally Reviewed:  [ ] YES  [ ] NO    Consultant(s) Notes Reviewed:  [ ] YES  [ ] NO    Care Discussed with Consultants/Other Providers [ ] YES  [ ] NO

## 2023-09-04 LAB
BLD GP AB SCN SERPL QL: SIGNIFICANT CHANGE UP
HCT VFR BLD CALC: 21.3 % — LOW (ref 39–50)
HGB BLD-MCNC: 6.9 G/DL — CRITICAL LOW (ref 13–17)
MCHC RBC-ENTMCNC: 24.6 PG — LOW (ref 27–34)
MCHC RBC-ENTMCNC: 32.4 G/DL — SIGNIFICANT CHANGE UP (ref 32–36)
MCV RBC AUTO: 76.1 FL — LOW (ref 80–100)
NRBC # BLD: 0 /100 WBCS — SIGNIFICANT CHANGE UP (ref 0–0)
PLATELET # BLD AUTO: 332 K/UL — SIGNIFICANT CHANGE UP (ref 150–400)
RBC # BLD: 2.8 M/UL — LOW (ref 4.2–5.8)
RBC # FLD: 26.6 % — HIGH (ref 10.3–14.5)
WBC # BLD: 8.4 K/UL — SIGNIFICANT CHANGE UP (ref 3.8–10.5)
WBC # FLD AUTO: 8.4 K/UL — SIGNIFICANT CHANGE UP (ref 3.8–10.5)

## 2023-09-04 PROCEDURE — 93010 ELECTROCARDIOGRAM REPORT: CPT

## 2023-09-04 PROCEDURE — 99232 SBSQ HOSP IP/OBS MODERATE 35: CPT

## 2023-09-04 RX ADMIN — Medication 325 MILLIGRAM(S): at 21:57

## 2023-09-04 RX ADMIN — BENZOCAINE AND MENTHOL 1 LOZENGE: 5; 1 LIQUID ORAL at 00:22

## 2023-09-04 RX ADMIN — BENZOCAINE AND MENTHOL 1 LOZENGE: 5; 1 LIQUID ORAL at 13:51

## 2023-09-04 RX ADMIN — BENZOCAINE AND MENTHOL 1 LOZENGE: 5; 1 LIQUID ORAL at 21:57

## 2023-09-04 RX ADMIN — BUDESONIDE AND FORMOTEROL FUMARATE DIHYDRATE 2 PUFF(S): 160; 4.5 AEROSOL RESPIRATORY (INHALATION) at 05:25

## 2023-09-04 RX ADMIN — PANTOPRAZOLE SODIUM 40 MILLIGRAM(S): 20 TABLET, DELAYED RELEASE ORAL at 06:13

## 2023-09-04 RX ADMIN — Medication 325 MILLIGRAM(S): at 05:23

## 2023-09-04 RX ADMIN — AMIODARONE HYDROCHLORIDE 200 MILLIGRAM(S): 400 TABLET ORAL at 05:24

## 2023-09-04 RX ADMIN — BENZOCAINE AND MENTHOL 1 LOZENGE: 5; 1 LIQUID ORAL at 05:24

## 2023-09-04 RX ADMIN — TIOTROPIUM BROMIDE 2 PUFF(S): 18 CAPSULE ORAL; RESPIRATORY (INHALATION) at 13:48

## 2023-09-04 RX ADMIN — Medication 325 MILLIGRAM(S): at 13:47

## 2023-09-04 NOTE — PROGRESS NOTE ADULT - SUBJECTIVE AND OBJECTIVE BOX
INTERVAL HPI:  89 yo M with multiple syncopal episodes per chart notes,  on no home meds   Brought by EMS after being found down on street minimally responsive (8/20/23).   In the ED, was hypotensive, hypothermic (rectal T 90F), and tachycardic (afib w/ RVR).   Labs were remarkable for anemia (Hgb 6.1), leukocytosis w/ 25% band, lactic acidosis (lactate 14.4), ANGI, and elevated troponin   Got treated w/ vanc/zosyn, fluids, 1U pRBCs, IV amiodarone bolus (resolved arrhythmia), and a non-rebreather mask.   CT imaging showed an age-indeterminate L frontal sub-dural hematoma (4mm) and lung findings with pneumonia and emphysema/ILD.   RVP was entero/rhinovirus positive.   He was switched to ceftriaxone/azithromycin, was started on ipratropium nebulizers, and got  admitted to the ICU for new pressor requirement (norepinephrine).    During his ICU course, his blood cultures grew Strep pneumonia (on appropriate abx), lower extremity duplex study showed L tibioperoneal trunk and proximal posterior tibial vein DVTs (on heparin ppx), norepinephrine was weaned off w/ adequate MAPs,   Repeat CT head showed stable sub-dural hematoma, and iron supplementation was started.   08/21/23:  Got transferred to regular medical floor.  08/22/23:  At time of my evaluation, awake, responsive. Denies SOB, cough, sputum production or chest pain.  Admits being a smoker but says quit about a year ago.  Again in rapid A Fib and being transferred to monitor bed. Seen by Cardiology.    OVERNIGHT EVENTS:  Continue to have dark BM, drop in H & H noted. Got one unit of blood.    Vital Signs Last 24 Hrs  T(C): 37.1 (04 Sep 2023 16:46), Max: 37.8 (04 Sep 2023 04:40)  T(F): 98.8 (04 Sep 2023 16:46), Max: 100.1 (04 Sep 2023 04:40)  HR: 73 (04 Sep 2023 16:46) (73 - 81)  BP: 106/60 (04 Sep 2023 16:46) (103/47 - 117/63)  BP(mean): --  RR: 18 (04 Sep 2023 16:46) (18 - 20)  SpO2: 95% (04 Sep 2023 16:46) (92% - 97%)    Parameters below as of 04 Sep 2023 16:46  Patient On (Oxygen Delivery Method): room air    PHYSICAL EXAM:  GEN:        Resting, comfortable.  HEENT:    Normal.    RESP:       no distress  CVS:         Regular rate and rhythm.   ABD:         Soft, non-tender, non-distended;     MEDICATIONS  (STANDING):  aMIOdarone    Tablet 200 milliGRAM(s) Oral daily  budesonide 160 MICROgram(s)/formoterol 4.5 MICROgram(s) Inhaler 2 Puff(s) Inhalation two times a day  ferrous    sulfate 325 milliGRAM(s) Oral three times a day  pantoprazole    Tablet 40 milliGRAM(s) Oral before breakfast  tiotropium 2.5 MICROgram(s) Inhaler 2 Puff(s) Inhalation daily    MEDICATIONS  (PRN):  acetaminophen     Tablet .. 650 milliGRAM(s) Oral every 6 hours PRN Moderate Pain (4 - 6)  albuterol    90 MICROgram(s) HFA Inhaler 2 Puff(s) Inhalation every 6 hours PRN Shortness of Breath and/or Wheezing  aluminum hydroxide/magnesium hydroxide/simethicone Suspension 30 milliLiter(s) Oral every 4 hours PRN Dyspepsia  benzocaine/menthol Lozenge 1 Lozenge Oral four times a day PRN Sore Throat  benzonatate 100 milliGRAM(s) Oral three times a day PRN Cough  diltiazem Injectable 10 milliGRAM(s) IV Push once PRN sustained HR >130  guaiFENesin Oral Liquid (Sugar-Free) 100 milliGRAM(s) Oral every 6 hours PRN Cough  polyethylene glycol 3350 17 Gram(s) Oral daily PRN Constipation    LABS:                        6.9    8.40  )-----------( 332      ( 04 Sep 2023 06:55 )             21.3     09-03    134<L>  |  104  |  21  ----------------------------<  83  4.9   |  26  |  1.01    Ca    7.6<L>      03 Sep 2023 07:02  Phos  3.0     09-03  Mg     2.0     09-03    TPro  5.8<L>  /  Alb  1.5<L>  /  TBili  0.4  /  DBili  0.2  /  AST  20  /  ALT  29  /  AlkPhos  60  09-03    Urinalysis Basic - ( 03 Sep 2023 07:02 )    Color: x / Appearance: x / SG: x / pH: x  Gluc: 83 mg/dL / Ketone: x  / Bili: x / Urobili: x   Blood: x / Protein: x / Nitrite: x   Leuk Esterase: x / RBC: x / WBC x   Sq Epi: x / Non Sq Epi: x / Bacteria: x    ASSESSMENT AND PLAN:  Multifocal pneumonia.  S/P Septic shock.  Strep Pneumo Bacteremia.  Leukocytosis.  A Fib with RVR.  Anemia.  Renal Insuffiencey.  Hypokalemia.  Left peroneal+ post tibial DVT.    SPO2 in high 90s on room air.  Continue antibiotic per ID.  Being transferred to monitor bed for rapid A Fib.  Not a candidate for full anticoagulation due to SDH, and anemia requiring PRBC transfusion.  Consider Vascular evaluation for DVT.  56+ minutes spent.    08/23/23:   Awake, responsive. Complains of stomach discomfort and at times with difficult swallowing. SPO2 stable on room air.  Converted to sinus rhythm, still on Amiodarone drip.  Continue antibiotics per ID.    08/24/23:  Resting comfortably, no SOB or distress. SPO2 98% .  Converted to sinus rhythm, changed to PO Amiodarone.  Continue Vancomycin per ID.  Will change to adult dose Symbicort.    08/25/23: Awake, responsive and comfortable. SPO2 97%, no distress.  Complains of soreness in throat. Will add Cepacol lozenges.    08/26/23:  Complains of soreness in throat. Will start on Cepacol lozenges.  Getting PRBC transfusion    On Symbicort and Spiriva.  antibiotics per ID    08/27/23: Resting comfortably, no distress. SPO2 96%.  Continue treatment.    08/28/23:  Awake, responsive and comfortable.  SPO2 98%.  On Symbicort and Levaquin.    08/29/23: Weak looking, denies SOB. SPO2 97%   Over all pulmonary status close to base line.  Continue treatment.    08/30/23: Resting comfortably in bed, no distress observed.  SPO2 95% on nasal O2.  Continue treatment.  Vascular surgery follow up noted.    08/31/23: Awake, responsive and comfortable. Says breathing is up and down.  SPO2 98% on nasal O2.  Complete antibiotic per ID.  Follow H & H.    09/01/23:  Continues to complain of sore throat.   Reported by RN  to have some rectal bleed  overnight  Again getting PRBC transfusion.  Seen by GI but pt/family does not want any procedure. Before has refused IVC filter.  SPO2 96% on nasal O2.  On Levaquin per ID.    09/02/23:  Resting comfortably in bed. RN reports rectal bleed. Pt has refused any invasive intervention.  SPO2 97% on nasal O2.  Consider palliative care evaluation.    09/03/23: Awake, responsive, comfortable and feels better.  SPO2 93% on room air.  Oxygenation status stable.  Supportive care.    09/04/23: Continue to have dark BM, drop in H & H noted. Got one unit of blood.  SPO2 stable.  Continue Symbicort and Spiriva.

## 2023-09-04 NOTE — PROVIDER CONTACT NOTE (CRITICAL VALUE NOTIFICATION) - TEST AND RESULT REPORTED:
Hemoglobin 6.9
Lactate 5.8
hemoglobin 6.9
hgb 6.6
Blood Cultures August 20th Growth in the Aerobic Bottle Gram + Cocci in pairs and chains.
Hgb 6.1
Hgb 6.7
Lactate 9.3

## 2023-09-04 NOTE — PROGRESS NOTE ADULT - SUBJECTIVE AND OBJECTIVE BOX
Patient is a 88y old  Male who presents with a chief complaint of found  down (03 Sep 2023 20:35)      INTERVAL HPI/OVERNIGHT EVENTS:  Pt was seen and examined, no acute events.      MEDICATIONS  (STANDING):  aMIOdarone    Tablet 200 milliGRAM(s) Oral daily  budesonide 160 MICROgram(s)/formoterol 4.5 MICROgram(s) Inhaler 2 Puff(s) Inhalation two times a day  ferrous    sulfate 325 milliGRAM(s) Oral three times a day  pantoprazole    Tablet 40 milliGRAM(s) Oral before breakfast  tiotropium 2.5 MICROgram(s) Inhaler 2 Puff(s) Inhalation daily    MEDICATIONS  (PRN):  acetaminophen     Tablet .. 650 milliGRAM(s) Oral every 6 hours PRN Moderate Pain (4 - 6)  albuterol    90 MICROgram(s) HFA Inhaler 2 Puff(s) Inhalation every 6 hours PRN Shortness of Breath and/or Wheezing  aluminum hydroxide/magnesium hydroxide/simethicone Suspension 30 milliLiter(s) Oral every 4 hours PRN Dyspepsia  benzocaine/menthol Lozenge 1 Lozenge Oral four times a day PRN Sore Throat  benzonatate 100 milliGRAM(s) Oral three times a day PRN Cough  diltiazem Injectable 10 milliGRAM(s) IV Push once PRN sustained HR >130  guaiFENesin Oral Liquid (Sugar-Free) 100 milliGRAM(s) Oral every 6 hours PRN Cough  polyethylene glycol 3350 17 Gram(s) Oral daily PRN Constipation      Allergies  No Known Allergies        Vital Signs Last 24 Hrs  T(C): 37.4 (04 Sep 2023 10:55), Max: 37.8 (04 Sep 2023 04:40)  T(F): 99.3 (04 Sep 2023 10:55), Max: 100.1 (04 Sep 2023 04:40)  HR: 81 (04 Sep 2023 10:55) (75 - 81)  BP: 106/69 (04 Sep 2023 10:55) (103/47 - 126/52)  BP(mean): --  RR: 19 (04 Sep 2023 10:55) (18 - 20)  SpO2: 97% (04 Sep 2023 10:55) (92% - 97%)    Parameters below as of 04 Sep 2023 10:55  Patient On (Oxygen Delivery Method): room air        PHYSICAL EXAM:  GENERAL: NAD,  no increased WOB  HEAD:  Atraumatic, Normocephalic  EYES: EOMI, conjunctiva and sclera clear  ENMT: Moist mucous membranes  NECK: Supple, No JVD  NERVOUS SYSTEM:  Alert, no focal neuro deficits   CHEST/LUNG: Clear to auscultation bilaterally; No rales, rhonchi, wheezing, or rubs  HEART: Regular rate and rhythm;  ABDOMEN: Soft, Nontender, Nondistended; Bowel sounds present  EXTREMITIES:  2+ Peripheral Pulses b/l, No clubbing, cyanosis, calf tenderness or edema b/l      LABS:                        6.9    8.40  )-----------( 332      ( 04 Sep 2023 06:55 )             21.3     09-03    134<L>  |  104  |  21  ----------------------------<  83  4.9   |  26  |  1.01    Ca    7.6<L>      03 Sep 2023 07:02  Phos  3.0     09-03  Mg     2.0     09-03    TPro  5.8<L>  /  Alb  1.5<L>  /  TBili  0.4  /  DBili  0.2  /  AST  20  /  ALT  29  /  AlkPhos  60  09-03      Urinalysis Basic - ( 03 Sep 2023 07:02 )    Color: x / Appearance: x / SG: x / pH: x  Gluc: 83 mg/dL / Ketone: x  / Bili: x / Urobili: x   Blood: x / Protein: x / Nitrite: x   Leuk Esterase: x / RBC: x / WBC x   Sq Epi: x / Non Sq Epi: x / Bacteria: x      CAPILLARY BLOOD GLUCOSE          RADIOLOGY & ADDITIONAL TESTS:    Imaging Personally Reviewed:  [ ] YES  [ ] NO    Consultant(s) Notes Reviewed:  [ ] YES  [ ] NO    Care Discussed with Consultants/Other Providers [ ] YES  [ ] NO

## 2023-09-04 NOTE — PROGRESS NOTE ADULT - ASSESSMENT
89 yo M w/ hx of multiple syncopal episodes presented via EMS for an unwitnessed fall w/ hypotension, hypothermia, and afib w/ RVR concerning for sepsis. He was found to have Strep pneumonia bacteremia w/ evidence of b/l pneumonia and lactic acidosis, iron deficiency anemia, stable sub-dural hematoma, ANGI, and demand ischemia.    Metabolic encephalopathy:  - 2/2 sepsis  - Now A&Ox3, stable L frontal SDH    Acute hypoxic respiratory failure:  - pneumonia w/ findings c/w emphysema/ILD, weaned off NC on room air, on ipratropium q8h (consider transition to duonebs), chest PT, maintain SpO2 > 92%  - C/w symciort and Spiriva  - albuterol PRN  - Pulm following     Sepsis with septic shock POA:  - With strep pneumo bacteremia 2/2 PNA  - Weaned off norepinephrine  - Initial BC positive for strep, intermediate sensitivity to Rocephin, sensitive to Vanco  - Repeat Bcx negative  - Completed course of Levaquin     Afib:  - s/p amio bolus and gtt for afib w/ RVR (converted), currently on sinus rthym   - troponin elevation c/w demand ischemia   - Echo with Mildly decreased global left ventricular systolic function. BLAE, Mod - severe MR/TR, severe pHTN  - Not on Ac for anemia and SDH  - Continue with PO amiodarone  - Cardio following     ANGI:  - 2/2 septic ATN  - Cr improving   - Pending serologies for pulm renal syndrome  - Renal following     Acute blood loss Anemia:  - hx of iron deficiency anemia  - S/P 5 units PRBC  - Another unit ordered today 9/4  - Retic negative, haptoglobin high, LDH high  - peripheral blood smear shows no schistocytes, tear drop cell  - Iron deficient, continue iron supplementation  - Pt had multiple episode of rectal bleed for last few days,  LGIB, either ischemic colitis vs. stercoral colitis in the setting of constipation   - Gi consult appreciated  - Pt and family doesn't want any intervention for now, will monitor  - AC held   - PPI  - Miralax for constipation    Acute DVT :  - Not on full dose AC for anemia and SDH  - DVT ppx dose held for GI bleed  - repeat US unchanged  - Vascular follow up, no intervention, pt refused IVC filter    GI ppx  - Protonix    Psych consult appreciated, pt doesn't have  capacity to make decision regarding dc plan.    PT eval: AMADOR   87 yo M w/ hx of multiple syncopal episodes presented via EMS for an unwitnessed fall w/ hypotension, hypothermia, and afib w/ RVR concerning for sepsis. He was found to have Strep pneumonia bacteremia w/ evidence of b/l pneumonia and lactic acidosis, iron deficiency anemia, stable sub-dural hematoma, ANGI, and demand ischemia.    Metabolic encephalopathy:  - 2/2 sepsis  - Now A&Ox3, stable L frontal SDH    Acute hypoxic respiratory failure:  - pneumonia w/ findings c/w emphysema/ILD, weaned off NC on room air, on ipratropium q8h (consider transition to duonebs), chest PT, maintain SpO2 > 92%  - C/w symciort and Spiriva  - albuterol PRN  - Pulm following     Sepsis with septic shock POA:  - With strep pneumo bacteremia 2/2 PNA  - Weaned off norepinephrine  - Initial BC positive for strep, intermediate sensitivity to Rocephin, sensitive to Vanco  - Repeat Bcx negative  - Completed course of Levaquin     Afib:  - s/p amio bolus and gtt for afib w/ RVR (converted), currently on sinus rthym   - troponin elevation c/w demand ischemia   - Echo with Mildly decreased global left ventricular systolic function. BLAE, Mod - severe MR/TR, severe pHTN  - Not on Ac for anemia and SDH  - Continue with PO amiodarone  - Cardio following     ANGI:  - 2/2 septic ATN  - Cr improving   - Pending serologies for pulm renal syndrome  - Renal following     Acute blood loss Anemia:  - hx of iron deficiency anemia  - S/P 5 units PRBC  - Another unit ordered today 9/4  - Retic negative, haptoglobin high, LDH high  - peripheral blood smear shows no schistocytes, tear drop cell  - Iron deficient, continue iron supplementation  - Pt had multiple episode of rectal bleed for last few days, LGIB, either ischemic colitis vs. stercoral colitis in the setting of constipation   - Gi consult appreciated  - Pt and family didn't want any intervention for now, discussed with daughter again she is reconsidering and will ask GI to re assess.  - AC held   - PPI  - Miralax for constipation    Acute DVT :  - Not on full dose AC for anemia and SDH  - DVT ppx dose held for GI bleed  - repeat US unchanged  - Vascular follow up, no intervention, pt refused IVC filter    GI ppx  - Protonix    Psych consult appreciated, pt doesn't have  capacity to make decision regarding dc plan.    PT eval: AMADOR

## 2023-09-04 NOTE — PROVIDER CONTACT NOTE (CRITICAL VALUE NOTIFICATION) - PERSON GIVING RESULT:
Roger Guillaume
Jerri Blackwell
Raimundo marcelo
lab
Shanae Tucker
Devon marcelo
IGNACIA Molina/ Raegan
Rhonda Underwood/Lab

## 2023-09-05 LAB
HCT VFR BLD CALC: 24.1 % — LOW (ref 39–50)
HGB BLD-MCNC: 8 G/DL — LOW (ref 13–17)
MCHC RBC-ENTMCNC: 25.5 PG — LOW (ref 27–34)
MCHC RBC-ENTMCNC: 33.2 G/DL — SIGNIFICANT CHANGE UP (ref 32–36)
MCV RBC AUTO: 76.8 FL — LOW (ref 80–100)
NRBC # BLD: 0 /100 WBCS — SIGNIFICANT CHANGE UP (ref 0–0)
PLATELET # BLD AUTO: 306 K/UL — SIGNIFICANT CHANGE UP (ref 150–400)
RBC # BLD: 3.14 M/UL — LOW (ref 4.2–5.8)
RBC # FLD: 25.4 % — HIGH (ref 10.3–14.5)
WBC # BLD: 5.54 K/UL — SIGNIFICANT CHANGE UP (ref 3.8–10.5)
WBC # FLD AUTO: 5.54 K/UL — SIGNIFICANT CHANGE UP (ref 3.8–10.5)

## 2023-09-05 PROCEDURE — 93010 ELECTROCARDIOGRAM REPORT: CPT

## 2023-09-05 PROCEDURE — 99232 SBSQ HOSP IP/OBS MODERATE 35: CPT

## 2023-09-05 PROCEDURE — 99233 SBSQ HOSP IP/OBS HIGH 50: CPT

## 2023-09-05 PROCEDURE — 99231 SBSQ HOSP IP/OBS SF/LOW 25: CPT

## 2023-09-05 RX ORDER — SOD SULF/SODIUM/NAHCO3/KCL/PEG
4000 SOLUTION, RECONSTITUTED, ORAL ORAL ONCE
Refills: 0 | Status: DISCONTINUED | OUTPATIENT
Start: 2023-09-05 | End: 2023-09-07

## 2023-09-05 RX ORDER — SODIUM CHLORIDE 9 MG/ML
1000 INJECTION, SOLUTION INTRAVENOUS
Refills: 0 | Status: DISCONTINUED | OUTPATIENT
Start: 2023-09-05 | End: 2023-09-07

## 2023-09-05 RX ORDER — POLYETHYLENE GLYCOL 3350 17 G/17G
17 POWDER, FOR SOLUTION ORAL
Refills: 0 | Status: DISCONTINUED | OUTPATIENT
Start: 2023-09-05 | End: 2023-09-12

## 2023-09-05 RX ADMIN — Medication 325 MILLIGRAM(S): at 05:46

## 2023-09-05 RX ADMIN — BENZOCAINE AND MENTHOL 1 LOZENGE: 5; 1 LIQUID ORAL at 13:08

## 2023-09-05 RX ADMIN — TIOTROPIUM BROMIDE 2 PUFF(S): 18 CAPSULE ORAL; RESPIRATORY (INHALATION) at 13:09

## 2023-09-05 RX ADMIN — BUDESONIDE AND FORMOTEROL FUMARATE DIHYDRATE 2 PUFF(S): 160; 4.5 AEROSOL RESPIRATORY (INHALATION) at 05:46

## 2023-09-05 RX ADMIN — BUDESONIDE AND FORMOTEROL FUMARATE DIHYDRATE 2 PUFF(S): 160; 4.5 AEROSOL RESPIRATORY (INHALATION) at 18:02

## 2023-09-05 RX ADMIN — Medication 325 MILLIGRAM(S): at 13:08

## 2023-09-05 RX ADMIN — BENZOCAINE AND MENTHOL 1 LOZENGE: 5; 1 LIQUID ORAL at 05:46

## 2023-09-05 RX ADMIN — AMIODARONE HYDROCHLORIDE 200 MILLIGRAM(S): 400 TABLET ORAL at 05:46

## 2023-09-05 RX ADMIN — SODIUM CHLORIDE 75 MILLILITER(S): 9 INJECTION, SOLUTION INTRAVENOUS at 22:16

## 2023-09-05 RX ADMIN — POLYETHYLENE GLYCOL 3350 17 GRAM(S): 17 POWDER, FOR SOLUTION ORAL at 18:02

## 2023-09-05 RX ADMIN — PANTOPRAZOLE SODIUM 40 MILLIGRAM(S): 20 TABLET, DELAYED RELEASE ORAL at 06:02

## 2023-09-05 RX ADMIN — Medication 325 MILLIGRAM(S): at 21:16

## 2023-09-05 NOTE — PROGRESS NOTE ADULT - ASSESSMENT
# Rectal bleeding - Maroon tinted stool in a patient with recent septic shock. Suspect LGIB, either ischemic colitis vs. stercoral colitis in the setting of constipation as seen on previous CT. Low suspicion for brisk UGIB DDx also includes (with lower suspicion) AVMs, polyps and malignancy. Discussed with daughter, Neena, re: diagnostic colonoscopy vs. conservative management. Patient is at an increased sushant-procedural risk given severe pulmonary hypertension. Given increased risks, overall condition, Neena would like to continue with conservative management and defer an endoscopic evaluation for now, even with the possibility of malignancy - as patient will likely not be a candidate for treatment regardless, which is reasonable. Would concentrate on optimizing bowel movements at this time. Can reconsider endoscopic evaluation pending clinical course.  # Constipation - patient denies constipation but daughter reports he voiced concerns for being constipated  # ?Capacity -  consulted, Neena states patient does not have capacity and was living on the street  # Severe pulmonary hypertension  # DVT - recommended IVC filter  # Subdural hematoma    Recommendations:  - Aggressive laxatives - ordered GoLytely to sip on  - Please document stool count  - PPI 40mg daily  - trend CBC, CMP, INR  - 2 large bore IVs; active type and screen  - transfuse Hgb > 7, Platelets > 50  - Per discussion with daughter today - possible EGD/colonoscopy pending clinical course and response to laxatives   - Per anesthesia, if considering EGD/colon possible transfer to tertiary center given severe PHT for the procedure  - supportive care as per primary team    Thank you for involving us in the care of this patient. Please reach out if any further questions.    Thomas Weinberg  Gastroenterology    Available on Microsoft Teams  [phone]

## 2023-09-05 NOTE — BH CONSULTATION LIAISON PROGRESS NOTE - NSBHCONSULTRECOMMENDOTHER_PSY_A_CORE FT
9/1/23: B12 and folate, prealbumin levels ordered for AM  - track calorie intake / food log   - Patient underweight with severe protein-calorie deficiency, low albumin, low iron panel, significant anemia with BMI of 16 all indicating he is not able to safely meet his basic needs (ie. sufficient food/nutrition intake) in his current state in his current home situation at this time for whatever reason  - it is unlikely Patient carried a mannequin up 15 flights of stairs in a [place] [facility] etc  - Patient does NOT have capacity at this time to engage in discharge planning; decision maker are 2 adult children    9/5/23: accepted to the [facility] for sub-acute rehab

## 2023-09-05 NOTE — PROGRESS NOTE ADULT - SUBJECTIVE AND OBJECTIVE BOX
INTERVAL HPI:  87 yo M with multiple syncopal episodes per chart notes,  on no home meds   Brought by EMS after being found down on street minimally responsive (8/20/23).   In the ED, was hypotensive, hypothermic (rectal T 90F), and tachycardic (afib w/ RVR).   Labs were remarkable for anemia (Hgb 6.1), leukocytosis w/ 25% band, lactic acidosis (lactate 14.4), ANGI, and elevated troponin   Got treated w/ vanc/zosyn, fluids, 1U pRBCs, IV amiodarone bolus (resolved arrhythmia), and a non-rebreather mask.   CT imaging showed an age-indeterminate L frontal sub-dural hematoma (4mm) and lung findings with pneumonia and emphysema/ILD.   RVP was entero/rhinovirus positive.   He was switched to ceftriaxone/azithromycin, was started on ipratropium nebulizers, and got  admitted to the ICU for new pressor requirement (norepinephrine).    During his ICU course, his blood cultures grew Strep pneumonia (on appropriate abx), lower extremity duplex study showed L tibioperoneal trunk and proximal posterior tibial vein DVTs (on heparin ppx), norepinephrine was weaned off w/ adequate MAPs,   Repeat CT head showed stable sub-dural hematoma, and iron supplementation was started.   08/21/23:  Got transferred to regular medical floor.  08/22/23:  At time of my evaluation, awake, responsive. Denies SOB, cough, sputum production or chest pain.  Admits being a smoker but says quit about a year ago.  Again in rapid A Fib and being transferred to monitor bed. Seen by Cardiology.    OVERNIGHT EVENTS:  Still with bloody BM, GI follow up noted. Breathing status remains stable.  SPO2 97% on room air.    Vital Signs Last 24 Hrs  T(C): 36.2 (05 Sep 2023 15:45), Max: 36.6 (04 Sep 2023 23:11)  T(F): 97.2 (05 Sep 2023 15:45), Max: 97.8 (04 Sep 2023 23:11)  HR: 68 (05 Sep 2023 15:45) (62 - 74)  BP: 108/49 (05 Sep 2023 15:45) (106/60 - 119/66)  BP(mean): --  RR: 18 (05 Sep 2023 15:45) (18 - 19)  SpO2: 97% (05 Sep 2023 15:45) (95% - 98%)    Parameters below as of 05 Sep 2023 15:45  Patient On (Oxygen Delivery Method): room air    PHYSICAL EXAM:  GEN:         Awake, responsive and comfortable.  HEENT:    Normal.    RESP:      no distress  CVS:          Regular rate and rhythm.     MEDICATIONS  (STANDING):  aMIOdarone    Tablet 200 milliGRAM(s) Oral daily  budesonide 160 MICROgram(s)/formoterol 4.5 MICROgram(s) Inhaler 2 Puff(s) Inhalation two times a day  ferrous    sulfate 325 milliGRAM(s) Oral three times a day  pantoprazole    Tablet 40 milliGRAM(s) Oral before breakfast  polyethylene glycol 3350 17 Gram(s) Oral two times a day  polyethylene glycol/electrolyte Solution. 4000 milliLiter(s) Oral once  tiotropium 2.5 MICROgram(s) Inhaler 2 Puff(s) Inhalation daily    MEDICATIONS  (PRN):  acetaminophen     Tablet .. 650 milliGRAM(s) Oral every 6 hours PRN Moderate Pain (4 - 6)  albuterol    90 MICROgram(s) HFA Inhaler 2 Puff(s) Inhalation every 6 hours PRN Shortness of Breath and/or Wheezing  aluminum hydroxide/magnesium hydroxide/simethicone Suspension 30 milliLiter(s) Oral every 4 hours PRN Dyspepsia  benzocaine/menthol Lozenge 1 Lozenge Oral four times a day PRN Sore Throat  benzonatate 100 milliGRAM(s) Oral three times a day PRN Cough  diltiazem Injectable 10 milliGRAM(s) IV Push once PRN sustained HR >130  guaiFENesin Oral Liquid (Sugar-Free) 100 milliGRAM(s) Oral every 6 hours PRN Cough    LABS:                        8.0    5.54  )-----------( 306      ( 05 Sep 2023 06:02 )             24.1     ASSESSMENT AND PLAN:  Multifocal pneumonia.  S/P Septic shock.  Strep Pneumo Bacteremia.  Leukocytosis.  A Fib with RVR.  Anemia.  Renal Insuffiencey.  Hypokalemia.  Left peroneal+ post tibial DVT.    SPO2 in high 90s on room air.  Continue antibiotic per ID.  Being transferred to monitor bed for rapid A Fib.  Not a candidate for full anticoagulation due to SDH, and anemia requiring PRBC transfusion.  Consider Vascular evaluation for DVT.  56+ minutes spent.    08/23/23:   Awake, responsive. Complains of stomach discomfort and at times with difficult swallowing. SPO2 stable on room air.  Converted to sinus rhythm, still on Amiodarone drip.  Continue antibiotics per ID.    08/24/23:  Resting comfortably, no SOB or distress. SPO2 98% .  Converted to sinus rhythm, changed to PO Amiodarone.  Continue Vancomycin per ID.  Will change to adult dose Symbicort.    08/25/23: Awake, responsive and comfortable. SPO2 97%, no distress.  Complains of soreness in throat. Will add Cepacol lozenges.    08/26/23:  Complains of soreness in throat. Will start on Cepacol lozenges.  Getting PRBC transfusion    On Symbicort and Spiriva.  antibiotics per ID    08/27/23: Resting comfortably, no distress. SPO2 96%.  Continue treatment.    08/28/23:  Awake, responsive and comfortable.  SPO2 98%.  On Symbicort and Levaquin.    08/29/23: Weak looking, denies SOB. SPO2 97%   Over all pulmonary status close to base line.  Continue treatment.    08/30/23: Resting comfortably in bed, no distress observed.  SPO2 95% on nasal O2.  Continue treatment.  Vascular surgery follow up noted.    08/31/23: Awake, responsive and comfortable. Says breathing is up and down.  SPO2 98% on nasal O2.  Complete antibiotic per ID.  Follow H & H.    09/01/23:  Continues to complain of sore throat.   Reported by RN  to have some rectal bleed  overnight  Again getting PRBC transfusion.  Seen by GI but pt/family does not want any procedure. Before has refused IVC filter.  SPO2 96% on nasal O2.  On Levaquin per ID.    09/02/23:  Resting comfortably in bed. RN reports rectal bleed. Pt has refused any invasive intervention.  SPO2 97% on nasal O2.  Consider palliative care evaluation.    09/03/23: Awake, responsive, comfortable and feels better.  SPO2 93% on room air.  Oxygenation status stable.  Supportive care.    09/04/23: Continue to have dark BM, drop in H & H noted. Got one unit of blood.  SPO2 stable.  Continue Symbicort and Spiriva.    09/05/23:  Still with bloody BM, GI follow up noted. There is possibility that pt may have EGD/Colonoscopy.  Breathing status remains stable.  SPO2 97% on room air.  On Symbicort, Spiriva and as needed Albuterol.

## 2023-09-05 NOTE — BH CONSULTATION LIAISON PROGRESS NOTE - NSBHCHARTREVIEWVS_PSY_A_CORE FT
Vital Signs Last 24 Hrs  T(C): 36.4 (05 Sep 2023 10:07), Max: 37.1 (04 Sep 2023 16:46)  T(F): 97.5 (05 Sep 2023 10:07), Max: 98.8 (04 Sep 2023 16:46)  HR: 62 (05 Sep 2023 10:07) (62 - 74)  BP: 109/56 (05 Sep 2023 10:07) (106/60 - 119/66)  BP(mean): --  RR: 19 (05 Sep 2023 10:07) (18 - 19)  SpO2: 97% (05 Sep 2023 10:07) (95% - 98%)    Parameters below as of 05 Sep 2023 10:07  Patient On (Oxygen Delivery Method): room air

## 2023-09-05 NOTE — PROVIDER CONTACT NOTE (OTHER) - SITUATION
Patient refused Golytely, re Patient refused Golytely, bllody stool x 2 prior this shift, requesting to eat regular food.

## 2023-09-05 NOTE — PROGRESS NOTE ADULT - SUBJECTIVE AND OBJECTIVE BOX
Patient is a 88y old  Male who presents with a chief complaint of found  down (05 Sep 2023 18:40)      INTERVAL HPI/OVERNIGHT EVENTS:  Pt was seen and examined, no acute events.      MEDICATIONS  (STANDING):  aMIOdarone    Tablet 200 milliGRAM(s) Oral daily  budesonide 160 MICROgram(s)/formoterol 4.5 MICROgram(s) Inhaler 2 Puff(s) Inhalation two times a day  ferrous    sulfate 325 milliGRAM(s) Oral three times a day  lactated ringers. 1000 milliLiter(s) (75 mL/Hr) IV Continuous <Continuous>  pantoprazole    Tablet 40 milliGRAM(s) Oral before breakfast  polyethylene glycol 3350 17 Gram(s) Oral two times a day  polyethylene glycol/electrolyte Solution. 4000 milliLiter(s) Oral once  tiotropium 2.5 MICROgram(s) Inhaler 2 Puff(s) Inhalation daily    MEDICATIONS  (PRN):  acetaminophen     Tablet .. 650 milliGRAM(s) Oral every 6 hours PRN Moderate Pain (4 - 6)  albuterol    90 MICROgram(s) HFA Inhaler 2 Puff(s) Inhalation every 6 hours PRN Shortness of Breath and/or Wheezing  aluminum hydroxide/magnesium hydroxide/simethicone Suspension 30 milliLiter(s) Oral every 4 hours PRN Dyspepsia  benzocaine/menthol Lozenge 1 Lozenge Oral four times a day PRN Sore Throat  benzonatate 100 milliGRAM(s) Oral three times a day PRN Cough  diltiazem Injectable 10 milliGRAM(s) IV Push once PRN sustained HR >130  guaiFENesin Oral Liquid (Sugar-Free) 100 milliGRAM(s) Oral every 6 hours PRN Cough      Allergies    No Known Allergies    Intolerances          Vital Signs Last 24 Hrs  T(C): 36.2 (05 Sep 2023 15:45), Max: 36.6 (04 Sep 2023 23:11)  T(F): 97.2 (05 Sep 2023 15:45), Max: 97.8 (04 Sep 2023 23:11)  HR: 68 (05 Sep 2023 15:45) (62 - 74)  BP: 108/49 (05 Sep 2023 15:45) (106/60 - 119/66)  BP(mean): --  RR: 18 (05 Sep 2023 15:45) (18 - 19)  SpO2: 97% (05 Sep 2023 15:45) (95% - 98%)    Parameters below as of 05 Sep 2023 15:45  Patient On (Oxygen Delivery Method): room air        PHYSICAL EXAM:  GENERAL: NAD,  no increased WOB  HEAD:  Atraumatic, Normocephalic  EYES: EOMI, conjunctiva and sclera clear  ENMT: Moist mucous membranes  NECK: Supple, No JVD  NERVOUS SYSTEM:  Alert, no focal neuro deficits   CHEST/LUNG: Clear to auscultation bilaterally; No rales, rhonchi, wheezing, or rubs  HEART: Regular rate and rhythm;  ABDOMEN: Soft, Nontender, Nondistended; Bowel sounds present  EXTREMITIES:  2+ Peripheral Pulses b/l, No clubbing, cyanosis, calf tenderness or edema b/l          LABS:                        8.0    5.54  )-----------( 306      ( 05 Sep 2023 06:02 )             24.1               CAPILLARY BLOOD GLUCOSE          RADIOLOGY & ADDITIONAL TESTS:    Imaging Personally Reviewed:  [ ] YES  [ ] NO    Consultant(s) Notes Reviewed:  [ ] YES  [ ] NO    Care Discussed with Consultants/Other Providers [ ] YES  [ ] NO

## 2023-09-05 NOTE — BH CONSULTATION LIAISON PROGRESS NOTE - CURRENT MEDICATION
MEDICATIONS  (STANDING):  aMIOdarone    Tablet 200 milliGRAM(s) Oral daily  budesonide 160 MICROgram(s)/formoterol 4.5 MICROgram(s) Inhaler 2 Puff(s) Inhalation two times a day  ferrous    sulfate 325 milliGRAM(s) Oral three times a day  pantoprazole    Tablet 40 milliGRAM(s) Oral before breakfast  polyethylene glycol 3350 17 Gram(s) Oral two times a day  polyethylene glycol/electrolyte Solution. 4000 milliLiter(s) Oral once  tiotropium 2.5 MICROgram(s) Inhaler 2 Puff(s) Inhalation daily    MEDICATIONS  (PRN):  acetaminophen     Tablet .. 650 milliGRAM(s) Oral every 6 hours PRN Moderate Pain (4 - 6)  albuterol    90 MICROgram(s) HFA Inhaler 2 Puff(s) Inhalation every 6 hours PRN Shortness of Breath and/or Wheezing  aluminum hydroxide/magnesium hydroxide/simethicone Suspension 30 milliLiter(s) Oral every 4 hours PRN Dyspepsia  benzocaine/menthol Lozenge 1 Lozenge Oral four times a day PRN Sore Throat  benzonatate 100 milliGRAM(s) Oral three times a day PRN Cough  diltiazem Injectable 10 milliGRAM(s) IV Push once PRN sustained HR >130  guaiFENesin Oral Liquid (Sugar-Free) 100 milliGRAM(s) Oral every 6 hours PRN Cough

## 2023-09-05 NOTE — PROGRESS NOTE ADULT - ASSESSMENT
87 yo M w/ hx of multiple syncopal episodes presented via EMS for an unwitnessed fall w/ hypotension, hypothermia, and afib w/ RVR concerning for sepsis. He was found to have Strep pneumonia bacteremia w/ evidence of b/l pneumonia and lactic acidosis, iron deficiency anemia, stable sub-dural hematoma, ANGI, and demand ischemia.    Acute blood loss Anemia:  - hx of iron deficiency anemia  - S/P total 6 units PRBC, last on 9/4  - Retic negative, haptoglobin high, LDH high  - peripheral blood smear shows no schistocytes, tear drop cell  - Iron deficient, continue iron supplementation  - Pt had multiple episode of rectal bleed for last few days, LGIB, either ischemic colitis vs. stercoral colitis in the setting of constipation   - Gi consult appreciated  - Pt and family now agreeable  to colonoscopy  - AC held   - PPI  - Miralax for constipation, added Golytely per GI  - Clear liquid  - GI follow up to assess if colonoscopy is indicated , if proceed may need to transfer per anaesthesia for high risk as pt had severe pulm HTN.    Metabolic encephalopathy:  - 2/2 sepsis  - Now A&Ox3, stable L frontal SDH    Acute hypoxic respiratory failure:  - pneumonia w/ findings c/w emphysema/ILD, weaned off NC on room air, on ipratropium q8h (consider transition to duonebs), chest PT, maintain SpO2 > 92%  - C/w symciort and Spiriva  - albuterol PRN  - Pulm following     Sepsis with septic shock POA:  - With strep pneumo bacteremia 2/2 PNA  - Weaned off norepinephrine  - Initial BC positive for strep, intermediate sensitivity to Rocephin, sensitive to Vanco  - Repeat Bcx negative  - Completed course of Levaquin     Afib:  - s/p amio bolus and gtt for afib w/ RVR (converted), currently on sinus rthym   - troponin elevation c/w demand ischemia   - Echo with Mildly decreased global left ventricular systolic function. BLAE, Mod - severe MR/TR, severe pHTN  - Not on Ac for anemia and SDH  - Continue with PO amiodarone  - Cardio following     ANGI:  - 2/2 septic ATN  - Cr improving   - Pending serologies for pulm renal syndrome  - Renal following     Acute DVT :  - Not on full dose AC for anemia and SDH  - DVT ppx dose held for GI bleed  - repeat US unchanged  - Vascular follow up, no intervention, pt refused IVC filter    GI ppx  - Protonix    Psych consult appreciated, pt doesn't have  capacity to make decision regarding dc plan.    PT eval: AMADOR      Discussed with niece at bedside.

## 2023-09-05 NOTE — PROGRESS NOTE ADULT - SUBJECTIVE AND OBJECTIVE BOX
Interval Events:   - Continues to have some blood mixed with stool/maroon colored  - 1u pRBC given y/d with adequate response    Allergies:  No Known Allergies        Hospital Medications:  acetaminophen     Tablet .. 650 milliGRAM(s) Oral every 6 hours PRN  albuterol    90 MICROgram(s) HFA Inhaler 2 Puff(s) Inhalation every 6 hours PRN  aluminum hydroxide/magnesium hydroxide/simethicone Suspension 30 milliLiter(s) Oral every 4 hours PRN  aMIOdarone    Tablet 200 milliGRAM(s) Oral daily  benzocaine/menthol Lozenge 1 Lozenge Oral four times a day PRN  benzonatate 100 milliGRAM(s) Oral three times a day PRN  budesonide 160 MICROgram(s)/formoterol 4.5 MICROgram(s) Inhaler 2 Puff(s) Inhalation two times a day  diltiazem Injectable 10 milliGRAM(s) IV Push once PRN  ferrous    sulfate 325 milliGRAM(s) Oral three times a day  guaiFENesin Oral Liquid (Sugar-Free) 100 milliGRAM(s) Oral every 6 hours PRN  pantoprazole    Tablet 40 milliGRAM(s) Oral before breakfast  polyethylene glycol 3350 17 Gram(s) Oral two times a day  polyethylene glycol/electrolyte Solution. 4000 milliLiter(s) Oral once  tiotropium 2.5 MICROgram(s) Inhaler 2 Puff(s) Inhalation daily      PMHX/PSHX:  No pertinent past medical history        Family history:      ROS: As per HPI, otherwise 14-point ROS reviewed and negative.      PHYSICAL EXAM:   Vital Signs:  Vital Signs Last 24 Hrs  T(C): 36.4 (05 Sep 2023 10:07), Max: 37.1 (04 Sep 2023 16:46)  T(F): 97.5 (05 Sep 2023 10:07), Max: 98.8 (04 Sep 2023 16:46)  HR: 62 (05 Sep 2023 10:07) (62 - 74)  BP: 109/56 (05 Sep 2023 10:07) (106/60 - 119/66)  BP(mean): --  RR: 19 (05 Sep 2023 10:07) (18 - 19)  SpO2: 97% (05 Sep 2023 10:07) (95% - 98%)    Parameters below as of 05 Sep 2023 10:07  Patient On (Oxygen Delivery Method): room air      Daily     Daily       09-04-23 @ 07:01  -  09-05-23 @ 07:00  --------------------------------------------------------  IN: 480 mL / OUT: 776 mL / NET: -296 mL    09-05-23 @ 07:01  -  09-05-23 @ 13:49  --------------------------------------------------------  IN: 0 mL / OUT: 150 mL / NET: -150 mL        GENERAL:  No acute distress  HEENT:  Normocephalic/atraumtic,  no scleral icterus  CHEST:  No accessory muscle use  HEART:  Regular rate and rhythm  ABDOMEN:  Soft, non-tender, non-distended, normoactive bowel sounds, no masses, no hepato-splenomegaly, no signs of chronic liver disease  EXTREMITIES:  No cyanosis, clubbing, or edema  SKIN:  No rash  NEURO:  Alert and oriented x 3, no asterixis, no tremor    LABS:                        8.0    5.54  )-----------( 306      ( 05 Sep 2023 06:02 )             24.1     Mean Cell Volume: 76.8 fl (09-05-23 @ 06:02)                                        8.0    5.54  )-----------( 306      ( 05 Sep 2023 06:02 )             24.1                         6.9    8.40  )-----------( 332      ( 04 Sep 2023 06:55 )             21.3                         7.1    5.98  )-----------( 335      ( 03 Sep 2023 07:02 )             22.2       Imaging:

## 2023-09-05 NOTE — BH CONSULTATION LIAISON PROGRESS NOTE - NSBHFUPINTERVALHXFT_PSY_A_CORE
Patient has been accepted to the [facility] for sub-acute rehab. Prealbumin low at 7, B12 and folate levels are within normal range. Received 1 unit PRBC yesterday - Hg 6.9 went up to 8. No other significant interval events. Patient has remained calm, cooperative, pleasant and medication/treatment compliant.

## 2023-09-06 DIAGNOSIS — E43 UNSPECIFIED SEVERE PROTEIN-CALORIE MALNUTRITION: ICD-10-CM

## 2023-09-06 DIAGNOSIS — I82.409 ACUTE EMBOLISM AND THROMBOSIS OF UNSPECIFIED DEEP VEINS OF UNSPECIFIED LOWER EXTREMITY: ICD-10-CM

## 2023-09-06 DIAGNOSIS — R53.81 OTHER MALAISE: ICD-10-CM

## 2023-09-06 DIAGNOSIS — Z51.5 ENCOUNTER FOR PALLIATIVE CARE: ICD-10-CM

## 2023-09-06 DIAGNOSIS — K92.2 GASTROINTESTINAL HEMORRHAGE, UNSPECIFIED: ICD-10-CM

## 2023-09-06 DIAGNOSIS — I38 ENDOCARDITIS, VALVE UNSPECIFIED: ICD-10-CM

## 2023-09-06 DIAGNOSIS — A41.9 SEPSIS, UNSPECIFIED ORGANISM: ICD-10-CM

## 2023-09-06 LAB
ANION GAP SERPL CALC-SCNC: 6 MMOL/L — SIGNIFICANT CHANGE UP (ref 5–17)
BUN SERPL-MCNC: 22 MG/DL — SIGNIFICANT CHANGE UP (ref 7–23)
CALCIUM SERPL-MCNC: 7.6 MG/DL — LOW (ref 8.5–10.1)
CHLORIDE SERPL-SCNC: 104 MMOL/L — SIGNIFICANT CHANGE UP (ref 96–108)
CO2 SERPL-SCNC: 23 MMOL/L — SIGNIFICANT CHANGE UP (ref 22–31)
CREAT SERPL-MCNC: 1.07 MG/DL — SIGNIFICANT CHANGE UP (ref 0.5–1.3)
EGFR: 68 ML/MIN/1.73M2 — SIGNIFICANT CHANGE UP
GLUCOSE SERPL-MCNC: 73 MG/DL — SIGNIFICANT CHANGE UP (ref 70–99)
HCT VFR BLD CALC: 26.5 % — LOW (ref 39–50)
HGB BLD-MCNC: 8.3 G/DL — LOW (ref 13–17)
MCHC RBC-ENTMCNC: 24.9 PG — LOW (ref 27–34)
MCHC RBC-ENTMCNC: 31.3 G/DL — LOW (ref 32–36)
MCV RBC AUTO: 79.6 FL — LOW (ref 80–100)
NRBC # BLD: 0 /100 WBCS — SIGNIFICANT CHANGE UP (ref 0–0)
PLATELET # BLD AUTO: 195 K/UL — SIGNIFICANT CHANGE UP (ref 150–400)
POTASSIUM SERPL-MCNC: 4.8 MMOL/L — SIGNIFICANT CHANGE UP (ref 3.5–5.3)
POTASSIUM SERPL-SCNC: 4.8 MMOL/L — SIGNIFICANT CHANGE UP (ref 3.5–5.3)
RBC # BLD: 3.33 M/UL — LOW (ref 4.2–5.8)
RBC # FLD: 26.1 % — HIGH (ref 10.3–14.5)
SODIUM SERPL-SCNC: 133 MMOL/L — LOW (ref 135–145)
WBC # BLD: 3.51 K/UL — LOW (ref 3.8–10.5)
WBC # FLD AUTO: 3.51 K/UL — LOW (ref 3.8–10.5)

## 2023-09-06 PROCEDURE — 99232 SBSQ HOSP IP/OBS MODERATE 35: CPT

## 2023-09-06 PROCEDURE — 99233 SBSQ HOSP IP/OBS HIGH 50: CPT

## 2023-09-06 PROCEDURE — 99223 1ST HOSP IP/OBS HIGH 75: CPT | Mod: FS

## 2023-09-06 RX ADMIN — Medication 325 MILLIGRAM(S): at 13:01

## 2023-09-06 RX ADMIN — Medication 650 MILLIGRAM(S): at 06:02

## 2023-09-06 RX ADMIN — AMIODARONE HYDROCHLORIDE 200 MILLIGRAM(S): 400 TABLET ORAL at 05:59

## 2023-09-06 RX ADMIN — Medication 325 MILLIGRAM(S): at 22:29

## 2023-09-06 RX ADMIN — PANTOPRAZOLE SODIUM 40 MILLIGRAM(S): 20 TABLET, DELAYED RELEASE ORAL at 06:03

## 2023-09-06 RX ADMIN — TIOTROPIUM BROMIDE 2 PUFF(S): 18 CAPSULE ORAL; RESPIRATORY (INHALATION) at 13:05

## 2023-09-06 RX ADMIN — BUDESONIDE AND FORMOTEROL FUMARATE DIHYDRATE 2 PUFF(S): 160; 4.5 AEROSOL RESPIRATORY (INHALATION) at 17:56

## 2023-09-06 RX ADMIN — Medication 650 MILLIGRAM(S): at 06:33

## 2023-09-06 RX ADMIN — BENZOCAINE AND MENTHOL 1 LOZENGE: 5; 1 LIQUID ORAL at 13:03

## 2023-09-06 RX ADMIN — Medication 325 MILLIGRAM(S): at 05:58

## 2023-09-06 NOTE — PROGRESS NOTE ADULT - ASSESSMENT
87 yo M w/ hx of multiple syncopal episodes presented via EMS for an unwitnessed fall w/ hypotension, hypothermia, and afib w/ RVR concerning for sepsis. He was found to have Strep pneumonia bacteremia w/ evidence of b/l pneumonia and lactic acidosis, iron deficiency anemia, stable sub-dural hematoma, ANGI, and demand ischemia.    Acute blood loss Anemia:  - hx of iron deficiency anemia  - S/P total 6 units PRBC, last on 9/4  - Retic negative, haptoglobin high, LDH high  - peripheral blood smear shows no schistocytes, tear drop cell  - Iron deficient, continue iron supplementation  - Pt had multiple episode of rectal bleed for last few days, LGIB, either hemorrhoidal vs stercoral colitis in the setting of constipation   - Gi consult appreciated  - Pt and family now agreeable  to colonoscopy  - AC held   - PPI  - Miralax for constipation, added Golytely per GI  - Clear liquid  - GI follow up to assess if colonoscopy is indicated , if proceed may need to transfer per anaesthesia for high risk as pt had severe pulm HTN.    Metabolic encephalopathy: L frontal SDH  - 2/2 sepsis  - Now A&Ox3, stable L frontal SDH    Acute hypoxic respiratory failure:  - pneumonia w/ findings c/w emphysema/ILD, weaned off NC on room air, on ipratropium q8h (consider transition to duonebs), chest PT, maintain SpO2 > 92%  - C/w symciort and Spiriva  - albuterol PRN  - Pulm following     Sepsis with septic shock POA:  - With strep pneumo bacteremia 2/2 PNA  - Weaned off norepinephrine  - Initial BC positive for strep, intermediate sensitivity to Rocephin, sensitive to Vanco  - Repeat Bcx negative  - Completed course of Levaquin     Chronic Afib:  - s/p amio bolus and gtt for afib w/ RVR (converted), currently on sinus rthym   - troponin elevation c/w demand ischemia   - Echo with Mildly decreased global left ventricular systolic function. BLAE, Mod - severe MR/TR, severe pHTN  - Not on Ac for anemia and SDH  - Continue with PO amiodarone  - Cardio following     ANGI:  - 2/2 septic ATN  - Cr improving   - Pending serologies for pulm renal syndrome  - Renal following     Acute DVT :  - Not on full dose AC for anemia and SDH  - DVT ppx dose held for GI bleed  - repeat US unchanged  - Vascular follow up, no intervention, pt refused IVC filter    GI ppx  - Protonix    Psych consult appreciated, pt doesn't have  capacity to make decision regarding dc plan.    PT eval: AMADOR

## 2023-09-06 NOTE — CONSULT NOTE ADULT - CONVERSATION DETAILS
Spoke with patient who said he has 2 children, Neena and Carroll and Neena helps with his medical decision making.  Patient asked to recount his understanding of what is going on in the hospital and is aware of blood in stool, but is most bothered by not being able to eat solid foods like oatmeal and is tired of the clears-he feels hungry.  Told patient I would reach out to Neena with no objections made.      Called and spoke with Neena [phone] via phone as she was not available to speak in person.  She said patient has been living on his own for the past 17 years and would not share where he was living with family.  Neena had not seen patient for the past 2 years as she was not able to find him, but patient did meet up with his brother monthly to handle his social security check.  She is aware of bleeding issue and had discussed possible colonoscopy, but that it carries risk to patient and was ok conservatively managing patient.  We discussed if patient were to go to rehab he might not be as strong as he was prior to coming in.  Patient was very active prior to coming in.  She said she understood and educated on hospice and their philosophy of care.  Explained that patient may become eligible in the future if he declines or is becoming transfusion dependent and they wish to defer invasive diagnostics.  Also explained that LST such as intubation and CPR would likely be more harmful than beneficial to patient given his valvular issues and pHTN.  Palliative to follow up with patient and family tomorrow.

## 2023-09-06 NOTE — PROGRESS NOTE ADULT - SUBJECTIVE AND OBJECTIVE BOX
Patient reports he had a bm this morning. He did not look at it and did not inform the staff. Denies abdominal pain.   Refused golytely per RN    T(C): 36.3 (09-06-23 @ 10:55), Max: 36.4 (09-05-23 @ 23:34)  HR: 53 (09-06-23 @ 10:55) (53 - 70)  BP: 102/64 (09-06-23 @ 10:55) (102/64 - 119/64)  RR: 18 (09-06-23 @ 10:55) (18 - 18)  SpO2: 98% (09-06-23 @ 10:55) (97% - 98%)    NAD  soft NT ND                          8.3    3.51  )-----------( 195      ( 06 Sep 2023 05:56 )             26.5   09-06    133<L>  |  104  |  22  ----------------------------<  73  4.8   |  23  |  1.07    Ca    7.6<L>      06 Sep 2023 05:56    Impression: BRBPR, may be related to constipation - hemorrhoidal vs stercoral colitis.     Recommend aggressive bowel regimen - Miralax 17g BID at minimum    Monitor bm for blood  H/H stable    Discussed with RN at bedside

## 2023-09-06 NOTE — CONSULT NOTE ADULT - TIME BILLING
88 year old male adm after being found on the floor, lives alone.  Patient initially hypotensive, hypothermic and tachycardiac started on pressors and sent to ICU.  Patient with Streptococcus pneumoniae bacteremia s/p abx and with anemia. Echo showed mod-sev MR, severe pulm HTN. high risk for endoscopic evaluation, continue conservative med mgmt. Daughter assists w decisionmaking. GOC discussion as above.

## 2023-09-06 NOTE — CONSULT NOTE ADULT - ASSESSMENT
88 year old male presented to the ED by EMS after being found on the floor.  Patient initially hypotensive, hypothermic and tachycardiac started on pressors and sent to ICU.  Patient with Streptococcus pneumoniae bacteremia s/p abx and with anemia, positive occult blood with blood noted in stool.  Palliative care consulted for GOC.

## 2023-09-06 NOTE — PROGRESS NOTE ADULT - SUBJECTIVE AND OBJECTIVE BOX
Patient is a 88y old  Male who presents with a chief complaint of found  down (06 Sep 2023 12:53)      OVERNIGHT EVENTS:  Patients seen and examined at bedside this morning. No acute events overnight.    REVIEW OF SYSTEMS: poor historian    MEDICATIONS  (STANDING):  aMIOdarone    Tablet 200 milliGRAM(s) Oral daily  budesonide 160 MICROgram(s)/formoterol 4.5 MICROgram(s) Inhaler 2 Puff(s) Inhalation two times a day  ferrous    sulfate 325 milliGRAM(s) Oral three times a day  lactated ringers. 1000 milliLiter(s) (75 mL/Hr) IV Continuous <Continuous>  pantoprazole    Tablet 40 milliGRAM(s) Oral before breakfast  polyethylene glycol 3350 17 Gram(s) Oral two times a day  polyethylene glycol/electrolyte Solution. 4000 milliLiter(s) Oral once  tiotropium 2.5 MICROgram(s) Inhaler 2 Puff(s) Inhalation daily    MEDICATIONS  (PRN):  acetaminophen     Tablet .. 650 milliGRAM(s) Oral every 6 hours PRN Moderate Pain (4 - 6)  albuterol    90 MICROgram(s) HFA Inhaler 2 Puff(s) Inhalation every 6 hours PRN Shortness of Breath and/or Wheezing  aluminum hydroxide/magnesium hydroxide/simethicone Suspension 30 milliLiter(s) Oral every 4 hours PRN Dyspepsia  benzocaine/menthol Lozenge 1 Lozenge Oral four times a day PRN Sore Throat  benzonatate 100 milliGRAM(s) Oral three times a day PRN Cough  diltiazem Injectable 10 milliGRAM(s) IV Push once PRN sustained HR >130  guaiFENesin Oral Liquid (Sugar-Free) 100 milliGRAM(s) Oral every 6 hours PRN Cough      Allergies    No Known Allergies    Intolerances        T(F): 97.4 (09-06-23 @ 10:55), Max: 97.6 (09-05-23 @ 23:34)  HR: 53 (09-06-23 @ 10:55) (53 - 70)  BP: 102/64 (09-06-23 @ 10:55) (102/64 - 119/64)  RR: 18 (09-06-23 @ 10:55) (18 - 18)  SpO2: 98% (09-06-23 @ 10:55) (97% - 98%)  Wt(kg): --    PHYSICAL EXAM:  GENERAL: NAD  HEAD:  Atraumatic, Normocephalic  EYES: PERRLA, conjunctiva and sclera clear  ENMT: Moist mucous membranes  NECK: Supple, No JVD, Normal thyroid  NERVOUS SYSTEM:  Alert & Awake  CHEST/LUNG: Clear to percussion bilaterally;   HEART: Regular rate and rhythm;   ABDOMEN: Soft, Nontender, Nondistended; Bowel sounds present  EXTREMITIES:  no edema BL LE  SKIN: moist    LABS:                        8.3    3.51  )-----------( 195      ( 06 Sep 2023 05:56 )             26.5     09-06    133<L>  |  104  |  22  ----------------------------<  73  4.8   |  23  |  1.07    Ca    7.6<L>      06 Sep 2023 05:56        Urinalysis Basic - ( 06 Sep 2023 05:56 )    Color: x / Appearance: x / SG: x / pH: x  Gluc: 73 mg/dL / Ketone: x  / Bili: x / Urobili: x   Blood: x / Protein: x / Nitrite: x   Leuk Esterase: x / RBC: x / WBC x   Sq Epi: x / Non Sq Epi: x / Bacteria: x      Cultures;   CAPILLARY BLOOD GLUCOSE        Lipid panel:           RADIOLOGY & ADDITIONAL TESTS:    Imaging Personally Reviewed:  [x ] YES      Consultant(s) Notes Reviewed:  [x ] YES     Care Discussed with [x ] Consultants [X ] Patient [ ] Family  [x ]    [x ]  Other; RN

## 2023-09-06 NOTE — CONSULT NOTE ADULT - PROBLEM SELECTOR RECOMMENDATION 5
Clinical evidence indicates that the patient has Severe protein calorie malnutrition/ 3rd degree    In context of  Chronic Illness (>1 month)    Energy/Food intake <50% of estimated energy requirement >5 days  Weight loss: Moderate - severe (lbs lost recently)  Body Fat loss: Severe   (Cachexia, temporal wasting, muscle atrophy)   Strength: weakened   Recommend: advance diet as tolerated (on clears right now), add back protein supplements to diet when advanced

## 2023-09-06 NOTE — PROGRESS NOTE ADULT - TIME-BASED
20
55
Detail Level: Simple
Additional Notes: Confirmed by scrapping under nail. Discussed that it should grow out with nail.\\nRecheck in 4-6 months when pt returns to check mole on back.
Additional Notes: Brown material was removed with forceps - consistent with oxidized hemoglobin/blood.

## 2023-09-06 NOTE — PROGRESS NOTE ADULT - SUBJECTIVE AND OBJECTIVE BOX
INTERVAL HPI:   89 yo M with multiple syncopal episodes per chart notes,  on no home meds   Brought by EMS after being found down on street minimally responsive (8/20/23).   In the ED, was hypotensive, hypothermic (rectal T 90F), and tachycardic (afib w/ RVR).   Labs were remarkable for anemia (Hgb 6.1), leukocytosis w/ 25% band, lactic acidosis (lactate 14.4), ANGI, and elevated troponin   Got treated w/ vanc/zosyn, fluids, 1U pRBCs, IV amiodarone bolus (resolved arrhythmia), and a non-rebreather mask.   CT imaging showed an age-indeterminate L frontal sub-dural hematoma (4mm) and lung findings with pneumonia and emphysema/ILD.   RVP was entero/rhinovirus positive.   He was switched to ceftriaxone/azithromycin, was started on ipratropium nebulizers, and got  admitted to the ICU for new pressor requirement (norepinephrine).    During his ICU course, his blood cultures grew Strep pneumonia (on appropriate abx), lower extremity duplex study showed L tibioperoneal trunk and proximal posterior tibial vein DVTs (on heparin ppx), norepinephrine was weaned off w/ adequate MAPs,   Repeat CT head showed stable sub-dural hematoma, and iron supplementation was started.   08/21/23:  Got transferred to regular medical floor.  08/22/23:  At time of my evaluation, awake, responsive. Denies SOB, cough, sputum production or chest pain.  Admits being a smoker but says quit about a year ago.  Again in rapid A Fib and being transferred to monitor bed. Seen by Cardiology.    OVERNIGHT EVENTS:  Awake, responsive and comfortable., wants to eat real food.    Vital Signs Last 24 Hrs  T(C): 36.3 (06 Sep 2023 10:55), Max: 36.4 (05 Sep 2023 23:34)  T(F): 97.4 (06 Sep 2023 10:55), Max: 97.6 (05 Sep 2023 23:34)  HR: 53 (06 Sep 2023 10:55) (53 - 70)  BP: 102/64 (06 Sep 2023 10:55) (102/64 - 119/64)  BP(mean): --  RR: 18 (06 Sep 2023 10:55) (18 - 18)  SpO2: 98% (06 Sep 2023 10:55) (97% - 98%)    Parameters below as of 06 Sep 2023 04:53  Patient On (Oxygen Delivery Method): room air    PHYSICAL EXAM:  GEN:         Awake, responsive and comfortable.  HEENT:    Normal.    RESP:        no distress  CVS:          Regular rate and rhythm.     MEDICATIONS  (STANDING):  aMIOdarone    Tablet 200 milliGRAM(s) Oral daily  budesonide 160 MICROgram(s)/formoterol 4.5 MICROgram(s) Inhaler 2 Puff(s) Inhalation two times a day  ferrous    sulfate 325 milliGRAM(s) Oral three times a day  lactated ringers. 1000 milliLiter(s) (75 mL/Hr) IV Continuous <Continuous>  pantoprazole    Tablet 40 milliGRAM(s) Oral before breakfast  polyethylene glycol 3350 17 Gram(s) Oral two times a day  polyethylene glycol/electrolyte Solution. 4000 milliLiter(s) Oral once  tiotropium 2.5 MICROgram(s) Inhaler 2 Puff(s) Inhalation daily    MEDICATIONS  (PRN):  acetaminophen     Tablet .. 650 milliGRAM(s) Oral every 6 hours PRN Moderate Pain (4 - 6)  albuterol    90 MICROgram(s) HFA Inhaler 2 Puff(s) Inhalation every 6 hours PRN Shortness of Breath and/or Wheezing  aluminum hydroxide/magnesium hydroxide/simethicone Suspension 30 milliLiter(s) Oral every 4 hours PRN Dyspepsia  benzocaine/menthol Lozenge 1 Lozenge Oral four times a day PRN Sore Throat  benzonatate 100 milliGRAM(s) Oral three times a day PRN Cough  diltiazem Injectable 10 milliGRAM(s) IV Push once PRN sustained HR >130  guaiFENesin Oral Liquid (Sugar-Free) 100 milliGRAM(s) Oral every 6 hours PRN Cough    LABS:                        8.3    3.51  )-----------( 195      ( 06 Sep 2023 05:56 )             26.5     09-06    133<L>  |  104  |  22  ----------------------------<  73  4.8   |  23  |  1.07    Ca    7.6<L>      06 Sep 2023 05:56    Urinalysis Basic - ( 06 Sep 2023 05:56 )    Color: x / Appearance: x / SG: x / pH: x  Gluc: 73 mg/dL / Ketone: x  / Bili: x / Urobili: x   Blood: x / Protein: x / Nitrite: x   Leuk Esterase: x / RBC: x / WBC x   Sq Epi: x / Non Sq Epi: x / Bacteria: x    ASSESSMENT AND PLAN:  Multifocal pneumonia.  S/P Septic shock.  Strep Pneumo Bacteremia.  Leukocytosis.  A Fib with RVR.  Anemia.  Renal Insuffiencey.  Hypokalemia.  Left peroneal+ post tibial DVT.    SPO2 in high 90s on room air.  Continue antibiotic per ID.  Being transferred to monitor bed for rapid A Fib.  Not a candidate for full anticoagulation due to SDH, and anemia requiring PRBC transfusion.  Consider Vascular evaluation for DVT.  56+ minutes spent.    08/23/23:   Awake, responsive. Complains of stomach discomfort and at times with difficult swallowing. SPO2 stable on room air.  Converted to sinus rhythm, still on Amiodarone drip.  Continue antibiotics per ID.    08/24/23:  Resting comfortably, no SOB or distress. SPO2 98% .  Converted to sinus rhythm, changed to PO Amiodarone.  Continue Vancomycin per ID.  Will change to adult dose Symbicort.    08/25/23: Awake, responsive and comfortable. SPO2 97%, no distress.  Complains of soreness in throat. Will add Cepacol lozenges.    08/26/23:  Complains of soreness in throat. Will start on Cepacol lozenges.  Getting PRBC transfusion    On Symbicort and Spiriva.  antibiotics per ID    08/27/23: Resting comfortably, no distress. SPO2 96%.  Continue treatment.    08/28/23:  Awake, responsive and comfortable.  SPO2 98%.  On Symbicort and Levaquin.    08/29/23: Weak looking, denies SOB. SPO2 97%   Over all pulmonary status close to base line.  Continue treatment.    08/30/23: Resting comfortably in bed, no distress observed.  SPO2 95% on nasal O2.  Continue treatment.  Vascular surgery follow up noted.    08/31/23: Awake, responsive and comfortable. Says breathing is up and down.  SPO2 98% on nasal O2.  Complete antibiotic per ID.  Follow H & H.    09/01/23:  Continues to complain of sore throat.   Reported by RN  to have some rectal bleed  overnight  Again getting PRBC transfusion.  Seen by GI but pt/family does not want any procedure. Before has refused IVC filter.  SPO2 96% on nasal O2.  On Levaquin per ID.    09/02/23:  Resting comfortably in bed. RN reports rectal bleed. Pt has refused any invasive intervention.  SPO2 97% on nasal O2.  Consider palliative care evaluation.    09/03/23: Awake, responsive, comfortable and feels better.  SPO2 93% on room air.  Oxygenation status stable.  Supportive care.    09/04/23: Continue to have dark BM, drop in H & H noted. Got one unit of blood.  SPO2 stable.  Continue Symbicort and Spiriva.    09/05/23:  Still with bloody BM, GI follow up noted. There is possibility that pt may have EGD/Colonoscopy.  Breathing status remains stable.  SPO2 97% on room air.  On Symbicort, Spiriva and as needed Albuterol.    09/06/23:  Awake, responsive and comfortable., wants to eat real food.  Oxygenation status stable. Continue Symbicort and Spiriva.  Did not take preparation for EGD/Colonoscopy.

## 2023-09-06 NOTE — CONSULT NOTE ADULT - SUBJECTIVE AND OBJECTIVE BOX
HPI:  89 yo M     bibems after being found down outside.    no  pmx. pe r pt  now    per  er  chart ,on  arrival,  pt  was  minimally responsive, altered, unable to provide history.    and  had  a ectal temp   90 degrees.  No prior visits from chart review   was hypotensive/  hypotehrmic/    elevated  lactate/  acidotic, severe  anemia on  arrival/ and  ,per  ER Dr , ICU  eval  was  called/ and  was rejected /  note currently, pending    pt  unable  to  clearly   state  if  eh had  any  trauma  or how  he fell    icu  re  eval  called again by current   er  dr/  awaiting   consult   pt  seen  in  er/  awake  and  alert,  somewhat  able  to  answer  questions , though   not  fully    denies  any  cp/sob/abd  pain/  recall   bleeding  also  denies   any  cardiac  hx/  priro  strokes/  dm,  etc   states  he  lives  with  his  family/ address /  phone  numbers  of  family    currently  unknown   (20 Aug 2023 10:35)    PERTINENT PM/SXH:   No pertinent past medical history        FAMILY HISTORY:    ITEMS NOT CHECKED ARE NOT PRESENT    SOCIAL HISTORY:   Significant other/partner: no [ ]  Children: yes [ ]  Church/Spirituality:  Substance hx:  [ ]   Tobacco hx:  [ ]   Alcohol hx: [ ]   Home Opioid hx:  [ ] I-Stop Reference No:  Reference #: [id number]  Living Situation: [ ]Home  [ ]Long term care  [ ]Rehab [ ]Other    ADVANCE DIRECTIVES:    DNR  MOLST  [ ]  Living Will  [ ]   DECISION MAKER(s):  [ ] Health Care Proxy(s)  [ x] Surrogate(s)  [ ] Guardian           Name(s): Phone Number(s): Neena [phone]    BASELINE (I)ADL(s) (prior to admission):  [place]: [ x]Total  [ ] Moderate [ ]Dependent    Allergies    No Known Allergies    Intolerances    MEDICATIONS  (STANDING):  aMIOdarone    Tablet 200 milliGRAM(s) Oral daily  budesonide 160 MICROgram(s)/formoterol 4.5 MICROgram(s) Inhaler 2 Puff(s) Inhalation two times a day  ferrous    sulfate 325 milliGRAM(s) Oral three times a day  lactated ringers. 1000 milliLiter(s) (75 mL/Hr) IV Continuous <Continuous>  pantoprazole    Tablet 40 milliGRAM(s) Oral before breakfast  polyethylene glycol 3350 17 Gram(s) Oral two times a day  polyethylene glycol/electrolyte Solution. 4000 milliLiter(s) Oral once  tiotropium 2.5 MICROgram(s) Inhaler 2 Puff(s) Inhalation daily    MEDICATIONS  (PRN):  acetaminophen     Tablet .. 650 milliGRAM(s) Oral every 6 hours PRN Moderate Pain (4 - 6)  albuterol    90 MICROgram(s) HFA Inhaler 2 Puff(s) Inhalation every 6 hours PRN Shortness of Breath and/or Wheezing  aluminum hydroxide/magnesium hydroxide/simethicone Suspension 30 milliLiter(s) Oral every 4 hours PRN Dyspepsia  benzocaine/menthol Lozenge 1 Lozenge Oral four times a day PRN Sore Throat  benzonatate 100 milliGRAM(s) Oral three times a day PRN Cough  diltiazem Injectable 10 milliGRAM(s) IV Push once PRN sustained HR >130  guaiFENesin Oral Liquid (Sugar-Free) 100 milliGRAM(s) Oral every 6 hours PRN Cough    PRESENT SYMPTOMS: [ ]Unable to obtain due to poor mentation   Source if other than patient:  [ ]Family   [ ]Team     Pain: [ ] yes [ x] no  QOL impact -   Location -                    Aggravating factors -  Quality -  Radiation -  Timing-  Severity (0-10 scale):  Minimal acceptable level (0-10 scale):     PAIN AD Score:     [email or web address] (press ctrl +  left click to view)    Dyspnea:                           [ ]Mild [ ]Moderate [ ]Severe  Anxiety:                             [ ]Mild [ ]Moderate [ ]Severe  Fatigue:                             [ ]Mild [ ]Moderate [ ]Severe  Nausea:                             [ ]Mild [ ]Moderate [ ]Severe  Loss of appetite:              [ ]Mild [ ]Moderate [ ]Severe  Constipation:                    [ ]Mild [ ]Moderate [ ]Severe    Other Symptoms:  [x ]All other review of systems negative     Karnofsky Performance Score/Palliative Performance Status Version 2:        50-60 %    http://npcrc.org/files/news/palliative_performance_scale_ppsv2.pdf  PHYSICAL EXAM:  Vital Signs Last 24 Hrs  T(C): 36.3 (06 Sep 2023 10:55), Max: 36.4 (05 Sep 2023 23:34)  T(F): 97.4 (06 Sep 2023 10:55), Max: 97.6 (05 Sep 2023 23:34)  HR: 53 (06 Sep 2023 10:55) (53 - 70)  BP: 102/64 (06 Sep 2023 10:55) (102/64 - 119/64)  BP(mean): --  RR: 18 (06 Sep 2023 10:55) (18 - 18)  SpO2: 98% (06 Sep 2023 10:55) (97% - 98%)    Parameters below as of 06 Sep 2023 04:53  Patient On (Oxygen Delivery Method): room air     I&O's Summary    05 Sep 2023 07:01  -  06 Sep 2023 07:00  --------------------------------------------------------  IN: 0 mL / OUT: 1050 mL / NET: -1050 mL    GENERAL:  [ x]Alert  [ ]Oriented x   [ ]Lethargic  [ ]Cachexia  [ ]Unarousable  [x ]Verbal  [ ]Non-Verbal  Behavioral:   [ ] Anxiety  [ ] Delirium [ ] Agitation [ ] Other  HEENT:  [x ]Normal   [ ]Dry mouth   [ ]ET Tube/Trach  [ ]Oral lesions  PULMONARY:   [ x]Clear [ ]Tachypnea  [ ]Audible excessive secretions   [ ]Rhonchi        [ ]Right [ ]Left [ ]Bilateral  [ ]Crackles        [ ]Right [ ]Left [ ]Bilateral  [ ]Wheezing     [ ]Right [ ]Left [ ]Bilateral  CARDIOVASCULAR:    [x ]Regular [ ]Irregular [ ]Tachy  [ ]Rodríguez [ ]Murmur [ ]Other  GASTROINTESTINAL:  [x ]Soft  [ ]Distended   [ ]+BS  [x ]Non tender [ ]Tender  [ ]PEG [ ]OGT/ NGT  Last BM: 9/6/23 GENITOURINARY:  [ x]Normal [ ] Incontinent   [ ]Oliguria/Anuria   [ ]Ward  MUSCULOSKELETAL:   [ ]Normal   [ x]Weakness  [ ]Bed/Wheelchair bound [ ]Edema  NEUROLOGIC:   [ ]No focal deficits  [ ] Cognitive impairment  [ ] Dysphagia [ ]Dysarthria [ ] Paresis [ ]Other   SKIN:   [ ]Normal   [ ]Pressure ulcer(s)  [ ]Rash    CRITICAL CARE:  [ ] Shock Present  [ ]Septic [ ]Cardiogenic [ ]Neurologic [ ]Hypovolemic  [ ]  Vasopressors [ ]  Inotropes   [ ] Respiratory failure present [ ] mechanical ventilation [ ] non-invasive ventilatory support [ ] High flow  [ ] Acute  [ ] Chronic [ ] Hypoxic  [ ] Hypercarbic [ ] Other  [ ] Other organ failure     LABS:                        8.3    3.51  )-----------( 195      ( 06 Sep 2023 05:56 )             26.5   09-06    133<L>  |  104  |  22  ----------------------------<  73  4.8   |  23  |  1.07    Ca    7.6<L>      06 Sep 2023 05:56  Urinalysis Basic - ( 06 Sep 2023 05:56 )    Color: x / Appearance: x / SG: x / pH: x  Gluc: 73 mg/dL / Ketone: x  / Bili: x / Urobili: x   Blood: x / Protein: x / Nitrite: x   Leuk Esterase: x / RBC: x / WBC x   Sq Epi: x / Non Sq Epi: x / Bacteria: x    Fecal Occult Blood Immunology (08.28.23 @ 14:33)    Fecal Occult Blood Immunology: Positive    Culture - Blood (08.22.23 @ 06:45)    Specimen Source: .Blood Blood-Peripheral   Culture Results:   No growth at 5 days    Culture - Blood (08.20.23 @ 05:00)    Gram Stain:   Growth in aerobic bottle: Gram Positive Cocci in Pairs and Chains   -  Erythromycin: R >0.5 Predicts results for azithromycin.   -  Levofloxacin: S 1   -  Trimethoprim/Sulfamethoxazole: R >2/38   -  Vancomycin: S 0.5   -  Streptococcus pneumoniae: Detec   -  Penicillin (non-meningitidis): I 4   -  Penicillin (oral penicillin V): R 4   -  Ceftriaxone (meningitidis): R 2   -  Ceftriaxone (non-meningitidis): I 2   -  Penicillin (meningitidis): R 4   Specimen Source: .Blood Blood   Organism: Blood Culture PCR   Organism: Streptococcus pneumoniae   Culture Results:   Growth in aerobic bottle: Streptococcus pneumoniae  Direct identification is available within approximately 3-5  hours either by Blood Panel Multiplexed PCR or Direct  MALDI-TOF. Details: [email or web address]   Organism Identification: Blood Culture PCR  Streptococcus pneumoniae   Method Type: PCR   Method Type: ELIZABETH    RADIOLOGY & ADDITIONAL STUDIES:   < from: US Duplex Venous Lower Ext Complete, Bilateral (08.29.23 @ 10:53) >  IMPRESSION:  Acute deep venous thrombosis: below the knee.  Acute left soleal vein deep vein thrombosis, unchanged.  --- End of Report ---  < end of copied text >    < from: US Duplex Venous Lower Ext Complete, Bilateral (08.20.23 @ 17:44) >  IMPRESSION:  Acute deep venous thrombosis: below the knee.  DVT within the left tibioperoneal trunk and proximal posterior tibial   veins.  Above findings were discussed with CATRINA Jacinto on 8/20/2023 6:01 PM.  --- End of Report ---  < end of copied text >    < from: CT Head No Cont (08.20.23 @ 12:29) >  IMPRESSION:  Similar left frontal subdural collection. Mild chronic   microvascular changes without evidence of an acute transcortical   infarction or hemorrhage.  --- End of Report ---  < end of copied text >    < from: CT Abdomen and Pelvis No Cont (08.20.23 @ 06:49) >  IMPRESSION:  Bilateral pneumonia.  Additional findings as above.  --- End of Report ---  < end of copied text >    < from: CT Cervical Spine No Cont (08.20.23 @ 06:48) >  IMPRESSION:  Motion degraded exam shows no gross acute cervical spine fracture or   evidence of traumatic malalignment. Cervical spondylosis.  --- End of Report ---  < end of copied text >    < from: TTE Echo Complete w/o Contrast w/ Doppler (08.21.23 @ 08:17) >  Summary:   1. Mildly decreased global left ventricular systolic function.   2. Global longitudinal strain imaging was performed on sunne.ws Vivid S70 at a   heart rate of 68BPM and a blood pressure of 108/57 mmHg, average GLS   calculated was -19.1% (normal).   3. Moderately enlarged left atrium.   4. Moderately enlarged right atrium.   5. Moderate to severe mitral valve regurgitation.   6. Moderate-severe tricuspid regurgitation.   7. Mild pulmonic valve regurgitation.   8. Estimated pulmonary artery systolic pressure is 65.8 mmHg assuming a   right atrial pressure of 15 mmHg, which is consistent with severe   pulmonary hypertension.  [phone] Bradley Martinez MD, FACC  < end of copied text >    PROTEIN CALORIE MALNUTRITION PRESENT: [x ] Yes [ ] No  [ ] PPSV2 < or = to 30% [ ] significant weight loss  [x ] poor nutritional intake [x ] catabolic state [ ] anasarca     Artificial Nutrition [ ]     REFERRALS:   [ ]Chaplaincy  [ ] Hospice  [ ]Child Life  [ ]Social Work  [ ]Case management [ ]Holistic Therapy     Goals of Care Document:   Care Coordination Assessment 201 [C. Provider] (08-22-23 @ 10:49)   Progress Notes - Care Coordination [C. Provider] (09-04-23 @ 12:35)

## 2023-09-06 NOTE — CONSULT NOTE ADULT - PROBLEM SELECTOR RECOMMENDATION 7
See GOC above.  Patient with 2 children, identifies Neena as the one who would help with decision making.  Plan likely for [facility] when medically ready.  Recommend to advance diet when able.    Messages sent to Dr. Santoyo

## 2023-09-06 NOTE — CONSULT NOTE ADULT - PROBLEM SELECTOR RECOMMENDATION 2
initially hypothermic, hypotensive and tachy, started on pressors and lactate of 14.4  strep pneumoniae pna s/p abx

## 2023-09-07 LAB
ANION GAP SERPL CALC-SCNC: 5 MMOL/L — SIGNIFICANT CHANGE UP (ref 5–17)
BUN SERPL-MCNC: 15 MG/DL — SIGNIFICANT CHANGE UP (ref 7–23)
CALCIUM SERPL-MCNC: 7.9 MG/DL — LOW (ref 8.5–10.1)
CHLORIDE SERPL-SCNC: 104 MMOL/L — SIGNIFICANT CHANGE UP (ref 96–108)
CO2 SERPL-SCNC: 26 MMOL/L — SIGNIFICANT CHANGE UP (ref 22–31)
CREAT SERPL-MCNC: 0.96 MG/DL — SIGNIFICANT CHANGE UP (ref 0.5–1.3)
EGFR: 77 ML/MIN/1.73M2 — SIGNIFICANT CHANGE UP
GLUCOSE SERPL-MCNC: 83 MG/DL — SIGNIFICANT CHANGE UP (ref 70–99)
HCT VFR BLD CALC: 26 % — LOW (ref 39–50)
HGB BLD-MCNC: 8.6 G/DL — LOW (ref 13–17)
MCHC RBC-ENTMCNC: 25.8 PG — LOW (ref 27–34)
MCHC RBC-ENTMCNC: 33.1 G/DL — SIGNIFICANT CHANGE UP (ref 32–36)
MCV RBC AUTO: 78.1 FL — LOW (ref 80–100)
NRBC # BLD: 0 /100 WBCS — SIGNIFICANT CHANGE UP (ref 0–0)
PLATELET # BLD AUTO: 355 K/UL — SIGNIFICANT CHANGE UP (ref 150–400)
POTASSIUM SERPL-MCNC: 4.4 MMOL/L — SIGNIFICANT CHANGE UP (ref 3.5–5.3)
POTASSIUM SERPL-SCNC: 4.4 MMOL/L — SIGNIFICANT CHANGE UP (ref 3.5–5.3)
RBC # BLD: 3.33 M/UL — LOW (ref 4.2–5.8)
RBC # FLD: 24.8 % — HIGH (ref 10.3–14.5)
SODIUM SERPL-SCNC: 135 MMOL/L — SIGNIFICANT CHANGE UP (ref 135–145)
WBC # BLD: 3.67 K/UL — LOW (ref 3.8–10.5)
WBC # FLD AUTO: 3.67 K/UL — LOW (ref 3.8–10.5)

## 2023-09-07 PROCEDURE — 99232 SBSQ HOSP IP/OBS MODERATE 35: CPT

## 2023-09-07 PROCEDURE — 99231 SBSQ HOSP IP/OBS SF/LOW 25: CPT

## 2023-09-07 RX ORDER — FERROUS SULFATE 325(65) MG
325 TABLET ORAL DAILY
Refills: 0 | Status: DISCONTINUED | OUTPATIENT
Start: 2023-09-07 | End: 2023-09-12

## 2023-09-07 RX ADMIN — Medication 650 MILLIGRAM(S): at 11:30

## 2023-09-07 RX ADMIN — Medication 325 MILLIGRAM(S): at 13:46

## 2023-09-07 RX ADMIN — BUDESONIDE AND FORMOTEROL FUMARATE DIHYDRATE 2 PUFF(S): 160; 4.5 AEROSOL RESPIRATORY (INHALATION) at 17:15

## 2023-09-07 RX ADMIN — BENZOCAINE AND MENTHOL 1 LOZENGE: 5; 1 LIQUID ORAL at 20:38

## 2023-09-07 RX ADMIN — BENZOCAINE AND MENTHOL 1 LOZENGE: 5; 1 LIQUID ORAL at 17:16

## 2023-09-07 RX ADMIN — PANTOPRAZOLE SODIUM 40 MILLIGRAM(S): 20 TABLET, DELAYED RELEASE ORAL at 06:03

## 2023-09-07 RX ADMIN — AMIODARONE HYDROCHLORIDE 200 MILLIGRAM(S): 400 TABLET ORAL at 05:57

## 2023-09-07 RX ADMIN — BUDESONIDE AND FORMOTEROL FUMARATE DIHYDRATE 2 PUFF(S): 160; 4.5 AEROSOL RESPIRATORY (INHALATION) at 06:04

## 2023-09-07 RX ADMIN — BENZOCAINE AND MENTHOL 1 LOZENGE: 5; 1 LIQUID ORAL at 11:29

## 2023-09-07 RX ADMIN — TIOTROPIUM BROMIDE 2 PUFF(S): 18 CAPSULE ORAL; RESPIRATORY (INHALATION) at 11:24

## 2023-09-07 RX ADMIN — Medication 325 MILLIGRAM(S): at 05:57

## 2023-09-07 NOTE — PROGRESS NOTE ADULT - SUBJECTIVE AND OBJECTIVE BOX
Patient seen and examined  labs reviewed  Was having bm when I first went this am, but per bedside aid there was a lot of toilet paper and she could not see stool  Patient denies complaints    T(C): 36.3 (09-07-23 @ 11:25), Max: 36.3 (09-07-23 @ 11:25)  HR: 54 (09-07-23 @ 11:25) (54 - 58)  BP: 123/66 (09-07-23 @ 11:25) (123/66 - 129/72)  RR: 18 (09-07-23 @ 11:25) (18 - 18)  SpO2: 96% (09-07-23 @ 11:25) (96% - 99%)    NAD  soft NT ND                          8.6    3.67  )-----------( 355      ( 07 Sep 2023 06:33 )             26.0   09-07    135  |  104  |  15  ----------------------------<  83  4.4   |  26  |  0.96    Ca    7.9<L>      07 Sep 2023 06:33    Impression: 88M with severe PAH with hematochezia that was likely outlet/hemorrhoidal vs stercoral colitis. Seems resolved and hgb stable.     Defer endoscopic evaluation at this time  Trend H/H and monitor for recurrent bleeding    Will sign off; please call back with questions

## 2023-09-07 NOTE — CHART NOTE - NSCHARTNOTEFT_GEN_A_CORE
Pt seen for follow-up, adm w/ AMS. Pt w/ Acute blood loss anemia, hemorrhoidal vs. 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 S/P PRBC. 9/6 - S/P Palliative care consult. Denied Golytely.     Factors impacting intake: [ ] none [ ] nausea  [ ] vomiting [ ] diarrhea [ ] constipation  [ ]chewing problems [ ] swallowing issues  [ ] other:     Diet Prescription: Diet, Full Liquid (09-07-23 @ 09:30)  Diet, Regular (09-06-23 @ 16:30)    Intake: Pt has not received his tray yet for breakfast as Full liquid diet was just advanced. Pt will not be having colonoscopy and c/o hunger.  RN to address advancing pt's diet to Easy to Chew w/ Ensure Plus High Protein x 3/day (provides 1050 kcal, 60 g protein). Prior to being NPO, pt had been consuming 50 - 75% of meals.  Denies N/V/D/C    Current Weight: 9/7 - 220.6 (50.2 kg); 8/28 - 114,8 ( 52.1 kg)  % Weight Change - 3.6 % / 1.9 kg loss x 10 Days    No edema noted      Nutrition focused physical exam conducted ;   Subcutaneous fat loss: [severe ] Orbital fat pads region, [x ]Buccal fat region, [severe ]Triceps region,  [severe ]Ribs region.  Muscle wasting: [severe ]Temples region, [ severe]Clavicle region, [severe ]Shoulder region, [x ]Scapula region, [x ]Interosseous region,  [severe ]thigh region, [severe ]Calf region    Pertinent Medications: MEDICATIONS  (STANDING):  aMIOdarone    Tablet 200 milliGRAM(s) Oral daily  budesonide 160 MICROgram(s)/formoterol 4.5 MICROgram(s) Inhaler 2 Puff(s) Inhalation two times a day  ferrous    sulfate 325 milliGRAM(s) Oral three times a day  lactated ringers. 1000 milliLiter(s) (75 mL/Hr) IV Continuous <Continuous>  pantoprazole    Tablet 40 milliGRAM(s) Oral before breakfast  polyethylene glycol 3350 17 Gram(s) Oral two times a day  polyethylene glycol/electrolyte Solution. 4000 milliLiter(s) Oral once  tiotropium 2.5 MICROgram(s) Inhaler 2 Puff(s) Inhalation daily    MEDICATIONS  (PRN):  acetaminophen     Tablet .. 650 milliGRAM(s) Oral every 6 hours PRN Moderate Pain (4 - 6)  albuterol    90 MICROgram(s) HFA Inhaler 2 Puff(s) Inhalation every 6 hours PRN Shortness of Breath and/or Wheezing  aluminum hydroxide/magnesium hydroxide/simethicone Suspension 30 milliLiter(s) Oral every 4 hours PRN Dyspepsia  benzocaine/menthol Lozenge 1 Lozenge Oral four times a day PRN Sore Throat  benzonatate 100 milliGRAM(s) Oral three times a day PRN Cough  diltiazem Injectable 10 milliGRAM(s) IV Push once PRN sustained HR >130  guaiFENesin Oral Liquid (Sugar-Free) 100 milliGRAM(s) Oral every 6 hours PRN Cough    Pertinent Labs: 09-07 Na135 mmol/L Glu 83 mg/dL K+ 4.4 mmol/L Cr  0.96 mg/dL BUN 15 mg/dL 09-03 Phos 3.0 mg/dL 09-03 Alb 1.5 g/dL<L> 09-02 PAB 7 mg/dL<L> 08-23 Chol 78 mg/dL LDL --    HDL 31 mg/dL<L> Trig 84 mg/dL09-03 ALT 29 U/L AST 20 U/L Alkaline Phosphatase 60 U/[id number] @ 14:45 A1C 5.6       CAPILLARY BLOOD GLUCOSE        Skin: Dry & Intact    Estimated Needs:   [x ] no change since previous assessment ( 8/22 )  [ ] recalculated:     Previous Nutrition Diagnosis:   [X ]  Severe Malnutrition in context of chronic illness  Etiology:  Inadequate energy/protein intake related to emphysema/lung disease, SDH, possible social circumstances  Signs & Symptoms:  Physical findings of severe fat depletion & muscle wasting as noted     GOAL:  Pt to consume >50% meals/supplements during LOS - Not being met at this time      Nutrition Diagnosis is [x ] ongoing  [ ] resolved [ ] not applicable     New Nutrition Diagnosis: [x ] not applicable       Interventions:   Recommend  [x ] Change Diet To: Easy to chew  [x ] Nutrition Supplement - Ensure Plus High Protein x 3/day (provides 1050 kcal, 60 g protein)   [ ] Nutrition Support  [ ] Other:     Monitoring and Evaluation:   [x ] PO intake [ x ] Tolerance to diet prescription [ x ] weights [ x ] labs[ x ] follow up per protocol  [ ] other:

## 2023-09-07 NOTE — PROGRESS NOTE ADULT - SUBJECTIVE AND OBJECTIVE BOX
INTERVAL HPI/OVERNIGHT EVENTS:  Pt seen and examined at bedside.     Allergies/Intolerance: No Known Allergies      MEDICATIONS  (STANDING):  aMIOdarone    Tablet 200 milliGRAM(s) Oral daily  budesonide 160 MICROgram(s)/formoterol 4.5 MICROgram(s) Inhaler 2 Puff(s) Inhalation two times a day  ferrous    sulfate 325 milliGRAM(s) Oral three times a day  lactated ringers. 1000 milliLiter(s) (75 mL/Hr) IV Continuous <Continuous>  pantoprazole    Tablet 40 milliGRAM(s) Oral before breakfast  polyethylene glycol 3350 17 Gram(s) Oral two times a day  polyethylene glycol/electrolyte Solution. 4000 milliLiter(s) Oral once  tiotropium 2.5 MICROgram(s) Inhaler 2 Puff(s) Inhalation daily    MEDICATIONS  (PRN):  acetaminophen     Tablet .. 650 milliGRAM(s) Oral every 6 hours PRN Moderate Pain (4 - 6)  albuterol    90 MICROgram(s) HFA Inhaler 2 Puff(s) Inhalation every 6 hours PRN Shortness of Breath and/or Wheezing  aluminum hydroxide/magnesium hydroxide/simethicone Suspension 30 milliLiter(s) Oral every 4 hours PRN Dyspepsia  benzocaine/menthol Lozenge 1 Lozenge Oral four times a day PRN Sore Throat  benzonatate 100 milliGRAM(s) Oral three times a day PRN Cough  diltiazem Injectable 10 milliGRAM(s) IV Push once PRN sustained HR >130  guaiFENesin Oral Liquid (Sugar-Free) 100 milliGRAM(s) Oral every 6 hours PRN Cough        ROS: all systems reviewed and wnl      PHYSICAL EXAMINATION:  Vital Signs Last 24 Hrs  T(C): 36.3 (07 Sep 2023 11:25), Max: 36.3 (07 Sep 2023 11:25)  T(F): 97.4 (07 Sep 2023 11:25), Max: 97.4 (07 Sep 2023 11:25)  HR: 54 (07 Sep 2023 11:25) (54 - 58)  BP: 123/66 (07 Sep 2023 11:25) (123/66 - 129/72)  BP(mean): --  RR: 18 (07 Sep 2023 11:25) (18 - 18)  SpO2: 96% (07 Sep 2023 11:25) (96% - 99%)    Parameters below as of 07 Sep 2023 04:42  Patient On (Oxygen Delivery Method): room air      CAPILLARY BLOOD GLUCOSE          09-06 @ 07:01  -  09-07 @ 07:00  --------------------------------------------------------  IN: 490 mL / OUT: 1600 mL / NET: -1110 mL        GENERAL:   NECK: supple, No JVD  CHEST/LUNG: clear to auscultation bilaterally; no rales, rhonchi, or wheezing b/l  HEART: normal S1, S2  ABDOMEN: BS+, soft, ND, NT   EXTREMITIES:  pulses palpable; no clubbing, cyanosis, or edema b/l LEs  SKIN: no rashes or lesions      LABS:                        8.6    3.67  )-----------( 355      ( 07 Sep 2023 06:33 )             26.0     09-07    135  |  104  |  15  ----------------------------<  83  4.4   |  26  |  0.96    Ca    7.9<L>      07 Sep 2023 06:33        Urinalysis Basic - ( 07 Sep 2023 06:33 )    Color: x / Appearance: x / SG: x / pH: x  Gluc: 83 mg/dL / Ketone: x  / Bili: x / Urobili: x   Blood: x / Protein: x / Nitrite: x   Leuk Esterase: x / RBC: x / WBC x   Sq Epi: x / Non Sq Epi: x / Bacteria: x             INTERVAL HPI/OVERNIGHT EVENTS:  Pt seen and examined at bedside.     Allergies/Intolerance: No Known Allergies      MEDICATIONS  (STANDING):  aMIOdarone    Tablet 200 milliGRAM(s) Oral daily  budesonide 160 MICROgram(s)/formoterol 4.5 MICROgram(s) Inhaler 2 Puff(s) Inhalation two times a day  ferrous    sulfate 325 milliGRAM(s) Oral three times a day  lactated ringers. 1000 milliLiter(s) (75 mL/Hr) IV Continuous <Continuous>  pantoprazole    Tablet 40 milliGRAM(s) Oral before breakfast  polyethylene glycol 3350 17 Gram(s) Oral two times a day  polyethylene glycol/electrolyte Solution. 4000 milliLiter(s) Oral once  tiotropium 2.5 MICROgram(s) Inhaler 2 Puff(s) Inhalation daily    MEDICATIONS  (PRN):  acetaminophen     Tablet .. 650 milliGRAM(s) Oral every 6 hours PRN Moderate Pain (4 - 6)  albuterol    90 MICROgram(s) HFA Inhaler 2 Puff(s) Inhalation every 6 hours PRN Shortness of Breath and/or Wheezing  aluminum hydroxide/magnesium hydroxide/simethicone Suspension 30 milliLiter(s) Oral every 4 hours PRN Dyspepsia  benzocaine/menthol Lozenge 1 Lozenge Oral four times a day PRN Sore Throat  benzonatate 100 milliGRAM(s) Oral three times a day PRN Cough  diltiazem Injectable 10 milliGRAM(s) IV Push once PRN sustained HR >130  guaiFENesin Oral Liquid (Sugar-Free) 100 milliGRAM(s) Oral every 6 hours PRN Cough        ROS: all systems reviewed and wnl      PHYSICAL EXAMINATION:  Vital Signs Last 24 Hrs  T(C): 36.3 (07 Sep 2023 11:25), Max: 36.3 (07 Sep 2023 11:25)  T(F): 97.4 (07 Sep 2023 11:25), Max: 97.4 (07 Sep 2023 11:25)  HR: 54 (07 Sep 2023 11:25) (54 - 58)  BP: 123/66 (07 Sep 2023 11:25) (123/66 - 129/72)  BP(mean): --  RR: 18 (07 Sep 2023 11:25) (18 - 18)  SpO2: 96% (07 Sep 2023 11:25) (96% - 99%)    Parameters below as of 07 Sep 2023 04:42  Patient On (Oxygen Delivery Method): room air      CAPILLARY BLOOD GLUCOSE          09-06 @ 07:01  -  09-07 @ 07:00  --------------------------------------------------------  IN: 490 mL / OUT: 1600 mL / NET: -1110 mL        GENERAL: stable, no CP or SOB  NECK: supple, No JVD  CHEST/LUNG: clear to auscultation bilaterally; no rales, rhonchi, or wheezing b/l  HEART: normal S1, S2  ABDOMEN: BS+, soft, ND, NT   EXTREMITIES:  pulses palpable; no clubbing, cyanosis, or edema b/l LEs  SKIN: no rashes or lesions      LABS:                        8.6    3.67  )-----------( 355      ( 07 Sep 2023 06:33 )             26.0     09-07    135  |  104  |  15  ----------------------------<  83  4.4   |  26  |  0.96    Ca    7.9<L>      07 Sep 2023 06:33        Urinalysis Basic - ( 07 Sep 2023 06:33 )    Color: x / Appearance: x / SG: x / pH: x  Gluc: 83 mg/dL / Ketone: x  / Bili: x / Urobili: x   Blood: x / Protein: x / Nitrite: x   Leuk Esterase: x / RBC: x / WBC x   Sq Epi: x / Non Sq Epi: x / Bacteria: x

## 2023-09-08 PROCEDURE — 93970 EXTREMITY STUDY: CPT | Mod: 26

## 2023-09-08 PROCEDURE — 99497 ADVNCD CARE PLAN 30 MIN: CPT

## 2023-09-08 PROCEDURE — 99232 SBSQ HOSP IP/OBS MODERATE 35: CPT

## 2023-09-08 RX ADMIN — PANTOPRAZOLE SODIUM 40 MILLIGRAM(S): 20 TABLET, DELAYED RELEASE ORAL at 06:19

## 2023-09-08 RX ADMIN — Medication 325 MILLIGRAM(S): at 12:14

## 2023-09-08 RX ADMIN — BUDESONIDE AND FORMOTEROL FUMARATE DIHYDRATE 2 PUFF(S): 160; 4.5 AEROSOL RESPIRATORY (INHALATION) at 21:23

## 2023-09-08 RX ADMIN — AMIODARONE HYDROCHLORIDE 200 MILLIGRAM(S): 400 TABLET ORAL at 12:15

## 2023-09-08 RX ADMIN — Medication 650 MILLIGRAM(S): at 14:43

## 2023-09-08 RX ADMIN — BUDESONIDE AND FORMOTEROL FUMARATE DIHYDRATE 2 PUFF(S): 160; 4.5 AEROSOL RESPIRATORY (INHALATION) at 06:21

## 2023-09-08 RX ADMIN — Medication 650 MILLIGRAM(S): at 14:53

## 2023-09-08 RX ADMIN — TIOTROPIUM BROMIDE 2 PUFF(S): 18 CAPSULE ORAL; RESPIRATORY (INHALATION) at 12:15

## 2023-09-08 RX ADMIN — BENZOCAINE AND MENTHOL 1 LOZENGE: 5; 1 LIQUID ORAL at 10:07

## 2023-09-08 NOTE — CONSULT NOTE ADULT - PROVIDER SPECIALTY LIST ADULT
Cardiology
Critical Care
Intervent Radiology
Gastroenterology
Vascular Surgery
Nephrology
Infectious Disease
Neurology
Pulmonology
Palliative Care

## 2023-09-08 NOTE — PROGRESS NOTE ADULT - ASSESSMENT
# Rectal bleeding - Maroon tinted stool in a patient with recent septic shock. Suspect LGIB, either ischemic colitis vs. stercoral colitis in the setting of constipation as seen on previous CT. Low suspicion for brisk UGIB DDx also includes (with lower suspicion) AVMs, polyps and malignancy. Discussed with daughter, Neena, re: diagnostic colonoscopy vs. conservative management. Patient is at an increased sushant-procedural risk given severe pulmonary hypertension. Given increased risks, overall condition, Neena would like to continue with conservative management and defer an endoscopic evaluation for now, even with the possibility of malignancy - as patient will likely not be a candidate for treatment regardless, which is reasonable. Would concentrate on optimizing bowel movements at this time. Can reconsider endoscopic evaluation pending clinical course.  # Constipation - patient denies constipation but daughter reports he voiced concerns for being constipated  # Severe pulmonary hypertension  # DVT - recommended IVC filter  # Subdural hematoma    Recommendations:  - MiraLax BID  - Please document stool count  - PPI 40mg daily  - trend CBC, CMP, INR  - 2 large bore IVs; active type and screen  - transfuse Hgb > 7, Platelets > 50  - Per discussion with daughter today - possible EGD/colonoscopy pending clinical course and response to laxatives   - Per anesthesia, if considering EGD/colon possible transfer to tertiary center given severe PHT for the procedure  - supportive care as per primary team    Thank you for involving us in the care of this patient. Please reach out if any further questions.    Thomas Weinberg  Gastroenterology    Available on [employer] Teams  [phone]

## 2023-09-08 NOTE — CHART NOTE - NSCHARTNOTEFT_GEN_A_CORE
< from: US Duplex Venous Lower Ext Complete, Bilateral (09.08.23 @ 11:31) >    IMPRESSION:    Persistent left calf DVT confined to the soleal vein. No evidence of   interval clot propagation.    < end of copied text >    LLE venous duplex reviewed. No additional clot propagation since last study.  Recommend repeat duplex in 1 week if patient remains hospitalized.   IR consult noted.

## 2023-09-08 NOTE — CONSULT NOTE ADULT - CONSULT REASON
hypoxia, tachycardia
ANGI
Found unresponsive on street.  Bilateral subdural collections.
Pneumonia
Streptococcus pneumonia bacteremia
Found unconscious, hypothermia, hypotension, ANGI, elevated trops, profound anemia.
Rectal bleeding
IVC filter
assess for IVC filter
GOC

## 2023-09-08 NOTE — CONSULT NOTE ADULT - REASON FOR ADMISSION
found  down

## 2023-09-08 NOTE — CONSULT NOTE ADULT - CONSULT REQUESTED DATE/TIME
01-Sep-2023 12:34
20-Aug-2023 14:14
20-Aug-2023 13:40
06-Sep-2023 14:24
21-Aug-2023 17:49
22-Aug-2023 16:01
20-Aug-2023 08:00
20-Aug-2023 12:55
08-Sep-2023 09:23
23-Aug-2023 18:00

## 2023-09-08 NOTE — CONSULT NOTE ADULT - CONSULT REQUESTED BY NAME
Dr. Sosa
Chinyere Moreland
Dr. Gilman
Primary Team
Dr Marte
Dr. Bell
Dr. Gilman
Dr. Santoyo
BARRY Moreland
medicine

## 2023-09-08 NOTE — PROGRESS NOTE ADULT - ASSESSMENT
89 yo M w/ hx of multiple syncopal episodes presented via EMS for an unwitnessed fall w/ hypotension, hypothermia, and afib w/ RVR concerning for sepsis. He was found to have Strep pneumonia bacteremia w/ evidence of b/l pneumonia and lactic acidosis, iron deficiency anemia, stable sub-dural hematoma, ANGI, and demand ischemia.      Acute blood loss Anemia:  - hx of iron deficiency anemia  - S/P total 6 units PRBC, last on 9/4  - Retic negative, haptoglobin high, LDH high  - peripheral blood smear shows no schistocytes, tear drop cell  - Iron deficient, continue iron supplementation  - Pt had multiple episode of rectal bleed for last few days, LGIB, either hemorrhoidal vs stercoral colitis in the setting of constipation   - Gi consult appreciated  - Pt and family now agreeable  to colonoscopy  - AC held   - PPI  - Miralax for constipation, added Golytely per GI  - Clear liquid  - GI follow up to assess if colonoscopy is indicated , if proceed may need to transfer per anaesthesia for high risk as pt had severe pulm HTN.    Metabolic encephalopathy: L frontal SDH  - 2/2 sepsis  - Now A&Ox3, stable L frontal SDH    Acute hypoxic respiratory failure:  - pneumonia w/ findings c/w emphysema/ILD, weaned off NC on room air, on ipratropium q8h (consider transition to duonebs), chest PT, maintain SpO2 > 92%  - C/w symciort and Spiriva  - albuterol PRN  - Pulm following     Sepsis with septic shock POA:  - With strep pneumo bacteremia 2/2 PNA  - Weaned off norepinephrine  - Initial BC positive for strep, intermediate sensitivity to Rocephin, sensitive to Vanco  - Repeat Bcx negative  - Completed course of Levaquin     Chronic Afib:  - s/p amio bolus and gtt for afib w/ RVR (converted), currently on sinus rthym   - troponin elevation c/w demand ischemia   - Echo with Mildly decreased global left ventricular systolic function. BLAE, Mod - severe MR/TR, severe pHTN  - Not on Ac for anemia and SDH  - Continue with PO amiodarone  - Cardio following     ANGI:  - 2/2 septic ATN  - Cr improving   - Pending serologies for pulm renal syndrome  - Renal following     Acute DVT :  - Not on full dose AC for anemia and SDH  - DVT ppx dose held for GI bleed  - repeat US unchanged  - Vascular follow up, no intervention, pt refused IVC filter    GI ppx  - Protonix    Psych consult appreciated, pt doesn't have  capacity to make decision regarding dc plan.    PT eval: AMADOR   87 yo M w/ hx of multiple syncopal episodes presented via EMS for an unwitnessed fall w/ hypotension, hypothermia, and afib w/ RVR concerning for sepsis. He was found to have Strep pneumonia bacteremia w/ evidence of b/l pneumonia and lactic acidosis, iron deficiency anemia, stable sub-dural hematoma, ANGI, and demand ischemia.      Acute blood loss Anemia:  - hx of iron deficiency anemia  - S/P total 6 units PRBC, last on 9/4  - Retic negative, haptoglobin high, LDH high  - peripheral blood smear shows no schistocytes, tear drop cell  - Iron deficient, continue iron supplementation  - Pt had multiple episode of rectal bleed for last few days, LGIB, either hemorrhoidal vs stercoral colitis in the setting of constipation   - Gi consult appreciated, Pt now decline colonoscopy  - AC held   - PPI  - Miralax for constipation.  Regular diet.     Metabolic encephalopathy: L frontal SDH, stable.     Acute hypoxic respiratory failure: now resolved.  Symbicort and Spiriva  - albuterol PRN. All ABX completed.        Chronic Afib:  - s/p amio bolus and gtt for afib w/ RVR (converted), currently on sinus rthym   - troponin elevation c/w demand ischemia   - Echo with Mildly decreased global left ventricular systolic function. BLAE, Mod - severe MR/TR, severe pHTN  - Not on Ac for anemia and SDH  - Continue with PO amiodarone  - Cardio following, no AC     Acute DVT :  - Not on full dose AC for anemia and SDH  - DVT ppx dose held for GI bleed  - repeat US unchanged  - Vascular follow up, no intervention, pt refused IVC filter    GI ppx  - Protonix    Psych consult appreciated, pt doesn't have  capacity to make decision regarding dc plan.    PT eval: AMADOR   89 yo M w/ hx of multiple syncopal episodes presented via EMS for an unwitnessed fall w/ hypotension, hypothermia, and afib w/ RVR concerning for sepsis. He was found to have Strep pneumonia bacteremia w/ evidence of b/l pneumonia and lactic acidosis, iron deficiency anemia, stable sub-dural hematoma, ANGI, and demand ischemia.      Acute blood loss Anemia:  - hx of iron deficiency anemia, S/P total 6 units PRBC, last on 9/4  - Iron deficient, continue iron supplementation daily  - Pt had multiple episode of rectal bleed for last few days, LGIB, either hemorrhoidal vs stercoral colitis in the setting of constipation   - Gi consult appreciated, Pt now decline colonoscopy  - AC held, PPI  - Miralax for constipation.  Regular diet.     Metabolic encephalopathy: L frontal SDH, stable.     Acute hypoxic respiratory failure: now resolved.  Symbicort and Spiriva, Albuterol PRN. All ABX completed.        Chronic Afib: s/p amio bolus and gtt for afib w/ RVR (converted), currently on sinus rthym   - troponin elevation c/w demand ischemia   - Echo with Mildly decreased global left ventricular systolic function. BLAE, Mod - severe MR/TR, severe pHTN  - Not on Ac for anemia and SDH  - Continue with PO amiodarone  - Cardio following, no AC     Acute DVT :  - Not on full dose AC for anemia and SDH  - DVT ppx dose held for GI bleed  - repeat US today. Has refused IVC filter in the past.     Psych consult appreciated, pt doesn't have  capacity to make decision regarding dc plan.    PT eval: AMADOR    Discussed with proxy Ricky [phone].  Await repeat LE venous dopplers.

## 2023-09-08 NOTE — PROGRESS NOTE ADULT - SUBJECTIVE AND OBJECTIVE BOX
Interval Events:   - No acute events  - Denies any blood in stools  - H/H stable    Allergies:  No Known Allergies        Hospital Medications:  acetaminophen     Tablet .. 650 milliGRAM(s) Oral every 6 hours PRN  albuterol    90 MICROgram(s) HFA Inhaler 2 Puff(s) Inhalation every 6 hours PRN  aMIOdarone    Tablet 200 milliGRAM(s) Oral daily  budesonide 160 MICROgram(s)/formoterol 4.5 MICROgram(s) Inhaler 2 Puff(s) Inhalation two times a day  ferrous    sulfate 325 milliGRAM(s) Oral daily  pantoprazole    Tablet 40 milliGRAM(s) Oral before breakfast  polyethylene glycol 3350 17 Gram(s) Oral two times a day  tiotropium 2.5 MICROgram(s) Inhaler 2 Puff(s) Inhalation daily      PMHX/PSHX:  No pertinent past medical history        Family history:      ROS: As per HPI, otherwise 14-point ROS reviewed and negative.      PHYSICAL EXAM:   Vital Signs:  Vital Signs Last 24 Hrs  T(C): 36.8 (08 Sep 2023 10:18), Max: 36.9 (07 Sep 2023 23:37)  T(F): 98.2 (08 Sep 2023 10:18), Max: 98.4 (07 Sep 2023 23:37)  HR: 74 (08 Sep 2023 10:18) (53 - 74)  BP: 120/61 (08 Sep 2023 10:18) (100/51 - 120/61)  BP(mean): --  RR: 18 (08 Sep 2023 10:18) (18 - 18)  SpO2: 100% (08 Sep 2023 04:24) (98% - 100%)    Parameters below as of 08 Sep 2023 10:18    O2 Flow (L/min): 98    Daily     Daily       09-07-23 @ 07:01  -  09-08-23 @ 07:00  --------------------------------------------------------  IN: 240 mL / OUT: 1600 mL / NET: -1360 mL    09-08-23 @ 07:01  -  09-08-23 @ 14:50  --------------------------------------------------------  IN: 320 mL / OUT: 0 mL / NET: 320 mL        GENERAL:  No acute distress  HEENT:  Normocephalic/atraumtic,  no scleral icterus  CHEST:  No accessory muscle use  HEART:  Regular rate and rhythm  ABDOMEN:  Soft, non-tender, non-distended  EXTREMITIES:  No cyanosis, clubbing, or edema  SKIN:  No rash  NEURO:  Alert and oriented x 3, no asterixis, no tremor    LABS:                        8.6    3.67  )-----------( 355      ( 07 Sep 2023 06:33 )             26.0       09-07    135  |  104  |  15  ----------------------------<  83  4.4   |  26  |  0.96    Ca    7.9<L>      07 Sep 2023 06:33          Urinalysis Basic - ( 07 Sep 2023 06:33 )    Color: x / Appearance: x / SG: x / pH: x  Gluc: 83 mg/dL / Ketone: x  / Bili: x / Urobili: x   Blood: x / Protein: x / Nitrite: x   Leuk Esterase: x / RBC: x / WBC x   Sq Epi: x / Non Sq Epi: x / Bacteria: x                              8.6    3.67  )-----------( 355      ( 07 Sep 2023 06:33 )             26.0                         8.3    3.51  )-----------( 195      ( 06 Sep 2023 05:56 )             26.5       Imaging:

## 2023-09-08 NOTE — CHART NOTE - NSCHARTNOTESELECT_GEN_ALL_CORE
AMIO INFILTRATION/Event Note
ICU Downgrade Note/Transfer Note
Nutrition Services
POCUS/Event Note
Nutrition Services
Progress Note
Screen
Vascular surgery/Event Note

## 2023-09-08 NOTE — PROGRESS NOTE ADULT - CONVERSATION DETAILS
Spoke with patient about completing a HCP form.  He said he would prefer to wait till his daughter comes to visit, likely later today.  He was agreeable to leaving the HCP at the bedside so he could ziyad it out later.  He said he has to think about who he would appoint as his decision maker.  Encouraged that whomever he appoints, he share his wishes regarding LST such as intubation and CPR.  Reviewed how to complete HCP with patient and left blank HCP at bedside.
Went to patient's bedside, he was agreeable to me speaking with his daughter, Neena again.  Asked about completing a HCP and he asked that we do so at another time since he was eating.  Called and spoke with patient's daughter, Neena and informed her of my visit with her dad today.  Will attempt completing a HCP when patient agreeable and discussed that it would be important to ascertain her dad's wishes regarding LST such as breathing tubes and CPR prior to an emergency.   She said she did not think he would want those things, but will discuss further.  She said she is at the store right now, but will call tomorrow.

## 2023-09-08 NOTE — CONSULT NOTE ADULT - SUBJECTIVE AND OBJECTIVE BOX
Interventional Radiology    Evaluate for Procedure: IVC filter    HPI: 89 yo M w/ hx of multiple syncopal episodes presented via EMS for an unwitnessed fall w/ hypotension, hypothermia, and afib w/ RVR concerning for sepsis. He was found to have Strep pneumonia bacteremia w/ evidence of b/l pneumonia and lactic acidosis, iron deficiency anemia, stable sub-dural hematoma, ANGI, and demand ischemia. US venous duplex 8/29 showing acute below knee DVT in left soleal vein. Pt not tolerating DVT ppx, had multiple episode of rectal bleed for last few days, LGIB, either hemorrhoidal vs stercoral colitis. S/P total 6 units PRBC, last on 9/4. GI considering colonoscopy. IR consulted for IVC filter.     Allergies: No Known Allergies    Medications (Abx/Cardiac/Anticoagulation/Blood Products)  aMIOdarone    Tablet: 200 milliGRAM(s) Oral (09-07 @ 05:57)    Data:    T(C): 36.4  HR: 54  BP: 100/51  RR: 18  SpO2: 100%    -WBC 3.67 / HgB 8.6 / Hct 26.0 / Plt 355  -Na 135 / Cl 104 / BUN 15 / Glucose 83  -K 4.4 / CO2 26 / Cr 0.96  -ALT -- / Alk Phos -- / T.Bili --  -INR 1.08 / PTT 30.8    Radiology: Reviewed with Dr. Nevarez  < from: US Duplex Venous Lower Ext Complete, Bilateral (08.29.23 @ 10:53) >  FINDINGS:    RIGHT:  Normal compressibility of the RIGHT common femoral, femoral and popliteal   veins.  Doppler examination shows normal spontaneous and phasic flow.  No RIGHT calf vein thrombosis is detected.    LEFT:  Normal compressibility of the LEFT common femoral, femoral and popliteal   veins.  Doppler examination shows normal spontaneous and phasic flow.  There is acute left soleal vein deep vein thrombosis, unchanged as   compared with the prior study. Please note previously the clot was   erroneously presumed to be in the posterior tibial vein. The rest of the   visualized calf veins are unremarkable.    IMPRESSION:  Acute deep venous thrombosis: below the knee.    Acute left soleal vein deep vein thrombosis, unchanged.        --- End of Report ---    < end of copied text >      Assessment/Plan:   -89 yo M w/ hx of multiple syncopal episodes presented via EMS for an unwitnessed fall w/ hypotension, hypothermia, and afib w/ RVR concerning for sepsis. He was found to have Strep pneumonia bacteremia w/ evidence of b/l pneumonia and lactic acidosis, iron deficiency anemia, stable sub-dural hematoma, ANGI, and demand ischemia. US venous duplex 8/29 showing acute below knee DVT in left soleal vein. Pt not tolerating DVT ppx, had multiple episode of rectal bleed for last few days, LGIB, either hemorrhoidal vs stercoral colitis. S/P total 6 units PRBC, last on 9/4. GI considering colonoscopy. IR consulted for IVC filter.       - Case and imaging reviewed with attending Dr. Nevarez  - Acute DVT is below the knee, low risk of PE.   - IVC filter can also be nidus for thrombus formation, not indicated for below the knee DVT.   - Recommending continued conservative management and serial venous duplex to monitor clot propagation.   - If repeat duplex shows DVT propagation above the knee,  may consider IVCF.   - IR to sign off, please reconsult PRN.   - d/w primary team

## 2023-09-08 NOTE — PROGRESS NOTE ADULT - SUBJECTIVE AND OBJECTIVE BOX
INTERVAL HPI/OVERNIGHT EVENTS:  Pt seen and examined at bedside.     Allergies/Intolerance: No Known Allergies      MEDICATIONS  (STANDING):  aMIOdarone    Tablet 200 milliGRAM(s) Oral daily  budesonide 160 MICROgram(s)/formoterol 4.5 MICROgram(s) Inhaler 2 Puff(s) Inhalation two times a day  ferrous    sulfate 325 milliGRAM(s) Oral daily  pantoprazole    Tablet 40 milliGRAM(s) Oral before breakfast  polyethylene glycol 3350 17 Gram(s) Oral two times a day  tiotropium 2.5 MICROgram(s) Inhaler 2 Puff(s) Inhalation daily    MEDICATIONS  (PRN):  acetaminophen     Tablet .. 650 milliGRAM(s) Oral every 6 hours PRN Moderate Pain (4 - 6)  albuterol    90 MICROgram(s) HFA Inhaler 2 Puff(s) Inhalation every 6 hours PRN Shortness of Breath and/or Wheezing  benzocaine/menthol Lozenge 1 Lozenge Oral four times a day PRN Sore Throat  diltiazem Injectable 10 milliGRAM(s) IV Push once PRN sustained HR >130        ROS: all systems reviewed and wnl      PHYSICAL EXAMINATION:  Vital Signs Last 24 Hrs  T(C): 36.8 (08 Sep 2023 10:18), Max: 36.9 (07 Sep 2023 23:37)  T(F): 98.2 (08 Sep 2023 10:18), Max: 98.4 (07 Sep 2023 23:37)  HR: 74 (08 Sep 2023 10:18) (53 - 74)  BP: 120/61 (08 Sep 2023 10:18) (100/51 - 123/66)  BP(mean): --  RR: 18 (08 Sep 2023 10:18) (18 - 18)  SpO2: 100% (08 Sep 2023 04:24) (96% - 100%)    Parameters below as of 08 Sep 2023 10:18    O2 Flow (L/min): 98    CAPILLARY BLOOD GLUCOSE          09-07 @ 07:01  -  09-08 @ 07:00  --------------------------------------------------------  IN: 240 mL / OUT: 1600 mL / NET: -1360 mL    09-08 @ 07:01  -  09-08 @ 10:28  --------------------------------------------------------  IN: 320 mL / OUT: 0 mL / NET: 320 mL        GENERAL: in bed, tired, no CP or SOB, no leg pain.   NECK: supple, No JVD  CHEST/LUNG: clear to auscultation bilaterally; no rales, rhonchi, or wheezing b/l  HEART: normal S1, S2  ABDOMEN: BS+, soft, ND, NT   EXTREMITIES:  pulses palpable; no clubbing, cyanosis, or edema b/l LEs  SKIN: no rashes or lesions      LABS:                        8.6    3.67  )-----------( 355      ( 07 Sep 2023 06:33 )             26.0     09-07    135  |  104  |  15  ----------------------------<  83  4.4   |  26  |  0.96    Ca    7.9<L>      07 Sep 2023 06:33        Urinalysis Basic - ( 07 Sep 2023 06:33 )    Color: x / Appearance: x / SG: x / pH: x  Gluc: 83 mg/dL / Ketone: x  / Bili: x / Urobili: x   Blood: x / Protein: x / Nitrite: x   Leuk Esterase: x / RBC: x / WBC x   Sq Epi: x / Non Sq Epi: x / Bacteria: x

## 2023-09-09 PROCEDURE — 99232 SBSQ HOSP IP/OBS MODERATE 35: CPT

## 2023-09-09 PROCEDURE — 93010 ELECTROCARDIOGRAM REPORT: CPT

## 2023-09-09 RX ORDER — BENZOCAINE AND MENTHOL 5; 1 G/100ML; G/100ML
1 LIQUID ORAL EVERY 4 HOURS
Refills: 0 | Status: DISCONTINUED | OUTPATIENT
Start: 2023-09-09 | End: 2023-09-12

## 2023-09-09 RX ORDER — SODIUM CHLORIDE 9 MG/ML
1000 INJECTION INTRAMUSCULAR; INTRAVENOUS; SUBCUTANEOUS
Refills: 0 | Status: DISCONTINUED | OUTPATIENT
Start: 2023-09-09 | End: 2023-09-10

## 2023-09-09 RX ADMIN — BENZOCAINE AND MENTHOL 1 LOZENGE: 5; 1 LIQUID ORAL at 13:53

## 2023-09-09 RX ADMIN — SODIUM CHLORIDE 100 MILLILITER(S): 9 INJECTION INTRAMUSCULAR; INTRAVENOUS; SUBCUTANEOUS at 18:25

## 2023-09-09 RX ADMIN — BUDESONIDE AND FORMOTEROL FUMARATE DIHYDRATE 2 PUFF(S): 160; 4.5 AEROSOL RESPIRATORY (INHALATION) at 18:01

## 2023-09-09 RX ADMIN — TIOTROPIUM BROMIDE 2 PUFF(S): 18 CAPSULE ORAL; RESPIRATORY (INHALATION) at 11:25

## 2023-09-09 RX ADMIN — BUDESONIDE AND FORMOTEROL FUMARATE DIHYDRATE 2 PUFF(S): 160; 4.5 AEROSOL RESPIRATORY (INHALATION) at 05:30

## 2023-09-09 RX ADMIN — BENZOCAINE AND MENTHOL 1 LOZENGE: 5; 1 LIQUID ORAL at 18:02

## 2023-09-09 RX ADMIN — Medication 325 MILLIGRAM(S): at 11:24

## 2023-09-09 RX ADMIN — PANTOPRAZOLE SODIUM 40 MILLIGRAM(S): 20 TABLET, DELAYED RELEASE ORAL at 05:28

## 2023-09-09 RX ADMIN — AMIODARONE HYDROCHLORIDE 200 MILLIGRAM(S): 400 TABLET ORAL at 05:29

## 2023-09-09 NOTE — PROGRESS NOTE ADULT - SUBJECTIVE AND OBJECTIVE BOX
INTERVAL HPI/OVERNIGHT EVENTS:  Pt seen and examined at bedside.     Allergies/Intolerance: No Known Allergies      MEDICATIONS  (STANDING):  aMIOdarone    Tablet 200 milliGRAM(s) Oral daily  budesonide 160 MICROgram(s)/formoterol 4.5 MICROgram(s) Inhaler 2 Puff(s) Inhalation two times a day  ferrous    sulfate 325 milliGRAM(s) Oral daily  pantoprazole    Tablet 40 milliGRAM(s) Oral before breakfast  polyethylene glycol 3350 17 Gram(s) Oral two times a day  tiotropium 2.5 MICROgram(s) Inhaler 2 Puff(s) Inhalation daily    MEDICATIONS  (PRN):  acetaminophen     Tablet .. 650 milliGRAM(s) Oral every 6 hours PRN Moderate Pain (4 - 6)  albuterol    90 MICROgram(s) HFA Inhaler 2 Puff(s) Inhalation every 6 hours PRN Shortness of Breath and/or Wheezing        ROS: all systems reviewed and wnl      PHYSICAL EXAMINATION:  Vital Signs Last 24 Hrs  T(C): 37.2 (09 Sep 2023 04:42), Max: 37.2 (09 Sep 2023 04:42)  T(F): 98.9 (09 Sep 2023 04:42), Max: 98.9 (09 Sep 2023 04:42)  HR: 77 (09 Sep 2023 04:42) (70 - 78)  BP: 105/53 (09 Sep 2023 04:42) (105/53 - 120/61)  BP(mean): --  RR: 17 (09 Sep 2023 04:42) (17 - 18)  SpO2: 98% (09 Sep 2023 04:42) (96% - 98%)    Parameters below as of 09 Sep 2023 04:42  Patient On (Oxygen Delivery Method): room air      CAPILLARY BLOOD GLUCOSE          09-08 @ 07:01  -  09-09 @ 07:00  --------------------------------------------------------  IN: 440 mL / OUT: 700 mL / NET: -260 mL        GENERAL:   NECK: supple, No JVD  CHEST/LUNG: clear to auscultation bilaterally; no rales, rhonchi, or wheezing b/l  HEART: normal S1, S2  ABDOMEN: BS+, soft, ND, NT   EXTREMITIES:  pulses palpable; no clubbing, cyanosis, or edema b/l LEs  SKIN: no rashes or lesions      LABS:

## 2023-09-09 NOTE — PROGRESS NOTE ADULT - SUBJECTIVE AND OBJECTIVE BOX
INTERVAL HPI:  89 yo M with multiple syncopal episodes per chart notes,  on no home meds   Brought by EMS after being found down on street minimally responsive (8/20/23).   In the ED, was hypotensive, hypothermic (rectal T 90F), and tachycardic (afib w/ RVR).   Labs were remarkable for anemia (Hgb 6.1), leukocytosis w/ 25% band, lactic acidosis (lactate 14.4), ANGI, and elevated troponin   Got treated w/ vanc/zosyn, fluids, 1U pRBCs, IV amiodarone bolus (resolved arrhythmia), and a non-rebreather mask.   CT imaging showed an age-indeterminate L frontal sub-dural hematoma (4mm) and lung findings with pneumonia and emphysema/ILD.   RVP was entero/rhinovirus positive.   He was switched to ceftriaxone/azithromycin, was started on ipratropium nebulizers, and got  admitted to the ICU for new pressor requirement (norepinephrine).    During his ICU course, his blood cultures grew Strep pneumonia (on appropriate abx), lower extremity duplex study showed L tibioperoneal trunk and proximal posterior tibial vein DVTs (on heparin ppx), norepinephrine was weaned off w/ adequate MAPs,   Repeat CT head showed stable sub-dural hematoma, and iron supplementation was started.   08/21/23:  Got transferred to regular medical floor.  08/22/23:  At time of my evaluation, awake, responsive. Denies SOB, cough, sputum production or chest pain.  Admits being a smoker but says quit about a year ago.  Again in rapid A Fib and being transferred to monitor bed. Seen by Cardiology.    OVERNIGHT EVENTS:  Awake, responsive and comfortable    Vital Signs Last 24 Hrs  T(C): 36.4 (09 Sep 2023 11:12), Max: 37.2 (09 Sep 2023 04:42)  T(F): 97.6 (09 Sep 2023 11:12), Max: 98.9 (09 Sep 2023 04:42)  HR: 133 (09 Sep 2023 11:12) (70 - 133)  BP: 106/60 (09 Sep 2023 11:12) (105/53 - 107/51)  BP(mean): --  RR: 19 (09 Sep 2023 11:12) (17 - 19)  SpO2: 98% (09 Sep 2023 11:12) (96% - 98%)    Parameters below as of 09 Sep 2023 11:12  Patient On (Oxygen Delivery Method): room air    PHYSICAL EXAM:  GEN:         Awake, responsive and comfortable.  HEENT:    Normal.    RESP:      no distress  CVS:          Regular rate and rhythm.     MEDICATIONS  (STANDING):  aMIOdarone    Tablet 200 milliGRAM(s) Oral daily  budesonide 160 MICROgram(s)/formoterol 4.5 MICROgram(s) Inhaler 2 Puff(s) Inhalation two times a day  ferrous    sulfate 325 milliGRAM(s) Oral daily  pantoprazole    Tablet 40 milliGRAM(s) Oral before breakfast  polyethylene glycol 3350 17 Gram(s) Oral two times a day  tiotropium 2.5 MICROgram(s) Inhaler 2 Puff(s) Inhalation daily    MEDICATIONS  (PRN):  acetaminophen     Tablet .. 650 milliGRAM(s) Oral every 6 hours PRN Moderate Pain (4 - 6)  albuterol    90 MICROgram(s) HFA Inhaler 2 Puff(s) Inhalation every 6 hours PRN Shortness of Breath and/or Wheezing  benzocaine/menthol Lozenge 1 Lozenge Oral every 4 hours PRN Sore Throat    ASSESSMENT AND PLAN:  Multifocal pneumonia.  S/P Septic shock.  Strep Pneumo Bacteremia.  Leukocytosis.  A Fib with RVR.  Anemia.  Renal Insuffiencey.  Hypokalemia.  Left peroneal+ post tibial DVT.    SPO2 in high 90s on room air.  Continue antibiotic per ID.  Being transferred to monitor bed for rapid A Fib.  Not a candidate for full anticoagulation due to SDH, and anemia requiring PRBC transfusion.  Consider Vascular evaluation for DVT.  56+ minutes spent.    08/23/23:   Awake, responsive. Complains of stomach discomfort and at times with difficult swallowing. SPO2 stable on room air.  Converted to sinus rhythm, still on Amiodarone drip.  Continue antibiotics per ID.    08/24/23:  Resting comfortably, no SOB or distress. SPO2 98% .  Converted to sinus rhythm, changed to PO Amiodarone.  Continue Vancomycin per ID.  Will change to adult dose Symbicort.    08/25/23: Awake, responsive and comfortable. SPO2 97%, no distress.  Complains of soreness in throat. Will add Cepacol lozenges.    08/26/23:  Complains of soreness in throat. Will start on Cepacol lozenges.  Getting PRBC transfusion    On Symbicort and Spiriva.  antibiotics per ID    08/27/23: Resting comfortably, no distress. SPO2 96%.  Continue treatment.    08/28/23:  Awake, responsive and comfortable.  SPO2 98%.  On Symbicort and Levaquin.    08/29/23: Weak looking, denies SOB. SPO2 97%   Over all pulmonary status close to base line.  Continue treatment.    08/30/23: Resting comfortably in bed, no distress observed.  SPO2 95% on nasal O2.  Continue treatment.  Vascular surgery follow up noted.    08/31/23: Awake, responsive and comfortable. Says breathing is up and down.  SPO2 98% on nasal O2.  Complete antibiotic per ID.  Follow H & H.    09/01/23:  Continues to complain of sore throat.   Reported by RN  to have some rectal bleed  overnight  Again getting PRBC transfusion.  Seen by GI but pt/family does not want any procedure. Before has refused IVC filter.  SPO2 96% on nasal O2.  On Levaquin per ID.    09/02/23:  Resting comfortably in bed. RN reports rectal bleed. Pt has refused any invasive intervention.  SPO2 97% on nasal O2.  Consider palliative care evaluation.    09/03/23: Awake, responsive, comfortable and feels better.  SPO2 93% on room air.  Oxygenation status stable.  Supportive care.    09/04/23: Continue to have dark BM, drop in H & H noted. Got one unit of blood.  SPO2 stable.  Continue Symbicort and Spiriva.    09/05/23:  Still with bloody BM, GI follow up noted. There is possibility that pt may have EGD/Colonoscopy.  Breathing status remains stable.  SPO2 97% on room air.  On Symbicort, Spiriva and as needed Albuterol.    09/06/23:  Awake, responsive and comfortable., wants to eat real food.  Oxygenation status stable. Continue Symbicort and Spiriva.  Did not take preparation for EGD/Colonoscopy.    09/09/23: Awake, responsive and comfortable. SPO2 stable on room air.  Continue SYMBICORT and Spiriva.

## 2023-09-09 NOTE — PROGRESS NOTE ADULT - ASSESSMENT
87 yo M w/ hx of multiple syncopal episodes presented via EMS for an unwitnessed fall w/ hypotension, hypothermia, and afib w/ RVR concerning for sepsis. He was found to have Strep pneumonia bacteremia w/ evidence of b/l pneumonia and lactic acidosis, iron deficiency anemia, stable sub-dural hematoma, ANGI, and demand ischemia.      Acute blood loss Anemia:  - hx of iron deficiency anemia, S/P total 6 units PRBC, last on 9/4  - Iron deficient, continue iron supplementation daily  - Pt had multiple episode of rectal bleed for last few days, LGIB, either hemorrhoidal vs stercoral colitis in the setting of constipation   - Gi consult appreciated, Pt now decline colonoscopy  - AC held, PPI  - Miralax for constipation.  Regular diet.     Metabolic encephalopathy: L frontal SDH, stable.     Acute hypoxic respiratory failure: now resolved.  Symbicort and Spiriva, Albuterol PRN. All ABX completed.        Chronic Afib: s/p amio bolus and gtt for afib w/ RVR (converted), currently on sinus rthym   - troponin elevation c/w demand ischemia   - Echo with Mildly decreased global left ventricular systolic function. BLAE, Mod - severe MR/TR, severe pHTN  - Not on Ac for anemia and SDH  - Continue with PO amiodarone  - Cardio following, no AC     Acute DVT :  - Not on full dose AC for anemia and SDH  - DVT ppx dose held for GI bleed  - repeat US today. Has refused IVC filter in the past.     Psych consult appreciated, pt doesn't have  capacity to make decision regarding dc plan.    PT eval: [facility]    Discussed with proxy Ricky [phone].  Await repeat LE venous dopplers.     87 yo M w/ hx of multiple syncopal episodes presented via EMS for an unwitnessed fall w/ hypotension, hypothermia, and afib w/ RVR concerning for sepsis. He was found to have Strep pneumonia bacteremia w/ evidence of b/l pneumonia and lactic acidosis, iron deficiency anemia, stable sub-dural hematoma, ANGI, and demand ischemia.      Acute blood loss Anemia:  - hx of iron deficiency anemia, S/P total 6 units PRBC, last on 9/4  - Iron deficient, continue iron supplementation daily  - Pt had multiple episode of rectal bleed for last few days, LGIB, either hemorrhoidal vs stercoral colitis in the setting of constipation   - Gi consult appreciated, Pt now decline colonoscopy  - AC held, PPI  - Miralax for constipation.  Regular diet.     Metabolic encephalopathy: L frontal SDH, stable.     Acute hypoxic respiratory failure: now resolved.  Symbicort and Spiriva, Albuterol PRN. All ABX completed.        Chronic Afib: s/p amio bolus and gtt for afib w/ RVR (converted), currently on sinus rthym   - troponin elevation c/w demand ischemia, Echo with Mildly decreased global left ventricular systolic function. BLAE, Mod - severe MR/TR, severe pHTN  - Not on Ac for anemia and SDH  - Continue with PO amiodarone  - Cardio following, no AC     Acute DVT :  - Not on full dose AC for anemia and SDH  - DVT ppx dose held for GI bleed  - repeat US today. Has refused IVC filter in the past.     Psych consult appreciated, pt doesn't have  capacity to make decision regarding dc plan.    PT eval: [facility]    Discussed with proxy Ricky [phone].  LE venous dopplers no clot progression.     No need for IVC filter.

## 2023-09-10 LAB
ANION GAP SERPL CALC-SCNC: 6 MMOL/L — SIGNIFICANT CHANGE UP (ref 5–17)
BUN SERPL-MCNC: 16 MG/DL — SIGNIFICANT CHANGE UP (ref 7–23)
CALCIUM SERPL-MCNC: 7.8 MG/DL — LOW (ref 8.5–10.1)
CHLORIDE SERPL-SCNC: 108 MMOL/L — SIGNIFICANT CHANGE UP (ref 96–108)
CO2 SERPL-SCNC: 24 MMOL/L — SIGNIFICANT CHANGE UP (ref 22–31)
CREAT SERPL-MCNC: 1.01 MG/DL — SIGNIFICANT CHANGE UP (ref 0.5–1.3)
EGFR: 72 ML/MIN/1.73M2 — SIGNIFICANT CHANGE UP
GLUCOSE SERPL-MCNC: 90 MG/DL — SIGNIFICANT CHANGE UP (ref 70–99)
HCT VFR BLD CALC: 24.1 % — LOW (ref 39–50)
HGB BLD-MCNC: 7.4 G/DL — LOW (ref 13–17)
MCHC RBC-ENTMCNC: 25.3 PG — LOW (ref 27–34)
MCHC RBC-ENTMCNC: 30.7 G/DL — LOW (ref 32–36)
MCV RBC AUTO: 82.3 FL — SIGNIFICANT CHANGE UP (ref 80–100)
NRBC # BLD: 0 /100 WBCS — SIGNIFICANT CHANGE UP (ref 0–0)
PLATELET # BLD AUTO: 348 K/UL — SIGNIFICANT CHANGE UP (ref 150–400)
POTASSIUM SERPL-MCNC: 4.3 MMOL/L — SIGNIFICANT CHANGE UP (ref 3.5–5.3)
POTASSIUM SERPL-SCNC: 4.3 MMOL/L — SIGNIFICANT CHANGE UP (ref 3.5–5.3)
RBC # BLD: 2.93 M/UL — LOW (ref 4.2–5.8)
RBC # FLD: 25.2 % — HIGH (ref 10.3–14.5)
SODIUM SERPL-SCNC: 138 MMOL/L — SIGNIFICANT CHANGE UP (ref 135–145)
WBC # BLD: 8.66 K/UL — SIGNIFICANT CHANGE UP (ref 3.8–10.5)
WBC # FLD AUTO: 8.66 K/UL — SIGNIFICANT CHANGE UP (ref 3.8–10.5)

## 2023-09-10 PROCEDURE — 99232 SBSQ HOSP IP/OBS MODERATE 35: CPT

## 2023-09-10 RX ADMIN — BENZOCAINE AND MENTHOL 1 LOZENGE: 5; 1 LIQUID ORAL at 16:18

## 2023-09-10 RX ADMIN — BUDESONIDE AND FORMOTEROL FUMARATE DIHYDRATE 2 PUFF(S): 160; 4.5 AEROSOL RESPIRATORY (INHALATION) at 05:43

## 2023-09-10 RX ADMIN — SODIUM CHLORIDE 100 MILLILITER(S): 9 INJECTION INTRAMUSCULAR; INTRAVENOUS; SUBCUTANEOUS at 04:30

## 2023-09-10 RX ADMIN — PANTOPRAZOLE SODIUM 40 MILLIGRAM(S): 20 TABLET, DELAYED RELEASE ORAL at 05:41

## 2023-09-10 RX ADMIN — TIOTROPIUM BROMIDE 2 PUFF(S): 18 CAPSULE ORAL; RESPIRATORY (INHALATION) at 14:07

## 2023-09-10 RX ADMIN — AMIODARONE HYDROCHLORIDE 200 MILLIGRAM(S): 400 TABLET ORAL at 05:41

## 2023-09-10 RX ADMIN — Medication 325 MILLIGRAM(S): at 14:07

## 2023-09-10 RX ADMIN — BUDESONIDE AND FORMOTEROL FUMARATE DIHYDRATE 2 PUFF(S): 160; 4.5 AEROSOL RESPIRATORY (INHALATION) at 17:54

## 2023-09-10 NOTE — PROGRESS NOTE ADULT - SUBJECTIVE AND OBJECTIVE BOX
INTERVAL HPI:   89 yo M with multiple syncopal episodes per chart notes,  on no home meds   Brought by EMS after being found down on street minimally responsive (8/20/23).   In the ED, was hypotensive, hypothermic (rectal T 90F), and tachycardic (afib w/ RVR).   Labs were remarkable for anemia (Hgb 6.1), leukocytosis w/ 25% band, lactic acidosis (lactate 14.4), ANGI, and elevated troponin   Got treated w/ vanc/zosyn, fluids, 1U pRBCs, IV amiodarone bolus (resolved arrhythmia), and a non-rebreather mask.   CT imaging showed an age-indeterminate L frontal sub-dural hematoma (4mm) and lung findings with pneumonia and emphysema/ILD.   RVP was entero/rhinovirus positive.   He was switched to ceftriaxone/azithromycin, was started on ipratropium nebulizers, and got  admitted to the ICU for new pressor requirement (norepinephrine).    During his ICU course, his blood cultures grew Strep pneumonia (on appropriate abx), lower extremity duplex study showed L tibioperoneal trunk and proximal posterior tibial vein DVTs (on heparin ppx), norepinephrine was weaned off w/ adequate MAPs,   Repeat CT head showed stable sub-dural hematoma, and iron supplementation was started.   08/21/23:  Got transferred to regular medical floor.  08/22/23:  At time of my evaluation, awake, responsive. Denies SOB, cough, sputum production or chest pain.  Admits being a smoker but says quit about a year ago.  Again in rapid A Fib and being transferred to monitor bed. Seen by Cardiology.    OVERNIGHT EVENTS:  Comfortable in bed, no distress. SPO2 96%.    Vital Signs Last 24 Hrs  T(C): 37.4 (10 Sep 2023 16:11), Max: 37.4 (10 Sep 2023 16:11)  T(F): 99.3 (10 Sep 2023 16:11), Max: 99.3 (10 Sep 2023 16:11)  HR: 65 (10 Sep 2023 16:11) (65 - 81)  BP: 116/57 (10 Sep 2023 16:11) (104/60 - 119/74)  BP(mean): --  RR: 18 (10 Sep 2023 16:11) (18 - 18)  SpO2: 96% (10 Sep 2023 16:11) (96% - 98%)    Parameters below as of 10 Sep 2023 10:58  Patient On (Oxygen Delivery Method): room air    PHYSICAL EXAM:  GEN:        comfortable.  HEENT:    Normal.    RESP:      no distress  CVS:         Regular rate and rhythm.     MEDICATIONS  (STANDING):  aMIOdarone    Tablet 200 milliGRAM(s) Oral daily  budesonide 160 MICROgram(s)/formoterol 4.5 MICROgram(s) Inhaler 2 Puff(s) Inhalation two times a day  ferrous    sulfate 325 milliGRAM(s) Oral daily  pantoprazole    Tablet 40 milliGRAM(s) Oral before breakfast  polyethylene glycol 3350 17 Gram(s) Oral two times a day  tiotropium 2.5 MICROgram(s) Inhaler 2 Puff(s) Inhalation daily    MEDICATIONS  (PRN):  acetaminophen     Tablet .. 650 milliGRAM(s) Oral every 6 hours PRN Moderate Pain (4 - 6)  albuterol    90 MICROgram(s) HFA Inhaler 2 Puff(s) Inhalation every 6 hours PRN Shortness of Breath and/or Wheezing  benzocaine/menthol Lozenge 1 Lozenge Oral every 4 hours PRN Sore Throat    LABS:                        7.4    8.66  )-----------( 348      ( 10 Sep 2023 07:20 )             24.1     09-10    138  |  108  |  16  ----------------------------<  90  4.3   |  24  |  1.01    Ca    7.8<L>      10 Sep 2023 07:20    Urinalysis Basic - ( 10 Sep 2023 07:20 )    Color: x / Appearance: x / SG: x / pH: x  Gluc: 90 mg/dL / Ketone: x  / Bili: x / Urobili: x   Blood: x / Protein: x / Nitrite: x   Leuk Esterase: x / RBC: x / WBC x   Sq Epi: x / Non Sq Epi: x / Bacteria: x    ASSESSMENT AND PLAN:  Multifocal pneumonia.  S/P Septic shock.  Strep Pneumo Bacteremia.  Leukocytosis.  A Fib with RVR.  Anemia.  Renal Insuffiencey.  Hypokalemia.  Left peroneal+ post tibial DVT.    SPO2 in high 90s on room air.  Continue antibiotic per ID.  Being transferred to monitor bed for rapid A Fib.  Not a candidate for full anticoagulation due to SDH, and anemia requiring PRBC transfusion.  Consider Vascular evaluation for DVT.  56+ minutes spent.    08/23/23:   Awake, responsive. Complains of stomach discomfort and at times with difficult swallowing. SPO2 stable on room air.  Converted to sinus rhythm, still on Amiodarone drip.  Continue antibiotics per ID.    08/24/23:  Resting comfortably, no SOB or distress. SPO2 98% .  Converted to sinus rhythm, changed to PO Amiodarone.  Continue Vancomycin per ID.  Will change to adult dose Symbicort.    08/25/23: Awake, responsive and comfortable. SPO2 97%, no distress.  Complains of soreness in throat. Will add Cepacol lozenges.    08/26/23:  Complains of soreness in throat. Will start on Cepacol lozenges.  Getting PRBC transfusion    On Symbicort and Spiriva.  antibiotics per ID    08/27/23: Resting comfortably, no distress. SPO2 96%.  Continue treatment.    08/28/23:  Awake, responsive and comfortable.  SPO2 98%.  On Symbicort and Levaquin.    08/29/23: Weak looking, denies SOB. SPO2 97%   Over all pulmonary status close to base line.  Continue treatment.    08/30/23: Resting comfortably in bed, no distress observed.  SPO2 95% on nasal O2.  Continue treatment.  Vascular surgery follow up noted.    08/31/23: Awake, responsive and comfortable. Says breathing is up and down.  SPO2 98% on nasal O2.  Complete antibiotic per ID.  Follow H & H.    09/01/23:  Continues to complain of sore throat.   Reported by RN  to have some rectal bleed  overnight  Again getting PRBC transfusion.  Seen by GI but pt/family does not want any procedure. Before has refused IVC filter.  SPO2 96% on nasal O2.  On Levaquin per ID.    09/02/23:  Resting comfortably in bed. RN reports rectal bleed. Pt has refused any invasive intervention.  SPO2 97% on nasal O2.  Consider palliative care evaluation.    09/03/23: Awake, responsive, comfortable and feels better.  SPO2 93% on room air.  Oxygenation status stable.  Supportive care.    09/04/23: Continue to have dark BM, drop in H & H noted. Got one unit of blood.  SPO2 stable.  Continue Symbicort and Spiriva.    09/05/23:  Still with bloody BM, GI follow up noted. There is possibility that pt may have EGD/Colonoscopy.  Breathing status remains stable.  SPO2 97% on room air.  On Symbicort, Spiriva and as needed Albuterol.    09/06/23:  Awake, responsive and comfortable., wants to eat real food.  Oxygenation status stable. Continue Symbicort and Spiriva.  Did not take preparation for EGD/Colonoscopy.    09/09/23: Awake, responsive and comfortable. SPO2 stable on room air.  Continue SYMBICORT and Spiriva.    09/10/23: Comfortable in bed, no distress. SPO2 96%.  Continue treatment.

## 2023-09-10 NOTE — PROGRESS NOTE ADULT - ASSESSMENT
89 yo M w/ hx of multiple syncopal episodes presented via EMS for an unwitnessed fall w/ hypotension, hypothermia, and afib w/ RVR concerning for sepsis. He was found to have Strep pneumonia bacteremia w/ evidence of b/l pneumonia and lactic acidosis, iron deficiency anemia, stable sub-dural hematoma, ANGI, and demand ischemia.      Acute blood loss Anemia:  - hx of iron deficiency anemia, S/P total 6 units PRBC, last on 9/4  - Iron deficient, continue iron supplementation daily  - Pt had multiple episode of rectal bleed for last few days, LGIB, either hemorrhoidal vs stercoral colitis in the setting of constipation   - Gi consult appreciated, Pt now refuse colonoscopy  - AC held, PPI  - Miralax for constipation.  Regular diet.     Metabolic encephalopathy: L frontal SDH, stable.     Acute hypoxic respiratory failure: now resolved.  Symbicort and Spiriva, Albuterol PRN. All ABX completed.      Chronic Afib: s/p amio bolus and gtt for afib w/ RVR (converted), currently on sinus rthym   - troponin elevation c/w demand ischemia, Echo with Mildly decreased global left ventricular systolic function. BLAE, Mod - severe MR/TR, severe pHTN  - Not on Ac for anemia and SDH  - Continue with PO amiodarone for maintenance of NSR, Cardio following, no AC given SDH     Acute DVT : Not on full dose AC for anemia and SDH  - DVT ppx dose held for GI bleed  - repeat US today no clot progression. No need for IVC filter.      Psych consult appreciated, pt doesn't have  capacity to make decision regarding dc plan.    PT eval: [facility] or home with [facility]. Discussed with proxy Ricky [phone] twice.     Taper IVF to off, possible discharge Monday to home or rehab.       87 yo M w/ hx of multiple syncopal episodes presented via EMS for an unwitnessed fall w/ hypotension, hypothermia, and afib w/ RVR concerning for sepsis. He was found to have Strep pneumonia bacteremia w/ evidence of b/l pneumonia and lactic acidosis, iron deficiency anemia, stable sub-dural hematoma, ANGI, and demand ischemia.      Acute blood loss Anemia:  - hx of iron deficiency anemia, S/P total 6 units PRBC, last on 9/4  - Iron deficient, continue iron supplementation daily  - Pt had multiple episode of rectal bleed for last few days, LGIB, either hemorrhoidal vs stercoral colitis in the setting of constipation   - Gi consult appreciated, Pt now refuse colonoscopy  - AC held, PPI  - Miralax for constipation.  Regular diet.     Metabolic encephalopathy: L frontal SDH, stable.     Acute hypoxic respiratory failure: now resolved.  Symbicort and Spiriva, Albuterol PRN. All ABX completed.      Chronic Afib: s/p amio bolus and gtt for afib w/ RVR (converted), currently on sinus rthym   - troponin elevation c/w demand ischemia, Echo with Mildly decreased global left ventricular systolic function. BLAE, Mod - severe MR/TR, severe pHTN  - Not on Ac for anemia and SDH  - Continue with PO amiodarone for maintenance of NSR, Cardio following, no AC given SDH     Acute DVT : Not on full dose AC for anemia and SDH  - DVT ppx dose held for GI bleed  - repeat US today no clot progression. No need for IVC filter.      Psych consult appreciated, pt doesn't have  capacity to make decision regarding dc plan.    PT eval: [facility] or home with [facility]. Discussed with proxy Ricky [phone] twice.     Taper IVF to off, possible discharge Monday to home or rehab. Increased HR responded to IVF, taper to off by tomorrow.       89 yo M w/ hx of multiple syncopal episodes presented via EMS for an unwitnessed fall w/ hypotension, hypothermia, and afib w/ RVR concerning for sepsis. He was found to have Strep pneumonia bacteremia w/ evidence of b/l pneumonia and lactic acidosis, iron deficiency anemia, stable sub-dural hematoma, ANGI, and demand ischemia.      Acute blood loss Anemia:  - hx of iron deficiency anemia, S/P total 6 units PRBC, last on 9/4  - Iron deficient, continue iron supplementation daily  - Pt had multiple episode of rectal bleed for last few days, LGIB, either hemorrhoidal vs stercoral colitis in the setting of constipation   - Gi consult appreciated, Pt now refuse colonoscopy  - AC held, PPI  - Miralax for constipation.  Regular diet.     Metabolic encephalopathy: L frontal SDH, stable.     Acute hypoxic respiratory failure: now resolved.  Symbicort and Spiriva, Albuterol PRN. All ABX completed.      Chronic Afib: s/p amio bolus and gtt for afib w/ RVR (converted), currently on sinus rthym   - troponin elevation c/w demand ischemia, Echo with Mildly decreased global left ventricular systolic function. BLAE, Mod - severe MR/TR, severe pHTN  - Not on Ac for anemia and SDH  - Continue with PO amiodarone for maintenance of NSR, Cardio following, no AC given SDH     Acute DVT : Not on full dose AC for anemia and SDH  - DVT ppx dose held for GI bleed  - repeat US today no clot progression. No need for IVC filter.      Psych consult appreciated, pt doesn't have  capacity to make decision regarding dc plan.    PT eval: [facility] or home with [facility]. Discussed with proxy Ricky [phone] twice.     Stop IVF, possible discharge Monday to home or rehab. Increased HR responded to IVF.

## 2023-09-10 NOTE — PROGRESS NOTE ADULT - SUBJECTIVE AND OBJECTIVE BOX
INTERVAL HPI/OVERNIGHT EVENTS:  Pt seen and examined at bedside.     Allergies/Intolerance: No Known Allergies      MEDICATIONS  (STANDING):  aMIOdarone    Tablet 200 milliGRAM(s) Oral daily  budesonide 160 MICROgram(s)/formoterol 4.5 MICROgram(s) Inhaler 2 Puff(s) Inhalation two times a day  ferrous    sulfate 325 milliGRAM(s) Oral daily  pantoprazole    Tablet 40 milliGRAM(s) Oral before breakfast  polyethylene glycol 3350 17 Gram(s) Oral two times a day  sodium chloride 0.9%. 1000 milliLiter(s) (100 mL/Hr) IV Continuous <Continuous>  tiotropium 2.5 MICROgram(s) Inhaler 2 Puff(s) Inhalation daily    MEDICATIONS  (PRN):  acetaminophen     Tablet .. 650 milliGRAM(s) Oral every 6 hours PRN Moderate Pain (4 - 6)  albuterol    90 MICROgram(s) HFA Inhaler 2 Puff(s) Inhalation every 6 hours PRN Shortness of Breath and/or Wheezing  benzocaine/menthol Lozenge 1 Lozenge Oral every 4 hours PRN Sore Throat        ROS: all systems reviewed and wnl      PHYSICAL EXAMINATION:  Vital Signs Last 24 Hrs  T(C): 36.3 (10 Sep 2023 04:37), Max: 36.8 (09 Sep 2023 16:38)  T(F): 97.3 (10 Sep 2023 04:37), Max: 98.2 (09 Sep 2023 16:38)  HR: 80 (10 Sep 2023 04:37) (79 - 133)  BP: 119/74 (10 Sep 2023 04:37) (105/63 - 119/74)  BP(mean): --  RR: 18 (10 Sep 2023 04:37) (18 - 19)  SpO2: 97% (10 Sep 2023 04:37) (97% - 98%)    Parameters below as of 09 Sep 2023 16:38  Patient On (Oxygen Delivery Method): room air      CAPILLARY BLOOD GLUCOSE            GENERAL: prefers to stay in bed, not interested in more testing, no SOB  NECK: supple, No JVD  CHEST/LUNG: clear to auscultation bilaterally; no rales, rhonchi, or wheezing b/l  HEART: normal S1, S2  ABDOMEN: BS+, soft, ND, NT   EXTREMITIES:  pulses palpable; no clubbing, cyanosis, or edema b/l LEs  SKIN: no rashes or lesions      LABS:                        7.4    8.66  )-----------( 348      ( 10 Sep 2023 07:20 )             24.1     09-10    138  |  108  |  16  ----------------------------<  90  4.3   |  24  |  1.01    Ca    7.8<L>      10 Sep 2023 07:20        Urinalysis Basic - ( 10 Sep 2023 07:20 )    Color: x / Appearance: x / SG: x / pH: x  Gluc: 90 mg/dL / Ketone: x  / Bili: x / Urobili: x   Blood: x / Protein: x / Nitrite: x   Leuk Esterase: x / RBC: x / WBC x   Sq Epi: x / Non Sq Epi: x / Bacteria: x

## 2023-09-11 LAB
BLD GP AB SCN SERPL QL: SIGNIFICANT CHANGE UP
HCT VFR BLD CALC: 22.9 % — LOW (ref 39–50)
HGB BLD-MCNC: 7.1 G/DL — LOW (ref 13–17)
MCHC RBC-ENTMCNC: 25.6 PG — LOW (ref 27–34)
MCHC RBC-ENTMCNC: 31 G/DL — LOW (ref 32–36)
MCV RBC AUTO: 82.7 FL — SIGNIFICANT CHANGE UP (ref 80–100)
NRBC # BLD: 0 /100 WBCS — SIGNIFICANT CHANGE UP (ref 0–0)
PLATELET # BLD AUTO: 333 K/UL — SIGNIFICANT CHANGE UP (ref 150–400)
RBC # BLD: 2.77 M/UL — LOW (ref 4.2–5.8)
RBC # FLD: 25.3 % — HIGH (ref 10.3–14.5)
WBC # BLD: 7.35 K/UL — SIGNIFICANT CHANGE UP (ref 3.8–10.5)
WBC # FLD AUTO: 7.35 K/UL — SIGNIFICANT CHANGE UP (ref 3.8–10.5)

## 2023-09-11 PROCEDURE — 99232 SBSQ HOSP IP/OBS MODERATE 35: CPT

## 2023-09-11 RX ADMIN — AMIODARONE HYDROCHLORIDE 200 MILLIGRAM(S): 400 TABLET ORAL at 06:05

## 2023-09-11 RX ADMIN — BENZOCAINE AND MENTHOL 1 LOZENGE: 5; 1 LIQUID ORAL at 06:06

## 2023-09-11 RX ADMIN — Medication 325 MILLIGRAM(S): at 11:55

## 2023-09-11 RX ADMIN — BUDESONIDE AND FORMOTEROL FUMARATE DIHYDRATE 2 PUFF(S): 160; 4.5 AEROSOL RESPIRATORY (INHALATION) at 17:16

## 2023-09-11 RX ADMIN — TIOTROPIUM BROMIDE 2 PUFF(S): 18 CAPSULE ORAL; RESPIRATORY (INHALATION) at 11:56

## 2023-09-11 RX ADMIN — BUDESONIDE AND FORMOTEROL FUMARATE DIHYDRATE 2 PUFF(S): 160; 4.5 AEROSOL RESPIRATORY (INHALATION) at 06:09

## 2023-09-11 RX ADMIN — PANTOPRAZOLE SODIUM 40 MILLIGRAM(S): 20 TABLET, DELAYED RELEASE ORAL at 06:05

## 2023-09-11 NOTE — PROGRESS NOTE ADULT - ASSESSMENT
89 yo M w/ hx of multiple syncopal episodes presented via EMS for an unwitnessed fall w/ hypotension, hypothermia, and afib w/ RVR concerning for sepsis. He was found to have Strep pneumonia bacteremia w/ evidence of b/l pneumonia and lactic acidosis, iron deficiency anemia, stable sub-dural hematoma, ANGI, and demand ischemia.      Acute blood loss Anemia:  - hx of iron deficiency anemia, S/P Multiple PRBC- again hb decline - transfuse 1 more unit today  - Iron deficient, continue iron supplementation daily  - Pt had multiple episode of rectal bleed for last few days, LGIB, either hemorrhoidal vs stercoral colitis in the setting of constipation   - Gi consult appreciated, Pt and family refused colonoscopy  - AC held, c/w PPI  - Miralax for constipation.  Regular diet. No further episodes of LGIB per RN     Metabolic encephalopathy: L frontal SDH, stable.     Acute hypoxic respiratory failure: now resolved.  Symbicort and Spiriva, Albuterol PRN. All ABX completed.      Chronic Afib: s/p amio bolus and gtt for afib w/ RVR (converted), currently on sinus rthym   - troponin elevation c/w demand ischemia, Echo with Mildly decreased global left ventricular systolic function. BLAE, Mod - severe MR/TR, severe pHTN  - Not on Ac  given anemia and SDH  - Continue with PO amiodarone for maintenance of NSR, Cardio following, no AC given SDH     Acute DVT : Not on full dose AC for anemia and SDH  - DVT ppx dose held for GI bleed  - repeat US today no clot progression.     Seen by GI but pt and family does not want any procedure. Pt has refused for bowel prep and NPO multiple times, Pt and family have refused IVC filter.  per Dr Medeiros > Recommend repeat bilateral lower extremity duplex US in 1 week   Psych consult appreciated, pt doesn't have  capacity to make decision regarding dc plan.      DC to [facility] likely tomorrow if Hb stable    d/w proxy Ricky [phone]- All questions answered.

## 2023-09-11 NOTE — PROGRESS NOTE ADULT - SUBJECTIVE AND OBJECTIVE BOX
CHIEF COMPLAINT/INTERVAL HISTORY:    Patient is a 88y old  Male who presents with a chief complaint of found  down (10 Sep 2023 20:21)      HPI:  89 yo M     bibems after being found down outside.    no  pmx. pe r pt  now    per  er  chart ,on  arrival,  pt  was  minimally responsive, altered, unable to provide history.    and  had  a ectal temp   90 degrees.  No prior visits from chart review   was hypotensive/  hypotehrmic/    elevated  lactate/  acidotic, severe  anemia on  arrival/ and  ,per  ER Dr , ICU  eval  was  called/ and  was rejected /  note currently, pending    pt  unable  to  clearly   state  if  eh had  any  trauma  or how  he fell    icu  re  eval  called again by current   er  dr/  awaiting   consult   pt  seen  in  er/  awake  and  alert,  somewhat  able  to  answer  questions , though   not  fully    denies  any  cp/sob/abd  pain/  recall   bleeding  also  denies   any  cardiac  hx/  priro  strokes/  dm,  etc   states  he  lives  with  his  family/ address /  phone  numbers  of  family    currently  unknown   (20 Aug 2023 10:35)      SUBJECTIVE & OBJECTIVE: Pt seen and examined at bedside.   poor historian  confused  unable to get ROS  No melena or hematemesis per RN       Vital Signs Last 24 Hrs  T(C): 36.8 (11 Sep 2023 17:23), Max: 37.2 (11 Sep 2023 00:13)  T(F): 98.2 (11 Sep 2023 17:23), Max: 98.9 (11 Sep 2023 00:13)  HR: 68 (11 Sep 2023 17:23) (67 - 76)  BP: 120/70 (11 Sep 2023 17:23) (103/64 - 120/70)  BP(mean): --  ABP: --  ABP(mean): --  RR: 16 (11 Sep 2023 17:23) (16 - 19)  SpO2: 97% (11 Sep 2023 17:23) (95% - 99%)    O2 Parameters below as of 11 Sep 2023 17:23  Patient On (Oxygen Delivery Method): room air              MEDICATIONS  (STANDING):  aMIOdarone    Tablet 200 milliGRAM(s) Oral daily  budesonide 160 MICROgram(s)/formoterol 4.5 MICROgram(s) Inhaler 2 Puff(s) Inhalation two times a day  ferrous    sulfate 325 milliGRAM(s) Oral daily  pantoprazole    Tablet 40 milliGRAM(s) Oral before breakfast  polyethylene glycol 3350 17 Gram(s) Oral two times a day  tiotropium 2.5 MICROgram(s) Inhaler 2 Puff(s) Inhalation daily    MEDICATIONS  (PRN):  acetaminophen     Tablet .. 650 milliGRAM(s) Oral every 6 hours PRN Moderate Pain (4 - 6)  albuterol    90 MICROgram(s) HFA Inhaler 2 Puff(s) Inhalation every 6 hours PRN Shortness of Breath and/or Wheezing  benzocaine/menthol Lozenge 1 Lozenge Oral every 4 hours PRN Sore Throat        PHYSICAL EXAM:    GENERAL: NAD,malnourished  HEAD:  Atraumatic, Normocephalic  EYES: EOMI, PERRLA, conjunctiva and sclera clear, ++ pallor  ENMT: Moist mucous membranes  NECK: Supple  NERVOUS SYSTEM:  alert and awake, confused  CHEST/LUNG: Clear to auscultation bilaterally; No rales, rhonchi, wheezing, or rubs  HEART: S1, S2  ABDOMEN: Soft, Nontender,  Bowel sounds present  EXTREMITIES:  no cyanosis, or edema    LABS:                        7.1    7.35  )-----------( 333      ( 11 Sep 2023 11:05 )             22.9     09-10    138  |  108  |  16  ----------------------------<  90  4.3   |  24  |  1.01    Ca    7.8<L>      10 Sep 2023 07:20        Urinalysis Basic - ( 10 Sep 2023 07:20 )    Color: x / Appearance: x / SG: x / pH: x  Gluc: 90 mg/dL / Ketone: x  / Bili: x / Urobili: x   Blood: x / Protein: x / Nitrite: x   Leuk Esterase: x / RBC: x / WBC x   Sq Epi: x / Non Sq Epi: x / Bacteria: x

## 2023-09-11 NOTE — PROGRESS NOTE ADULT - SUBJECTIVE AND OBJECTIVE BOX
INTERVAL HPI:   89 yo M with multiple syncopal episodes per chart notes,  on no home meds   Brought by EMS after being found down on street minimally responsive (8/20/23).   In the ED, was hypotensive, hypothermic (rectal T 90F), and tachycardic (afib w/ RVR).   Labs were remarkable for anemia (Hgb 6.1), leukocytosis w/ 25% band, lactic acidosis (lactate 14.4), ANGI, and elevated troponin   Got treated w/ vanc/zosyn, fluids, 1U pRBCs, IV amiodarone bolus (resolved arrhythmia), and a non-rebreather mask.   CT imaging showed an age-indeterminate L frontal sub-dural hematoma (4mm) and lung findings with pneumonia and emphysema/ILD.   RVP was entero/rhinovirus positive.   He was switched to ceftriaxone/azithromycin, was started on ipratropium nebulizers, and got  admitted to the ICU for new pressor requirement (norepinephrine).    During his ICU course, his blood cultures grew Strep pneumonia (on appropriate abx), lower extremity duplex study showed L tibioperoneal trunk and proximal posterior tibial vein DVTs (on heparin ppx), norepinephrine was weaned off w/ adequate MAPs,   Repeat CT head showed stable sub-dural hematoma, and iron supplementation was started.   08/21/23:  Got transferred to regular medical floor.  08/22/23:  At time of my evaluation, awake, responsive. Denies SOB, cough, sputum production or chest pain.  Admits being a smoker but says quit about a year ago.  Again in rapid A Fib and being transferred to monitor bed. Seen by Cardiology.    OVERNIGHT EVENTS:  Continue with slow rectal bleed, and drop in H & H.. Again receiving PRBC.  SPO2 97% on room air.    Vital Signs Last 24 Hrs  T(C): 37.1 (11 Sep 2023 20:50), Max: 37.2 (11 Sep 2023 00:13)  T(F): 98.8 (11 Sep 2023 20:50), Max: 98.9 (11 Sep 2023 00:13)  HR: 64 (11 Sep 2023 20:50) (64 - 76)  BP: 118/64 (11 Sep 2023 20:50) (103/64 - 120/70)  BP(mean): --  RR: 18 (11 Sep 2023 20:50) (16 - 19)  SpO2: 97% (11 Sep 2023 20:50) (95% - 99%)    Parameters below as of 11 Sep 2023 20:50  Patient On (Oxygen Delivery Method): room air    PHYSICAL EXAM:  GEN:         Awake, responsive and comfortable.  HEENT:    Normal.    RESP:        No distress  CVS:          Regular rate and rhythm.     MEDICATIONS  (STANDING):  aMIOdarone    Tablet 200 milliGRAM(s) Oral daily  budesonide 160 MICROgram(s)/formoterol 4.5 MICROgram(s) Inhaler 2 Puff(s) Inhalation two times a day  ferrous    sulfate 325 milliGRAM(s) Oral daily  pantoprazole    Tablet 40 milliGRAM(s) Oral before breakfast  polyethylene glycol 3350 17 Gram(s) Oral two times a day  tiotropium 2.5 MICROgram(s) Inhaler 2 Puff(s) Inhalation daily    MEDICATIONS  (PRN):  acetaminophen     Tablet .. 650 milliGRAM(s) Oral every 6 hours PRN Moderate Pain (4 - 6)  albuterol    90 MICROgram(s) HFA Inhaler 2 Puff(s) Inhalation every 6 hours PRN Shortness of Breath and/or Wheezing  benzocaine/menthol Lozenge 1 Lozenge Oral every 4 hours PRN Sore Throat    LABS:                        7.1    7.35  )-----------( 333      ( 11 Sep 2023 11:05 )             22.9     09-10    138  |  108  |  16  ----------------------------<  90  4.3   |  24  |  1.01    Ca    7.8<L>      10 Sep 2023 07:20    Urinalysis Basic - ( 10 Sep 2023 07:20 )    Color: x / Appearance: x / SG: x / pH: x  Gluc: 90 mg/dL / Ketone: x  / Bili: x / Urobili: x   Blood: x / Protein: x / Nitrite: x   Leuk Esterase: x / RBC: x / WBC x   Sq Epi: x / Non Sq Epi: x / Bacteria: x    ASSESSMENT AND PLAN:  Multifocal pneumonia.  S/P Septic shock.  Strep Pneumo Bacteremia.  Leukocytosis.  A Fib with RVR.  Anemia.  Renal Insuffiencey.  Hypokalemia.  Left peroneal+ post tibial DVT.    SPO2 in high 90s on room air.  Continue antibiotic per ID.  Being transferred to monitor bed for rapid A Fib.  Not a candidate for full anticoagulation due to SDH, and anemia requiring PRBC transfusion.  Consider Vascular evaluation for DVT.  56+ minutes spent.    08/23/23:   Awake, responsive. Complains of stomach discomfort and at times with difficult swallowing. SPO2 stable on room air.  Converted to sinus rhythm, still on Amiodarone drip.  Continue antibiotics per ID.    08/24/23:  Resting comfortably, no SOB or distress. SPO2 98% .  Converted to sinus rhythm, changed to PO Amiodarone.  Continue Vancomycin per ID.  Will change to adult dose Symbicort.    08/25/23: Awake, responsive and comfortable. SPO2 97%, no distress.  Complains of soreness in throat. Will add Cepacol lozenges.    08/26/23:  Complains of soreness in throat. Will start on Cepacol lozenges.  Getting PRBC transfusion    On Symbicort and Spiriva.  antibiotics per ID    08/27/23: Resting comfortably, no distress. SPO2 96%.  Continue treatment.    08/28/23:  Awake, responsive and comfortable.  SPO2 98%.  On Symbicort and Levaquin.    08/29/23: Weak looking, denies SOB. SPO2 97%   Over all pulmonary status close to base line.  Continue treatment.    08/30/23: Resting comfortably in bed, no distress observed.  SPO2 95% on nasal O2.  Continue treatment.  Vascular surgery follow up noted.    08/31/23: Awake, responsive and comfortable. Says breathing is up and down.  SPO2 98% on nasal O2.  Complete antibiotic per ID.  Follow H & H.    09/01/23:  Continues to complain of sore throat.   Reported by RN  to have some rectal bleed  overnight  Again getting PRBC transfusion.  Seen by GI but pt/family does not want any procedure. Before has refused IVC filter.  SPO2 96% on nasal O2.  On Levaquin per ID.    09/02/23:  Resting comfortably in bed. RN reports rectal bleed. Pt has refused any invasive intervention.  SPO2 97% on nasal O2.  Consider palliative care evaluation.    09/03/23: Awake, responsive, comfortable and feels better.  SPO2 93% on room air.  Oxygenation status stable.  Supportive care.    09/04/23: Continue to have dark BM, drop in H & H noted. Got one unit of blood.  SPO2 stable.  Continue Symbicort and Spiriva.    09/05/23:  Still with bloody BM, GI follow up noted. There is possibility that pt may have EGD/Colonoscopy.  Breathing status remains stable.  SPO2 97% on room air.  On Symbicort, Spiriva and as needed Albuterol.    09/06/23:  Awake, responsive and comfortable., wants to eat real food.  Oxygenation status stable. Continue Symbicort and Spiriva.  Did not take preparation for EGD/Colonoscopy.    09/09/23: Awake, responsive and comfortable. SPO2 stable on room air.  Continue SYMBICORT and Spiriva.    09/10/23: Comfortable in bed, no distress. SPO2 96%.  Continue treatment.    09/11/23: Continue with slow rectal bleed, and drop in H & H.. Again receiving PRBC.  SPO2 97% on room air.  So for has refused colonoscopy.

## 2023-09-11 NOTE — PROGRESS NOTE ADULT - NUTRITIONAL ASSESSMENT
This patient has been assessed with a concern for Malnutrition and has been determined to have a diagnosis/diagnoses of Severe protein-calorie malnutrition and Underweight (BMI < 19).    This patient is being managed with:   Diet Easy to Chew-  Supplement Feeding Modality:  Oral  Ensure Plus High Protein Cans or Servings Per Day:  1       Frequency:  Three Times a day  Entered: Aug 22 2023 11:55AM  
This patient has been assessed with a concern for Malnutrition and has been determined to have a diagnosis/diagnoses of Severe protein-calorie malnutrition and Underweight (BMI < 19).    This patient is being managed with:   Diet Easy to Chew-  Supplement Feeding Modality:  Oral  Ensure Plus High Protein Cans or Servings Per Day:  1       Frequency:  Three Times a day  Entered: Sep  7 2023 10:24AM  
This patient has been assessed with a concern for Malnutrition and has been determined to have a diagnosis/diagnoses of Severe protein-calorie malnutrition and Underweight (BMI < 19).    This patient is being managed with:   Diet Easy to Chew-  Supplement Feeding Modality:  Oral  Ensure Plus High Protein Cans or Servings Per Day:  1       Frequency:  Three Times a day  Entered: Aug 22 2023 11:55AM  
This patient has been assessed with a concern for Malnutrition and has been determined to have a diagnosis/diagnoses of Severe protein-calorie malnutrition and Underweight (BMI < 19).    This patient is being managed with:   Diet Clear Liquid-  Entered: Sep  4 2023  1:39PM  
This patient has been assessed with a concern for Malnutrition and has been determined to have a diagnosis/diagnoses of Severe protein-calorie malnutrition and Underweight (BMI < 19).    This patient is being managed with:   Diet Clear Liquid-  Entered: Sep  4 2023  1:39PM  
This patient has been assessed with a concern for Malnutrition and has been determined to have a diagnosis/diagnoses of Severe protein-calorie malnutrition and Underweight (BMI < 19).    This patient is being managed with:   Diet Easy to Chew-  Supplement Feeding Modality:  Oral  Ensure Plus High Protein Cans or Servings Per Day:  1       Frequency:  Three Times a day  Entered: Aug 22 2023 11:55AM  
This patient has been assessed with a concern for Malnutrition and has been determined to have a diagnosis/diagnoses of Underweight (BMI < 19) and Severe protein-calorie malnutrition.    This patient is being managed with:   Diet Easy to Chew-  Supplement Feeding Modality:  Oral  Ensure Plus High Protein Cans or Servings Per Day:  1       Frequency:  Three Times a day  Entered: Aug 22 2023 11:55AM

## 2023-09-12 VITALS
SYSTOLIC BLOOD PRESSURE: 145 MMHG | DIASTOLIC BLOOD PRESSURE: 65 MMHG | RESPIRATION RATE: 18 BRPM | HEART RATE: 74 BPM | OXYGEN SATURATION: 97 % | TEMPERATURE: 97 F

## 2023-09-12 LAB
HCT VFR BLD CALC: 27.6 % — LOW (ref 39–50)
HGB BLD-MCNC: 9 G/DL — LOW (ref 13–17)
MCHC RBC-ENTMCNC: 26.6 PG — LOW (ref 27–34)
MCHC RBC-ENTMCNC: 32.6 G/DL — SIGNIFICANT CHANGE UP (ref 32–36)
MCV RBC AUTO: 81.7 FL — SIGNIFICANT CHANGE UP (ref 80–100)
NRBC # BLD: 0 /100 WBCS — SIGNIFICANT CHANGE UP (ref 0–0)
PLATELET # BLD AUTO: 445 K/UL — HIGH (ref 150–400)
RBC # BLD: 3.38 M/UL — LOW (ref 4.2–5.8)
RBC # FLD: 23.2 % — HIGH (ref 10.3–14.5)
WBC # BLD: 7.47 K/UL — SIGNIFICANT CHANGE UP (ref 3.8–10.5)
WBC # FLD AUTO: 7.47 K/UL — SIGNIFICANT CHANGE UP (ref 3.8–10.5)

## 2023-09-12 PROCEDURE — 99239 HOSP IP/OBS DSCHRG MGMT >30: CPT

## 2023-09-12 RX ORDER — ALBUTEROL 90 UG/1
2 AEROSOL, METERED ORAL
Qty: 0 | Refills: 0 | DISCHARGE
Start: 2023-09-12

## 2023-09-12 RX ORDER — ACETAMINOPHEN 500 MG
2 TABLET ORAL
Qty: 0 | Refills: 0 | DISCHARGE
Start: 2023-09-12

## 2023-09-12 RX ORDER — FERROUS SULFATE 325(65) MG
1 TABLET ORAL
Qty: 0 | Refills: 0 | DISCHARGE
Start: 2023-09-12

## 2023-09-12 RX ORDER — AMIODARONE HYDROCHLORIDE 400 MG/1
1 TABLET ORAL
Qty: 0 | Refills: 0 | DISCHARGE
Start: 2023-09-12

## 2023-09-12 RX ORDER — POLYETHYLENE GLYCOL 3350 17 G/17G
17 POWDER, FOR SOLUTION ORAL
Qty: 0 | Refills: 0 | DISCHARGE
Start: 2023-09-12

## 2023-09-12 RX ORDER — TIOTROPIUM BROMIDE 18 UG/1
2 CAPSULE ORAL; RESPIRATORY (INHALATION)
Qty: 0 | Refills: 0 | DISCHARGE
Start: 2023-09-12

## 2023-09-12 RX ORDER — PANTOPRAZOLE SODIUM 20 MG/1
1 TABLET, DELAYED RELEASE ORAL
Qty: 0 | Refills: 0 | DISCHARGE
Start: 2023-09-12

## 2023-09-12 RX ORDER — BUDESONIDE AND FORMOTEROL FUMARATE DIHYDRATE 160; 4.5 UG/1; UG/1
2 AEROSOL RESPIRATORY (INHALATION)
Qty: 0 | Refills: 0 | DISCHARGE
Start: 2023-09-12

## 2023-09-12 RX ADMIN — TIOTROPIUM BROMIDE 2 PUFF(S): 18 CAPSULE ORAL; RESPIRATORY (INHALATION) at 11:37

## 2023-09-12 RX ADMIN — BUDESONIDE AND FORMOTEROL FUMARATE DIHYDRATE 2 PUFF(S): 160; 4.5 AEROSOL RESPIRATORY (INHALATION) at 18:13

## 2023-09-12 RX ADMIN — AMIODARONE HYDROCHLORIDE 200 MILLIGRAM(S): 400 TABLET ORAL at 06:01

## 2023-09-12 RX ADMIN — PANTOPRAZOLE SODIUM 40 MILLIGRAM(S): 20 TABLET, DELAYED RELEASE ORAL at 06:01

## 2023-09-12 RX ADMIN — BUDESONIDE AND FORMOTEROL FUMARATE DIHYDRATE 2 PUFF(S): 160; 4.5 AEROSOL RESPIRATORY (INHALATION) at 06:04

## 2023-09-12 RX ADMIN — POLYETHYLENE GLYCOL 3350 17 GRAM(S): 17 POWDER, FOR SOLUTION ORAL at 18:11

## 2023-09-12 RX ADMIN — Medication 325 MILLIGRAM(S): at 11:37

## 2023-09-12 NOTE — DISCHARGE NOTE PROVIDER - HOSPITAL COURSE
89 yo M w/ hx of multiple syncopal episodes presented via EMS for an unwitnessed fall w/ hypotension, hypothermia, and afib w/ RVR concerning for sepsis. He was found to have Strep pneumonia bacteremia w/ evidence of b/l pneumonia and lactic acidosis, iron deficiency anemia, stable sub-dural hematoma, AGNI, and demand ischemia.      Acute blood loss Anemia:  - hx of iron deficiency anemia, S/P Multiple PRBC- recent PRBC yesterday 9/11- post transf hb improved- Monitor CBC/ hemoglobin frequently.  - Iron deficient, continue iron supplementation daily  - Pt had multiple episode of rectal bleed for last few days, LGIB, either hemorrhoidal vs stercoral colitis in the setting of constipation - NO more bleed for last 2 to 3 days per nursing staff.   - Gi consult appreciated, Pt and family refused colonoscopy  - AC held, c/w PPI  - Miralax for constipation.  Regular diet. No further episodes of LGIB per RN     encephalopathy: L frontal SDH, stable.     Acute hypoxic respiratory failure: now resolved.  Symbicort and Spiriva, Albuterol PRN. All ABX completed. seen by pulm.      Chronic Afib: s/p amio bolus and gtt for afib w/ RVR (converted), currently on sinus rthym   - troponin elevation c/w demand ischemia, Echo with Mildly decreased global left ventricular systolic function. BLAE, Mod - severe MR/TR, severe pHTN  - Not on Ac  given anemia and SDH- family / daughter is aware.   - Continue with PO amiodarone for maintenance of NSR, seen by cardio, no AC given SDH     Acute DVT : Not on  AC for anemia and SDH and GI bleed  - DVT ppx dose held for GI bleed  - repeat US  no clot progression. seen by IR- serial venous duplex to be done to monitor dvt progression-    Seen by GI but pt and family does not want any procedure. Pt has refused for bowel prep and NPO multiple times, Pt and family have refused IVC filter.  per Dr Medeiros > Recommend repeat bilateral lower extremity duplex US in 1 week   Psych consult appreciated, pt doesn't have  capacity to make decision   DC to [facility] today  d/w proxy Neena [phone]- All questions answered. she is made aware that pt is at   high risk for worsening anemia , PE and stroke.     Pt was seen by IR- per IR >>> "Acute DVT is below the knee, low risk of PE.   - IVC filter can also be nidus for thrombus formation, not indicated for below the knee DVT.   - Recommending continued conservative management and serial venous duplex to monitor clot propagation.   - If repeat duplex shows DVT propagation above the knee,  may consider IVCF. "    pt at high risk for readmission,

## 2023-09-12 NOTE — DISCHARGE NOTE NURSING/CASE MANAGEMENT/SOCIAL WORK - PATIENT PORTAL LINK FT
You can access the FollowMyHealth Patient Portal offered by [facility] by registering at the following website: [email or web address]. By joining to be’s FollowMyHealth portal, you will also be able to view your health information using other applications (apps) compatible with our system.

## 2023-09-12 NOTE — DISCHARGE NOTE PROVIDER - NSDCMRMEDTOKEN_GEN_ALL_CORE_FT
acetaminophen 325 mg oral tablet: 2 tab(s) orally every 6 hours As needed Moderate Pain (4 - 6)  albuterol 90 mcg/inh inhalation aerosol: 2 puff(s) inhaled every 6 hours As needed Shortness of Breath and/or Wheezing  amiodarone 200 mg oral tablet: 1 tab(s) orally once a day  budesonide-formoterol 160 mcg-4.5 mcg/inh inhalation aerosol: 2 puff(s) inhaled 2 times a day  ferrous sulfate 325 mg (65 mg elemental iron) oral tablet: 1 tab(s) orally once a day  pantoprazole 40 mg oral delayed release tablet: 1 tab(s) orally once a day (before a meal)  polyethylene glycol 3350 oral powder for reconstitution: 17 gram(s) orally 2 times a day  tiotropium 2.5 mcg/inh inhalation aerosol: 2 puff(s) inhaled once a day

## 2023-09-12 NOTE — DISCHARGE NOTE PROVIDER - NSDCCPCAREPLAN_GEN_ALL_CORE_FT
PRINCIPAL DISCHARGE DIAGNOSIS  Diagnosis: AMS (altered mental status)  Assessment and Plan of Treatment:       SECONDARY DISCHARGE DIAGNOSES  Diagnosis: Sepsis  Assessment and Plan of Treatment:     Diagnosis: Anemia requiring transfusions  Assessment and Plan of Treatment:     Diagnosis: ANGI (acute kidney injury)  Assessment and Plan of Treatment:     Diagnosis: Elevated troponin  Assessment and Plan of Treatment:     Diagnosis: Induced hypothermia  Assessment and Plan of Treatment:

## 2023-09-12 NOTE — DISCHARGE NOTE PROVIDER - CARE PROVIDERS DIRECT ADDRESSES
,DirectAddress_Unknown,DirectAddress_Unknown,[email or web address],DirectAddress_Unknown

## 2023-09-12 NOTE — DISCHARGE NOTE PROVIDER - PROVIDER TOKENS
PROVIDER:[TOKEN:[[id number]],FOLLOWUP:[2 weeks]],PROVIDER:[TOKEN:[[id number]],FOLLOWUP:[2 weeks]],PROVIDER:[TOKEN:[[id number]],FOLLOWUP:[1 week]],PROVIDER:[TOKEN:[[id number]],FOLLOWUP:[1 week]]

## 2023-09-12 NOTE — DISCHARGE NOTE PROVIDER - NSDCFUADDAPPT_GEN_ALL_CORE_FT
-Follow with GI if plans to get colonoscopy    -Repeat Venous Ultrasound of both lower extremities / venous doppler every week to follow on DVT progression- if progression of DVT then Contact IR to get IVC filter to prevent PE     -cardiac follow up for outpatient further cardiac work up per Dr Martinez

## 2023-09-12 NOTE — PROGRESS NOTE ADULT - SUBJECTIVE AND OBJECTIVE BOX
INTERVAL HPI:  89 yo M with multiple syncopal episodes per chart notes,  on no home meds   Brought by EMS after being found down on street minimally responsive (8/20/23).   In the ED, was hypotensive, hypothermic (rectal T 90F), and tachycardic (afib w/ RVR).   Labs were remarkable for anemia (Hgb 6.1), leukocytosis w/ 25% band, lactic acidosis (lactate 14.4), ANGI, and elevated troponin   Got treated w/ vanc/zosyn, fluids, 1U pRBCs, IV amiodarone bolus (resolved arrhythmia), and a non-rebreather mask.   CT imaging showed an age-indeterminate L frontal sub-dural hematoma (4mm) and lung findings with pneumonia and emphysema/ILD.   RVP was entero/rhinovirus positive.   He was switched to ceftriaxone/azithromycin, was started on ipratropium nebulizers, and got  admitted to the ICU for new pressor requirement (norepinephrine).    During his ICU course, his blood cultures grew Strep pneumonia (on appropriate abx), lower extremity duplex study showed L tibioperoneal trunk and proximal posterior tibial vein DVTs (on heparin ppx), norepinephrine was weaned off w/ adequate MAPs,   Repeat CT head showed stable sub-dural hematoma, and iron supplementation was started.   08/21/23:  Got transferred to regular medical floor.  08/22/23:  At time of my evaluation, awake, responsive. Denies SOB, cough, sputum production or chest pain.  Admits being a smoker but says quit about a year ago.  Again in rapid A Fib and being transferred to monitor bed. Seen by Cardiology.    OVERNIGHT EVENTS:  Resting and comfortable    Vital Signs Last 24 Hrs  T(C): 36.8 (12 Sep 2023 10:22), Max: 37.1 (11 Sep 2023 20:50)  T(F): 98.3 (12 Sep 2023 10:22), Max: 98.8 (11 Sep 2023 20:50)  HR: 74 (12 Sep 2023 10:22) (64 - 74)  BP: 118/58 (12 Sep 2023 10:22) (105/60 - 129/66)  BP(mean): --  RR: 17 (12 Sep 2023 10:22) (16 - 18)  SpO2: 98% (12 Sep 2023 10:22) (95% - 98%)    Parameters below as of 12 Sep 2023 11:55  Patient On (Oxygen Delivery Method): room air    PHYSICAL EXAM:  GEN:        Resting, comfortable.  HEENT:    Normal.    RESP:      no distress  CVS:         Regular rate and rhythm.     MEDICATIONS  (STANDING):  aMIOdarone    Tablet 200 milliGRAM(s) Oral daily  budesonide 160 MICROgram(s)/formoterol 4.5 MICROgram(s) Inhaler 2 Puff(s) Inhalation two times a day  ferrous    sulfate 325 milliGRAM(s) Oral daily  pantoprazole    Tablet 40 milliGRAM(s) Oral before breakfast  polyethylene glycol 3350 17 Gram(s) Oral two times a day  tiotropium 2.5 MICROgram(s) Inhaler 2 Puff(s) Inhalation daily    MEDICATIONS  (PRN):  acetaminophen     Tablet .. 650 milliGRAM(s) Oral every 6 hours PRN Moderate Pain (4 - 6)  albuterol    90 MICROgram(s) HFA Inhaler 2 Puff(s) Inhalation every 6 hours PRN Shortness of Breath and/or Wheezing  benzocaine/menthol Lozenge 1 Lozenge Oral every 4 hours PRN Sore Throat    LABS:                        9.0    7.47  )-----------( 445      ( 12 Sep 2023 10:09 )             27.6     ASSESSMENT AND PLAN:  Multifocal pneumonia.  S/P Septic shock.  Strep Pneumo Bacteremia.  Leukocytosis.  A Fib with RVR.  Anemia.  Renal Insuffiencey.  Hypokalemia.  Left peroneal+ post tibial DVT.    SPO2 in high 90s on room air.  Continue antibiotic per ID.  Being transferred to monitor bed for rapid A Fib.  Not a candidate for full anticoagulation due to SDH, and anemia requiring PRBC transfusion.  Consider Vascular evaluation for DVT.  56+ minutes spent.    08/23/23:   Awake, responsive. Complains of stomach discomfort and at times with difficult swallowing. SPO2 stable on room air.  Converted to sinus rhythm, still on Amiodarone drip.  Continue antibiotics per ID.    08/24/23:  Resting comfortably, no SOB or distress. SPO2 98% .  Converted to sinus rhythm, changed to PO Amiodarone.  Continue Vancomycin per ID.  Will change to adult dose Symbicort.    08/25/23: Awake, responsive and comfortable. SPO2 97%, no distress.  Complains of soreness in throat. Will add Cepacol lozenges.    08/26/23:  Complains of soreness in throat. Will start on Cepacol lozenges.  Getting PRBC transfusion    On Symbicort and Spiriva.  antibiotics per ID    08/27/23: Resting comfortably, no distress. SPO2 96%.  Continue treatment.    08/28/23:  Awake, responsive and comfortable.  SPO2 98%.  On Symbicort and Levaquin.    08/29/23: Weak looking, denies SOB. SPO2 97%   Over all pulmonary status close to base line.  Continue treatment.    08/30/23: Resting comfortably in bed, no distress observed.  SPO2 95% on nasal O2.  Continue treatment.  Vascular surgery follow up noted.    08/31/23: Awake, responsive and comfortable. Says breathing is up and down.  SPO2 98% on nasal O2.  Complete antibiotic per ID.  Follow H & H.    09/01/23:  Continues to complain of sore throat.   Reported by RN  to have some rectal bleed  overnight  Again getting PRBC transfusion.  Seen by GI but pt/family does not want any procedure. Before has refused IVC filter.  SPO2 96% on nasal O2.  On Levaquin per ID.    09/02/23:  Resting comfortably in bed. RN reports rectal bleed. Pt has refused any invasive intervention.  SPO2 97% on nasal O2.  Consider palliative care evaluation.    09/03/23: Awake, responsive, comfortable and feels better.  SPO2 93% on room air.  Oxygenation status stable.  Supportive care.    09/04/23: Continue to have dark BM, drop in H & H noted. Got one unit of blood.  SPO2 stable.  Continue Symbicort and Spiriva.    09/05/23:  Still with bloody BM, GI follow up noted. There is possibility that pt may have EGD/Colonoscopy.  Breathing status remains stable.  SPO2 97% on room air.  On Symbicort, Spiriva and as needed Albuterol.    09/06/23:  Awake, responsive and comfortable., wants to eat real food.  Oxygenation status stable. Continue Symbicort and Spiriva.  Did not take preparation for EGD/Colonoscopy.    09/09/23: Awake, responsive and comfortable. SPO2 stable on room air.  Continue SYMBICORT and Spiriva.    09/10/23: Comfortable in bed, no distress. SPO2 96%.  Continue treatment.    09/11/23: Continue with slow rectal bleed, and drop in H & H.. Again receiving PRBC.  SPO2 97% on room air.  So for has refused colonoscopy.    09/12/23:  Resting and comfortable. SPO2 (8%.  Pulmonary stable for discharge.

## 2023-09-12 NOTE — PROGRESS NOTE ADULT - REASON FOR ADMISSION
found  down

## 2023-09-12 NOTE — DISCHARGE NOTE NURSING/CASE MANAGEMENT/SOCIAL WORK - NSDCPEFALRISK_GEN_ALL_CORE
For information on Fall & Injury Prevention, visit: [email or web address] OR  [email or web address] OR  https://www.cdc.gov/steadi/patient.html

## 2023-09-12 NOTE — DISCHARGE NOTE PROVIDER - ATTENDING DISCHARGE PHYSICAL EXAMINATION:
Vital Signs Last 24 Hrs  T(C): 36.8 (12 Sep 2023 10:22), Max: 37.1 (11 Sep 2023 20:50)  T(F): 98.3 (12 Sep 2023 10:22), Max: 98.8 (11 Sep 2023 20:50)  HR: 74 (12 Sep 2023 10:22) (64 - 74)  BP: 118/58 (12 Sep 2023 10:22) (105/60 - 129/66)  BP(mean): --  ABP: --  ABP(mean): --  RR: 17 (12 Sep 2023 10:22) (16 - 18)  SpO2: 98% (12 Sep 2023 10:22) (95% - 98%)    O2 Parameters below as of 12 Sep 2023 11:55  Patient On (Oxygen Delivery Method): room air    Gen: NAD, Alert/ awake  Heent: perrla, eomi  neck: supple  CVS: S1, S2  Lungs: CTA b/l  Abd: soft, non tender, BS +  extr: no LE edema  Neuro: alert/ awake, confused base line

## 2023-09-12 NOTE — DISCHARGE NOTE PROVIDER - CARE PROVIDER_API CALL
Lydia Curran  Pulmonary Disease  [address], [address]  [place] [address]  Phone: [phone]  Fax: [phone]  Follow Up Time: 2 weeks    Eva Seals  Gastroenterology  [address] [place] [address]  Phone: [phone]  Fax: [phone]  Follow Up Time: 2 weeks    Bradley Martinez  Cardiology  [address] [place] [address]  Phone: [phone]  Fax: [phone]  Follow Up Time: 1 week    Alvaro Medeiros  Vascular Surgery  [address] [place] [address]  Phone: [phone]  Fax: [phone]  Follow Up Time: 1 week

## 2023-09-15 DIAGNOSIS — I24.8 OTHER FORMS OF ACUTE ISCHEMIC HEART DISEASE: ICD-10-CM

## 2023-09-15 DIAGNOSIS — J18.9 PNEUMONIA, UNSPECIFIED ORGANISM: ICD-10-CM

## 2023-09-15 DIAGNOSIS — K59.00 CONSTIPATION, UNSPECIFIED: ICD-10-CM

## 2023-09-15 DIAGNOSIS — I82.462 ACUTE EMBOLISM AND THROMBOSIS OF LEFT CALF MUSCULAR VEIN: ICD-10-CM

## 2023-09-15 DIAGNOSIS — A40.9 STREPTOCOCCAL SEPSIS, UNSPECIFIED: ICD-10-CM

## 2023-09-15 DIAGNOSIS — A41.9 SEPSIS, UNSPECIFIED ORGANISM: ICD-10-CM

## 2023-09-15 DIAGNOSIS — R65.21 SEVERE SEPSIS WITH SEPTIC SHOCK: ICD-10-CM

## 2023-09-15 DIAGNOSIS — N17.0 ACUTE KIDNEY FAILURE WITH TUBULAR NECROSIS: ICD-10-CM

## 2023-09-15 DIAGNOSIS — I48.20 CHRONIC ATRIAL FIBRILLATION, UNSPECIFIED: ICD-10-CM

## 2023-09-15 DIAGNOSIS — B95.2 ENTEROCOCCUS AS THE CAUSE OF DISEASES CLASSIFIED ELSEWHERE: ICD-10-CM

## 2023-09-15 DIAGNOSIS — E87.6 HYPOKALEMIA: ICD-10-CM

## 2023-09-15 DIAGNOSIS — E43 UNSPECIFIED SEVERE PROTEIN-CALORIE MALNUTRITION: ICD-10-CM

## 2023-09-15 DIAGNOSIS — B97.89 OTHER VIRAL AGENTS AS THE CAUSE OF DISEASES CLASSIFIED ELSEWHERE: ICD-10-CM

## 2023-09-15 DIAGNOSIS — D50.9 IRON DEFICIENCY ANEMIA, UNSPECIFIED: ICD-10-CM

## 2023-09-15 DIAGNOSIS — D62 ACUTE POSTHEMORRHAGIC ANEMIA: ICD-10-CM

## 2023-09-15 DIAGNOSIS — I62.03 NONTRAUMATIC CHRONIC SUBDURAL HEMORRHAGE: ICD-10-CM

## 2023-09-15 DIAGNOSIS — I27.20 PULMONARY HYPERTENSION, UNSPECIFIED: ICD-10-CM

## 2023-09-15 DIAGNOSIS — E87.20 ACIDOSIS, UNSPECIFIED: ICD-10-CM

## 2023-09-15 DIAGNOSIS — K92.2 GASTROINTESTINAL HEMORRHAGE, UNSPECIFIED: ICD-10-CM

## 2023-09-15 DIAGNOSIS — R64 CACHEXIA: ICD-10-CM

## 2023-09-15 DIAGNOSIS — G93.41 METABOLIC ENCEPHALOPATHY: ICD-10-CM

## 2023-09-15 DIAGNOSIS — K52.89 OTHER SPECIFIED NONINFECTIVE GASTROENTERITIS AND COLITIS: ICD-10-CM

## 2023-09-15 DIAGNOSIS — I82.452 ACUTE EMBOLISM AND THROMBOSIS OF LEFT PERONEAL VEIN: ICD-10-CM

## 2023-09-15 DIAGNOSIS — I82.442 ACUTE EMBOLISM AND THROMBOSIS OF LEFT TIBIAL VEIN: ICD-10-CM

## 2023-09-15 DIAGNOSIS — J96.01 ACUTE RESPIRATORY FAILURE WITH HYPOXIA: ICD-10-CM

## 2023-09-15 DIAGNOSIS — Z20.822 CONTACT WITH AND (SUSPECTED) EXPOSURE TO COVID-19: ICD-10-CM

## 2023-09-15 DIAGNOSIS — F17.210 NICOTINE DEPENDENCE, CIGARETTES, UNCOMPLICATED: ICD-10-CM

## 2023-09-15 DIAGNOSIS — I08.1 RHEUMATIC DISORDERS OF BOTH MITRAL AND TRICUSPID VALVES: ICD-10-CM

## 2023-09-15 DIAGNOSIS — J43.9 EMPHYSEMA, UNSPECIFIED: ICD-10-CM

## 2023-09-15 DIAGNOSIS — R13.10 DYSPHAGIA, UNSPECIFIED: ICD-10-CM

## 2024-06-14 NOTE — PHYSICAL THERAPY INITIAL EVALUATION ADULT - PATIENT PROFILE REVIEW, REHAB EVAL
Patient calling to schedule a colonoscopy. Patient scheduled for 10/4/2024 with DR. Bonilla at the [facility]. Patient is ASC eligible.   
yes
